# Patient Record
Sex: MALE | Race: WHITE | URBAN - METROPOLITAN AREA
[De-identification: names, ages, dates, MRNs, and addresses within clinical notes are randomized per-mention and may not be internally consistent; named-entity substitution may affect disease eponyms.]

---

## 2017-08-09 ENCOUNTER — OUTPATIENT (OUTPATIENT)
Dept: OUTPATIENT SERVICES | Facility: HOSPITAL | Age: 67
LOS: 1 days | End: 2017-08-09
Payer: COMMERCIAL

## 2017-08-09 VITALS
RESPIRATION RATE: 18 BRPM | TEMPERATURE: 97 F | OXYGEN SATURATION: 99 % | HEIGHT: 66 IN | DIASTOLIC BLOOD PRESSURE: 80 MMHG | WEIGHT: 154.1 LBS | SYSTOLIC BLOOD PRESSURE: 150 MMHG | HEART RATE: 64 BPM

## 2017-08-09 DIAGNOSIS — S83.242A OTHER TEAR OF MEDIAL MENISCUS, CURRENT INJURY, LEFT KNEE, INITIAL ENCOUNTER: ICD-10-CM

## 2017-08-09 DIAGNOSIS — I74.3 EMBOLISM AND THROMBOSIS OF ARTERIES OF THE LOWER EXTREMITIES: Chronic | ICD-10-CM

## 2017-08-09 DIAGNOSIS — I10 ESSENTIAL (PRIMARY) HYPERTENSION: ICD-10-CM

## 2017-08-09 DIAGNOSIS — I73.9 PERIPHERAL VASCULAR DISEASE, UNSPECIFIED: ICD-10-CM

## 2017-08-09 DIAGNOSIS — I73.9 PERIPHERAL VASCULAR DISEASE, UNSPECIFIED: Chronic | ICD-10-CM

## 2017-08-09 DIAGNOSIS — Z01.818 ENCOUNTER FOR OTHER PREPROCEDURAL EXAMINATION: ICD-10-CM

## 2017-08-09 PROCEDURE — G0463: CPT

## 2017-08-09 NOTE — H&P PST ADULT - HISTORY OF PRESENT ILLNESS
67 year old male with h/o HTN, PVD, GERD, HLD, hypothyroidism, COPD- c/o left knee pain 2/2 s/p fall in 05/2017. Pt had orthopedic consult- s/p MRI  revealed medial meniscus tear- scheduled for left knee arthroscopy on08/17/2017

## 2017-08-09 NOTE — H&P PST ADULT - PSH
Femoral popliteal artery thrombus  h/o Fem pop bypass 1997  PVD (peripheral vascular disease)  s/p peripheral stent insertion bilateral lower extemities x 10

## 2017-08-09 NOTE — H&P PST ADULT - PMH
Chronic obstructive pulmonary disease, unspecified COPD type    Eczema    Essential hypertension    Gastroesophageal reflux disease, esophagitis presence not specified    Hyperlipidemia, unspecified hyperlipidemia type    Hypothyroidism    PVD (peripheral vascular disease)

## 2017-08-09 NOTE — H&P PST ADULT - NSANTHOSAYNRD_GEN_A_CORE
No. ETHEL screening performed.  STOP BANG Legend: 0-2 = LOW Risk; 3-4 = INTERMEDIATE Risk; 5-8 = HIGH Risk

## 2017-08-16 RX ORDER — LIDOCAINE HCL 20 MG/ML
0.2 VIAL (ML) INJECTION ONCE
Qty: 0 | Refills: 0 | Status: DISCONTINUED | OUTPATIENT
Start: 2017-08-17 | End: 2017-09-01

## 2017-08-16 RX ORDER — SODIUM CHLORIDE 9 MG/ML
3 INJECTION INTRAMUSCULAR; INTRAVENOUS; SUBCUTANEOUS EVERY 8 HOURS
Qty: 0 | Refills: 0 | Status: DISCONTINUED | OUTPATIENT
Start: 2017-08-17 | End: 2017-09-01

## 2017-08-17 ENCOUNTER — OUTPATIENT (OUTPATIENT)
Dept: OUTPATIENT SERVICES | Facility: HOSPITAL | Age: 67
LOS: 1 days | End: 2017-08-17
Payer: COMMERCIAL

## 2017-08-17 VITALS
DIASTOLIC BLOOD PRESSURE: 76 MMHG | RESPIRATION RATE: 16 BRPM | OXYGEN SATURATION: 99 % | TEMPERATURE: 97 F | SYSTOLIC BLOOD PRESSURE: 127 MMHG | HEART RATE: 66 BPM

## 2017-08-17 VITALS — HEIGHT: 65.98 IN | WEIGHT: 154.1 LBS

## 2017-08-17 DIAGNOSIS — I74.3 EMBOLISM AND THROMBOSIS OF ARTERIES OF THE LOWER EXTREMITIES: Chronic | ICD-10-CM

## 2017-08-17 DIAGNOSIS — I73.9 PERIPHERAL VASCULAR DISEASE, UNSPECIFIED: Chronic | ICD-10-CM

## 2017-08-17 DIAGNOSIS — S83.242A OTHER TEAR OF MEDIAL MENISCUS, CURRENT INJURY, LEFT KNEE, INITIAL ENCOUNTER: ICD-10-CM

## 2017-08-17 PROCEDURE — 29881 ARTHRS KNE SRG MNISECTMY M/L: CPT | Mod: LT

## 2017-08-17 RX ORDER — ONDANSETRON 8 MG/1
4 TABLET, FILM COATED ORAL ONCE
Qty: 0 | Refills: 0 | Status: DISCONTINUED | OUTPATIENT
Start: 2017-08-17 | End: 2017-09-01

## 2017-08-17 RX ORDER — ASPIRIN/CALCIUM CARB/MAGNESIUM 324 MG
1 TABLET ORAL
Qty: 0 | Refills: 0 | DISCHARGE
Start: 2017-08-17

## 2017-08-17 RX ORDER — ACETAMINOPHEN 500 MG
1000 TABLET ORAL ONCE
Qty: 0 | Refills: 0 | Status: DISCONTINUED | OUTPATIENT
Start: 2017-08-17 | End: 2017-09-01

## 2017-08-17 RX ORDER — SODIUM CHLORIDE 9 MG/ML
1000 INJECTION, SOLUTION INTRAVENOUS
Qty: 0 | Refills: 0 | Status: DISCONTINUED | OUTPATIENT
Start: 2017-08-17 | End: 2017-09-01

## 2017-08-17 NOTE — ASU PATIENT PROFILE, ADULT - VISION (WITH CORRECTIVE LENSES IF THE PATIENT USUALLY WEARS THEM):
To locker/Partially impaired: cannot see medication labels or newsprint, but can see obstacles in path, and the surrounding layout; can count fingers at arm's length

## 2017-08-17 NOTE — ASU DISCHARGE PLAN (ADULT/PEDIATRIC). - MEDICATION SUMMARY - MEDICATIONS TO TAKE
I will START or STAY ON the medications listed below when I get home from the hospital:    aspirin 325 mg oral delayed release tablet  -- 1 tab(s) by mouth once a day for 14 days then resume homne aspirin 81mg regimen  -- Indication: For dvt ppx    valsartan 160 mg oral tablet  -- 1 tab(s) by mouth once a day  -- Indication: For hypertension    rosuvastatin 20 mg oral tablet  -- 1 tab(s) by mouth once a day (at bedtime)  -- Indication: For hld    Plavix 75 mg oral tablet  -- 1 tab(s) by mouth once a day  -- Indication: For antiplATELET    furosemide 20 mg oral tablet  -- 1 tab(s) by mouth once a day  -- Indication: For hypertension    omeprazole 20 mg oral delayed release capsule  -- 1 cap(s) by mouth once a day  -- Indication: For ulcer ppx    Synthroid 100 mcg (0.1 mg) oral tablet  -- 1 tab(s) by mouth once a day  -- Indication: For hypothyroid    Calcium 600+D 600 mg-200 intl units oral tablet  --  by mouth once a day  -- Indication: For supplement    Centrum Silver oral tablet  -- 1 tab(s) by mouth once a day  -- Indication: For supplement    Vitamin D3 1000 intl units oral capsule  -- 1 cap(s) by mouth once a day  -- Indication: For supplement

## 2017-08-17 NOTE — ASU DISCHARGE PLAN (ADULT/PEDIATRIC). - NOTIFY
Fever greater than 101/Numbness, color, or temperature change to extremity/Pain not relieved by Medications/Swelling that continues/Bleeding that does not stop/Persistent Nausea and Vomiting

## 2017-08-17 NOTE — ASU DISCHARGE PLAN (ADULT/PEDIATRIC). - MEDICATION SUMMARY - MEDICATIONS TO CHANGE
I will SWITCH the dose or number of times a day I take the medications listed below when I get home from the hospital:    Aspirin Low Dose 81 mg oral delayed release tablet  -- 1 tab(s) by mouth once a day

## 2017-10-24 PROBLEM — J44.9 CHRONIC OBSTRUCTIVE PULMONARY DISEASE, UNSPECIFIED: Chronic | Status: ACTIVE | Noted: 2017-08-09

## 2017-10-24 PROBLEM — K21.9 GASTRO-ESOPHAGEAL REFLUX DISEASE WITHOUT ESOPHAGITIS: Chronic | Status: ACTIVE | Noted: 2017-08-09

## 2017-10-24 PROBLEM — S83.249A OTHER TEAR OF MEDIAL MENISCUS, CURRENT INJURY, UNSPECIFIED KNEE, INITIAL ENCOUNTER: Chronic | Status: ACTIVE | Noted: 2017-08-09

## 2017-10-24 PROBLEM — E78.5 HYPERLIPIDEMIA, UNSPECIFIED: Chronic | Status: ACTIVE | Noted: 2017-08-09

## 2017-10-24 PROBLEM — L30.9 DERMATITIS, UNSPECIFIED: Chronic | Status: ACTIVE | Noted: 2017-08-09

## 2017-10-24 PROBLEM — I73.9 PERIPHERAL VASCULAR DISEASE, UNSPECIFIED: Chronic | Status: ACTIVE | Noted: 2017-08-09

## 2017-10-24 PROBLEM — I10 ESSENTIAL (PRIMARY) HYPERTENSION: Chronic | Status: ACTIVE | Noted: 2017-08-09

## 2017-10-24 PROBLEM — E03.9 HYPOTHYROIDISM, UNSPECIFIED: Chronic | Status: ACTIVE | Noted: 2017-08-09

## 2017-10-30 PROBLEM — M25.562 CHRONIC PAIN OF LEFT KNEE: Status: ACTIVE | Noted: 2017-10-30

## 2017-10-31 ENCOUNTER — APPOINTMENT (OUTPATIENT)
Dept: ORTHOPEDIC SURGERY | Facility: CLINIC | Age: 67
End: 2017-10-31
Payer: MEDICARE

## 2017-10-31 VITALS
BODY MASS INDEX: 24.33 KG/M2 | SYSTOLIC BLOOD PRESSURE: 159 MMHG | WEIGHT: 155 LBS | HEART RATE: 64 BPM | HEIGHT: 67 IN | DIASTOLIC BLOOD PRESSURE: 79 MMHG

## 2017-10-31 DIAGNOSIS — M25.562 PAIN IN LEFT KNEE: ICD-10-CM

## 2017-10-31 DIAGNOSIS — G89.29 PAIN IN LEFT KNEE: ICD-10-CM

## 2017-10-31 PROCEDURE — 99204 OFFICE O/P NEW MOD 45 MIN: CPT

## 2017-10-31 PROCEDURE — 73564 X-RAY EXAM KNEE 4 OR MORE: CPT | Mod: LT

## 2017-10-31 RX ORDER — LEVOTHYROXINE SODIUM 0.11 MG/1
112 TABLET ORAL
Qty: 90 | Refills: 0 | Status: ACTIVE | COMMUNITY
Start: 2016-06-07

## 2017-10-31 RX ORDER — FUROSEMIDE 20 MG/1
20 TABLET ORAL
Qty: 180 | Refills: 0 | Status: ACTIVE | COMMUNITY
Start: 2016-12-01

## 2017-10-31 RX ORDER — CLOPIDOGREL BISULFATE 75 MG/1
75 TABLET, FILM COATED ORAL
Qty: 90 | Refills: 0 | Status: ACTIVE | COMMUNITY
Start: 2017-05-15

## 2017-12-26 ENCOUNTER — APPOINTMENT (OUTPATIENT)
Dept: ORTHOPEDIC SURGERY | Facility: CLINIC | Age: 67
End: 2017-12-26
Payer: MEDICARE

## 2017-12-26 VITALS
BODY MASS INDEX: 27.32 KG/M2 | SYSTOLIC BLOOD PRESSURE: 143 MMHG | HEIGHT: 66 IN | DIASTOLIC BLOOD PRESSURE: 59 MMHG | HEART RATE: 70 BPM | WEIGHT: 170 LBS

## 2017-12-26 PROCEDURE — 99213 OFFICE O/P EST LOW 20 MIN: CPT

## 2018-07-09 PROBLEM — M25.552 LEFT HIP PAIN: Status: ACTIVE | Noted: 2018-07-09

## 2018-07-10 ENCOUNTER — APPOINTMENT (OUTPATIENT)
Dept: ORTHOPEDIC SURGERY | Facility: CLINIC | Age: 68
End: 2018-07-10
Payer: MEDICARE

## 2018-07-10 VITALS
WEIGHT: 161 LBS | DIASTOLIC BLOOD PRESSURE: 74 MMHG | HEIGHT: 66 IN | SYSTOLIC BLOOD PRESSURE: 138 MMHG | BODY MASS INDEX: 25.88 KG/M2 | HEART RATE: 74 BPM

## 2018-07-10 DIAGNOSIS — M25.552 PAIN IN LEFT HIP: ICD-10-CM

## 2018-07-10 PROCEDURE — 73564 X-RAY EXAM KNEE 4 OR MORE: CPT | Mod: LT

## 2018-07-10 PROCEDURE — 99214 OFFICE O/P EST MOD 30 MIN: CPT

## 2018-07-10 PROCEDURE — 73502 X-RAY EXAM HIP UNI 2-3 VIEWS: CPT | Mod: LT

## 2018-10-11 ENCOUNTER — APPOINTMENT (OUTPATIENT)
Dept: OTOLARYNGOLOGY | Facility: CLINIC | Age: 68
End: 2018-10-11

## 2018-11-06 ENCOUNTER — APPOINTMENT (OUTPATIENT)
Dept: ORTHOPEDIC SURGERY | Facility: CLINIC | Age: 68
End: 2018-11-06

## 2019-06-12 ENCOUNTER — APPOINTMENT (OUTPATIENT)
Dept: ORTHOPEDIC SURGERY | Facility: CLINIC | Age: 69
End: 2019-06-12
Payer: MEDICARE

## 2019-06-12 VITALS
HEIGHT: 66 IN | WEIGHT: 160 LBS | SYSTOLIC BLOOD PRESSURE: 126 MMHG | DIASTOLIC BLOOD PRESSURE: 75 MMHG | BODY MASS INDEX: 25.71 KG/M2 | HEART RATE: 71 BPM

## 2019-06-12 PROCEDURE — 99214 OFFICE O/P EST MOD 30 MIN: CPT

## 2019-06-12 PROCEDURE — 73564 X-RAY EXAM KNEE 4 OR MORE: CPT | Mod: LT

## 2019-06-12 NOTE — HISTORY OF PRESENT ILLNESS
[de-identified] : This is very nice 68-year-old gentleman experiencing left knee pain, which is severe in intensity. I have previously diagnosed him with L knee osteoarthritis.  He is here for a reevaluation of his pain.  He previously underwent a left knee arthroscopically by Dr. Hartmann 8/17/17 and had a severe increase in knee pain after the scope which improved with rest and time. We have been managing the pain well nonoperatively with HEP and NSAIDs. The pain substantially limits activities of daily living. Walking tolerance is reduced. Medication and activity modification have been minimally effective for a period lasting greater than three months in duration.  He uses a neoprene sleeve knee brace which does help.  Physical therapy has been helpful in the past.  He is planning on starting physical therapy again tomorrow.

## 2019-06-12 NOTE — PHYSICAL EXAM
[de-identified] : Patient is well nourished, well-developed, in no acute distress, with appropriate mood and affect. The patient is oriented to time, place, and person. Respirations are even and unlabored. Gait evaluation does reveal a limp. There is no inguinal adenopathy. Examination of the contralateral knee shows normal range of motion, strength, no tenderness, and intact skin. The affected limb is well-perfused, without skin lesions, shows a grossly normal motor and sensory examination. Knee motion is significantly reduced and does cause significant pain. The left knee moves from 0-125 degrees. The knee is stable within that range-of-motion to AP and ML stress. The alignment of the knee is 5 degrees varus. Muscle strength is normal. Pedal pulses are palpable. Hip examination was negative. TTP about distal femur medial scar from vascular surgery with sensitivity to light touch. [de-identified] : Long standing knee, AP knee, lateral knee, and patellar views of the left knee were ordered and taken in the office and demonstrate degenerative joint disease of the knee with joint space narrowing, osteophyte formation, and subchondral sclerosis.\par

## 2019-06-12 NOTE — REASON FOR VISIT
[Follow-Up Visit] : a follow-up visit for [Knee Pain] : knee pain [Osteoarthritis, Knee] : osteoarthritis, knee

## 2019-06-12 NOTE — DISCUSSION/SUMMARY
[de-identified] : The patient has left knee osteoarthritis.   Continue HEP and starting physical therapy tomorrow. Continue with brace.  Ice. Follow up in 3-6 months.  Prescribed voltaren gel to be applied to painful area.

## 2019-06-12 NOTE — REVIEW OF SYSTEMS
[Joint Pain] : joint pain [Arthralgia] : arthralgia [Joint Stiffness] : joint stiffness [Negative] : Heme/Lymph

## 2019-08-13 PROBLEM — M25.561 CHRONIC PAIN OF RIGHT KNEE: Status: ACTIVE | Noted: 2019-08-13

## 2019-08-14 ENCOUNTER — APPOINTMENT (OUTPATIENT)
Dept: ORTHOPEDIC SURGERY | Facility: CLINIC | Age: 69
End: 2019-08-14
Payer: MEDICARE

## 2019-08-14 DIAGNOSIS — G89.29 PAIN IN RIGHT KNEE: ICD-10-CM

## 2019-08-14 DIAGNOSIS — M25.561 PAIN IN RIGHT KNEE: ICD-10-CM

## 2019-08-14 DIAGNOSIS — M79.671 PAIN IN RIGHT FOOT: ICD-10-CM

## 2019-08-14 DIAGNOSIS — M25.571 PAIN IN RIGHT ANKLE AND JOINTS OF RIGHT FOOT: ICD-10-CM

## 2019-08-14 PROCEDURE — 73564 X-RAY EXAM KNEE 4 OR MORE: CPT | Mod: RT

## 2019-08-14 PROCEDURE — 73630 X-RAY EXAM OF FOOT: CPT | Mod: RT

## 2019-08-14 PROCEDURE — 73610 X-RAY EXAM OF ANKLE: CPT | Mod: RT

## 2019-08-14 PROCEDURE — 99215 OFFICE O/P EST HI 40 MIN: CPT
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
## 2019-08-14 NOTE — DISCUSSION/SUMMARY
[de-identified] : This patient has acute right lower extremity pain secondary to trauma.  He does have prepatellar bursitis.  He is noted that regarding this he should monitor the wound make sure that it does not become erythematous and he does not develop fever chills which would require return to the office for possible aspiration to rule out infection.  He should use a warm compress for this.  As well as an Ace wrap.  For the right lower extremity should ice and elevate the leg.  You should take anti-inflammatories for pain.  Weightbearing as tolerated.  Follow-up was recommended in 2 to 3 weeks.

## 2019-08-14 NOTE — PHYSICAL EXAM
[de-identified] : Long standing knee, AP knee, lateral knee, and patellar views of the right knee were ordered and taken in the office and demonstrate no evidence of degenerative joint disease of the knee, fractures, intra-articular pathology.\par AP and lateral radiographs of the right tibia and fibula were ordered and taken in the office and imaging no evidence of acute fracture or dislocation.\par AP and lateral and mortise radiographs of the right ankle as well as AP and oblique and lateral radiographs of the right foot were ordered and obtained in the office and demonstrate no evidence of acute displaced fracture or dislocation. [de-identified] : Patient is well nourished, well-developed, in no acute distress, with appropriate mood and affect. The patient is oriented to time, place, and person. Respirations are even and unlabored. Gait evaluation does reveal a limp. There is no inguinal adenopathy. Examination of the contralateral knee shows normal range of motion, strength, no tenderness, and intact skin. The affected limb is well-perfused, without skin lesions, shows a grossly normal motor and sensory examination.  2+ prepatellar bursitis is noted in the right knee without erythema.  Knee motion is significantly reduced and does cause significant pain. The right knee moves from 0-125 degrees. The knee is stable within that range-of-motion to AP and ML stress. The alignment of the knee is 5 degrees varus. Muscle strength is normal. Pedal pulses are palpable. Hip examination was negative.  He has 2+ peripheral edema below the knee to the foot.  There is some bruising about the tibia and foot.  Compartments are soft.  Tender to palpation all about the tibia and fibula all the way down through the ankle and foot.  10 degrees dorsiflexion and 20 degrees plantarflexion noted.\par

## 2019-09-04 ENCOUNTER — APPOINTMENT (OUTPATIENT)
Dept: ORTHOPEDIC SURGERY | Facility: CLINIC | Age: 69
End: 2019-09-04
Payer: MEDICARE

## 2019-09-04 VITALS — WEIGHT: 168 LBS | HEIGHT: 66 IN | BODY MASS INDEX: 27 KG/M2

## 2019-09-04 DIAGNOSIS — M70.41 PREPATELLAR BURSITIS, RIGHT KNEE: ICD-10-CM

## 2019-09-04 DIAGNOSIS — M25.561 PAIN IN RIGHT KNEE: ICD-10-CM

## 2019-09-04 PROCEDURE — 99213 OFFICE O/P EST LOW 20 MIN: CPT
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
## 2019-09-04 NOTE — PHYSICAL EXAM
[de-identified] : Patient is well nourished, well-developed, in no acute distress, with appropriate mood and affect. The patient is oriented to time, place, and person. Respirations are even and unlabored. Gait evaluation does reveal a limp. There is no inguinal adenopathy. Examination of the contralateral knee shows normal range of motion, strength, no tenderness, and intact skin. The affected limb is well-perfused, without skin lesions, shows a grossly normal motor and sensory examination.  1+ prepatellar bursitis is noted in the right knee without erythema.  Knee motion is significantly reduced and does cause significant pain. The right knee moves from 0-135 degrees. The knee is stable within that range-of-motion to AP and ML stress. The alignment of the knee is 5 degrees varus. Muscle strength is normal. Pedal pulses are palpable. Hip examination was negative.  He has 1+ peripheral edema below the knee to the foot.  Resolved ecchymosis.  Compartments are soft.  No longer tender to palpation about the tibia or fibula.\par

## 2019-11-26 ENCOUNTER — APPOINTMENT (OUTPATIENT)
Dept: ORTHOPEDIC SURGERY | Facility: CLINIC | Age: 69
End: 2019-11-26
Payer: MEDICARE

## 2019-11-26 DIAGNOSIS — S46.111A STRAIN OF MUSCLE, FASCIA AND TENDON OF LONG HEAD OF BICEPS, RIGHT ARM, INITIAL ENCOUNTER: ICD-10-CM

## 2019-11-26 PROCEDURE — 73030 X-RAY EXAM OF SHOULDER: CPT | Mod: RT

## 2019-11-26 PROCEDURE — 99203 OFFICE O/P NEW LOW 30 MIN: CPT

## 2019-11-27 PROBLEM — S46.111A RUPTURE OF LONG HEAD BICEPS TENDON, RIGHT, INITIAL ENCOUNTER: Status: ACTIVE | Noted: 2019-11-27
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
## 2019-11-27 NOTE — ADDENDUM
[FreeTextEntry1] : This note was written by Lindsay Holden on 11/26/2019 acting solely as a scribe for Dr. Alexy Sapnn.\par \par All medical record entries made by the Scribe were at my, Dr. Alexy Spann, direction and personally dictated by me on 11/26/2019. I have personally reviewed the chart and agree that the record accurately reflects my personal performance of the history, physical exam, assessment and plan.\par

## 2019-11-27 NOTE — PHYSICAL EXAM
[de-identified] : General: well-appearing, not in acute distress and not obese. \par Gait: normal. \par Oriented to time, place, person\par Mood: Normal\par Affect: Normal\par Assistive Devices: Sling\par \par Right upper extremity exam\par \par Inspection: No malalignment, No defects, No atrophy. Significant midarm swelling with ecchymosis to forearm. Possible early trey deformity\par Skin: No masses, No lesions\par Neck: Negative Spurling's, full ROM, no pain with ROM\par Elbow: limited ROM given swelling\par AROM: Full shoulder ROM\par Painful arc ROM: Pain with terminal elbow ROM\par Tenderness: Severe bicipital tenderness to the midarm, no tenderness to the greater tuberosity/RTC insertion, no anterior shoulder/lesser tuberosity tenderness\par Strength: 5/5 ER, 5/5 IR in adduction\par AC Joint: No ttp/pain with cross arm testing\par Biceps: Speed + Yergusons +\par Impingement test: Negative Spencer, Negative Neer \par Stability: Stable\par Vasc: 2+ radial pulse\par Neuro: AIN, PIN, Ulnar nerve intact to motor\par Sensation: Intact to light touch throughout [de-identified] : The following radiographs were ordered and read by me during this patients visit. I reviewed each radiograph in detail with the patient and discussed the findings as highlighted below. \par \par 3 views of the right shoulder were obtained today 11/26/2019  that show no acute fracture or dislocation. There is no glenohumeral and no AC joint degenerative change seen. Type II acromion. There is no significant malalignment. No significant other obvious osseous abnormality, otherwise unremarkable\par

## 2019-11-27 NOTE — DISCUSSION/SUMMARY
[de-identified] : 69 year old male presents with right LHB tendon rupture\par \par The patient presents with symptoms that may be consistent with a long head biceps tendon rupture. Patient has ecchymosis from mid arm distally consistent with injury in this location. Patient has significant tenderness through the biceps tendon and biceps musculature within the arm. Discussion was had with the patient regarding possible long-term cosmetic deformity to the biceps tendon, with minimal interruption of function. Patient is currently limited with elbow range of motion given current pain and swelling/ecchymosis given anticoagulation.\par \par Recommendations: Conservative modalities as above (overhead activity rest/activity avoidance until less symptomatic, ice, NSAIDs, home exercise strengthening and stretching program). Sling as needed ~1wk.\par \par Begin a trial of PT. Rx given. \par  \par Followup: 4-6 weeks\par

## 2019-12-17 ENCOUNTER — APPOINTMENT (OUTPATIENT)
Dept: ORTHOPEDIC SURGERY | Facility: CLINIC | Age: 69
End: 2019-12-17
Payer: MEDICARE

## 2019-12-17 DIAGNOSIS — S46.111D STRAIN OF MUSCLE, FASCIA AND TENDON OF LONG HEAD OF BICEPS, RIGHT ARM, SUBSEQUENT ENCOUNTER: ICD-10-CM

## 2019-12-17 PROCEDURE — 99213 OFFICE O/P EST LOW 20 MIN: CPT

## 2019-12-17 NOTE — ADDENDUM
[FreeTextEntry1] : This note was written by Lindsay Holden on 12/17/2019 acting solely as a scribe for Dr. Alexy Spann.\par \par All medical record entries made by the Scribe were at my, Dr. Alexy Spann, direction and personally dictated by me on 12/17/2019. I have personally reviewed the chart and agree that the record accurately reflects my personal performance of the history, physical exam, assessment and plan.\par

## 2019-12-17 NOTE — PHYSICAL EXAM
[de-identified] : General: well-appearing, not in acute distress and not obese. \par Gait: normal. \par Oriented to time, place, person\par Mood: Normal\par Affect: Normal\par Assistive Devices: Sling\par \par Right upper extremity exam\par \par Inspection: No malalignment, No defects, No atrophy. No ecchymosis, mild Dante deformity\par Skin: No masses, No lesions\par Neck: Negative Spurling's, full ROM, no pain with ROM\par Elbow: full ROM\par AROM: , ABD 90, 80 ER, IR to upper lumbar\par Painful arc ROM: Pain with terminal elbow ROM\par Tenderness: mild bicipital tenderness to the midarm, no tenderness to the greater tuberosity/RTC insertion, no anterior shoulder/lesser tuberosity tenderness\par Strength: 5/5 ER, 5/5 IR in adduction, 4.5/5 SS\par AC Joint: No ttp/pain with cross arm testing\par Biceps: Speed + Yergusons +\par Impingement test: Negative Spencer, Negative Neer \par Stability: Stable\par Vasc: 2+ radial pulse\par Neuro: AIN, PIN, Ulnar nerve intact to motor\par Sensation: Intact to light touch throughout [de-identified] : 3 views of the right shoulder were obtained 11/26/2019  that show no acute fracture or dislocation. There is no glenohumeral and no AC joint degenerative change seen. Type II acromion. There is no significant malalignment. No significant other obvious osseous abnormality, otherwise unremarkable\par

## 2019-12-17 NOTE — REASON FOR VISIT
[FreeTextEntry2] : Right arm injury  [Initial Visit] : an initial visit for [Follow-Up Visit] : a follow-up visit for

## 2019-12-17 NOTE — HISTORY OF PRESENT ILLNESS
[de-identified] : 69 year old male presents today for follow up of right LHB tendon rupture sustained 11/23/19. He is doing PT 3 x per week. Reports improvement of pain and function since last visit. Rate pain 4/10  and takes NSAIDs as needed. He is  able to do his ADLs with minimal pain. No current issues. Reports small deformity to the proximal arm.

## 2019-12-17 NOTE — DISCUSSION/SUMMARY
[de-identified] : 69 year old male presents with right LHB tendon rupture\par \par The patient presents with symptoms that may are consistent with a long head biceps tendon rupture. Discussion was had with the patient regarding long-term cosmetic deformity to the biceps tendon, with minimal interruption of function. Patient has significant improvement of range of motion and function of the upper extremity with resolution of the swelling seen at the last visit. Patient progressing well with physical therapy. No evidence of rotator cuff injury or dysfunction at today's visit.\par \par Recommendations: Conservative modalities as above (overhead activity rest/activity avoidance until less symptomatic, ice, NSAIDs, home exercise strengthening and stretching program). Continue PT.\par  \par Followup: PRN

## 2020-07-06 ENCOUNTER — APPOINTMENT (OUTPATIENT)
Dept: ORTHOPEDIC SURGERY | Facility: CLINIC | Age: 70
End: 2020-07-06

## 2020-07-07 ENCOUNTER — APPOINTMENT (OUTPATIENT)
Dept: ORTHOPEDIC SURGERY | Facility: CLINIC | Age: 70
End: 2020-07-07

## 2020-08-14 ENCOUNTER — APPOINTMENT (OUTPATIENT)
Dept: ORTHOPEDIC SURGERY | Facility: CLINIC | Age: 70
End: 2020-08-14
Payer: MEDICARE

## 2020-08-14 VITALS
SYSTOLIC BLOOD PRESSURE: 145 MMHG | WEIGHT: 155 LBS | DIASTOLIC BLOOD PRESSURE: 61 MMHG | HEIGHT: 66 IN | HEART RATE: 62 BPM | BODY MASS INDEX: 24.91 KG/M2

## 2020-08-14 DIAGNOSIS — M19.041 PRIMARY OSTEOARTHRITIS, RIGHT HAND: ICD-10-CM

## 2020-08-14 PROCEDURE — 99214 OFFICE O/P EST MOD 30 MIN: CPT

## 2020-08-14 PROCEDURE — 73130 X-RAY EXAM OF HAND: CPT | Mod: RT
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
## 2020-08-14 NOTE — PHYSICAL EXAM
[Poor Appearance] : well-appearing [Rad] : radial 2+ and symmetric bilaterally [Normal] : Alert and in no acute distress [Acute Distress] : not in acute distress [Obese] : not obese [de-identified] : The patient has no respiratory distress. Mood and affect are normal. The patient is alert and oriented to person, place and time.\par Examination of the cervical spine demonstrates no tenderness, no deformity and no muscle spasm. Cervical spine rotation is 60° to the right, 60° to the left, 75° of extension and 45° of flexion. Neurologic exam of the upper extremities reveals intact sensation to light touch. Motor function is 5 over 5 in all groups. Deep tendon reflexes are 2+ and equal at the biceps, triceps and brachioradialis.\par Examination of the right shoulder demonstrates no swelling, no deformity and no tenderness. The shoulder is stable. Drop arm test is negative. Hampshire test is negative. Liftoff test is negative. Motor strength is 5 over 5 in all groups. Range of motion is full and identical to that of the left shoulder. Flexion is 160°, abduction 160°, external rotation 45° and internal rotation to the lower thoracic level.\par There is no pain with motion of the elbows.  There is no pain with motion of the wrist.  Examination of the right hand demonstrates tenderness at the MCP joint to the right thumb.  There is lesser tenderness at the CMC joint.  The other fingers are nontender.  Tendon function is intact.  There is no triggering.  Tinel signs are negative.  The skin is intact.  There is no lymphedema. [de-identified] : AP, lateral and oblique x-rays of the right hand demonstrate no fracture or dislocation.  There are degenerative changes at the thumb MCP joint and CMC joint.

## 2020-08-14 NOTE — DISCUSSION/SUMMARY
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  I have discussed the pathology, natural history and treatment options with him.  Currently he feels his pain is controlled with over-the-counter medication.  If that stops being the case we can try other medication.  He will return as needed.

## 2020-10-23 ENCOUNTER — APPOINTMENT (OUTPATIENT)
Dept: NEUROSURGERY | Facility: CLINIC | Age: 70
End: 2020-10-23
Payer: MEDICARE

## 2020-10-23 VITALS
BODY MASS INDEX: 25.71 KG/M2 | SYSTOLIC BLOOD PRESSURE: 191 MMHG | HEIGHT: 66 IN | TEMPERATURE: 98.2 F | OXYGEN SATURATION: 98 % | WEIGHT: 160 LBS | DIASTOLIC BLOOD PRESSURE: 72 MMHG | HEART RATE: 62 BPM

## 2020-10-23 DIAGNOSIS — Z78.9 OTHER SPECIFIED HEALTH STATUS: ICD-10-CM

## 2020-10-23 DIAGNOSIS — I67.1 CEREBRAL ANEURYSM, NONRUPTURED: ICD-10-CM

## 2020-10-23 DIAGNOSIS — Z87.09 PERSONAL HISTORY OF OTHER DISEASES OF THE RESPIRATORY SYSTEM: ICD-10-CM

## 2020-10-23 DIAGNOSIS — Z86.39 PERSONAL HISTORY OF OTHER ENDOCRINE, NUTRITIONAL AND METABOLIC DISEASE: ICD-10-CM

## 2020-10-23 DIAGNOSIS — Z97.4 PRESENCE OF EXTERNAL HEARING-AID: ICD-10-CM

## 2020-10-23 DIAGNOSIS — Z86.79 PERSONAL HISTORY OF OTHER DISEASES OF THE CIRCULATORY SYSTEM: ICD-10-CM

## 2020-10-23 DIAGNOSIS — Z87.891 PERSONAL HISTORY OF NICOTINE DEPENDENCE: ICD-10-CM

## 2020-10-23 PROCEDURE — 99214 OFFICE O/P EST MOD 30 MIN: CPT

## 2020-10-23 PROCEDURE — 99204 OFFICE O/P NEW MOD 45 MIN: CPT

## 2020-10-23 RX ORDER — LISINOPRIL 30 MG/1
TABLET ORAL
Refills: 0 | Status: ACTIVE | COMMUNITY

## 2020-10-23 RX ORDER — ROSUVASTATIN CALCIUM 20 MG/1
20 TABLET, FILM COATED ORAL
Refills: 0 | Status: ACTIVE | COMMUNITY

## 2020-10-23 RX ORDER — VITAMIN B COMPLEX
TABLET ORAL
Refills: 0 | Status: ACTIVE | COMMUNITY

## 2020-10-23 RX ORDER — GLUCOSAMINE/CHONDR SU A SOD 750-600 MG
TABLET ORAL
Refills: 0 | Status: ACTIVE | COMMUNITY

## 2020-10-23 RX ORDER — MULTIVIT-MIN/FA/LYCOPEN/LUTEIN .4-300-25
TABLET ORAL
Refills: 0 | Status: ACTIVE | COMMUNITY

## 2020-10-23 NOTE — REASON FOR VISIT
[Consultation] : a consultation visit [Referred By: _________] : Patient was referred by CRISTIAN [Spouse] : spouse

## 2020-10-27 PROBLEM — Z86.79 HISTORY OF CORONARY ARTERY DISEASE: Status: RESOLVED | Noted: 2020-10-23 | Resolved: 2020-10-27

## 2020-10-27 PROBLEM — Z86.39 HISTORY OF HIGH CHOLESTEROL: Status: RESOLVED | Noted: 2017-10-31 | Resolved: 2020-10-27

## 2020-10-27 PROBLEM — Z78.9 EXERCISES OCCASIONALLY: Status: ACTIVE | Noted: 2017-10-31

## 2020-10-27 PROBLEM — I67.1 ANEURYSM, CEREBRAL, NONRUPTURED: Status: ACTIVE | Noted: 2020-10-23

## 2020-10-27 PROBLEM — Z87.891 FORMER SMOKER: Status: ACTIVE | Noted: 2017-10-31

## 2020-10-27 PROBLEM — Z78.9 DENIES ALCOHOL CONSUMPTION: Status: ACTIVE | Noted: 2017-10-31

## 2020-10-27 PROBLEM — Z87.09 HISTORY OF CHRONIC OBSTRUCTIVE LUNG DISEASE: Status: RESOLVED | Noted: 2017-10-31 | Resolved: 2020-10-27

## 2020-10-27 PROBLEM — Z78.9 DOES NOT USE ILLICIT DRUGS: Status: ACTIVE | Noted: 2017-10-31

## 2020-10-27 NOTE — PHYSICAL EXAM
[Person] : oriented to person [Place] : oriented to place [Time] : oriented to time [Concentration Intact] : normal concentrating ability [Fluency] : fluency intact [Comprehension] : comprehension intact [Cranial Nerves Optic (II)] : visual acuity intact bilaterally,  visual fields full to confrontation, pupils equal round and reactive to light [Cranial Nerves Oculomotor (III)] : extraocular motion intact [Cranial Nerves Trigeminal (V)] : facial sensation intact symmetrically [Cranial Nerves Facial (VII)] : face symmetrical [Cranial Nerves Vestibulocochlear (VIII)] : hearing was intact bilaterally [Cranial Nerves Glossopharyngeal (IX)] : tongue and palate midline [Cranial Nerves Accessory (XI - Cranial And Spinal)] : head turning and shoulder shrug symmetric [Cranial Nerves Hypoglossal (XII)] : there was no tongue deviation with protrusion [Motor Tone] : muscle tone was normal in all four extremities [Motor Handedness Right-Handed] : the patient is right hand dominant [Sensation Tactile Decrease] : light touch was intact

## 2020-10-27 NOTE — CONSULT LETTER
[Dear  ___] : Dear  [unfilled], [Consult Letter:] : I had the pleasure of evaluating your patient, [unfilled]. [Please see my note below.] : Please see my note below. [Consult Closing:] : Thank you very much for allowing me to participate in the care of this patient.  If you have any questions, please do not hesitate to contact me. [Sincerely,] : Sincerely, [FreeTextEntry1] : This is a very pleasant 69y/o right handed dominant gentleman with multiple vascular risks factors. I have discuss recent MR angiography findings with you and concur that patient will benefit from formal evaluation with cerebral angiography given incidental left posterior communicating artery aneurysm vs infundibular dilation. Will make pertinent arrangements to schedule angiogram and will discuss results with you.  [FreeTextEntry3] : Megan Carey MD\par Endovascular Neurologist \par Neurointerventional Surgery \par [address] [place] [address]\par Tel. [phone]

## 2020-10-27 NOTE — REASON FOR VISIT
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

## 2020-10-27 NOTE — HISTORY OF PRESENT ILLNESS
[FreeTextEntry1] : Mr. Oconnor is a 69 yo right handed male referred by Dr. Sierra with PMH of COPD, HTN, HLD, [place] with hearing aids,  left fem pop bypass (), presents to the office for a neurointerventional consultation for an incidental cerebral aneurysm seen on recent MRI head was negative for tumor. 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 \par Positive family history of aneurysms- mother had a SAH from a ruptured brain aneurysm in  in her early 50's. She then had elective coil embolization of remaining aneurysms in  and subsequently  from another medical condition. \par \par Pt takes Plavix 75 mg daily. \par Former smoker- 4 packs, quit 10 years ago. \par \par \par  \par

## 2020-10-27 NOTE — ASSESSMENT
[FreeTextEntry1] : Impression: Unruptured cerebral aneurysm \par \par Patient is amenable and wishes to proceed with a  diagnostic cerebral angiography \par PST/COVID\par Patient will obtain medical and cardiac optimization.\par Patient knows to call the office if there are any new or worsening symptoms.\par

## 2020-11-11 NOTE — ASSESSMENT
[FreeTextEntry1] : 2020\par \par Edy Acharya MD\par [facility]\par [address] [place] [address]\[place] [address]\par \par Re:	Alvin Oconnor\par :	1950\par \par Dear Dr. Acharya:\par \par Thank you for sending me Alvin Oconnor in neurosurgical consultation for evaluation of his left posterior communicating artery aneurysm.  He is a 70-year-old right-handed male who on 2020, awoke with dizziness.  He has had this for one month.  He has obtained physical therapy, and he now has balance issues and when walking outdoors needs a cane.  His MRI is grossly negative.  He is being treated with antibiotics for a middle ear infection.  MRA shows an irregular, multilobulated, slightly larger than 3 mm posterior communicating artery aneurysm on the left.  \par \par The patient’s past medical history is positive for COPD, emphysema, hypothyroidism, hypertension, vascular disease, and hypercholesterolemia.  Past surgical history is positive for a left leg vascular bypass 20 years ago, and he has 9 stents in his legs.  He has no allergies.  His social history is that he has smoked for 40 years and at one point was smoking 4 packs per day.  He has a positive family history.  His mother had 2 aneurysms and had a subarachnoid hemorrhage, and he is on a variety of medications including Plavix and Synthroid.  He has a negative neurologic exam.\par \par IMPRESSION: Given the morphology, size, and location of the aneurysm coupled with his smoking and family history, he is a candidate for definitive formal angiography to delineate this lesion and to make a determination whether it should be treated, either open or endovascular.  I am arranging for outpatient angiography with Dr. Carey here at [facility].  I will keep you posted, and I thank you for the confidence and courtesy of this referral as always.\par \par Thank you again,\par \par \par \par Sacha Sierra M.D., F.A.C.S.\par [facility]\par

## 2020-11-11 NOTE — PHYSICAL EXAM
[General Appearance - Alert] : alert [General Appearance - In No Acute Distress] : in no acute distress [Oriented To Time, Place, And Person] : oriented to person, place, and time [Impaired Insight] : insight and judgment were intact [Affect] : the affect was normal [Person] : oriented to person [Place] : oriented to place [Time] : oriented to time [Short Term Intact] : short term memory intact [Remote Intact] : remote memory intact [Span Intact] : the attention span was normal [Concentration Intact] : normal concentrating ability [Fluency] : fluency intact [Comprehension] : comprehension intact [Current Events] : adequate knowledge of current events [Past History] : adequate knowledge of personal past history [Vocabulary] : adequate range of vocabulary [Cranial Nerves Oculomotor (III)] : extraocular motion intact [Cranial Nerves Trigeminal (V)] : facial sensation intact symmetrically [Cranial Nerves Facial (VII)] : face symmetrical [Cranial Nerves Vestibulocochlear (VIII)] : hearing was intact bilaterally [Cranial Nerves Glossopharyngeal (IX)] : tongue and palate midline [Cranial Nerves Accessory (XI - Cranial And Spinal)] : head turning and shoulder shrug symmetric [Cranial Nerves Hypoglossal (XII)] : there was no tongue deviation with protrusion [Motor Tone] : muscle tone was normal in all four extremities [Motor Strength] : muscle strength was normal in all four extremities [No Muscle Atrophy] : normal bulk in all four extremities [Motor Handedness Right-Handed] : the patient is right hand dominant [Sensation Tactile Decrease] : light touch was intact [Balance] : balance was intact [Extraocular Movements] : extraocular movements were intact [Outer Ear] : the ears and nose were normal in appearance [Neck Appearance] : the appearance of the neck was normal [] : no respiratory distress [Respiration, Rhythm And Depth] : normal respiratory rhythm and effort [Exaggerated Use Of Accessory Muscles For Inspiration] : no accessory muscle use [Heart Rate And Rhythm] : heart rate was normal and rhythm regular [Skin Color & Pigmentation] : normal skin color and pigmentation [Skin Turgor] : normal skin turgor [Involuntary Movements] : no involuntary movements were seen [Abnormal Walk] : normal gait [Past-pointing] : there was no past-pointing [Tremor] : no tremor present [Coordination - Dysmetria Impaired Finger-to-Nose Bilateral] : not present [FreeTextEntry6] : no drift

## 2020-11-11 NOTE — REVIEW OF SYSTEMS
[Dizziness] : dizziness [Vertigo] : vertigo [Tension Headache] : tension-type headaches [Earache] : earache [Feeling Tired] : feeling tired [Negative] : Integumentary [de-identified] : imbalance

## 2020-11-11 NOTE — HISTORY OF PRESENT ILLNESS
[FreeTextEntry1] : Incidental cerebral aneurysm 3 mm left Pcomm artery aneurysm [de-identified] : 69 yo right handed male with PMH of COPD, HTN, HLD, left fem pop bypass (), presents to the office for a neurosurgical consultation for an incidental cerebral aneurysm seen on recent MRA imaging. In , pt initially presented with Vertigo and imbalance. He consulted with Dr. Acharya who ordered MRI/A imaging. He was placed Meclizine and attended VT with mild improvement. \par MRI head was negative for tumor. MRA brain showed 3 mm left pcomm artery aneurysm.\par Today, pt presents for consultation accompanied by his wife. He is extremely [place] and wears hearing aids. He states he has imbalance and unsteady gait and requires a cane as an assistive device. \par Positive family history of aneurysms- mother had a SAH from a ruptured brain aneurysm in  in her early 50's. She then had elective coil embolization of remaining aneurysms in  and subsequently  from another medical condition. \par Pt takes Plavix daily. Pt smoked 4 packs a day, quit 10 years ago. \par We feel the dizziness and imbalance are unrelated to the aneurysm. \par Plan: consulted with Dr. Carey  to consider cerebral angiogram

## 2020-11-18 ENCOUNTER — APPOINTMENT (OUTPATIENT)
Dept: NEUROSURGERY | Facility: HOSPITAL | Age: 70
End: 2020-11-18

## 2020-11-23 ENCOUNTER — APPOINTMENT (OUTPATIENT)
Dept: NEUROLOGY | Facility: CLINIC | Age: 70
End: 2020-11-23

## 2021-06-17 ENCOUNTER — APPOINTMENT (OUTPATIENT)
Dept: ENDOCRINOLOGY | Facility: CLINIC | Age: 71
End: 2021-06-17
Payer: MEDICARE

## 2021-06-17 VITALS
HEART RATE: 50 BPM | DIASTOLIC BLOOD PRESSURE: 57 MMHG | SYSTOLIC BLOOD PRESSURE: 181 MMHG | BODY MASS INDEX: 24.53 KG/M2 | WEIGHT: 152 LBS

## 2021-06-17 DIAGNOSIS — E03.9 HYPOTHYROIDISM, UNSPECIFIED: ICD-10-CM

## 2021-06-17 DIAGNOSIS — Z00.00 ENCOUNTER FOR GENERAL ADULT MEDICAL EXAMINATION W/OUT ABNORMAL FINDINGS: ICD-10-CM

## 2021-06-17 PROCEDURE — 99203 OFFICE O/P NEW LOW 30 MIN: CPT

## 2021-06-17 RX ORDER — CLOPIDOGREL 75 MG/1
75 TABLET, FILM COATED ORAL
Refills: 0 | Status: DISCONTINUED | COMMUNITY
End: 2021-06-17

## 2021-06-17 RX ORDER — LEVOTHYROXINE SODIUM 125 UG/1
125 TABLET ORAL
Qty: 90 | Refills: 0 | Status: DISCONTINUED | COMMUNITY
Start: 2017-08-18 | End: 2021-06-17

## 2021-06-17 RX ORDER — OMEPRAZOLE 20 MG/1
20 CAPSULE, DELAYED RELEASE ORAL
Refills: 0 | Status: DISCONTINUED | COMMUNITY
End: 2021-06-17

## 2021-06-17 RX ORDER — KETOROLAC TROMETHAMINE 10 MG/1
10 TABLET, FILM COATED ORAL
Qty: 20 | Refills: 0 | Status: DISCONTINUED | COMMUNITY
Start: 2017-08-10 | End: 2021-06-17

## 2021-06-17 RX ORDER — LEVOTHYROXINE SODIUM 100 UG/1
100 TABLET ORAL
Refills: 0 | Status: DISCONTINUED | COMMUNITY
End: 2021-06-17

## 2021-06-17 RX ORDER — VALACYCLOVIR 1 G/1
1 TABLET, FILM COATED ORAL
Qty: 30 | Refills: 0 | Status: DISCONTINUED | COMMUNITY
Start: 2017-09-18 | End: 2021-06-17

## 2021-06-17 RX ORDER — ACETAMINOPHEN AND CODEINE 300; 30 MG/1; MG/1
300-30 TABLET ORAL
Qty: 60 | Refills: 0 | Status: DISCONTINUED | COMMUNITY
Start: 2017-08-24 | End: 2021-06-17

## 2021-06-17 RX ORDER — OLMESARTAN MEDOXOMIL 40 MG/1
40 TABLET, FILM COATED ORAL
Refills: 0 | Status: DISCONTINUED | COMMUNITY
End: 2021-06-17

## 2021-06-17 RX ORDER — VALSARTAN 160 MG/1
160 TABLET, COATED ORAL
Qty: 90 | Refills: 0 | Status: DISCONTINUED | COMMUNITY
Start: 2016-12-22 | End: 2021-06-17

## 2021-06-17 RX ORDER — AZITHROMYCIN 250 MG/1
250 TABLET, FILM COATED ORAL
Qty: 14 | Refills: 0 | Status: DISCONTINUED | COMMUNITY
Start: 2017-06-12 | End: 2021-06-17

## 2021-06-17 RX ORDER — DICLOFENAC SODIUM 10 MG/G
1 GEL TOPICAL DAILY
Qty: 1 | Refills: 2 | Status: DISCONTINUED | COMMUNITY
Start: 2018-07-10 | End: 2021-06-17

## 2021-06-17 RX ORDER — FLUTICASONE PROPIONATE 50 UG/1
50 SPRAY, METERED NASAL
Qty: 16 | Refills: 0 | Status: DISCONTINUED | COMMUNITY
Start: 2017-07-13 | End: 2021-06-17

## 2021-06-17 RX ORDER — METHYLPREDNISOLONE 4 MG/1
4 TABLET ORAL
Qty: 21 | Refills: 0 | Status: DISCONTINUED | COMMUNITY
Start: 2017-09-18 | End: 2021-06-17

## 2021-06-17 RX ORDER — TRIMETHOPRIM 100 MG/1
100 TABLET ORAL
Refills: 0 | Status: DISCONTINUED | COMMUNITY
End: 2021-06-17

## 2021-06-17 RX ORDER — NEOMYCIN AND POLYMYXIN B SULFATES AND HYDROCORTISONE OTIC 10; 3.5; 1 MG/ML; MG/ML; [USP'U]/ML
3.5-10000-1 SUSPENSION AURICULAR (OTIC)
Qty: 10 | Refills: 0 | Status: DISCONTINUED | COMMUNITY
Start: 2017-08-10 | End: 2021-06-17

## 2021-06-17 RX ORDER — ATORVASTATIN CALCIUM 40 MG/1
40 TABLET, FILM COATED ORAL
Qty: 90 | Refills: 0 | Status: DISCONTINUED | COMMUNITY
Start: 2017-02-16 | End: 2021-06-17

## 2021-06-17 RX ORDER — DICLOFENAC SODIUM 10 MG/G
1 GEL TOPICAL DAILY
Qty: 1 | Refills: 2 | Status: DISCONTINUED | COMMUNITY
Start: 2019-06-12 | End: 2021-06-17

## 2021-06-17 RX ORDER — AMOXICILLIN 500 MG/1
500 CAPSULE ORAL
Qty: 24 | Refills: 0 | Status: DISCONTINUED | COMMUNITY
Start: 2017-05-06 | End: 2021-06-17

## 2021-06-17 NOTE — REVIEW OF SYSTEMS
[Recent Weight Loss (___ Lbs)] : recent weight loss: [unfilled] lbs [Lower Ext Edema] : lower extremity edema [Cold Intolerance] : cold intolerance [All other systems negative] : All other systems negative

## 2021-06-18 LAB
BASOPHILS # BLD AUTO: 0.01 K/UL
BASOPHILS NFR BLD AUTO: 0.2 %
EOSINOPHIL # BLD AUTO: 0.03 K/UL
EOSINOPHIL NFR BLD AUTO: 0.5 %
FERRITIN SERPL-MCNC: 56 NG/ML
HCT VFR BLD CALC: 41.2 %
HGB BLD-MCNC: 13.2 G/DL
IMM GRANULOCYTES NFR BLD AUTO: 0.5 %
LYMPHOCYTES # BLD AUTO: 0.99 K/UL
LYMPHOCYTES NFR BLD AUTO: 14.9 %
MAN DIFF?: NORMAL
MCHC RBC-ENTMCNC: 29.6 PG
MCHC RBC-ENTMCNC: 32 GM/DL
MCV RBC AUTO: 92.4 FL
MONOCYTES # BLD AUTO: 0.7 K/UL
MONOCYTES NFR BLD AUTO: 10.5 %
NEUTROPHILS # BLD AUTO: 4.89 K/UL
NEUTROPHILS NFR BLD AUTO: 73.4 %
PLATELET # BLD AUTO: 267 K/UL
RBC # BLD: 4.46 M/UL
RBC # FLD: 13.3 %
T4 FREE SERPL-MCNC: 1.4 NG/DL
THYROGLOB AB SERPL-ACNC: <20 IU/ML
THYROPEROXIDASE AB SERPL IA-ACNC: 91.2 IU/ML
TSH SERPL-ACNC: 0.67 UIU/ML
VIT B12 SERPL-MCNC: 504 PG/ML
WBC # FLD AUTO: 6.65 K/UL

## 2021-06-18 NOTE — PHYSICAL EXAM
[Alert] : alert [No Acute Distress] : no acute distress [No Proptosis] : no proptosis [No Lid Lag] : no lid lag [No LAD] : no lymphadenopathy [Thyroid Not Enlarged] : the thyroid was not enlarged [Clear to Auscultation] : lungs were clear to auscultation bilaterally [Normal S1, S2] : normal S1 and S2 [Regular Rhythm] : with a regular rhythm [Normal Affect] : the affect was normal [Normal Mood] : the mood was normal [Acanthosis Nigricans] : no acanthosis nigricans [de-identified] : hard of hearing [de-identified] : regular bradycardia [de-identified] : 2-3+ LE edema, R >L

## 2021-06-18 NOTE — ASSESSMENT
[FreeTextEntry1] : Cold intolerance.  Known hypothyroidism\par will check TSH.  if in the hypothyroid range, could be contributing to cold intolerance, though he has no other symptoms of hypothyroidism, so I suspect his TSH will be normal.\par Also will check for anemia.  Recommended keeping on schedule with his colonoscopies.\par Suggested increasing exercise to 5x/week; increased exercise will improve circulation and maybe help him feel less cold.\par

## 2021-06-18 NOTE — HISTORY OF PRESENT ILLNESS
[FreeTextEntry1] : Self referred for cold intolerance\par says is "always cold" for the past 5 years\par Hypothyroidism diagnosed about 10 years ago.  no  FH of thyroid disease \par no constipation.  \par lost 10-15 lb intentionally due to diet\par no palpitations or dizziness\par has swelling in legs due to PAD and CHF, taking diuretic.\par had low iron in the past.   is due for next colonoscopy. has had polyps in the past\par former smoker, quit in 2009, 30 year history\par syncopal episode about 2 months ago after driving to [place] (nearly continuously)\par exercises 3x/week\par \par PMH: PAD s/p 9 stents, fem pop bypass\par CHF, no recent hospitalizations.\par brain aneurysm, s/p clipping\par hypothyroidism\par \par Meds:\par levothyroxine 112mcg/day\par lisinopril 40mg, amlodipine 10mg\par furosemide 20mg bid\par Plavix, rosuvastatin 20mg\par Centrium Silver, Calcium 600mg + D3, vitamin D 1000 IU/day\par ALL: cilostazol (headache), betadine\par

## 2021-06-18 NOTE — CONSULT LETTER
[Dear  ___] : Dear  [unfilled], [Consult Letter:] : I had the pleasure of evaluating your patient, [unfilled]. [Please see my note below.] : Please see my note below. [Consult Closing:] : Thank you very much for allowing me to participate in the care of this patient.  If you have any questions, please do not hesitate to contact me. [Sincerely,] : Sincerely, [FreeTextEntry1] : Mr. Oconnor  reports cold intolerance and it was suggested to him to see an endocrinologist.  His TSH is normal, so he does not need an adjustment of his levothyroxine.   He is also not anemic and ferritin was in normal range.  I advised him that his thyroid was ok and not the reason he is cold.   I suggested exercising more, and he said he will try walking more.\par  [FreeTextEntry3] : Fabby Fishman MD\par Division of Endocrinology\par [facility]

## 2021-08-03 ENCOUNTER — APPOINTMENT (OUTPATIENT)
Dept: ORTHOPEDIC SURGERY | Facility: CLINIC | Age: 71
End: 2021-08-03
Payer: MEDICARE

## 2021-08-03 DIAGNOSIS — S70.01XA CONTUSION OF RIGHT HIP, INITIAL ENCOUNTER: ICD-10-CM

## 2021-08-03 DIAGNOSIS — M25.551 PAIN IN RIGHT HIP: ICD-10-CM

## 2021-08-03 PROCEDURE — 99213 OFFICE O/P EST LOW 20 MIN: CPT

## 2021-08-03 PROCEDURE — 73502 X-RAY EXAM HIP UNI 2-3 VIEWS: CPT | Mod: RT
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
## 2021-08-03 NOTE — DISCUSSION/SUMMARY
[de-identified] : This patient has a right hip contusion laterally.  The patient is not an appropriate candidate for surgical intervention at this time. An extensive discussion was conducted on the natural history of the disease and the variety of surgical and non-surgical options available to the patient including, but not limited to non-steroidal anti-inflammatory medications, steroid injections, physical therapy, maintenance of ideal body weight, and reduction of activity.  Recommend continue weight-bear as tolerated.  Restart home exercise program and ice.  He cannot take NSAIDs he should take Tylenol.\par The patient will schedule an appointment as needed.\par

## 2021-08-03 NOTE — PHYSICAL EXAM
[de-identified] : Patient is well nourished, well-developed, in no acute distress, with appropriate mood and affect. The patient is oriented to time, place, and person. Respirations are even and unlabored. Gait evaluation does not reveal a limp. There is no inguinal adenopathy. Examination of the contralateral hip shows normal range of motion, strength, no tenderness, and intact skin. The affected limb is well-perfused and showed 2+ dp/pt pulses, without skin lesions, shows a grossly normal motor and sensory examination. Examination of the hip shows no skin lesions. Hip motion is full and painless from 0-90 degrees extension to flexion, 20 degrees adduction and 20 degrees abduction, and 15 degrees internal and 30 degrees external rotation. Leg lengths are approximately equal. FADIR is negative and KAMINI is negative. Stinchfield test is negative. Both hips are stable and muscle strength is normal with good strength with resisted abduction and adduction. Pedal pulses are palpable.  Tender to palpation over the lateral aspect of the hip.Contusion noted with ecchymosis over the lateral aspect of the hip. [de-identified] : AP and lateral x-rays of the right hip, pelvis, and femur were ordered and taken in the office and demonstrate no evidence of degenerative joint disease of the hip with maintained joint space and no evidence of fractures or other intraarticular pathology.

## 2021-11-03 ENCOUNTER — APPOINTMENT (OUTPATIENT)
Dept: ORTHOPEDIC SURGERY | Facility: CLINIC | Age: 71
End: 2021-11-03

## 2021-11-03 ENCOUNTER — APPOINTMENT (OUTPATIENT)
Dept: ORTHOPEDIC SURGERY | Facility: CLINIC | Age: 71
End: 2021-11-03
Payer: MEDICARE

## 2021-11-03 VITALS — HEIGHT: 66 IN | WEIGHT: 150 LBS | BODY MASS INDEX: 24.11 KG/M2

## 2021-11-03 PROCEDURE — 73564 X-RAY EXAM KNEE 4 OR MORE: CPT | Mod: LT

## 2021-11-03 PROCEDURE — 99214 OFFICE O/P EST MOD 30 MIN: CPT | Mod: 25

## 2021-11-03 PROCEDURE — 20610 DRAIN/INJ JOINT/BURSA W/O US: CPT | Mod: LT

## 2021-11-04 NOTE — DISCUSSION/SUMMARY
[de-identified] : The patient has moderate left knee osteoarthritis. They are not an appropriate candidate for surgical intervention at this time. An extensive discussion was conducted on the natural history of the disease and the variety of surgical and non-surgical options available to the patient including, but not limited to non-steroidal anti-inflammatory medications, steroid injections, physical therapy, maintenance of ideal body weight, and reduction of activity. NSAIDs recommended The patient will schedule an appointment as needed. \par \par Informed consent for the left knee injection was obtained. All questions were answered. A time out was performed. The  knee was prepped and draped in sterile fashion. Using sterile technique, 2cc of Kenalog, 4cc of 1% lidocaine, 4cc of 0.25% marcaine using a 21-gauge needle. A sterile dressing was applied. Post injection instructions were reviewed. The patient tolerated the procedure well.

## 2021-11-04 NOTE — PHYSICAL EXAM
[de-identified] : Well developed, well nourished in no apparent distress, awake, alert and orientated to person, place and time. with appropriate mood and affect. \par Respirations are even and unlabored. Gait evaluation does reveal a limp. There is no inguinal adenopathy. \par The affected limb is well-perfused, without skin lesions, shows a grossly normal motor and sensory examination. \par Knee motion is significantly reduced and does cause significant pain. ROM of the knee is 5-115 degrees.  5 degrees varus\par The knee is stable within that range-of-motion to AP and ML stress. \par Muscle strength is normal. Pedal pulses are palpable.  [de-identified] : Long standing  knee, AP knee, lateral knee, and patellar views of the left knee were ordered and taken in the office and demonstrate moderate degenerative joint disease of the knee with joint space narrowing, osteophyte formation, and subchondral sclerosis.

## 2021-11-04 NOTE — HISTORY OF PRESENT ILLNESS
[de-identified] : This is a very nice  71 year old  male experiencing worsening pain in the left knee which is moderate in intensity and has been going on for at least 3 months now. The pain substantially limits activities of daily living. Walking tolerance is reduced. Medication and activity modification have been minimally effective for a period lasting greater than three months in duration. Assistive devices and external support were not deemed by the patient to be helpful in improving their function. The patient denies any radiation of the pain to the feet and it is not associated with numbness, tingling, or weakness.

## 2022-05-04 ENCOUNTER — APPOINTMENT (OUTPATIENT)
Dept: ORTHOPEDIC SURGERY | Facility: CLINIC | Age: 72
End: 2022-05-04
Payer: MEDICARE

## 2022-05-04 DIAGNOSIS — S80.12XA CONTUSION OF LEFT LOWER LEG, INITIAL ENCOUNTER: ICD-10-CM

## 2022-05-04 DIAGNOSIS — M17.12 UNILATERAL PRIMARY OSTEOARTHRITIS, LEFT KNEE: ICD-10-CM

## 2022-05-04 PROCEDURE — 73564 X-RAY EXAM KNEE 4 OR MORE: CPT | Mod: LT

## 2022-05-04 PROCEDURE — 99214 OFFICE O/P EST MOD 30 MIN: CPT

## 2022-05-04 PROCEDURE — 73590 X-RAY EXAM OF LOWER LEG: CPT | Mod: LT

## 2022-05-04 NOTE — DISCUSSION/SUMMARY
[de-identified] : The patient has moderate left knee osteoarthritis and a contusion of the left tibia. They are not an appropriate candidate for surgical intervention at this time. An extensive discussion was conducted on the natural history of the disease and the variety of surgical and non-surgical options available to the patient including, but not limited to non-steroidal anti-inflammatory medications, steroid injections, physical therapy, maintenance of ideal body weight, and reduction of activity.  He cannot take NSAIDs because he is on Plavix but he can take Tylenol for pain.  Ice and elevation is recommended.  Continue to use the cane the patient will schedule an appointment as needed. \par

## 2022-05-04 NOTE — PHYSICAL EXAM
[de-identified] : Well developed, well nourished in no apparent distress, awake, alert and orientated to person, place and time. with appropriate mood and affect. \par Respirations are even and unlabored. Gait evaluation does reveal a limp. There is no inguinal adenopathy. \par The affected limb is well-perfused, without skin lesions, shows a grossly normal motor and sensory examination. \par Knee motion is significantly reduced and does cause significant pain. ROM of the knee is 5-115 degrees.  5 degrees varus\par The knee is stable within that range-of-motion to AP and ML stress.  Swelling noted about the midshaft tibia with some mild erythema.  He states that the erythema does improve with elevation.  There is 2+ swelling in this area.\par Muscle strength is normal. Pedal pulses are palpable.  [de-identified] : Long standing  knee, AP knee, lateral knee, and patellar views of the left knee were ordered and taken in the office and demonstrate moderate degenerative joint disease of the knee with joint space narrowing, osteophyte formation, and subchondral sclerosis. \par \par AP lateral radiographs of the left tibia and fibula were ordered and obtained the office demonstrate no evidence of acute displaced fracture or dislocation.
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
## 2022-12-21 NOTE — ASU PATIENT PROFILE, ADULT - AS SC BRADEN MOISTURE
01/05/23      Faye Doyle  [address] [facility] [facility] [address]      To Whom It May Concern,    Faye Doyle is a patient in our clinic. Faye may sit out out of gym class this week and next week between the dates of January 02-13, 2023. She can follow up at that time if she is still having pain/issues.    If there are any other questions or concerns you may contact us directly.       Sincerely,      Regina Rogers MD  [facility] [address] MEDICAL [address]  [address]  [facility] [address] [phone]  
(3) occasionally moist

## 2023-10-22 ENCOUNTER — INPATIENT (INPATIENT)
Facility: HOSPITAL | Age: 73
LOS: 16 days | Discharge: NURSING, REHAB OR LONG-TERM CARE | DRG: 252 | End: 2023-11-08
Attending: STUDENT IN AN ORGANIZED HEALTH CARE EDUCATION/TRAINING PROGRAM | Admitting: INTERNAL MEDICINE
Payer: MEDICARE

## 2023-10-22 VITALS
RESPIRATION RATE: 18 BRPM | WEIGHT: 154.98 LBS | SYSTOLIC BLOOD PRESSURE: 127 MMHG | HEART RATE: 80 BPM | DIASTOLIC BLOOD PRESSURE: 66 MMHG | OXYGEN SATURATION: 99 %

## 2023-10-22 DIAGNOSIS — I73.9 PERIPHERAL VASCULAR DISEASE, UNSPECIFIED: Chronic | ICD-10-CM

## 2023-10-22 DIAGNOSIS — N17.9 ACUTE KIDNEY FAILURE, UNSPECIFIED: ICD-10-CM

## 2023-10-22 DIAGNOSIS — I74.3 EMBOLISM AND THROMBOSIS OF ARTERIES OF THE LOWER EXTREMITIES: Chronic | ICD-10-CM

## 2023-10-22 LAB
ABO RH CONFIRMATION: SIGNIFICANT CHANGE UP
ABO RH CONFIRMATION: SIGNIFICANT CHANGE UP
ADD ON TEST-SPECIMEN IN LAB: SIGNIFICANT CHANGE UP
ADD ON TEST-SPECIMEN IN LAB: SIGNIFICANT CHANGE UP
ALBUMIN SERPL ELPH-MCNC: 3 G/DL — LOW (ref 3.3–5)
ALBUMIN SERPL ELPH-MCNC: 3 G/DL — LOW (ref 3.3–5)
ALP SERPL-CCNC: 113 U/L — SIGNIFICANT CHANGE UP (ref 40–120)
ALP SERPL-CCNC: 113 U/L — SIGNIFICANT CHANGE UP (ref 40–120)
ALT FLD-CCNC: 30 U/L — SIGNIFICANT CHANGE UP (ref 12–78)
ALT FLD-CCNC: 30 U/L — SIGNIFICANT CHANGE UP (ref 12–78)
ANION GAP SERPL CALC-SCNC: 10 MMOL/L — SIGNIFICANT CHANGE UP (ref 5–17)
ANION GAP SERPL CALC-SCNC: 10 MMOL/L — SIGNIFICANT CHANGE UP (ref 5–17)
APTT BLD: 26.7 SEC — SIGNIFICANT CHANGE UP (ref 24.5–35.6)
APTT BLD: 26.7 SEC — SIGNIFICANT CHANGE UP (ref 24.5–35.6)
AST SERPL-CCNC: 43 U/L — HIGH (ref 15–37)
AST SERPL-CCNC: 43 U/L — HIGH (ref 15–37)
BASOPHILS # BLD AUTO: 0.01 K/UL — SIGNIFICANT CHANGE UP (ref 0–0.2)
BASOPHILS # BLD AUTO: 0.01 K/UL — SIGNIFICANT CHANGE UP (ref 0–0.2)
BASOPHILS NFR BLD AUTO: 0.1 % — SIGNIFICANT CHANGE UP (ref 0–2)
BASOPHILS NFR BLD AUTO: 0.1 % — SIGNIFICANT CHANGE UP (ref 0–2)
BILIRUB SERPL-MCNC: 0.4 MG/DL — SIGNIFICANT CHANGE UP (ref 0.2–1.2)
BILIRUB SERPL-MCNC: 0.4 MG/DL — SIGNIFICANT CHANGE UP (ref 0.2–1.2)
BLD GP AB SCN SERPL QL: SIGNIFICANT CHANGE UP
BLD GP AB SCN SERPL QL: SIGNIFICANT CHANGE UP
BUN SERPL-MCNC: 61 MG/DL — HIGH (ref 7–23)
BUN SERPL-MCNC: 61 MG/DL — HIGH (ref 7–23)
CALCIUM SERPL-MCNC: 8.8 MG/DL — SIGNIFICANT CHANGE UP (ref 8.5–10.1)
CALCIUM SERPL-MCNC: 8.8 MG/DL — SIGNIFICANT CHANGE UP (ref 8.5–10.1)
CHLORIDE SERPL-SCNC: 107 MMOL/L — SIGNIFICANT CHANGE UP (ref 96–108)
CHLORIDE SERPL-SCNC: 107 MMOL/L — SIGNIFICANT CHANGE UP (ref 96–108)
CO2 SERPL-SCNC: 26 MMOL/L — SIGNIFICANT CHANGE UP (ref 22–31)
CO2 SERPL-SCNC: 26 MMOL/L — SIGNIFICANT CHANGE UP (ref 22–31)
CREAT SERPL-MCNC: 2.2 MG/DL — HIGH (ref 0.5–1.3)
CREAT SERPL-MCNC: 2.2 MG/DL — HIGH (ref 0.5–1.3)
EGFR: 31 ML/MIN/1.73M2 — LOW
EGFR: 31 ML/MIN/1.73M2 — LOW
EOSINOPHIL # BLD AUTO: 0.07 K/UL — SIGNIFICANT CHANGE UP (ref 0–0.5)
EOSINOPHIL # BLD AUTO: 0.07 K/UL — SIGNIFICANT CHANGE UP (ref 0–0.5)
EOSINOPHIL NFR BLD AUTO: 0.5 % — SIGNIFICANT CHANGE UP (ref 0–6)
EOSINOPHIL NFR BLD AUTO: 0.5 % — SIGNIFICANT CHANGE UP (ref 0–6)
GLUCOSE SERPL-MCNC: 161 MG/DL — HIGH (ref 70–99)
GLUCOSE SERPL-MCNC: 161 MG/DL — HIGH (ref 70–99)
HCT VFR BLD CALC: 28.7 % — LOW (ref 39–50)
HCT VFR BLD CALC: 28.7 % — LOW (ref 39–50)
HGB BLD-MCNC: 9.2 G/DL — LOW (ref 13–17)
HGB BLD-MCNC: 9.2 G/DL — LOW (ref 13–17)
IMM GRANULOCYTES NFR BLD AUTO: 0.7 % — SIGNIFICANT CHANGE UP (ref 0–0.9)
IMM GRANULOCYTES NFR BLD AUTO: 0.7 % — SIGNIFICANT CHANGE UP (ref 0–0.9)
INR BLD: 1.02 RATIO — SIGNIFICANT CHANGE UP (ref 0.85–1.18)
INR BLD: 1.02 RATIO — SIGNIFICANT CHANGE UP (ref 0.85–1.18)
LYMPHOCYTES # BLD AUTO: 1.32 K/UL — SIGNIFICANT CHANGE UP (ref 1–3.3)
LYMPHOCYTES # BLD AUTO: 1.32 K/UL — SIGNIFICANT CHANGE UP (ref 1–3.3)
LYMPHOCYTES # BLD AUTO: 10.1 % — LOW (ref 13–44)
LYMPHOCYTES # BLD AUTO: 10.1 % — LOW (ref 13–44)
MCHC RBC-ENTMCNC: 28.4 PG — SIGNIFICANT CHANGE UP (ref 27–34)
MCHC RBC-ENTMCNC: 28.4 PG — SIGNIFICANT CHANGE UP (ref 27–34)
MCHC RBC-ENTMCNC: 32.1 GM/DL — SIGNIFICANT CHANGE UP (ref 32–36)
MCHC RBC-ENTMCNC: 32.1 GM/DL — SIGNIFICANT CHANGE UP (ref 32–36)
MCV RBC AUTO: 88.6 FL — SIGNIFICANT CHANGE UP (ref 80–100)
MCV RBC AUTO: 88.6 FL — SIGNIFICANT CHANGE UP (ref 80–100)
MONOCYTES # BLD AUTO: 0.96 K/UL — HIGH (ref 0–0.9)
MONOCYTES # BLD AUTO: 0.96 K/UL — HIGH (ref 0–0.9)
MONOCYTES NFR BLD AUTO: 7.3 % — SIGNIFICANT CHANGE UP (ref 2–14)
MONOCYTES NFR BLD AUTO: 7.3 % — SIGNIFICANT CHANGE UP (ref 2–14)
NEUTROPHILS # BLD AUTO: 10.66 K/UL — HIGH (ref 1.8–7.4)
NEUTROPHILS # BLD AUTO: 10.66 K/UL — HIGH (ref 1.8–7.4)
NEUTROPHILS NFR BLD AUTO: 81.3 % — HIGH (ref 43–77)
NEUTROPHILS NFR BLD AUTO: 81.3 % — HIGH (ref 43–77)
PLATELET # BLD AUTO: 282 K/UL — SIGNIFICANT CHANGE UP (ref 150–400)
PLATELET # BLD AUTO: 282 K/UL — SIGNIFICANT CHANGE UP (ref 150–400)
POTASSIUM SERPL-MCNC: 4.1 MMOL/L — SIGNIFICANT CHANGE UP (ref 3.5–5.3)
POTASSIUM SERPL-MCNC: 4.1 MMOL/L — SIGNIFICANT CHANGE UP (ref 3.5–5.3)
POTASSIUM SERPL-SCNC: 4.1 MMOL/L — SIGNIFICANT CHANGE UP (ref 3.5–5.3)
POTASSIUM SERPL-SCNC: 4.1 MMOL/L — SIGNIFICANT CHANGE UP (ref 3.5–5.3)
PROT SERPL-MCNC: 6.5 GM/DL — SIGNIFICANT CHANGE UP (ref 6–8.3)
PROT SERPL-MCNC: 6.5 GM/DL — SIGNIFICANT CHANGE UP (ref 6–8.3)
PROTHROM AB SERPL-ACNC: 11.5 SEC — SIGNIFICANT CHANGE UP (ref 9.5–13)
PROTHROM AB SERPL-ACNC: 11.5 SEC — SIGNIFICANT CHANGE UP (ref 9.5–13)
RBC # BLD: 3.24 M/UL — LOW (ref 4.2–5.8)
RBC # BLD: 3.24 M/UL — LOW (ref 4.2–5.8)
RBC # FLD: 14.5 % — SIGNIFICANT CHANGE UP (ref 10.3–14.5)
RBC # FLD: 14.5 % — SIGNIFICANT CHANGE UP (ref 10.3–14.5)
SODIUM SERPL-SCNC: 143 MMOL/L — SIGNIFICANT CHANGE UP (ref 135–145)
SODIUM SERPL-SCNC: 143 MMOL/L — SIGNIFICANT CHANGE UP (ref 135–145)
TROPONIN I, HIGH SENSITIVITY RESULT: 48.99 NG/L — SIGNIFICANT CHANGE UP
TROPONIN I, HIGH SENSITIVITY RESULT: 48.99 NG/L — SIGNIFICANT CHANGE UP
WBC # BLD: 13.11 K/UL — HIGH (ref 3.8–10.5)
WBC # BLD: 13.11 K/UL — HIGH (ref 3.8–10.5)
WBC # FLD AUTO: 13.11 K/UL — HIGH (ref 3.8–10.5)
WBC # FLD AUTO: 13.11 K/UL — HIGH (ref 3.8–10.5)

## 2023-10-22 PROCEDURE — 73721 MRI JNT OF LWR EXTRE W/O DYE: CPT | Mod: LT

## 2023-10-22 PROCEDURE — C9399: CPT

## 2023-10-22 PROCEDURE — 75635 CT ANGIO ABDOMINAL ARTERIES: CPT

## 2023-10-22 PROCEDURE — 93458 L HRT ARTERY/VENTRICLE ANGIO: CPT

## 2023-10-22 PROCEDURE — 83550 IRON BINDING TEST: CPT

## 2023-10-22 PROCEDURE — 82746 ASSAY OF FOLIC ACID SERUM: CPT

## 2023-10-22 PROCEDURE — 86923 COMPATIBILITY TEST ELECTRIC: CPT

## 2023-10-22 PROCEDURE — 73630 X-RAY EXAM OF FOOT: CPT | Mod: 26,LT

## 2023-10-22 PROCEDURE — 83036 HEMOGLOBIN GLYCOSYLATED A1C: CPT

## 2023-10-22 PROCEDURE — 97116 GAIT TRAINING THERAPY: CPT | Mod: GP

## 2023-10-22 PROCEDURE — 81001 URINALYSIS AUTO W/SCOPE: CPT

## 2023-10-22 PROCEDURE — 93926 LOWER EXTREMITY STUDY: CPT | Mod: 26,LT

## 2023-10-22 PROCEDURE — 73610 X-RAY EXAM OF ANKLE: CPT | Mod: 26,LT

## 2023-10-22 PROCEDURE — 86900 BLOOD TYPING SEROLOGIC ABO: CPT

## 2023-10-22 PROCEDURE — 82728 ASSAY OF FERRITIN: CPT

## 2023-10-22 PROCEDURE — 76770 US EXAM ABDO BACK WALL COMP: CPT | Mod: 26

## 2023-10-22 PROCEDURE — 82962 GLUCOSE BLOOD TEST: CPT

## 2023-10-22 PROCEDURE — 94640 AIRWAY INHALATION TREATMENT: CPT

## 2023-10-22 PROCEDURE — 36415 COLL VENOUS BLD VENIPUNCTURE: CPT

## 2023-10-22 PROCEDURE — 99222 1ST HOSP IP/OBS MODERATE 55: CPT

## 2023-10-22 PROCEDURE — 36430 TRANSFUSION BLD/BLD COMPNT: CPT

## 2023-10-22 PROCEDURE — 99223 1ST HOSP IP/OBS HIGH 75: CPT

## 2023-10-22 PROCEDURE — 84300 ASSAY OF URINE SODIUM: CPT

## 2023-10-22 PROCEDURE — C1887: CPT

## 2023-10-22 PROCEDURE — 84100 ASSAY OF PHOSPHORUS: CPT

## 2023-10-22 PROCEDURE — 97162 PT EVAL MOD COMPLEX 30 MIN: CPT | Mod: GP

## 2023-10-22 PROCEDURE — 85730 THROMBOPLASTIN TIME PARTIAL: CPT

## 2023-10-22 PROCEDURE — 96374 THER/PROPH/DIAG INJ IV PUSH: CPT

## 2023-10-22 PROCEDURE — 85025 COMPLETE CBC W/AUTO DIFF WBC: CPT

## 2023-10-22 PROCEDURE — 86922 COMPATIBILITY TEST ANTIGLOB: CPT

## 2023-10-22 PROCEDURE — 80048 BASIC METABOLIC PNL TOTAL CA: CPT

## 2023-10-22 PROCEDURE — C1874: CPT

## 2023-10-22 PROCEDURE — 82570 ASSAY OF URINE CREATININE: CPT

## 2023-10-22 PROCEDURE — 93306 TTE W/DOPPLER COMPLETE: CPT

## 2023-10-22 PROCEDURE — 93005 ELECTROCARDIOGRAM TRACING: CPT

## 2023-10-22 PROCEDURE — 99285 EMERGENCY DEPT VISIT HI MDM: CPT

## 2023-10-22 PROCEDURE — 86078 PHYS BLOOD BANK SERV REACTJ: CPT

## 2023-10-22 PROCEDURE — P9016: CPT

## 2023-10-22 PROCEDURE — 71045 X-RAY EXAM CHEST 1 VIEW: CPT

## 2023-10-22 PROCEDURE — C1894: CPT

## 2023-10-22 PROCEDURE — 93971 EXTREMITY STUDY: CPT | Mod: 26,LT

## 2023-10-22 PROCEDURE — 84540 ASSAY OF URINE/UREA-N: CPT

## 2023-10-22 PROCEDURE — 93010 ELECTROCARDIOGRAM REPORT: CPT

## 2023-10-22 PROCEDURE — 86920 COMPATIBILITY TEST SPIN: CPT

## 2023-10-22 PROCEDURE — C1768: CPT

## 2023-10-22 PROCEDURE — 97530 THERAPEUTIC ACTIVITIES: CPT | Mod: GP

## 2023-10-22 PROCEDURE — C1769: CPT

## 2023-10-22 PROCEDURE — 86921 COMPATIBILITY TEST INCUBATE: CPT

## 2023-10-22 PROCEDURE — 85027 COMPLETE CBC AUTOMATED: CPT

## 2023-10-22 PROCEDURE — 83735 ASSAY OF MAGNESIUM: CPT

## 2023-10-22 PROCEDURE — 99285 EMERGENCY DEPT VISIT HI MDM: CPT | Mod: 25

## 2023-10-22 PROCEDURE — 86803 HEPATITIS C AB TEST: CPT

## 2023-10-22 PROCEDURE — 86901 BLOOD TYPING SEROLOGIC RH(D): CPT

## 2023-10-22 PROCEDURE — 70450 CT HEAD/BRAIN W/O DYE: CPT

## 2023-10-22 PROCEDURE — 88305 TISSUE EXAM BY PATHOLOGIST: CPT

## 2023-10-22 PROCEDURE — 80053 COMPREHEN METABOLIC PANEL: CPT

## 2023-10-22 PROCEDURE — 82140 ASSAY OF AMMONIA: CPT

## 2023-10-22 PROCEDURE — 94660 CPAP INITIATION&MGMT: CPT

## 2023-10-22 PROCEDURE — 84484 ASSAY OF TROPONIN QUANT: CPT

## 2023-10-22 PROCEDURE — 86850 RBC ANTIBODY SCREEN: CPT

## 2023-10-22 PROCEDURE — C1889: CPT

## 2023-10-22 PROCEDURE — 83540 ASSAY OF IRON: CPT

## 2023-10-22 PROCEDURE — 85610 PROTHROMBIN TIME: CPT

## 2023-10-22 PROCEDURE — 97164 PT RE-EVAL EST PLAN CARE: CPT | Mod: GP

## 2023-10-22 PROCEDURE — 76770 US EXAM ABDO BACK WALL COMP: CPT

## 2023-10-22 PROCEDURE — 71045 X-RAY EXAM CHEST 1 VIEW: CPT | Mod: 26

## 2023-10-22 PROCEDURE — 83880 ASSAY OF NATRIURETIC PEPTIDE: CPT

## 2023-10-22 PROCEDURE — 82607 VITAMIN B-12: CPT

## 2023-10-22 PROCEDURE — 93926 LOWER EXTREMITY STUDY: CPT | Mod: LT

## 2023-10-22 PROCEDURE — 76000 FLUOROSCOPY <1 HR PHYS/QHP: CPT

## 2023-10-22 RX ORDER — CLOPIDOGREL BISULFATE 75 MG/1
75 TABLET, FILM COATED ORAL DAILY
Refills: 0 | Status: DISCONTINUED | OUTPATIENT
Start: 2023-10-22 | End: 2023-10-27

## 2023-10-22 RX ORDER — ASPIRIN/CALCIUM CARB/MAGNESIUM 324 MG
81 TABLET ORAL DAILY
Refills: 0 | Status: DISCONTINUED | OUTPATIENT
Start: 2023-10-22 | End: 2023-11-08

## 2023-10-22 RX ORDER — OXYCODONE AND ACETAMINOPHEN 5; 325 MG/1; MG/1
0.5 TABLET ORAL EVERY 4 HOURS
Refills: 0 | Status: DISCONTINUED | OUTPATIENT
Start: 2023-10-22 | End: 2023-10-29

## 2023-10-22 RX ORDER — NEBIVOLOL HYDROCHLORIDE 5 MG/1
2.5 TABLET ORAL DAILY
Refills: 0 | Status: DISCONTINUED | OUTPATIENT
Start: 2023-10-22 | End: 2023-11-08

## 2023-10-22 RX ORDER — LANOLIN ALCOHOL/MO/W.PET/CERES
3 CREAM (GRAM) TOPICAL AT BEDTIME
Refills: 0 | Status: DISCONTINUED | OUTPATIENT
Start: 2023-10-22 | End: 2023-11-08

## 2023-10-22 RX ORDER — INFLUENZA VIRUS VACCINE 15; 15; 15; 15 UG/.5ML; UG/.5ML; UG/.5ML; UG/.5ML
0.7 SUSPENSION INTRAMUSCULAR ONCE
Refills: 0 | Status: DISCONTINUED | OUTPATIENT
Start: 2023-10-22 | End: 2023-11-08

## 2023-10-22 RX ORDER — MORPHINE SULFATE 50 MG/1
2 CAPSULE, EXTENDED RELEASE ORAL EVERY 4 HOURS
Refills: 0 | Status: DISCONTINUED | OUTPATIENT
Start: 2023-10-22 | End: 2023-10-29

## 2023-10-22 RX ORDER — MORPHINE SULFATE 50 MG/1
4 CAPSULE, EXTENDED RELEASE ORAL ONCE
Refills: 0 | Status: DISCONTINUED | OUTPATIENT
Start: 2023-10-22 | End: 2023-10-22

## 2023-10-22 RX ORDER — LEVOTHYROXINE SODIUM 125 MCG
88 TABLET ORAL DAILY
Refills: 0 | Status: DISCONTINUED | OUTPATIENT
Start: 2023-10-22 | End: 2023-11-08

## 2023-10-22 RX ORDER — ONDANSETRON 8 MG/1
4 TABLET, FILM COATED ORAL EVERY 6 HOURS
Refills: 0 | Status: DISCONTINUED | OUTPATIENT
Start: 2023-10-22 | End: 2023-11-08

## 2023-10-22 RX ORDER — SODIUM CHLORIDE 9 MG/ML
1000 INJECTION INTRAMUSCULAR; INTRAVENOUS; SUBCUTANEOUS
Refills: 0 | Status: DISCONTINUED | OUTPATIENT
Start: 2023-10-22 | End: 2023-10-24

## 2023-10-22 RX ORDER — ACETAMINOPHEN 500 MG
650 TABLET ORAL EVERY 6 HOURS
Refills: 0 | Status: DISCONTINUED | OUTPATIENT
Start: 2023-10-22 | End: 2023-11-08

## 2023-10-22 RX ORDER — SODIUM CHLORIDE 9 MG/ML
500 INJECTION INTRAMUSCULAR; INTRAVENOUS; SUBCUTANEOUS ONCE
Refills: 0 | Status: COMPLETED | OUTPATIENT
Start: 2023-10-22 | End: 2023-10-22

## 2023-10-22 RX ORDER — ATORVASTATIN CALCIUM 80 MG/1
80 TABLET, FILM COATED ORAL AT BEDTIME
Refills: 0 | Status: DISCONTINUED | OUTPATIENT
Start: 2023-10-22 | End: 2023-11-08

## 2023-10-22 RX ADMIN — SODIUM CHLORIDE 500 MILLILITER(S): 9 INJECTION INTRAMUSCULAR; INTRAVENOUS; SUBCUTANEOUS at 12:01

## 2023-10-22 RX ADMIN — NEBIVOLOL HYDROCHLORIDE 2.5 MILLIGRAM(S): 5 TABLET ORAL at 18:38

## 2023-10-22 RX ADMIN — SODIUM CHLORIDE 1000 MILLILITER(S): 9 INJECTION INTRAMUSCULAR; INTRAVENOUS; SUBCUTANEOUS at 11:31

## 2023-10-22 RX ADMIN — MORPHINE SULFATE 2 MILLIGRAM(S): 50 CAPSULE, EXTENDED RELEASE ORAL at 23:35

## 2023-10-22 RX ADMIN — CLOPIDOGREL BISULFATE 75 MILLIGRAM(S): 75 TABLET, FILM COATED ORAL at 18:38

## 2023-10-22 RX ADMIN — ATORVASTATIN CALCIUM 80 MILLIGRAM(S): 80 TABLET, FILM COATED ORAL at 21:02

## 2023-10-22 RX ADMIN — Medication 3 MILLIGRAM(S): at 22:50

## 2023-10-22 RX ADMIN — MORPHINE SULFATE 2 MILLIGRAM(S): 50 CAPSULE, EXTENDED RELEASE ORAL at 23:20

## 2023-10-22 RX ADMIN — SODIUM CHLORIDE 75 MILLILITER(S): 9 INJECTION INTRAMUSCULAR; INTRAVENOUS; SUBCUTANEOUS at 18:38

## 2023-10-22 RX ADMIN — MORPHINE SULFATE 4 MILLIGRAM(S): 50 CAPSULE, EXTENDED RELEASE ORAL at 11:31

## 2023-10-22 NOTE — CONSULT NOTE ADULT - ATTENDING COMMENTS
US findings noted. Patient with hx of severe PVD with multiple BLE revascularization procedures. Admitted for profuse bleeding from L ankle wound causing near syncope event. Patient has been having LLE pain with increase intake in NSAIDs. Now with CKD.  Last LE procedure done about 2 m ago seen by Dr Gunter last week.  Patient has severe ischemia post profuse bleeding event. On Plavix. He will need CTA Ao with runoff once CKD allows. If patient wishes to continue vascular treatment at [facility] [place], [facility] medical records will be requested. He is not a candidate for AC. Discussed with hospitalist US findings noted. Patient with hx of severe PVD with multiple BLE revascularization procedures. Admitted for profuse bleeding from L ankle wound causing near syncope event. Patient has been having LLE pain with increase intake in NSAIDs. Now with CKD.  Last LE procedure done about 2 m ago seen by Dr Gunter last week.  Patient has severe ischemia post profuse bleeding event. On Plavix. He will need CTA Ao with runoff once CKD allows. If patient wishes to continue vascular treatment at [facility], [facility] medical records will be requested. He is not a candidate for AC. Discussed with hospitalist

## 2023-10-22 NOTE — PATIENT PROFILE ADULT - FALL HARM RISK - HARM RISK INTERVENTIONS

## 2023-10-22 NOTE — H&P ADULT - NSHPPHYSICALEXAM_GEN_ALL_CORE
PEx  T(C): --  HR: 80 (10-22-23 @ 10:31) (80 - 80)  BP: 127/66 (10-22-23 @ 10:31) (127/66 - 127/66)  RR: 18 (10-22-23 @ 10:31) (18 - 18)  SpO2: 99% (10-22-23 @ 10:31) (99% - 99%)  Wt(kg): --  General:     Well appearing, well nourished in no distress, no identifying marks , scars, or tattoos.  Skin: no rash or prominent lesions  Head: normocephalic, atraumatic     Sinuses: non-tender  Nose: no external lesions, mucosa non-inflamed, septum and turbinates normal  Throat: no erythema, exudates or lesions.  Neck: Supple without lymphadenopathy. Thyroid no thyromegaly, no palpable thyroid nodules, no palpable nodules or masses, carotid arteries no bruits.   Breasts: No palpable masses or lesions.  Heart: RRR, no murmur or gallop.  Normal S1, S2.  No S3, S4.   Lungs: CTA bilaterally, no wheezes, rhonchi, rales.  Breathing unlabored.   Chest wall: Normal insp   Abdomen:  Soft, NT/ND, normal bowel sounds, no HSM, no masses.  No peritoneal signs.   Back: spine normal without deformity or tenderness.  Normal ROM   : Exam normal.  no inguinal hernias.  Extremities: No deformities, clubbing, cyanosis, or edema.  Musculoskeletal: Normal gait and station. No decreased range of motion, instability, atrophy or abnormal strength or tone in the head, neck, spine, ribs, pelvis or extremities.   Neurologic: CN 2-12 normal. Sensation to pain, touch and proprioception normal. DTRs normal in upper and lower extremities. No pathologic reflexes.  Motor normal.  Psychiatric: Oriented X3, intact recent and remote memory, judgement and insight, normal mood and affect. PEx  T(C): --  HR: 80 (10-22-23 @ 10:31) (80 - 80)  BP: 127/66 (10-22-23 @ 10:31) (127/66 - 127/66)  RR: 18 (10-22-23 @ 10:31) (18 - 18)  SpO2: 99% (10-22-23 @ 10:31) (99% - 99%)    General:   NAD.  looks pale.    Skin: no rash or prominent lesions  Head: normocephalic, atraumatic     Sinuses: non-tender  Nose: no external lesions, mucosa non-inflamed, septum and turbinates normal  Throat: no erythema, exudates or lesions.  Neck: Supple without lymphadenopathy. Thyroid no thyromegaly, no palpable thyroid nodules, no palpable nodules or masses, carotid arteries no bruits.   Breasts: No palpable masses or lesions.  Heart: RRR, no murmur or gallop.  Normal S1, S2.  No S3, S4.   Lungs: CTA bilaterally, no wheezes, rhonchi, rales.  Breathing unlabored.   Chest wall: Normal insp   Abdomen:  Soft, NT/ND, normal bowel sounds, no HSM, no masses.  No peritoneal signs.   Back: spine normal without deformity or tenderness.  Normal ROM   : Exam normal.  no inguinal hernias.  Extremities: left foot cool to touch especially around toes/ sole of foot.  ulcer left medial malleolus covered in dressing c/d/i.  decreased pulses.  right foot WNL>    Musculoskeletal: Normal gait and station. No decreased range of motion, instability, atrophy or abnormal strength or tone in the head, neck, spine, ribs, pelvis or extremities.   Neurologic: CN 2-12 normal. Sensation to pain, touch and proprioception normal. DTRs normal in upper and lower extremities. No pathologic reflexes.  Motor normal.  Psychiatric: Oriented X3, intact recent and remote memory, judgement and insight, normal mood and affect. PEx  T(C): --  HR: 80 (10-22-23 @ 10:31) (80 - 80)  BP: 127/66 (10-22-23 @ 10:31) (127/66 - 127/66)  RR: 18 (10-22-23 @ 10:31) (18 - 18)  SpO2: 99% (10-22-23 @ 10:31) (99% - 99%)    General:   NAD.  looks pale.    Skin: no rash or prominent lesions  Head: normocephalic, atraumatic     Sinuses: non-tender  Nose: no external lesions, mucosa non-inflamed, septum and turbinates normal  Throat: no erythema, exudates or lesions.  Neck: Supple without lymphadenopathy. Thyroid no thyromegaly, no palpable thyroid nodules, no palpable nodules or masses, carotid arteries no bruits.   Breasts: No palpable masses or lesions.  Heart: bradycardia-- HR 50.  +II/VI MAXIMINO.    Lungs: CTA bilaterally, no wheezes, rhonchi, rales.  Breathing unlabored.   Chest wall: Normal insp   Abdomen:  Soft, NT/ND, normal bowel sounds, no HSM, no masses.  No peritoneal signs.   Back: spine normal without deformity or tenderness.  Normal ROM   : Exam normal.  no inguinal hernias.  Extremities: left foot cool to touch especially around toes/ sole of foot.  ulcer left medial malleolus covered in dressing c/d/i.  decreased pulses.  right foot WNL>    Musculoskeletal: Normal gait and station. No decreased range of motion, instability, atrophy or abnormal strength or tone in the head, neck, spine, ribs, pelvis or extremities.   Neurologic: CN 2-12 normal. Sensation to pain, touch and proprioception normal. DTRs normal in upper and lower extremities. No pathologic reflexes.  Motor normal.  Psychiatric: Oriented X3, intact recent and remote memory, judgement and insight, normal mood and affect.

## 2023-10-22 NOTE — ED PROVIDER NOTE - NSICDXPASTMEDICALHX_GEN_ALL_CORE_FT
PAST MEDICAL HISTORY:  Chronic obstructive pulmonary disease, unspecified COPD type     Eczema     Essential hypertension     Gastroesophageal reflux disease, esophagitis presence not specified     Hyperlipidemia, unspecified hyperlipidemia type     Hypothyroidism     Medial meniscus tear left knee    PVD (peripheral vascular disease)

## 2023-10-22 NOTE — ED ADULT TRIAGE NOTE - CHIEF COMPLAINT QUOTE
pt presents to ED from home for wound check. chronic wound to L foot. states spontaneously bleeding today. takes blood thinner. dressing in place. bleeding controlled at this time. unable to obtain oral temp due.

## 2023-10-22 NOTE — PHARMACOTHERAPY INTERVENTION NOTE - COMMENTS
Medication history complete, reviewed medications with patient spouse at bedside and confirmed with doctor first med hx.

## 2023-10-22 NOTE — ED PROVIDER NOTE - OBJECTIVE STATEMENT
74 yo male w/PMHx HLD, COPD, hypothyroidism, GERD, PVD, and HTN presents to the ED for wound check. Pt with chronic wound to L foot. Spontaneous bleeding began today. Wife at bedside reports pt had a mole removed by a dermatologist from the side of his left foot which is how the wound began. Pt is on Plavix and Lisinopril . States the pt has 20% circulation from the knee down to his ankle on his LLE. Pt endorsing an increase in pain to the entire LLE. Pt takes ibuprofen and Advil for pain. Pt was told to change the dressing on his wound 2-3 times a day using a guaze pad.

## 2023-10-22 NOTE — H&P ADULT - HISTORY OF PRESENT ILLNESS
72 y/o male with PMHX of HTN, severe PVD s/p multiple stents b/l legs, s/p fem/popliteal bypass, chronic left lower ankle ulceration, GERD, HLD who presented to  with CC of left LE pain and bleeding.  Patient notes he was at his family's house earlier today.  He was sitting on the floor with his grandson when the family noticed significant bleeding in his left leg.  His wife tried to get him up and into the bathroom to stop the bleeding.  She got him to sit on the side of the bathtub and was trying to hold pressure over the bleeding when he seemed to lose consciousness.  He didn't fall over but as per wife, turned white and wasn't responding to her.  They called 911.  He wasn't responding for a few minutes and then came to.  She denies hx of syncope/ near syncope in the past.  In the ER, bleeding had mostly stopped.  Patient was c/o severe pain left foot.        PAST MEDICAL & SURGICAL HISTORY:  Essential hypertension  PVD (peripheral vascular disease)  Gastroesophageal reflux disease, esophagitis presence not specified  Hypothyroidism  Chronic obstructive pulmonary disease, unspecified COPD type  Eczema  Hyperlipidemia, unspecified hyperlipidemia type  Medial meniscus tear  left knee  Femoral popliteal artery thrombus  h/o Fem pop bypass 1997      PVD (peripheral vascular disease)  s/p peripheral stent insertion bilateral lower extemities x 10        FAMILY HISTORY:    Social History:      Allergies:  Betadine (Hives; Rash)   74 y/o male with PMHX of HTN, severe PVD s/p multiple stents b/l legs, s/p fem/popliteal bypass, chronic left lower ankle ulceration, GERD, HLD who presented to  with CC of left LE pain and bleeding.  Patient notes he was at his family's house earlier today.  He was sitting on the floor with his grandson when the family noticed significant bleeding in his left leg.  His wife tried to get him up and into the bathroom to stop the bleeding.  She got him to sit on the side of the bathtub and was trying to hold pressure over the bleeding when he seemed to lose consciousness.  He didn't fall over but as per wife, turned white and wasn't responding to her.  They called 911.  He wasn't responding for a few minutes and then came to.  She denies hx of syncope/ near syncope in the past.  In the ER, bleeding had mostly stopped.  Patient was c/o severe pain left foot.  Left foot discoloration and vascular surgery called for consult.  Labs with ARF with Cr of 2.2-- unclear baseline.  Patient has been taking advil at home for pain in foot.        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    Ex-smoker-- quit 20 years ago      Allergies:  Betadine (Hives; Rash)

## 2023-10-22 NOTE — ED PROVIDER NOTE - CONSTITUTIONAL, MLM
Well appearing, awake, alert, oriented to person, place, time/situation and in no apparent distress. normal... Well appearing, awake, alert, oriented to person, place, time/situation and in moderate distress due to pain.

## 2023-10-22 NOTE — CONSULT NOTE ADULT - SUBJECTIVE AND OBJECTIVE BOX
HPI:  72yo M w/ HTN, severe PAD s/p LLE fem-pop bypass and fem-fem bypass, angiogram with stent and last intervention 2 months ago with atherectomy, GERD, HLD, CHF presents w/ left venous stasis ulcer that started bleeding this morning. Patient endorses that he was takinga shower and felt a pop and started bleeding. Once he got to ED, bleeding stopped. He has been having this ulcer for a year and getting wound care treatment. Patient also sees Dr. Jackson at [facility] who intervened for his left lower extremity 2 months ago with angiogram and atherectomy. Patient was to follow up with new vascular srgeon this Tuesday, Dr. Gunter who was planning on CT angiogram. He states that he has been having pain for so many years. Patient denies fever/chills, shortness of breath, chest pain. VS reviewed      PAST MEDICAL & SURGICAL HISTORY:  Essential hypertension      PVD (peripheral vascular disease)      Gastroesophageal reflux disease, esophagitis presence not specified      Hypothyroidism      Chronic obstructive pulmonary disease, unspecified COPD type      Eczema      Hyperlipidemia, unspecified hyperlipidemia type      Medial meniscus tear  left knee      Femoral popliteal artery thrombus  h/o Fem pop bypass 1997      PVD (peripheral vascular disease)  s/p peripheral stent insertion bilateral lower extemities x 10          MEDICATIONS  (STANDING):  atorvastatin 80 milliGRAM(s) Oral at bedtime  clopidogrel Tablet 75 milliGRAM(s) Oral daily  levothyroxine 88 MICROGram(s) Oral daily  nebivolol 2.5 milliGRAM(s) Oral daily  sodium chloride 0.9%. 1000 milliLiter(s) (75 mL/Hr) IV Continuous <Continuous>    MEDICATIONS  (PRN):  acetaminophen     Tablet .. 650 milliGRAM(s) Oral every 6 hours PRN Mild Pain (1 - 3)  aluminum hydroxide/magnesium hydroxide/simethicone Suspension 30 milliLiter(s) Oral every 4 hours PRN Dyspepsia  melatonin 3 milliGRAM(s) Oral at bedtime PRN Insomnia  morphine  - Injectable 2 milliGRAM(s) IV Push every 4 hours PRN Severe Pain (7 - 10)  ondansetron Injectable 4 milliGRAM(s) IV Push every 6 hours PRN Nausea and/or Vomiting  oxycodone    5 mG/acetaminophen 325 mG 0.5 Tablet(s) Oral every 4 hours PRN Moderate Pain (4 - 6)      Allergies    Betadine (Hives; Rash)    Intolerances        SOCIAL HISTORY:    FAMILY HISTORY:          Physical Exam:  GENERAL: NAD, AOx3, well developed, well nourished  HEAD: Atraumatic, normocephalic  EYES: EOMI, PERRLA, conjunctiva and sclera clear  ENT: moist mucous membrane  NECK: supple, No JVD, midline trachea  CHEST/LUNG: clear to auscultation b/l, no rales, rhonchi, wheezing or rubs. unlabored respirations  Heart: S1, S2 normal, RRR w/o murmur  ABDOMEN: soft, nondistended, nontender. no organomegaly  EXTREMITIES: +2 peripheral pulses, brisk cap refill. no clubbing, cyanosis or edema  NERVOUS SYSTEM: AOx3, speech clear, no neuro-deficits  MSK: full ROM, no deformities  SKIN: warm to touch, no rash or lesions        Vital Signs Last 24 Hrs  T(C): 36.5 (22 Oct 2023 15:02), Max: 36.5 (22 Oct 2023 15:02)  T(F): 97.7 (22 Oct 2023 15:02), Max: 97.7 (22 Oct 2023 15:02)  HR: 70 (22 Oct 2023 15:02) (70 - 80)  BP: 151/46 (22 Oct 2023 15:53) (109/42 - 151/46)  BP(mean): 77 (22 Oct 2023 15:53) (60 - 77)  RR: 18 (22 Oct 2023 15:02) (18 - 18)  SpO2: 100% (22 Oct 2023 15:02) (99% - 100%)    Parameters below as of 22 Oct 2023 15:02  Patient On (Oxygen Delivery Method): room air        I&O's Summary          LABS:                        9.2    13.11 )-----------( 282      ( 22 Oct 2023 11:10 )             28.7     10-22    143  |  107  |  61<H>  ----------------------------<  161<H>  4.1   |  26  |  2.20<H>    Ca    8.8      22 Oct 2023 11:10    TPro  6.5  /  Alb  3.0<L>  /  TBili  0.4  /  DBili  x   /  AST  43<H>  /  ALT  30  /  AlkPhos  113  10-22    PT/INR - ( 22 Oct 2023 11:10 )   PT: 11.5 sec;   INR: 1.02 ratio         PTT - ( 22 Oct 2023 11:10 )  PTT:26.7 sec  Urinalysis Basic - ( 22 Oct 2023 11:10 )    Color: x / Appearance: x / SG: x / pH: x  Gluc: 161 mg/dL / Ketone: x  / Bili: x / Urobili: x   Blood: x / Protein: x / Nitrite: x   Leuk Esterase: x / RBC: x / WBC x   Sq Epi: x / Non Sq Epi: x / Bacteria: x      CAPILLARY BLOOD GLUCOSE        LIVER FUNCTIONS - ( 22 Oct 2023 11:10 )  Alb: 3.0 g/dL / Pro: 6.5 gm/dL / ALK PHOS: 113 U/L / ALT: 30 U/L / AST: 43 U/L / GGT: x             Cultures:      RADIOLOGY & ADDITIONAL STUDIES:         HPI:  74yo M w/ HTN, severe PAD s/p LLE fem-pop bypass and fem-fem bypass, angiogram with stent and last intervention 2 months ago with atherectomy, GERD, HLD, CHF presents w/ left venous stasis ulcer that started bleeding this morning. Patient endorses that he was takinga shower and felt a pop and started bleeding. Once he got to ED, bleeding stopped. He has been having this ulcer for a year and getting wound care treatment. Patient also sees Dr. Jackson at [facility] who intervened for his left lower extremity 2 months ago with angiogram and atherectomy. Patient was to follow up with new vascular srgeon this Tuesday, Dr. Gunter who was planning on CT angiogram. He states that he has been having pain for so many years. Patient denies fever/chills, shortness of breath, chest pain. VS reviewed      PAST MEDICAL & SURGICAL HISTORY:  Essential hypertension      PVD (peripheral vascular disease)      Gastroesophageal reflux disease, esophagitis presence not specified      Hypothyroidism      Chronic obstructive pulmonary disease, unspecified COPD type      Eczema      Hyperlipidemia, unspecified hyperlipidemia type      Medial meniscus tear  left knee      Femoral popliteal artery thrombus  h/o Fem pop bypass 1997      PVD (peripheral vascular disease)  s/p peripheral stent insertion bilateral lower extemities x 10          MEDICATIONS  (STANDING):  atorvastatin 80 milliGRAM(s) Oral at bedtime  clopidogrel Tablet 75 milliGRAM(s) Oral daily  levothyroxine 88 MICROGram(s) Oral daily  nebivolol 2.5 milliGRAM(s) Oral daily  sodium chloride 0.9%. 1000 milliLiter(s) (75 mL/Hr) IV Continuous <Continuous>    MEDICATIONS  (PRN):  acetaminophen     Tablet .. 650 milliGRAM(s) Oral every 6 hours PRN Mild Pain (1 - 3)  aluminum hydroxide/magnesium hydroxide/simethicone Suspension 30 milliLiter(s) Oral every 4 hours PRN Dyspepsia  melatonin 3 milliGRAM(s) Oral at bedtime PRN Insomnia  morphine  - Injectable 2 milliGRAM(s) IV Push every 4 hours PRN Severe Pain (7 - 10)  ondansetron Injectable 4 milliGRAM(s) IV Push every 6 hours PRN Nausea and/or Vomiting  oxycodone    5 mG/acetaminophen 325 mG 0.5 Tablet(s) Oral every 4 hours PRN Moderate Pain (4 - 6)      Allergies    Betadine (Hives; Rash)    Intolerances        SOCIAL HISTORY:    FAMILY HISTORY:          Physical Exam:  GENERAL: NAD, AOx3  HEAD: Atraumatic, normocephalic  EYES: EOMI, PERRLA, conjunctiva and sclera clear  ENT: moist mucous membrane  NECK: supple, No JVD, midline trachea  CHEST/LUNG: unlabored respirations  Heart: S1, S2 normal  ABDOMEN: soft, nondistended, nontender. no organomegaly  EXTREMITIES: left lower foot with mild discoloration at sole to, venous stasis ulcer nonbleeding at medial malleolus, nondopplerable DP, faintly dopplerable PT. motor/sensory intact.  NERVOUS SYSTEM: AOx3, speech clear, no neuro-deficits  SKIN: warm to touch, no rash or lesions        Vital Signs Last 24 Hrs  T(C): 36.5 (22 Oct 2023 15:02), Max: 36.5 (22 Oct 2023 15:02)  T(F): 97.7 (22 Oct 2023 15:02), Max: 97.7 (22 Oct 2023 15:02)  HR: 70 (22 Oct 2023 15:02) (70 - 80)  BP: 151/46 (22 Oct 2023 15:53) (109/42 - 151/46)  BP(mean): 77 (22 Oct 2023 15:53) (60 - 77)  RR: 18 (22 Oct 2023 15:02) (18 - 18)  SpO2: 100% (22 Oct 2023 15:02) (99% - 100%)    Parameters below as of 22 Oct 2023 15:02  Patient On (Oxygen Delivery Method): room air        I&O's Summary          LABS:                        9.2    13.11 )-----------( 282      ( 22 Oct 2023 11:10 )             28.7     10-22    143  |  107  |  61<H>  ----------------------------<  161<H>  4.1   |  26  |  2.20<H>    Ca    8.8      22 Oct 2023 11:10    TPro  6.5  /  Alb  3.0<L>  /  TBili  0.4  /  DBili  x   /  AST  43<H>  /  ALT  30  /  AlkPhos  113  10-22    PT/INR - ( 22 Oct 2023 11:10 )   PT: 11.5 sec;   INR: 1.02 ratio         PTT - ( 22 Oct 2023 11:10 )  PTT:26.7 sec  Urinalysis Basic - ( 22 Oct 2023 11:10 )    Color: x / Appearance: x / SG: x / pH: x  Gluc: 161 mg/dL / Ketone: x  / Bili: x / Urobili: x   Blood: x / Protein: x / Nitrite: x   Leuk Esterase: x / RBC: x / WBC x   Sq Epi: x / Non Sq Epi: x / Bacteria: x      CAPILLARY BLOOD GLUCOSE        LIVER FUNCTIONS - ( 22 Oct 2023 11:10 )  Alb: 3.0 g/dL / Pro: 6.5 gm/dL / ALK PHOS: 113 U/L / ALT: 30 U/L / AST: 43 U/L / GGT: x             Cultures:      RADIOLOGY & ADDITIONAL STUDIES:

## 2023-10-22 NOTE — ED ADULT NURSE NOTE - OBJECTIVE STATEMENT
Pt states in February he had a wart removed on his left foot and it has been unable to heal. wound present to inner left foot. Bacitracin, non-stick dressing and kerlix wrapped and applied to wound. Pt states he has been to multiple wound specialists out of [facility] and they have been putting silvadene, a little bit of water and gauze dressing on wound but it has been unable to heal and has been bleeding. 20 G IV inserted into right A/C.

## 2023-10-22 NOTE — H&P ADULT - NSHPLABSRESULTS_GEN_ALL_CORE
9.2    13.11 )-----------( 282      ( 22 Oct 2023 11:10 )             28.7     10-22    143  |  107  |  61<H>  ----------------------------<  161<H>  4.1   |  26  |  2.20<H>    Ca    8.8      22 Oct 2023 11:10    TPro  6.5  /  Alb  3.0<L>  /  TBili  0.4  /  DBili  x   /  AST  43<H>  /  ALT  30  /  AlkPhos  113  10-22    CAPILLARY BLOOD GLUCOSE        PT/INR - ( 22 Oct 2023 11:10 )   PT: 11.5 sec;   INR: 1.02 ratio         PTT - ( 22 Oct 2023 11:10 )  PTT:26.7 sec  Urinalysis Basic - ( 22 Oct 2023 11:10 )    Color: x / Appearance: x / SG: x / pH: x  Gluc: 161 mg/dL / Ketone: x  / Bili: x / Urobili: x   Blood: x / Protein: x / Nitrite: x   Leuk Esterase: x / RBC: x / WBC x   Sq Epi: x / Non Sq Epi: x / Bacteria: x 9.2    13.11 )-----------( 282      ( 22 Oct 2023 11:10 )             28.7     10-22    143  |  107  |  61<H>  ----------------------------<  161<H>  4.1   |  26  |  2.20<H>    Ca    8.8      22 Oct 2023 11:10    TPro  6.5  /  Alb  3.0<L>  /  TBili  0.4  /  DBili  x   /  AST  43<H>  /  ALT  30  /  AlkPhos  113  10-22    PT/INR - ( 22 Oct 2023 11:10 )   PT: 11.5 sec;   INR: 1.02 ratio       PTT - ( 22 Oct 2023 11:10 )  PTT:26.7 sec    Urinalysis Basic - ( 22 Oct 2023 11:10 )  Color: x / Appearance: x / SG: x / pH: x  Gluc: 161 mg/dL / Ketone: x  / Bili: x / Urobili: x   Blood: x / Protein: x / Nitrite: x   Leuk Esterase: x / RBC: x / WBC x   Sq Epi: x / Non Sq Epi: x / Bacteria: x

## 2023-10-22 NOTE — ED PROVIDER NOTE - CADM POA PRESS ULCER
Loreta Armendariz Patient Age: 66 year old  MESSAGE:   See refill request from yesterday. Patient needs refill of:    clonazePAM (KLONOPIN) 2 MG tablet [Pharmacy Med Name: CLONAZEPAM 2 MG TABLET] 60 tablet 0 12/5/2018    Sig:  TAKE 2 TABLETS BY MOUTH EVERY EVENING AS NEEDED     Patient out of medication for a full week. Please refill today or call to advise if a problem.  Thanks.       Next and Last Visit with Provider and Department  Last visit with this provider: 12/5/2018  Last visit with this department: 12/5/2018    Next visit with this provider: Visit date not found  Next visit with this department: Visit date not found    WEIGHT AND HEIGHT:   Wt Readings from Last 1 Encounters:   12/29/17 76.2 kg (168 lb)     Ht Readings from Last 1 Encounters:   12/29/17 5' 5\" (1.651 m)     BMI Readings from Last 1 Encounters:   12/29/17 27.96 kg/m²       ALLERGIES: not on file.  No current outpatient medications on file.     No current facility-administered medications for this visit.      PHARMACY to use:  See below          Pharmacy preference(s) on file:   [facility]/pharmacy #[id number] - [place], [place] - [address] [address]  Phone: [phone] Fax: [phone]      CALL BACK INFO: Ok to leave response (including medical information) on answering machine  ROUTING: Patient's physician/staff        PCP: No primary care provider on file.         INS: No billing information found for this encounter.   PATIENT ADDRESS:  [address] [place] [address]   [facility] [id number]    No

## 2023-10-22 NOTE — H&P ADULT - NSHPREVIEWOFSYSTEMS_GEN_ALL_CORE
ROS:  General:  No fevers, chills, or unexplained weight loss  Skin: No rash or bothersome skin lesions  Musculoskeletal: No arthalgias, myalgias or joint swelling  Eyes: No visual changes or eye pain  Ears: No hearing loss , otorrhea or ear pain  Nose, Mouth, Throat: No nasal congestion, rhinorrhea, oral lesions, postnasal drip or sore throat  Cardio: No chest pain or palpitations. no lower extremity edema. no syncope. no claudication.   Respiratory: No cough, shortness of breath or wheezing   GI: No diarrhea, constipation, blood in stools, abdominal pain, vomiting or heartburn  : No urinary frequency, hematuria, incontinence, or dysuria  Neurologic: No headaches, parasthesias, confusion, dysarthria or gait instability  Psychiatric:  No anxiety or depression  Lymphatic:  No easy bruising, easy bleeding or swollen glands  Allergic: No itching, sneezing , watery eyes, clear rhinorrhea or recurrent infections ROS:  General:  No fevers, chills, or unexplained weight loss  Skin: No rash or bothersome skin lesions  Musculoskeletal: No arthalgias, myalgias or joint swelling  Eyes: No visual changes or eye pain  Ears: No hearing loss , otorrhea or ear pain  Nose, Mouth, Throat: No nasal congestion, rhinorrhea, oral lesions, postnasal drip or sore throat  Cardio: pain in left leg-- severe at rest.    Respiratory: No cough, shortness of breath or wheezing   GI: No diarrhea, constipation, blood in stools, abdominal pain, vomiting or heartburn  : No urinary frequency, hematuria, incontinence, or dysuria  Neurologic: No headaches, parasthesias, confusion, dysarthria or gait instability  Psychiatric:  No anxiety or depression  Lymphatic:  No easy bruising, easy bleeding or swollen glands  Allergic: No itching, sneezing , watery eyes, clear rhinorrhea or recurrent infections

## 2023-10-22 NOTE — ED PROVIDER NOTE - PROGRESS NOTE DETAILS
Yunior Scott for attending Dr. Arnold   Pt and wife updated with results. Pt pain improved after pain medicine. Pt with no history of kidney disease. Wife reports pt has not been able to eat or drink normally with some associated weight loss. Plan admission for JENIFER and failure to thrive. Yunior Scott for attending Dr. Arnold   Discussed with Dr. Crisostomo for admission. Yunior Scott for attending Dr. Arnold   Discussed with surgical resident for vascular consult.

## 2023-10-22 NOTE — ED PROVIDER NOTE - MUSCULOSKELETAL, MLM
Spine appears normal, range of motion is not limited, no muscle or joint tenderness. LLE 2+ pedal edema, wound on medial malleolus, not actively bleeding, cap refill les than 2 seconds Spine appears normal,  LLE 2+ pedal edema, wound/ulcer on medial malleolus, not actively bleeding, purpilish/blue color to left leg, No pain to palp, cool to touch extremities

## 2023-10-22 NOTE — ED PROVIDER NOTE - NSICDXPASTSURGICALHX_GEN_ALL_CORE_FT
PAST SURGICAL HISTORY:  Femoral popliteal artery thrombus h/o Fem pop bypass 1997    PVD (peripheral vascular disease) s/p peripheral stent insertion bilateral lower extemities x 10

## 2023-10-22 NOTE — ED PROVIDER NOTE - CLINICAL SUMMARY MEDICAL DECISION MAKING FREE TEXT BOX
74 yo pt presents to the ED with bleeding from chronic LLE wound. Pt also with increasing pain and swelling today. Plan check labs, pain control, and ultrasound.

## 2023-10-22 NOTE — PATIENT PROFILE ADULT - FUNCTIONAL ASSESSMENT - BASIC MOBILITY 6.
2-calculated by average/Not able to assess (calculate score using [facility] averaging method)

## 2023-10-22 NOTE — ED ADULT TRIAGE NOTE - PAIN: PRESENCE, MLM
There are no preventive care reminders to display for this patient.    Patient is up to date, no discussion needed.     complains of pain/discomfort

## 2023-10-22 NOTE — H&P ADULT - ASSESSMENT
74 y/o male with PMHX of HTN, severe PVD s/p multiple stents b/l legs, s/p fem/popliteal bypass, chronic left lower ankle ulceration, GERD, HLD who presented to  with CC of left LE pain and bleeding and near syncope.  Found to have bleeding from chronic vascular ulcer as well as anemia and JENIFER.      #Severe PVD now with increased pain/ bleeding from ulceration/ discoloration left foot and toes:    Admit to [facility] with remote tele.    Check US arterial Left LE-- r/o acute thrombus.    Hx of severe PVD s/p bypass and multiple stents.    Cont plavix.    Patient was on ASA but stopped due to bruising as per wife.    Pain control -- morphine/ percocet.    Vascular eval.    Bleeding from ulceration stopped in ER.    Cont dressing changes/ wound care.    Cannot have CT angio LE due to JENIFER.      #JENIFER:    Presumed JENIFER.  Wife unclear baseline but denies hx of CKD.    May be due to excess advil.    Also was on ACE and diuretics at home.    Hold ACE/ diuretics.    Avoid nephro toxic agents.    No IV contrast.    NSS @ 75.    Check urine lytes.    Check renal/ bladder US.    Repeat in am.      #Anemia:    Suspect related to acute blood loss.    No baseline.    Check iron/ b12/ folate.    Wife notes PMD just started patient on iron.    Repeat in am.    Transfuse <8.      #HTN:    Cont bystolic.    Hold ACE with JENIFER.      #Chronic ? diastolic CHF:    No acute exacerbation.    Hold diuretic and ACE with JENIFER.    Cont bystolic.      #Hyperglycemia:    Check A1C.      #Hypothyroid:    Cont synthroid.      #DVT proph:  hold for now with bleeding from ulcer.      D/w patient and wife in detail.   72 y/o male with PMHX of HTN, severe PVD s/p multiple stents b/l legs, s/p fem/popliteal bypass, chronic left lower ankle ulceration, GERD, HLD who presented to  with CC of left LE pain and bleeding and near syncope.  Found to have bleeding from chronic vascular ulcer as well as anemia and JENIFER.      #Severe PVD now with increased pain/ bleeding from ulceration/ discoloration left foot and toes:    Admit to [facility] with remote tele.    Check US arterial Left LE-- r/o acute thrombus.    Hx of severe PVD s/p bypass and multiple stents.    Cont plavix.    Patient was on ASA but stopped due to bruising as per wife.    Pain control -- morphine/ percocet.    Vascular eval.    Bleeding from ulceration stopped in ER.    Cont dressing changes/ wound care.    Cannot have CT angio LE due to JENIFER.      #Near syncope:    May be related to anemia-- bleeding.    Remote tele.    +Murmur on exam-- r/o AS.  Check ECHO.    Cardio eval.    Check trop.      #JENIFER:    Presumed JENIFER.  Wife unclear baseline but denies hx of CKD.    May be due to excess advil.    Also was on ACE and diuretics at home.    Hold ACE/ diuretics.    Avoid nephro toxic agents.    No IV contrast.    NSS @ 75.    Check urine lytes.    Check renal/ bladder US.    Repeat in am.      #Anemia:    Suspect related to acute blood loss.    No baseline.    Check iron/ b12/ folate.    Wife notes PMD just started patient on iron.    Repeat in am.    Transfuse <8.      #HTN:    Cont bystolic.    Hold ACE with JENIFER.      #Chronic ? diastolic CHF:    No acute exacerbation.    Hold diuretic and ACE with JENIFER.    Cont bystolic.      #Hyperglycemia:    Check A1C.      #Hypothyroid:    Cont synthroid.      #DVT proph:  hold for now with bleeding from ulcer.      D/w patient and wife in detail.

## 2023-10-22 NOTE — CONSULT NOTE ADULT - ASSESSMENT
74yo M presents w/ LLE venous stasis ulcer  severe small vessel disease on left lower extremity with multiple revascularization interventions, now occluded fem-pop and fem-fem bypass. chronic limb ischemia  JENIFER    Recommendation:  - likely need CT angiogram, however will wait until resolution of JENIFER  - family would like to continue care at [facility]. no acute intervention needed at this time  - daily dressing change    Plan discussed with vascular surgery attending, Dr. Lopez

## 2023-10-22 NOTE — ED PROVIDER NOTE - CARE PLAN
1 Principal Discharge DX:	JENIFER (acute kidney injury)  Secondary Diagnosis:	Anemia  Secondary Diagnosis:	Leg wound, left

## 2023-10-23 LAB
A1C WITH ESTIMATED AVERAGE GLUCOSE RESULT: 6.2 % — HIGH (ref 4–5.6)
A1C WITH ESTIMATED AVERAGE GLUCOSE RESULT: 6.2 % — HIGH (ref 4–5.6)
ANION GAP SERPL CALC-SCNC: 7 MMOL/L — SIGNIFICANT CHANGE UP (ref 5–17)
ANION GAP SERPL CALC-SCNC: 7 MMOL/L — SIGNIFICANT CHANGE UP (ref 5–17)
BLD GP AB SCN SERPL QL: SIGNIFICANT CHANGE UP
BLD GP AB SCN SERPL QL: SIGNIFICANT CHANGE UP
BUN SERPL-MCNC: 47 MG/DL — HIGH (ref 7–23)
BUN SERPL-MCNC: 47 MG/DL — HIGH (ref 7–23)
CALCIUM SERPL-MCNC: 8.4 MG/DL — LOW (ref 8.5–10.1)
CALCIUM SERPL-MCNC: 8.4 MG/DL — LOW (ref 8.5–10.1)
CHLORIDE SERPL-SCNC: 111 MMOL/L — HIGH (ref 96–108)
CHLORIDE SERPL-SCNC: 111 MMOL/L — HIGH (ref 96–108)
CO2 SERPL-SCNC: 26 MMOL/L — SIGNIFICANT CHANGE UP (ref 22–31)
CO2 SERPL-SCNC: 26 MMOL/L — SIGNIFICANT CHANGE UP (ref 22–31)
CREAT ?TM UR-MCNC: 71 MG/DL — SIGNIFICANT CHANGE UP
CREAT ?TM UR-MCNC: 71 MG/DL — SIGNIFICANT CHANGE UP
CREAT SERPL-MCNC: 1.33 MG/DL — HIGH (ref 0.5–1.3)
CREAT SERPL-MCNC: 1.33 MG/DL — HIGH (ref 0.5–1.3)
EGFR: 57 ML/MIN/1.73M2 — LOW
EGFR: 57 ML/MIN/1.73M2 — LOW
ESTIMATED AVERAGE GLUCOSE: 131 MG/DL — HIGH (ref 68–114)
ESTIMATED AVERAGE GLUCOSE: 131 MG/DL — HIGH (ref 68–114)
FERRITIN SERPL-MCNC: 77 NG/ML — SIGNIFICANT CHANGE UP (ref 30–400)
FERRITIN SERPL-MCNC: 77 NG/ML — SIGNIFICANT CHANGE UP (ref 30–400)
FOLATE SERPL-MCNC: >20 NG/ML — SIGNIFICANT CHANGE UP
FOLATE SERPL-MCNC: >20 NG/ML — SIGNIFICANT CHANGE UP
GLUCOSE SERPL-MCNC: 98 MG/DL — SIGNIFICANT CHANGE UP (ref 70–99)
GLUCOSE SERPL-MCNC: 98 MG/DL — SIGNIFICANT CHANGE UP (ref 70–99)
HCT VFR BLD CALC: 22.7 % — LOW (ref 39–50)
HCT VFR BLD CALC: 22.7 % — LOW (ref 39–50)
HCV AB S/CO SERPL IA: 0.06 S/CO — SIGNIFICANT CHANGE UP (ref 0–0.99)
HCV AB S/CO SERPL IA: 0.06 S/CO — SIGNIFICANT CHANGE UP (ref 0–0.99)
HCV AB SERPL-IMP: SIGNIFICANT CHANGE UP
HCV AB SERPL-IMP: SIGNIFICANT CHANGE UP
HGB BLD-MCNC: 7.2 G/DL — LOW (ref 13–17)
HGB BLD-MCNC: 7.2 G/DL — LOW (ref 13–17)
IRON SATN MFR SERPL: 10 % — LOW (ref 16–55)
IRON SATN MFR SERPL: 10 % — LOW (ref 16–55)
IRON SATN MFR SERPL: 26 UG/DL — LOW (ref 45–165)
IRON SATN MFR SERPL: 26 UG/DL — LOW (ref 45–165)
MCHC RBC-ENTMCNC: 28.1 PG — SIGNIFICANT CHANGE UP (ref 27–34)
MCHC RBC-ENTMCNC: 28.1 PG — SIGNIFICANT CHANGE UP (ref 27–34)
MCHC RBC-ENTMCNC: 31.7 GM/DL — LOW (ref 32–36)
MCHC RBC-ENTMCNC: 31.7 GM/DL — LOW (ref 32–36)
MCV RBC AUTO: 88.7 FL — SIGNIFICANT CHANGE UP (ref 80–100)
MCV RBC AUTO: 88.7 FL — SIGNIFICANT CHANGE UP (ref 80–100)
PLATELET # BLD AUTO: 200 K/UL — SIGNIFICANT CHANGE UP (ref 150–400)
PLATELET # BLD AUTO: 200 K/UL — SIGNIFICANT CHANGE UP (ref 150–400)
POTASSIUM SERPL-MCNC: 4.2 MMOL/L — SIGNIFICANT CHANGE UP (ref 3.5–5.3)
POTASSIUM SERPL-MCNC: 4.2 MMOL/L — SIGNIFICANT CHANGE UP (ref 3.5–5.3)
POTASSIUM SERPL-SCNC: 4.2 MMOL/L — SIGNIFICANT CHANGE UP (ref 3.5–5.3)
POTASSIUM SERPL-SCNC: 4.2 MMOL/L — SIGNIFICANT CHANGE UP (ref 3.5–5.3)
RBC # BLD: 2.56 M/UL — LOW (ref 4.2–5.8)
RBC # BLD: 2.56 M/UL — LOW (ref 4.2–5.8)
RBC # FLD: 15 % — HIGH (ref 10.3–14.5)
RBC # FLD: 15 % — HIGH (ref 10.3–14.5)
SODIUM SERPL-SCNC: 144 MMOL/L — SIGNIFICANT CHANGE UP (ref 135–145)
SODIUM SERPL-SCNC: 144 MMOL/L — SIGNIFICANT CHANGE UP (ref 135–145)
SODIUM UR-SCNC: 21 MMOL/L — SIGNIFICANT CHANGE UP
SODIUM UR-SCNC: 21 MMOL/L — SIGNIFICANT CHANGE UP
TIBC SERPL-MCNC: 254 UG/DL — SIGNIFICANT CHANGE UP (ref 220–430)
TIBC SERPL-MCNC: 254 UG/DL — SIGNIFICANT CHANGE UP (ref 220–430)
UIBC SERPL-MCNC: 228 UG/DL — SIGNIFICANT CHANGE UP (ref 110–370)
UIBC SERPL-MCNC: 228 UG/DL — SIGNIFICANT CHANGE UP (ref 110–370)
UUN UR-MCNC: 779 MG/DL — SIGNIFICANT CHANGE UP
UUN UR-MCNC: 779 MG/DL — SIGNIFICANT CHANGE UP
VIT B12 SERPL-MCNC: 785 PG/ML — SIGNIFICANT CHANGE UP (ref 232–1245)
VIT B12 SERPL-MCNC: 785 PG/ML — SIGNIFICANT CHANGE UP (ref 232–1245)
WBC # BLD: 7.24 K/UL — SIGNIFICANT CHANGE UP (ref 3.8–10.5)
WBC # BLD: 7.24 K/UL — SIGNIFICANT CHANGE UP (ref 3.8–10.5)
WBC # FLD AUTO: 7.24 K/UL — SIGNIFICANT CHANGE UP (ref 3.8–10.5)
WBC # FLD AUTO: 7.24 K/UL — SIGNIFICANT CHANGE UP (ref 3.8–10.5)

## 2023-10-23 PROCEDURE — 99232 SBSQ HOSP IP/OBS MODERATE 35: CPT

## 2023-10-23 PROCEDURE — 75635 CT ANGIO ABDOMINAL ARTERIES: CPT | Mod: 26

## 2023-10-23 PROCEDURE — 93306 TTE W/DOPPLER COMPLETE: CPT | Mod: 26

## 2023-10-23 RX ORDER — DIPHENHYDRAMINE HCL 50 MG
50 CAPSULE ORAL ONCE
Refills: 0 | Status: COMPLETED | OUTPATIENT
Start: 2023-10-23 | End: 2023-10-23

## 2023-10-23 RX ADMIN — SODIUM CHLORIDE 75 MILLILITER(S): 9 INJECTION INTRAMUSCULAR; INTRAVENOUS; SUBCUTANEOUS at 12:48

## 2023-10-23 RX ADMIN — NEBIVOLOL HYDROCHLORIDE 2.5 MILLIGRAM(S): 5 TABLET ORAL at 12:47

## 2023-10-23 RX ADMIN — Medication 40 MILLIGRAM(S): at 16:09

## 2023-10-23 RX ADMIN — Medication 50 MILLIGRAM(S): at 19:10

## 2023-10-23 RX ADMIN — ATORVASTATIN CALCIUM 80 MILLIGRAM(S): 80 TABLET, FILM COATED ORAL at 21:03

## 2023-10-23 RX ADMIN — Medication 81 MILLIGRAM(S): at 12:48

## 2023-10-23 RX ADMIN — CLOPIDOGREL BISULFATE 75 MILLIGRAM(S): 75 TABLET, FILM COATED ORAL at 12:47

## 2023-10-23 RX ADMIN — Medication 88 MICROGRAM(S): at 16:09

## 2023-10-23 NOTE — PROGRESS NOTE ADULT - ASSESSMENT
72 y/o male with PMHX of HTN, severe PVD s/p multiple stents b/l legs, s/p fem/popliteal bypass, chronic left lower ankle ulceration, GERD, HLD who presented to  with CC of left LE pain and bleeding and near syncope.  Found to have bleeding from chronic vascular ulcer as well as anemia and JENIFER.      #Severe PVD now with increased pain/ bleeding from ulceration/ discoloration left foot and toes:      US arterial Left LE: Occluded left femoropopliteal grafts and femorofemoral grafts.  Occluded left common femoral, superficial femoral, anterior tibial and   deep femoral arteries.Minimal monophasic flow short segments of the left popliteal, left   posterior tibial, and peroneal arteries  -Hx of severe PVD s/p bypass and multiple stents.    -Cont plavix.    -Patient was on ASA but stopped due to bruising as per wife.    -Pain control -- morphine/ percocet.    -Vascular eval.    -Bleeding from ulceration stopped in ER.    -Cont dressing changes/ wound care.    -- likely need CT angiogram, however will wait until resolution of JENIFER  - If patient wishes to continue vascular treatment at [facility] medical records will be requested   - daily dressing change  - Cardio consult appreciated- Could be more of a varicose rupture than PVD bleed     #Near syncope:    May be related to anemia-- bleeding.    Remote tele.    +Murmur on exam-- r/o AS.  Check ECHO.    Cardio eval.    Check trop.      #JENIFER:    Presumed JENIFER.  Wife unclear baseline but denies hx of CKD.    May be due to excess advil.    Also was on ACE and diuretics at home.    Hold ACE/ diuretics.    Avoid nephro toxic agents.    No IV contrast.    NSS @ 75.    Check urine lytes.    Check renal/ bladder US.    Repeat in am.      #Anemia:    Suspect related to acute blood loss.    No baseline.    Check iron/ b12/ folate.    Wife notes PMD just started patient on iron.    Repeat in am.    Transfuse <8 / Plan for 1u prbc today     #HTN:    Cont bystolic.    Hold ACE with JENIFER.      #Chronic ? diastolic CHF:    No acute exacerbation.    Hold diuretic and ACE with JENIFER.    Cont bystolic.      #Hyperglycemia:    Check A1C.      #Hypothyroid:    Cont synthroid.      #DVT proph:  hold for now with bleeding from ulcer.      D/w patient and wife in detail.

## 2023-10-23 NOTE — CONSULT NOTE ADULT - ASSESSMENT
Near syncope probably secondary to hypovolemia from significant blood loss from a bleeding ulcer.  Anemia of acute blood loss  JENIFER, improving  Severe PAD with critical limb ischemia and ulcer of the left foot.  Occluded femorofemoral and left femoropopliteal bypass graft.  Poor distal perfusion including stenosis of the left popliteal artery and single-vessel runoff.  Possibly venous insufficiency and a large caliber great saphenous vein in the left calf and left ankle area as a cause of patient's current bleeding.  Possible erosion of left ankle varicosity by arterial ulcer.  Mild AS    Suggest:    Hydration, Transfusion  Follow up renal function. improvement noted.  CTA of the abd, pelvis and LE run off, evaluate fem fem and left fem pop bypass.   Also assess for venous anatomy and reflux in the left leg. R/o  and significant reflux in the remnants of the left GSV.   D/W Dr Stevens.  Cardiac status is stable.   Pt would like to be transferred to [facility] or  if further invasive treatment needed for PAD or ulcer.   D/W wife at bedside. Near syncope probably secondary to hypovolemia from significant blood loss from a bleeding ulcer.  Anemia of acute blood loss  JENIFER, improving  Severe PAD with critical limb ischemia and ulcer of the left foot.  Occluded femorofemoral and left femoropopliteal bypass graft.  Poor distal perfusion including stenosis of the left popliteal artery and single-vessel runoff.  Possibly venous insufficiency and a large caliber great saphenous vein in the left calf and left ankle area as a cause of patient's current bleeding.  Possible erosion of left ankle varicosity by arterial ulcer.  Mild AS    Suggest:    Hydration, Transfusion  Follow up renal function. improvement noted.  CTA of the abd, pelvis and LE run off, evaluate fem fem and left fem pop bypass.   Also assess for venous anatomy and reflux in the left leg. R/o  and significant reflux in the remnants of the left GSV.   D/W Dr Stevens.  Cardiac status is stable.   Pt would like to be transferred to [facility] or  ([place]) if further invasive treatment needed for PAD or ulcer.   D/W wife at bedside.

## 2023-10-23 NOTE — PROGRESS NOTE ADULT - SUBJECTIVE AND OBJECTIVE BOX
Pt seen and examined at bedside, no acute events. Pt had no complaints. Denied fever, chills, nausea, vomiting or SOB overnight.     Vital Signs Last 24 Hrs  T(C): 37.1 (23 Oct 2023 08:36), Max: 37.1 (23 Oct 2023 08:36)  T(F): 98.7 (23 Oct 2023 08:36), Max: 98.7 (23 Oct 2023 08:36)  HR: 72 (23 Oct 2023 08:36) (65 - 78)  BP: 122/58 (23 Oct 2023 08:36) (109/42 - 160/87)  BP(mean): 77 (22 Oct 2023 15:53) (60 - 77)  RR: 18 (23 Oct 2023 08:36) (17 - 18)  SpO2: 95% (23 Oct 2023 08:36) (94% - 100%)    Parameters below as of 23 Oct 2023 08:36  Patient On (Oxygen Delivery Method): room air                            7.2    7.24  )-----------( 200      ( 23 Oct 2023 06:00 )             22.7     10-23    144  |  111<H>  |  47<H>  ----------------------------<  98  4.2   |  26  |  1.33<H>    Ca    8.4<L>      23 Oct 2023 06:00    TPro  6.5  /  Alb  3.0<L>  /  TBili  0.4  /  DBili  x   /  AST  43<H>  /  ALT  30  /  AlkPhos  113  10-22    I&O's Summary    22 Oct 2023 07:01  -  23 Oct 2023 07:00  --------------------------------------------------------  IN: 851 mL / OUT: 500 mL / NET: 351 mL    23 Oct 2023 07:01  -  23 Oct 2023 11:05  --------------------------------------------------------  IN: 450 mL / OUT: 350 mL / NET: 100 mL      Physical Exam:  GENERAL: NAD, AOx3  HEAD: Atraumatic, normocephalic  EYES: EOMI, PERRLA, conjunctiva and sclera clear  ENT: moist mucous membrane  NECK: supple, No JVD, midline trachea  CHEST/LUNG: unlabored respirations  Heart: S1, S2 normal  ABDOMEN: soft, nondistended, nontender. no organomegaly  EXTREMITIES: left lower foot with mild discoloration at sole to, venous stasis ulcer nonbleeding at medial malleolus, nondopplerable DP, faintly dopplerable PT. motor/sensory intact.  NERVOUS SYSTEM: AOx3, speech clear, no neuro-deficits  SKIN: warm to touch, no rash or lesions

## 2023-10-23 NOTE — PROGRESS NOTE ADULT - ASSESSMENT
72yo M presents w/ bleeding LLE venous stasis ulcer  severe small vessel disease on left lower extremity with multiple revascularization interventions, now occluded fem-pop and fem-fem bypass. chronic limb ischemia  JENIFER on CKD   chronic NSAID intake    Recommendation:  - likely need CT angiogram, however will wait until resolution of JENIFER  - If patient wishes to continue vascular treatment at [facility] medical records will be requested   - daily dressing change    Plan discussed with vascular surgery attending, Dr. Sosa

## 2023-10-23 NOTE — CONSULT NOTE ADULT - SUBJECTIVE AND OBJECTIVE BOX
Patient is a 73y old  Male who presents with a chief complaint of pain and bleeding left foot (23 Oct 2023 11:04)      HPI:  72 y/o male with PMHX of HTN, severe PVD s/p multiple stents b/l legs, s/p fem/popliteal bypass, chronic left lower ankle ulceration, GERD, HLD who presented to  with CC of left LE pain and bleeding.  Patient notes he was at his family's house earlier today.  He was sitting on the floor with his grandson when the family noticed significant bleeding in his left leg.  His wife tried to get him up and into the bathroom to stop the bleeding.  She got him to sit on the side of the bathtub and was trying to hold pressure over the bleeding when he seemed to lose consciousness.  He didn't fall over but as per wife, turned white and wasn't responding to her.  They called 911.  He wasn't responding for a few minutes and then came to.  She denies hx of syncope/ near syncope in the past.  In the ER, bleeding had mostly stopped.  Patient was c/o severe pain left foot.  Left foot discoloration and vascular surgery called for consult.  Labs with ARF with Cr of 2.2-- unclear baseline.  Patient has been taking advil at home for pain in foot.        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    Ex-smoker-- quit 20 years ago      Allergies:  Betadine (Hives; Rash)   (22 Oct 2023 14:35)      PAST MEDICAL & SURGICAL HISTORY:  Essential hypertension      PVD (peripheral vascular disease)      Gastroesophageal reflux disease, esophagitis presence not specified      Hypothyroidism      Chronic obstructive pulmonary disease, unspecified COPD type      Eczema      Hyperlipidemia, unspecified hyperlipidemia type      Medial meniscus tear  left knee      Femoral popliteal artery thrombus  h/o Fem pop bypass 1997      PVD (peripheral vascular disease)  s/p peripheral stent insertion bilateral lower extemities x 10          PREVIOUS CARDIAC WORKUP:      Echo:  Stress Test:  Cardiac Cath:    ALLERGIES:    Betadine (Hives; Rash)       MEDICATIONS  (STANDING):  aspirin enteric coated 81 milliGRAM(s) Oral daily  atorvastatin 80 milliGRAM(s) Oral at bedtime  clopidogrel Tablet 75 milliGRAM(s) Oral daily  influenza  Vaccine (HIGH DOSE) 0.7 milliLiter(s) IntraMuscular once  levothyroxine 88 MICROGram(s) Oral daily  nebivolol 2.5 milliGRAM(s) Oral daily  sodium chloride 0.9%. 1000 milliLiter(s) (75 mL/Hr) IV Continuous <Continuous>    MEDICATIONS  (PRN):  acetaminophen     Tablet .. 650 milliGRAM(s) Oral every 6 hours PRN Mild Pain (1 - 3)  aluminum hydroxide/magnesium hydroxide/simethicone Suspension 30 milliLiter(s) Oral every 4 hours PRN Dyspepsia  melatonin 3 milliGRAM(s) Oral at bedtime PRN Insomnia  morphine  - Injectable 2 milliGRAM(s) IV Push every 4 hours PRN Severe Pain (7 - 10)  ondansetron Injectable 4 milliGRAM(s) IV Push every 6 hours PRN Nausea and/or Vomiting  oxycodone    5 mG/acetaminophen 325 mG 0.5 Tablet(s) Oral every 4 hours PRN Moderate Pain (4 - 6)      FAMILY HISTORY:        SOCIAL HISTORY:  .scl    ROS:     .ros    Vital Signs Last 24 Hrs  T(C): 37.1 (23 Oct 2023 08:36), Max: 37.1 (23 Oct 2023 08:36)  T(F): 98.7 (23 Oct 2023 08:36), Max: 98.7 (23 Oct 2023 08:36)  HR: 72 (23 Oct 2023 08:36) (65 - 78)  BP: 122/58 (23 Oct 2023 08:36) (109/42 - 160/87)  BP(mean): 77 (22 Oct 2023 15:53) (60 - 77)  RR: 18 (23 Oct 2023 08:36) (17 - 18)  SpO2: 95% (23 Oct 2023 08:36) (94% - 100%)    Parameters below as of 23 Oct 2023 08:36  Patient On (Oxygen Delivery Method): room air          PHYSICAL EXAM:    .phy      TELEMETRY:    ECG:    LABS:                          7.2    7.24  )-----------( 200      ( 23 Oct 2023 06:00 )             22.7     10-23    144  |  111<H>  |  47<H>  ----------------------------<  98  4.2   |  26  |  1.33<H>    Creatinine: 1.33 mg/dL (10.23.23 @ 06:00)   Creatinine: 2.20 mg/dL (10.22.23 @ 11:10)     Ca    8.4<L>      23 Oct 2023 06:00    TPro  6.5  /  Alb  3.0<L>  /  TBili  0.4  /  DBili  x   /  AST  43<H>  /  ALT  30  /  AlkPhos  113  10-22    10-22 @ 11:10  Trop-I  48.99    PT/INR - ( 22 Oct 2023 11:10 )   PT: 11.5 sec;   INR: 1.02 ratio    PTT - ( 22 Oct 2023 11:10 )  PTT:26.7 sec    RADIOLOGY & ADDITIONAL STUDIES:     73-year-old male admitted with complaints of a near syncopal episode after having severe bleeding from his left ankle ulcer.  Patient has had history of PAD and venous insufficiency.  He has had a chronic left medial malleolus area ulceration.  He has had a history of a femorofemoral bypass and left femoropopliteal bypass.  While trying to get up after noticing a significant amount of bleed from the left ankle patient felt weak, pale and almost passed out.  No previous history of syncope or near syncope.  No history of arrhythmias.  Recently has been having severe pain in the left foot and worsening of ulceration of the left medial malleolus.  Follows up with Dr. Castillo at [facility] for PAD.  Recently saw Dr Nickolas Gunter of vascular surgery at [facility] on 10/17/2023 for worsening left ankle ulcer and change in SANTHOSH PVR. Clinical suspicion of occluded left Fem-pop bypass but with Doppler signal positive over the Fem-Fem bypass.       PAST MEDICAL & SURGICAL HISTORY:  Essential hypertension  PVD (peripheral vascular disease)  Gastroesophageal reflux disease, esophagitis presence not specified  Hypothyroidism  Chronic obstructive pulmonary disease, unspecified COPD type  Eczema  Hyperlipidemia, unspecified hyperlipidemia type  Medial meniscus tear left knee  Femoral popliteal artery thrombus  AAA, EVAR  Fem Fem bypass  h/o Fem pop bypass 1997.  Occlusion and redo bypass  PVD (peripheral vascular disease)  s/p peripheral stent insertion bilateral lower extremities x 10  Varicose veins      FAMILY HISTORY:  Family hx of vascular disease      Social History:    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    Ex-smoker-- quit 20 years ago      Allergies:  Betadine (Hives; Rash)   (22 Oct 2023 14:35)      PREVIOUS CARDIOVASCULAR WORKUP:      Peripheral angio 4/2023  SUMMARY:     - Severe LEFT popliteal stenosis s/p successful DCB x1  - Left PT occluded  - Patent fem-fem and fem-pop bypass grafts     Routine post-peripheral intervention care with monitoring and anticipated same day discharge if course uncomplicated  Continue ASA 81 mg daily  Continue Plavix 75 mg daily  Restart Xarelto 2.5mg BID tomorrow if no bleeding  Triple therapy for 1 week then stop aspirin  Continue medical management of PAD and PAD risk factors       SANTHOSH PVR 10/17/23  Interpretation:  Mild peripheral arterial occlusive disease appreciated in the right lower extremity based on   pulse volume recording waveforms. Moderate to severe peripheral arterial occlusive disease   appreciated in the left lower extremity based on pulse volume recording waveforms.    CTA Abd, pelvis and b/l LE run-off  5/15/23  IMPRESSION:     Status post endovascular repair of an infrarenal aortic aneurysm. The native aneurysm is stable in size. No evidence of endoleak.     Moderate stenosis of the origins of the celiac artery and superior mesenteric artery.     Severe stenoses of the origins of the bilateral renal arteries.     Right lower extremity: Inflow: Patent. Outflow: Patent superficial femoral and popliteal arteries with stable pseudoaneurysms, as described. Three-vessel runoff to the right foot.     Left lower extremity: Inflow: Occluded common iliac and external iliac arteries with patent femoral-femoral bypass graft. Occluded superficial femoral artery with patent femoral popliteal bypass graft. Multifocal moderate stenoses of the popliteal artery with two-vessel runoff to the left foot.       ALLERGIES:    Betadine (Hives; Rash)       MEDICATIONS  (STANDING):  aspirin enteric coated 81 milliGRAM(s) Oral daily  atorvastatin 80 milliGRAM(s) Oral at bedtime  clopidogrel Tablet 75 milliGRAM(s) Oral daily  influenza  Vaccine (HIGH DOSE) 0.7 milliLiter(s) IntraMuscular once  levothyroxine 88 MICROGram(s) Oral daily  nebivolol 2.5 milliGRAM(s) Oral daily  sodium chloride 0.9%. 1000 milliLiter(s) (75 mL/Hr) IV Continuous <Continuous>    MEDICATIONS  (PRN):  acetaminophen     Tablet .. 650 milliGRAM(s) Oral every 6 hours PRN Mild Pain (1 - 3)  aluminum hydroxide/magnesium hydroxide/simethicone Suspension 30 milliLiter(s) Oral every 4 hours PRN Dyspepsia  melatonin 3 milliGRAM(s) Oral at bedtime PRN Insomnia  morphine  - Injectable 2 milliGRAM(s) IV Push every 4 hours PRN Severe Pain (7 - 10)  ondansetron Injectable 4 milliGRAM(s) IV Push every 6 hours PRN Nausea and/or Vomiting  oxycodone    5 mG/acetaminophen 325 mG 0.5 Tablet(s) Oral every 4 hours PRN Moderate Pain (4 - 6)      ROS:     Detailed ten system ROS was performed and was negative except for history as eluded to above.    no fever  no chills  no nausea  no vomiting  no diarrhea  no constipation  no melena  no hematochezia  no chest pain  no palpitations  no sob at rest  no dyspnea on exertion  no cough  no wheezing  no anorexia  no headache  no dizziness  no syncope  no weakness  no myalgia  no dysuria  no polyuria  no hematuria     Vital Signs Last 24 Hrs  T(C): 37.1 (23 Oct 2023 08:36), Max: 37.1 (23 Oct 2023 08:36)  T(F): 98.7 (23 Oct 2023 08:36), Max: 98.7 (23 Oct 2023 08:36)  HR: 72 (23 Oct 2023 08:36) (65 - 78)  BP: 122/58 (23 Oct 2023 08:36) (109/42 - 160/87)  BP(mean): 77 (22 Oct 2023 15:53) (60 - 77)  RR: 18 (23 Oct 2023 08:36) (17 - 18)  SpO2: 95% (23 Oct 2023 08:36) (94% - 100%)    Parameters below as of 23 Oct 2023 08:36  Patient On (Oxygen Delivery Method): room air      PHYSICAL EXAM:    General:                Comfortable, AAO X 3, in no distress.  HEENT:                  Unremarkable.   Neck:                     Supple, no adenopathy, no thyromegaly, no JVD, no bruit.  Chest:                    Clear, B/L symmetric air entry, no tachypnea  Heart:                     S1, S2 normal, + murmur.  Abdomen:              Soft, non-tender, bowel sounds active. No palpable masses.  Extremities:           Poor perfusion Left foot. About 3 cm diameter Ulcer with dried blood at the left medial malleolus. No pedal pulses felt.  Varicose veins noted.   Neurologic:            Grossly nonfocal.       TELEMETRY:  Normal sinus rhythm with no tachy or alfonso events     ECG:  NSR, no ST T changes of ischemia or infarction.     LABS:                          7.2    7.24  )-----------( 200      ( 23 Oct 2023 06:00 )             22.7     10-23    144  |  111<H>  |  47<H>  ----------------------------<  98  4.2   |  26  |  1.33<H>    Creatinine: 1.33 mg/dL (10.23.23 @ 06:00)   Creatinine: 2.20 mg/dL (10.22.23 @ 11:10)     Ca    8.4<L>      23 Oct 2023 06:00    TPro  6.5  /  Alb  3.0<L>  /  TBili  0.4  /  DBili  x   /  AST  43<H>  /  ALT  30  /  AlkPhos  113  10-22    10-22 @ 11:10  Trop-I  48.99    PT/INR - ( 22 Oct 2023 11:10 )   PT: 11.5 sec;   INR: 1.02 ratio    PTT - ( 22 Oct 2023 11:10 )  PTT:26.7 sec    RADIOLOGY & ADDITIONAL STUDIES:    < from: US Duplex Venous Lower Ext Ltd, Left (10.22.23 @ 11:31) >  IMPRESSION:  No evidence of left lower extremity deep venous thrombosis.    An occluded arterial bypass graft is partially visualized.      < from: Xray Chest 1 View- PORTABLE-Urgent (10.22.23 @ 12:45) >  IMPRESSION: No acute chest finding. Mild edema around the left ankle   posteriorly. Benign-appearing calcifications in the medial left lower leg   are likely venous. No acute bony finding.    < from: US Duplex Arterial Lower Ext Ltd, Left (10.22.23 @ 14:40) >  IMPRESSION:  Occluded left femoropopliteal grafts and femorofemoral grafts.  Occluded left common femoral, superficial femoral, anterior tibial and deep femoral arteries.  Minimal monophasic flow short segments of the left popliteal, left posterior tibial, and peroneal arteries    < from: US Kidney and Bladder (10.22.23 @ 14:42) >  IMPRESSION:  Renal parenchymal disease without hydronephrosis. Left kidney is not well   visualized and may be atrophic.

## 2023-10-23 NOTE — PROGRESS NOTE ADULT - SUBJECTIVE AND OBJECTIVE BOX
HPI:  72 y/o male with PMHX of HTN, severe PVD s/p multiple stents b/l legs, s/p fem/popliteal bypass, chronic left lower ankle ulceration, GERD, HLD who presented to  with CC of left LE pain and bleeding.  Patient notes he was at his family's house earlier today.  He was sitting on the floor with his grandson when the family noticed significant bleeding in his left leg.  His wife tried to get him up and into the bathroom to stop the bleeding.  She got him to sit on the side of the bathtub and was trying to hold pressure over the bleeding when he seemed to lose consciousness.  He didn't fall over but as per wife, turned white and wasn't responding to her.  They called 911.  He wasn't responding for a few minutes and then came to.  She denies hx of syncope/ near syncope in the past.  In the ER, bleeding had mostly stopped.  Patient was c/o severe pain left foot.  Left foot discoloration and vascular surgery called for consult.  Labs with ARF with Cr of 2.2-- unclear baseline.  Patient has been taking advil at home for pain in foot.      Subjective:  CONSTITUTIONAL: No weakness, fevers or chills, no weight loss   EYES/ENT: No visual changes;  No vertigo or throat pain   NECK: No pain or stiffness  RESPIRATORY: No cough, wheezing, hemoptysis; No shortness of breath  CARDIOVASCULAR: No chest pain or palpitations  GASTROINTESTINAL: No abdominal or epigastric pain. No nausea, vomiting, or hematemesis; No diarrhea or constipation. No melena or hematochezia.  GENITOURINARY: No discharge, hematuria  NEUROLOGICAL: No numbness or weakness  All other review of systems is negative unless indicated above.    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    Ex-smoker-- quit 20 years ago      Allergies:  Betadine (Hives; Rash)   (22 Oct 2023 14:35)      MEDICATIONS  (STANDING):  aspirin enteric coated 81 milliGRAM(s) Oral daily  atorvastatin 80 milliGRAM(s) Oral at bedtime  clopidogrel Tablet 75 milliGRAM(s) Oral daily  influenza  Vaccine (HIGH DOSE) 0.7 milliLiter(s) IntraMuscular once  levothyroxine 88 MICROGram(s) Oral daily  nebivolol 2.5 milliGRAM(s) Oral daily  sodium chloride 0.9%. 1000 milliLiter(s) (75 mL/Hr) IV Continuous <Continuous>    MEDICATIONS  (PRN):  acetaminophen     Tablet .. 650 milliGRAM(s) Oral every 6 hours PRN Mild Pain (1 - 3)  aluminum hydroxide/magnesium hydroxide/simethicone Suspension 30 milliLiter(s) Oral every 4 hours PRN Dyspepsia  melatonin 3 milliGRAM(s) Oral at bedtime PRN Insomnia  morphine  - Injectable 2 milliGRAM(s) IV Push every 4 hours PRN Severe Pain (7 - 10)  ondansetron Injectable 4 milliGRAM(s) IV Push every 6 hours PRN Nausea and/or Vomiting  oxycodone    5 mG/acetaminophen 325 mG 0.5 Tablet(s) Oral every 4 hours PRN Moderate Pain (4 - 6)      Vital Signs Last 24 Hrs  T(C): 37.1 (23 Oct 2023 08:36), Max: 37.1 (23 Oct 2023 08:36)  T(F): 98.7 (23 Oct 2023 08:36), Max: 98.7 (23 Oct 2023 08:36)  HR: 72 (23 Oct 2023 08:36) (65 - 78)  BP: 122/58 (23 Oct 2023 08:36) (109/42 - 160/87)  BP(mean): 77 (22 Oct 2023 15:53) (60 - 77)  RR: 18 (23 Oct 2023 08:36) (17 - 18)  SpO2: 95% (23 Oct 2023 08:36) (94% - 100%)    Parameters below as of 23 Oct 2023 08:36  Patient On (Oxygen Delivery Method): room air          LABS:                          7.2    7.24  )-----------( 200      ( 23 Oct 2023 06:00 )             22.7     23 Oct 2023 06:00    144    |  111    |  47     ----------------------------<  98     4.2     |  26     |  1.33     Ca    8.4        23 Oct 2023 06:00    TPro  6.5    /  Alb  3.0    /  TBili  0.4    /  DBili  x      /  AST  43     /  ALT  30     /  AlkPhos  113    22 Oct 2023 11:10    LIVER FUNCTIONS - ( 22 Oct 2023 11:10 )  Alb: 3.0 g/dL / Pro: 6.5 gm/dL / ALK PHOS: 113 U/L / ALT: 30 U/L / AST: 43 U/L / GGT: x           PT/INR - ( 22 Oct 2023 11:10 )   PT: 11.5 sec;   INR: 1.02 ratio         PTT - ( 22 Oct 2023 11:10 )  PTT:26.7 sec  CAPILLARY BLOOD GLUCOSE            Urinalysis Basic - ( 23 Oct 2023 06:00 )    Color: x / Appearance: x / SG: x / pH: x  Gluc: 98 mg/dL / Ketone: x  / Bili: x / Urobili: x   Blood: x / Protein: x / Nitrite: x   Leuk Esterase: x / RBC: x / WBC x   Sq Epi: x / Non Sq Epi: x / Bacteria: x        RADIOLOGY:    < from: US Kidney and Bladder (10.22.23 @ 14:42) >    ACC: [id number] EXAM:  US KIDNEYS AND BLADDER   ORDERED BY: EVELIN HURD     PROCEDURE DATE:  10/22/2023          INTERPRETATION:  CLINICAL INFORMATION: Acute renal insufficiency.    COMPARISON: CT 8/28/2009.    TECHNIQUE: Sonography of the kidneys and bladder.    FINDINGS:  Right kidney: 11.1 cm. No renal mass, hydronephrosis or calculi.   Increased cortical echogenicity.    Left kidney: 9 cm. Not well visualized and may be atrophic.    Urinary bladder: Within normal limits. Prevoid volume of 322 mL. She was   unable to void.    IMPRESSION:  Renal parenchymal disease without hydronephrosis. Left kidney is not well   visualized and may be atrophic.        --- End of Report ---            WILLIAM GUERRA MD; Attending Radiologist  This document has been electronically signed. Oct 22 2023  2:50PM    < end of copied text >    < from: US Duplex Arterial Lower Ext Ltd, Left (10.22.23 @ 14:40) >    ACC: [id number] EXAM:  US DPLX LWR EXT ARTS LTD LT   ORDERED BY: CHAYO MIRAMONTES     PROCEDURE DATE:  10/22/2023          INTERPRETATION:  Clinical data: 73-year-old male with chronic left lower   ankle ulceration presenting with left lower extremity pain and bleeding   from site of wound. Recent episode of syncope. History of multiple   peripheral arterial stents and recent endarterectomy    TECHNIQUE: Sonography of the left lower extremity arteries with color   flow and spectral Doppler.    FINDINGS: No flow is detected in the left common femoral, deep femoral,   or superficial femoral arteries. A short segment of monophasic flow with   peak systolic velocity of 10 cm/s is identified in the popliteal artery   which is otherwise occluded. Minimal monophasic flow is also visualized   in the left posterior tibial and peroneal arteries. The anterior tibial   artery demonstrates no flow.    There is occlusion of  apparent femoropopliteal and femorofemoral bypass   grafts.    Biphasic flow is demonstrated in the right common femoral artery with   peak systolic velocity of 95 cm/s.    IMPRESSION:    Occluded left femoropopliteal grafts and femorofemoral grafts.    Occluded left common femoral, superficial femoral, anterior tibial and   deep femoral arteries.    Minimal monophasic flow short segments of the left popliteal, left   posterior tibial, and peroneal arteries    --- End of Report ---            ENDY العلي MD; Attending Radiologist  This document has been electronically signed. Oct 22 2023  3:38PM    < end of copied text >    < from: Xray Chest 1 View- PORTABLE-Urgent (10.22.23 @ 12:45) >    ACC: [id number] EXAM:  XR FOOT COMP MIN 3 VIEWS LT   ORDERED BY: CHAYO MIRAMONTES     ACC: [id number] EXAM:  XR CHEST PORTABLE URGENT 1V   ORDERED BY: CHAYO MIRAMONTES     ACC: [id number] EXAM:  XR ANKLE COMP MIN 3 VIEWS LT   ORDERED BY: CHAYO MIRAMONTES     PROCEDURE DATE:  10/22/2023          INTERPRETATION:  Left ankle, left foot, and chest. Concern is chronic   wound around the malleoli.    Left ankle. 3 views.    There is long area of soft tissue calcification likely in a venous   structure in the medial lower leg.    There is mild degeneration of the malleolar tips.    There is some swelling seen approaching cavers triangle.    No bone destruction or fracture.    Left foot. 3 views.    No bone destruction or fracture is evident.    AP chest on October 22, 2023 at 12:34 PM.    Heart magnified by technique.    Lungs are clear.    Chest is similar to August 28, 2019.    IMPRESSION: No acute chest finding. Mild edema around the left ankle   posteriorly. Benign-appearing calcifications in the medial left lower leg   are likely venous. No acute bony finding.    --- End of Report ---            NICK GAITAN MD; Attending Radiologist  This document has been electronically signed. Oct 22 2023  1:02PM    < end of copied text >    < from: Xray Ankle Complete 3 Views, Left (10.22.23 @ 11:55) >    ACC: [id number] EXAM:  XR FOOT COMP MIN 3 VIEWS LT   ORDERED BY: CHAYO MIRAMONTES     ACC: [id number] EXAM:  XR CHEST PORTABLE URGENT 1V   ORDERED BY: CHAYO MIRAMONTES     ACC: [id number] EXAM:  XR ANKLE COMP MIN 3 VIEWS LT   ORDERED BY: CHAYO MIRAMONTES     PROCEDURE DATE:  10/22/2023          INTERPRETATION:  Left ankle, left foot, and chest. Concern is chronic   wound around the malleoli.    Left ankle. 3 views.    There is long area of soft tissue calcification likely in a venous   structure in the medial lower leg.    There is mild degeneration of the malleolar tips.    There is some swelling seen approaching cavers triangle.    No bone destruction or fracture.    Left foot. 3 views.    No bone destruction or fracture is evident.    AP chest on October 22, 2023 at 12:34 PM.    Heart magnified by technique.    Lungs are clear.    Chest is similar to August 28, 2019.    IMPRESSION: No acute chest finding. Mild edema around the left ankle   posteriorly. Benign-appearing calcifications in the medial left lower leg   are likely venous. No acute bony finding.    --- End of Report ---            NICK GAITAN MD; Attending Radiologist  This document has been electronically signed. Oct 22 2023  1:02PM    < end of copied text >  < from: Xray Foot AP + Lateral + Oblique, Left (10.22.23 @ 11:51) >    ACC: [id number] EXAM:  XR FOOT COMP MIN 3 VIEWS LT   ORDERED BY: CHAYO MIRAMONTES     ACC: [id number] EXAM:  XR CHEST PORTABLE URGENT 1V   ORDERED BY: CHAYO MIRAMONTES     ACC: [id number] EXAM:  XR ANKLE COMP MIN 3 VIEWS LT   ORDERED BY: CHAYO MIRAMONTES     PROCEDURE DATE:  10/22/2023          INTERPRETATION:  Left ankle, left foot, and chest. Concern is chronic   wound around the malleoli.    Left ankle. 3 views.    There is long area of soft tissue calcification likely in a venous   structure in the medial lower leg.    There is mild degeneration of the malleolar tips.    There is some swelling seen approaching cavers triangle.    No bone destruction or fracture.    Left foot. 3 views.    No bone destruction or fracture is evident.    AP chest on October 22, 2023 at 12:34 PM.    Heart magnified by technique.    Lungs are clear.    Chest is similar to August 28, 2019.    IMPRESSION: No acute chest finding. Mild edema around the left ankle   posteriorly. Benign-appearing calcifications in the medial left lower leg   are likely venous. No acute bony finding.    --- End of Report ---            NICK GAITAN MD; Attending Radiologist  This document has been electronically signed. Oct 22 2023  1:02PM    < end of copied text >

## 2023-10-24 LAB
ALBUMIN SERPL ELPH-MCNC: 2.7 G/DL — LOW (ref 3.3–5)
ALBUMIN SERPL ELPH-MCNC: 2.7 G/DL — LOW (ref 3.3–5)
ALP SERPL-CCNC: 103 U/L — SIGNIFICANT CHANGE UP (ref 40–120)
ALP SERPL-CCNC: 103 U/L — SIGNIFICANT CHANGE UP (ref 40–120)
ALT FLD-CCNC: 29 U/L — SIGNIFICANT CHANGE UP (ref 12–78)
ALT FLD-CCNC: 29 U/L — SIGNIFICANT CHANGE UP (ref 12–78)
AMMONIA BLD-MCNC: 31 UMOL/L — SIGNIFICANT CHANGE UP (ref 11–32)
AMMONIA BLD-MCNC: 31 UMOL/L — SIGNIFICANT CHANGE UP (ref 11–32)
ANION GAP SERPL CALC-SCNC: 5 MMOL/L — SIGNIFICANT CHANGE UP (ref 5–17)
ANION GAP SERPL CALC-SCNC: 5 MMOL/L — SIGNIFICANT CHANGE UP (ref 5–17)
AST SERPL-CCNC: 70 U/L — HIGH (ref 15–37)
AST SERPL-CCNC: 70 U/L — HIGH (ref 15–37)
BASOPHILS # BLD AUTO: 0.01 K/UL — SIGNIFICANT CHANGE UP (ref 0–0.2)
BASOPHILS # BLD AUTO: 0.01 K/UL — SIGNIFICANT CHANGE UP (ref 0–0.2)
BASOPHILS NFR BLD AUTO: 0.1 % — SIGNIFICANT CHANGE UP (ref 0–2)
BASOPHILS NFR BLD AUTO: 0.1 % — SIGNIFICANT CHANGE UP (ref 0–2)
BILIRUB SERPL-MCNC: 0.3 MG/DL — SIGNIFICANT CHANGE UP (ref 0.2–1.2)
BILIRUB SERPL-MCNC: 0.3 MG/DL — SIGNIFICANT CHANGE UP (ref 0.2–1.2)
BUN SERPL-MCNC: 28 MG/DL — HIGH (ref 7–23)
BUN SERPL-MCNC: 28 MG/DL — HIGH (ref 7–23)
CALCIUM SERPL-MCNC: 8.3 MG/DL — LOW (ref 8.5–10.1)
CALCIUM SERPL-MCNC: 8.3 MG/DL — LOW (ref 8.5–10.1)
CHLORIDE SERPL-SCNC: 115 MMOL/L — HIGH (ref 96–108)
CHLORIDE SERPL-SCNC: 115 MMOL/L — HIGH (ref 96–108)
CO2 SERPL-SCNC: 26 MMOL/L — SIGNIFICANT CHANGE UP (ref 22–31)
CO2 SERPL-SCNC: 26 MMOL/L — SIGNIFICANT CHANGE UP (ref 22–31)
CREAT SERPL-MCNC: 1.19 MG/DL — SIGNIFICANT CHANGE UP (ref 0.5–1.3)
CREAT SERPL-MCNC: 1.19 MG/DL — SIGNIFICANT CHANGE UP (ref 0.5–1.3)
EGFR: 65 ML/MIN/1.73M2 — SIGNIFICANT CHANGE UP
EGFR: 65 ML/MIN/1.73M2 — SIGNIFICANT CHANGE UP
EOSINOPHIL # BLD AUTO: 0 K/UL — SIGNIFICANT CHANGE UP (ref 0–0.5)
EOSINOPHIL # BLD AUTO: 0 K/UL — SIGNIFICANT CHANGE UP (ref 0–0.5)
EOSINOPHIL NFR BLD AUTO: 0 % — SIGNIFICANT CHANGE UP (ref 0–6)
EOSINOPHIL NFR BLD AUTO: 0 % — SIGNIFICANT CHANGE UP (ref 0–6)
GLUCOSE BLDC GLUCOMTR-MCNC: 172 MG/DL — HIGH (ref 70–99)
GLUCOSE BLDC GLUCOMTR-MCNC: 172 MG/DL — HIGH (ref 70–99)
GLUCOSE SERPL-MCNC: 127 MG/DL — HIGH (ref 70–99)
GLUCOSE SERPL-MCNC: 127 MG/DL — HIGH (ref 70–99)
HCT VFR BLD CALC: 23.7 % — LOW (ref 39–50)
HCT VFR BLD CALC: 23.7 % — LOW (ref 39–50)
HGB BLD-MCNC: 7.7 G/DL — LOW (ref 13–17)
HGB BLD-MCNC: 7.7 G/DL — LOW (ref 13–17)
IMM GRANULOCYTES NFR BLD AUTO: 0.6 % — SIGNIFICANT CHANGE UP (ref 0–0.9)
IMM GRANULOCYTES NFR BLD AUTO: 0.6 % — SIGNIFICANT CHANGE UP (ref 0–0.9)
LYMPHOCYTES # BLD AUTO: 0.64 K/UL — LOW (ref 1–3.3)
LYMPHOCYTES # BLD AUTO: 0.64 K/UL — LOW (ref 1–3.3)
LYMPHOCYTES # BLD AUTO: 7.1 % — LOW (ref 13–44)
LYMPHOCYTES # BLD AUTO: 7.1 % — LOW (ref 13–44)
MAGNESIUM SERPL-MCNC: 2.4 MG/DL — SIGNIFICANT CHANGE UP (ref 1.6–2.6)
MAGNESIUM SERPL-MCNC: 2.4 MG/DL — SIGNIFICANT CHANGE UP (ref 1.6–2.6)
MCHC RBC-ENTMCNC: 28.8 PG — SIGNIFICANT CHANGE UP (ref 27–34)
MCHC RBC-ENTMCNC: 28.8 PG — SIGNIFICANT CHANGE UP (ref 27–34)
MCHC RBC-ENTMCNC: 32.5 GM/DL — SIGNIFICANT CHANGE UP (ref 32–36)
MCHC RBC-ENTMCNC: 32.5 GM/DL — SIGNIFICANT CHANGE UP (ref 32–36)
MCV RBC AUTO: 88.8 FL — SIGNIFICANT CHANGE UP (ref 80–100)
MCV RBC AUTO: 88.8 FL — SIGNIFICANT CHANGE UP (ref 80–100)
MONOCYTES # BLD AUTO: 0.82 K/UL — SIGNIFICANT CHANGE UP (ref 0–0.9)
MONOCYTES # BLD AUTO: 0.82 K/UL — SIGNIFICANT CHANGE UP (ref 0–0.9)
MONOCYTES NFR BLD AUTO: 9.1 % — SIGNIFICANT CHANGE UP (ref 2–14)
MONOCYTES NFR BLD AUTO: 9.1 % — SIGNIFICANT CHANGE UP (ref 2–14)
NEUTROPHILS # BLD AUTO: 7.48 K/UL — HIGH (ref 1.8–7.4)
NEUTROPHILS # BLD AUTO: 7.48 K/UL — HIGH (ref 1.8–7.4)
NEUTROPHILS NFR BLD AUTO: 83.1 % — HIGH (ref 43–77)
NEUTROPHILS NFR BLD AUTO: 83.1 % — HIGH (ref 43–77)
PHOSPHATE SERPL-MCNC: 3 MG/DL — SIGNIFICANT CHANGE UP (ref 2.5–4.5)
PHOSPHATE SERPL-MCNC: 3 MG/DL — SIGNIFICANT CHANGE UP (ref 2.5–4.5)
PLATELET # BLD AUTO: 196 K/UL — SIGNIFICANT CHANGE UP (ref 150–400)
PLATELET # BLD AUTO: 196 K/UL — SIGNIFICANT CHANGE UP (ref 150–400)
POTASSIUM SERPL-MCNC: 4.4 MMOL/L — SIGNIFICANT CHANGE UP (ref 3.5–5.3)
POTASSIUM SERPL-MCNC: 4.4 MMOL/L — SIGNIFICANT CHANGE UP (ref 3.5–5.3)
POTASSIUM SERPL-SCNC: 4.4 MMOL/L — SIGNIFICANT CHANGE UP (ref 3.5–5.3)
POTASSIUM SERPL-SCNC: 4.4 MMOL/L — SIGNIFICANT CHANGE UP (ref 3.5–5.3)
PROT SERPL-MCNC: 5.8 GM/DL — LOW (ref 6–8.3)
PROT SERPL-MCNC: 5.8 GM/DL — LOW (ref 6–8.3)
RBC # BLD: 2.67 M/UL — LOW (ref 4.2–5.8)
RBC # BLD: 2.67 M/UL — LOW (ref 4.2–5.8)
RBC # FLD: 14.8 % — HIGH (ref 10.3–14.5)
RBC # FLD: 14.8 % — HIGH (ref 10.3–14.5)
SODIUM SERPL-SCNC: 146 MMOL/L — HIGH (ref 135–145)
SODIUM SERPL-SCNC: 146 MMOL/L — HIGH (ref 135–145)
WBC # BLD: 9 K/UL — SIGNIFICANT CHANGE UP (ref 3.8–10.5)
WBC # BLD: 9 K/UL — SIGNIFICANT CHANGE UP (ref 3.8–10.5)
WBC # FLD AUTO: 9 K/UL — SIGNIFICANT CHANGE UP (ref 3.8–10.5)
WBC # FLD AUTO: 9 K/UL — SIGNIFICANT CHANGE UP (ref 3.8–10.5)

## 2023-10-24 PROCEDURE — 70450 CT HEAD/BRAIN W/O DYE: CPT | Mod: 26

## 2023-10-24 PROCEDURE — 99232 SBSQ HOSP IP/OBS MODERATE 35: CPT

## 2023-10-24 RX ORDER — MORPHINE SULFATE 50 MG/1
2 CAPSULE, EXTENDED RELEASE ORAL ONCE
Refills: 0 | Status: DISCONTINUED | OUTPATIENT
Start: 2023-10-24 | End: 2023-10-24

## 2023-10-24 RX ORDER — ALBUTEROL 90 UG/1
2.5 AEROSOL, METERED ORAL ONCE
Refills: 0 | Status: COMPLETED | OUTPATIENT
Start: 2023-10-24 | End: 2023-10-24

## 2023-10-24 RX ORDER — ALPRAZOLAM 0.25 MG
0.25 TABLET ORAL ONCE
Refills: 0 | Status: DISCONTINUED | OUTPATIENT
Start: 2023-10-24 | End: 2023-10-24

## 2023-10-24 RX ORDER — ALBUTEROL 90 UG/1
2 AEROSOL, METERED ORAL EVERY 6 HOURS
Refills: 0 | Status: DISCONTINUED | OUTPATIENT
Start: 2023-10-24 | End: 2023-10-24

## 2023-10-24 RX ORDER — ALBUTEROL 90 UG/1
2.5 AEROSOL, METERED ORAL
Refills: 0 | Status: DISCONTINUED | OUTPATIENT
Start: 2023-10-24 | End: 2023-10-30

## 2023-10-24 RX ORDER — FUROSEMIDE 40 MG
60 TABLET ORAL ONCE
Refills: 0 | Status: COMPLETED | OUTPATIENT
Start: 2023-10-24 | End: 2023-10-24

## 2023-10-24 RX ORDER — HYDRALAZINE HCL 50 MG
10 TABLET ORAL ONCE
Refills: 0 | Status: COMPLETED | OUTPATIENT
Start: 2023-10-24 | End: 2023-10-24

## 2023-10-24 RX ADMIN — ALBUTEROL 2.5 MILLIGRAM(S): 90 AEROSOL, METERED ORAL at 23:50

## 2023-10-24 RX ADMIN — SODIUM CHLORIDE 75 MILLILITER(S): 9 INJECTION INTRAMUSCULAR; INTRAVENOUS; SUBCUTANEOUS at 09:37

## 2023-10-24 RX ADMIN — Medication 81 MILLIGRAM(S): at 09:33

## 2023-10-24 RX ADMIN — NEBIVOLOL HYDROCHLORIDE 2.5 MILLIGRAM(S): 5 TABLET ORAL at 09:33

## 2023-10-24 RX ADMIN — CLOPIDOGREL BISULFATE 75 MILLIGRAM(S): 75 TABLET, FILM COATED ORAL at 09:33

## 2023-10-24 RX ADMIN — Medication 0.5 MILLIGRAM(S): at 22:40

## 2023-10-24 RX ADMIN — ATORVASTATIN CALCIUM 80 MILLIGRAM(S): 80 TABLET, FILM COATED ORAL at 21:16

## 2023-10-24 RX ADMIN — MORPHINE SULFATE 2 MILLIGRAM(S): 50 CAPSULE, EXTENDED RELEASE ORAL at 23:58

## 2023-10-24 RX ADMIN — Medication 88 MICROGRAM(S): at 06:55

## 2023-10-24 RX ADMIN — Medication 0.25 MILLIGRAM(S): at 11:54

## 2023-10-24 NOTE — PROGRESS NOTE ADULT - SUBJECTIVE AND OBJECTIVE BOX
HPI:  72 y/o male with PMHX of HTN, severe PVD s/p multiple stents b/l legs, s/p fem/popliteal bypass, chronic left lower ankle ulceration, GERD, HLD who presented to  with CC of left LE pain and bleeding.  Patient notes he was at his family's house earlier today.  He was sitting on the floor with his grandson when the family noticed significant bleeding in his left leg.  His wife tried to get him up and into the bathroom to stop the bleeding.  She got him to sit on the side of the bathtub and was trying to hold pressure over the bleeding when he seemed to lose consciousness.  He didn't fall over but as per wife, turned white and wasn't responding to her.  They called 911.  He wasn't responding for a few minutes and then came to.  She denies hx of syncope/ near syncope in the past.  In the ER, bleeding had mostly stopped.  Patient was c/o severe pain left foot.  Left foot discoloration and vascular surgery called for consult.  Labs with ARF with Cr of 2.2-- unclear baseline.  Patient has been taking advil at home for pain in foot.      Subjective:  CONSTITUTIONAL: No weakness, fevers or chills, no weight loss   EYES/ENT: No visual changes;  No vertigo or throat pain   NECK: No pain or stiffness  RESPIRATORY: No cough, wheezing, hemoptysis; No shortness of breath  CARDIOVASCULAR: No chest pain or palpitations  GASTROINTESTINAL: No abdominal or epigastric pain. No nausea, vomiting, or hematemesis; No diarrhea or constipation. No melena or hematochezia.  GENITOURINARY: No discharge, hematuria  NEUROLOGICAL: No numbness or weakness  All other review of systems is negative unless indicated above.      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    Ex-smoker-- quit 20 years ago      Allergies:  Betadine (Hives; Rash)   (22 Oct 2023 14:35)      MEDICATIONS  (STANDING):  aspirin enteric coated 81 milliGRAM(s) Oral daily  atorvastatin 80 milliGRAM(s) Oral at bedtime  clopidogrel Tablet 75 milliGRAM(s) Oral daily  influenza  Vaccine (HIGH DOSE) 0.7 milliLiter(s) IntraMuscular once  levothyroxine 88 MICROGram(s) Oral daily  nebivolol 2.5 milliGRAM(s) Oral daily    MEDICATIONS  (PRN):  acetaminophen     Tablet .. 650 milliGRAM(s) Oral every 6 hours PRN Mild Pain (1 - 3)  aluminum hydroxide/magnesium hydroxide/simethicone Suspension 30 milliLiter(s) Oral every 4 hours PRN Dyspepsia  melatonin 3 milliGRAM(s) Oral at bedtime PRN Insomnia  morphine  - Injectable 2 milliGRAM(s) IV Push every 6 hours PRN Severe Pain (7 - 10)  ondansetron Injectable 4 milliGRAM(s) IV Push every 6 hours PRN Nausea and/or Vomiting  oxycodone    5 mG/acetaminophen 325 mG 0.5 Tablet(s) Oral every 6 hours PRN Moderate Pain (4 - 6)      Vital Signs Last 24 Hrs  T(C): 36.8 (24 Oct 2023 08:49), Max: 37.1 (23 Oct 2023 21:40)  T(F): 98.2 (24 Oct 2023 08:49), Max: 98.8 (23 Oct 2023 21:40)  HR: 79 (24 Oct 2023 08:49) (69 - 86)  BP: 140/52 (24 Oct 2023 08:49) (131/42 - 149/85)  BP(mean): --  RR: 18 (24 Oct 2023 08:49) (17 - 18)  SpO2: 96% (24 Oct 2023 08:49) (95% - 98%)    Parameters below as of 24 Oct 2023 08:49  Patient On (Oxygen Delivery Method): room air      Physical Exam:  GENERAL: NAD, AOx3  HEAD: Atraumatic, normocephalic  EYES: EOMI, PERRLA, conjunctiva and sclera clear  ENT: moist mucous membrane  NECK: supple, No JVD, midline trachea  CHEST/LUNG: unlabored respirations  Heart: S1, S2 normal  ABDOMEN: soft, nondistended, nontender. no organomegaly  EXTREMITIES: left lower foot with mild discoloration at sole to, venous stasis ulcer nonbleeding at medial malleolus, nondopplerable DP, faintly dopplerable PT. motor/sensory intact.  NERVOUS SYSTEM: AOx3, speech clear, no neuro-deficits  SKIN: warm to touch, no rash or lesions          LABS:                          7.7    9.00  )-----------( 196      ( 24 Oct 2023 06:49 )             23.7     24 Oct 2023 06:49    146    |  115    |  28     ----------------------------<  127    4.4     |  26     |  1.19     Ca    8.3        24 Oct 2023 06:49  Phos  3.0       24 Oct 2023 06:49  Mg     2.4       24 Oct 2023 06:49    TPro  5.8    /  Alb  2.7    /  TBili  0.3    /  DBili  x      /  AST  70     /  ALT  29     /  AlkPhos  103    24 Oct 2023 06:49    LIVER FUNCTIONS - ( 24 Oct 2023 06:49 )  Alb: 2.7 g/dL / Pro: 5.8 gm/dL / ALK PHOS: 103 U/L / ALT: 29 U/L / AST: 70 U/L / GGT: x             CAPILLARY BLOOD GLUCOSE            Urinalysis Basic - ( 24 Oct 2023 06:49 )    Color: x / Appearance: x / SG: x / pH: x  Gluc: 127 mg/dL / Ketone: x  / Bili: x / Urobili: x   Blood: x / Protein: x / Nitrite: x   Leuk Esterase: x / RBC: x / WBC x   Sq Epi: x / Non Sq Epi: x / Bacteria: x        RADIOLOGY:  RADIOLOGY:    < from: US Kidney and Bladder (10.22.23 @ 14:42) >    ACC: [id number] EXAM:  US KIDNEYS AND BLADDER   ORDERED BY: EVELIN HURD     PROCEDURE DATE:  10/22/2023          INTERPRETATION:  CLINICAL INFORMATION: Acute renal insufficiency.    COMPARISON: CT 8/28/2009.    TECHNIQUE: Sonography of the kidneys and bladder.    FINDINGS:  Right kidney: 11.1 cm. No renal mass, hydronephrosis or calculi.   Increased cortical echogenicity.    Left kidney: 9 cm. Not well visualized and may be atrophic.    Urinary bladder: Within normal limits. Prevoid volume of 322 mL. She was   unable to void.    IMPRESSION:  Renal parenchymal disease without hydronephrosis. Left kidney is not well   visualized and may be atrophic.        --- End of Report ---            WILLIAM GUERRA MD; Attending Radiologist  This document has been electronically signed. Oct 22 2023  2:50PM    < end of copied text >    < from: US Duplex Arterial Lower Ext Ltd, Left (10.22.23 @ 14:40) >    ACC: [id number] EXAM:  US DPLX LWR EXT ARTS LTD LT   ORDERED BY: CHAYO MIRAMONTES     PROCEDURE DATE:  10/22/2023          INTERPRETATION:  Clinical data: 73-year-old male with chronic left lower   ankle ulceration presenting with left lower extremity pain and bleeding   from site of wound. Recent episode of syncope. History of multiple   peripheral arterial stents and recent endarterectomy    TECHNIQUE: Sonography of the left lower extremity arteries with color   flow and spectral Doppler.    FINDINGS: No flow is detected in the left common femoral, deep femoral,   or superficial femoral arteries. A short segment of monophasic flow with   peak systolic velocity of 10 cm/s is identified in the popliteal artery   which is otherwise occluded. Minimal monophasic flow is also visualized   in the left posterior tibial and peroneal arteries. The anterior tibial   artery demonstrates no flow.    There is occlusion of  apparent femoropopliteal and femorofemoral bypass   grafts.    Biphasic flow is demonstrated in the right common femoral artery with   peak systolic velocity of 95 cm/s.    IMPRESSION:    Occluded left femoropopliteal grafts and femorofemoral grafts.    Occluded left common femoral, superficial femoral, anterior tibial and   deep femoral arteries.    Minimal monophasic flow short segments of the left popliteal, left   posterior tibial, and peroneal arteries    --- End of Report ---            ENDY العلي MD; Attending Radiologist  This document has been electronically signed. Oct 22 2023  3:38PM    < end of copied text >    < from: Xray Chest 1 View- PORTABLE-Urgent (10.22.23 @ 12:45) >    ACC: [id number] EXAM:  XR FOOT COMP MIN 3 VIEWS LT   ORDERED BY: CHAYO MIRAMONTES     ACC: [id number] EXAM:  XR CHEST PORTABLE URGENT 1V   ORDERED BY: CHAYO MIRAMONTES     ACC: [id number] EXAM:  XR ANKLE COMP MIN 3 VIEWS LT   ORDERED BY: CHAYO MIRAMONTES     PROCEDURE DATE:  10/22/2023          INTERPRETATION:  Left ankle, left foot, and chest. Concern is chronic   wound around the malleoli.    Left ankle. 3 views.    There is long area of soft tissue calcification likely in a venous   structure in the medial lower leg.    There is mild degeneration of the malleolar tips.    There is some swelling seen approaching cavers triangle.    No bone destruction or fracture.    Left foot. 3 views.    No bone destruction or fracture is evident.    AP chest on October 22, 2023 at 12:34 PM.    Heart magnified by technique.    Lungs are clear.    Chest is similar to August 28, 2019.    IMPRESSION: No acute chest finding. Mild edema around the left ankle   posteriorly. Benign-appearing calcifications in the medial left lower leg   are likely venous. No acute bony finding.    --- End of Report ---            NICK GAITAN MD; Attending Radiologist  This document has been electronically signed. Oct 22 2023  1:02PM    < end of copied text >    < from: Xray Ankle Complete 3 Views, Left (10.22.23 @ 11:55) >    ACC: [id number] EXAM:  XR FOOT COMP MIN 3 VIEWS LT   ORDERED BY: CHAYO MIRAMONTES     ACC: [id number] EXAM:  XR CHEST PORTABLE URGENT 1V   ORDERED BY: CHAYO MIRAMONTES     ACC: [id number] EXAM:  XR ANKLE COMP MIN 3 VIEWS LT   ORDERED BY: CHAYO MIRAMONTES     PROCEDURE DATE:  10/22/2023          INTERPRETATION:  Left ankle, left foot, and chest. Concern is chronic   wound around the malleoli.    Left ankle. 3 views.    There is long area of soft tissue calcification likely in a venous   structure in the medial lower leg.    There is mild degeneration of the malleolar tips.    There is some swelling seen approaching cavers triangle.    No bone destruction or fracture.    Left foot. 3 views.    No bone destruction or fracture is evident.    AP chest on October 22, 2023 at 12:34 PM.    Heart magnified by technique.    Lungs are clear.    Chest is similar to August 28, 2019.    IMPRESSION: No acute chest finding. Mild edema around the left ankle   posteriorly. Benign-appearing calcifications in the medial left lower leg   are likely venous. No acute bony finding.    --- End of Report ---            NICK GAITAN MD; Attending Radiologist  This document has been electronically signed. Oct 22 2023  1:02PM    < end of copied text >  < from: Xray Foot AP + Lateral + Oblique, Left (10.22.23 @ 11:51) >    ACC: [id number] EXAM:  XR FOOT COMP MIN 3 VIEWS LT   ORDERED BY: CHAYO MIRAMONTES     ACC: [id number] EXAM:  XR CHEST PORTABLE URGENT 1V   ORDERED BY: CHAYO MIRAMONTES     ACC: [id number] EXAM:  XR ANKLE COMP MIN 3 VIEWS LT   ORDERED BY: CHAYO MIRAMONTES     PROCEDURE DATE:  10/22/2023          INTERPRETATION:  Left ankle, left foot, and chest. Concern is chronic   wound around the malleoli.    Left ankle. 3 views.    There is long area of soft tissue calcification likely in a venous   structure in the medial lower leg.    There is mild degeneration of the malleolar tips.    There is some swelling seen approaching cavers triangle.    No bone destruction or fracture.    Left foot. 3 views.    No bone destruction or fracture is evident.    AP chest on October 22, 2023 at 12:34 PM.    Heart magnified by technique.    Lungs are clear.    Chest is similar to August 28, 2019.    IMPRESSION: No acute chest finding. Mild edema around the left ankle   posteriorly. Benign-appearing calcifications in the medial left lower leg   are likely venous. No acute bony finding.    --- End of Report ---            NICK GAITAN MD; Attending Radiologist  This document has been electronically signed. Oct 22 2023  1:02PM    < end of copied text >

## 2023-10-24 NOTE — PHYSICAL THERAPY INITIAL EVALUATION ADULT - NSACTIVITYREC_GEN_A_PT
Stair Training: Patient will be able to negotiate 10 steps with bilateral handrails independently by discharge.

## 2023-10-24 NOTE — CONSULT NOTE ADULT - SUBJECTIVE AND OBJECTIVE BOX
Patient is a 73y old  Male who presents with a chief complaint of pain and bleeding left foot (24 Oct 2023 13:15)      BRIEF HOSPITAL COURSE: 72 y/o male with PMHX of HTN, severe PVD s/p multiple stents b/l legs, s/p fem/popliteal bypass, chronic left lower ankle ulceration, GERD, HLD who presented to  with CC of left LE pain and bleeding and near syncope.     Events last 24 hours: RR called for acute hypoxia and agitation. On arrival to RR patient agitated SpO2 75%. /90. Placed on NRB, given morphine 2mg IVP x2 POCUS showed diffuse anterior b lines. Placed on BIPAP transferred to ICU.     PAST MEDICAL & SURGICAL HISTORY:  Essential hypertension      PVD (peripheral vascular disease)      Gastroesophageal reflux disease, esophagitis presence not specified      Hypothyroidism      Chronic obstructive pulmonary disease, unspecified COPD type      Eczema      Hyperlipidemia, unspecified hyperlipidemia type      Medial meniscus tear  left knee      Femoral popliteal artery thrombus  h/o Fem pop bypass 1997      PVD (peripheral vascular disease)  s/p peripheral stent insertion bilateral lower extemities x 10          Review of Systems:  unable to obtain due to patient's clinical condition       Medications:    nebivolol 2.5 milliGRAM(s) Oral daily    albuterol    0.083% 2.5 milliGRAM(s) Nebulizer every 10 minutes    acetaminophen     Tablet .. 650 milliGRAM(s) Oral every 6 hours PRN  dexMEDEtomidine Infusion 1 MICROgram(s)/kG/Hr IV Continuous <Continuous>  melatonin 3 milliGRAM(s) Oral at bedtime PRN  morphine  - Injectable 2 milliGRAM(s) IV Push every 6 hours PRN  ondansetron Injectable 4 milliGRAM(s) IV Push every 6 hours PRN  oxycodone    5 mG/acetaminophen 325 mG 0.5 Tablet(s) Oral every 6 hours PRN      aspirin enteric coated 81 milliGRAM(s) Oral daily  clopidogrel Tablet 75 milliGRAM(s) Oral daily    aluminum hydroxide/magnesium hydroxide/simethicone Suspension 30 milliLiter(s) Oral every 4 hours PRN      atorvastatin 80 milliGRAM(s) Oral at bedtime  levothyroxine 88 MICROGram(s) Oral daily      influenza  Vaccine (HIGH DOSE) 0.7 milliLiter(s) IntraMuscular once    chlorhexidine 2% Cloths 1 Application(s) Topical <User Schedule>            ICU Vital Signs Last 24 Hrs  T(C): 36.7 (25 Oct 2023 00:15), Max: 37.2 (24 Oct 2023 21:15)  T(F): 98 (25 Oct 2023 00:15), Max: 99 (24 Oct 2023 21:15)  HR: 93 (25 Oct 2023 00:30) (64 - 116)  BP: 119/87 (25 Oct 2023 00:30) (119/87 - 183/60)  BP(mean): 97 (25 Oct 2023 00:30) (97 - 104)  ABP: --  ABP(mean): --  RR: 17 (25 Oct 2023 00:30) (17 - 27)  SpO2: 99% (25 Oct 2023 00:30) (84% - 100%)    O2 Parameters below as of 25 Oct 2023 00:30  Patient On (Oxygen Delivery Method): BiPAP/CPAP    O2 Concentration (%): 100            I&O's Detail    23 Oct 2023 07:01  -  24 Oct 2023 07:00  --------------------------------------------------------  IN:    Oral Fluid: 450 mL  Total IN: 450 mL    OUT:    Voided (mL): 750 mL  Total OUT: 750 mL    Total NET: -300 mL      24 Oct 2023 07:01  -  25 Oct 2023 01:13  --------------------------------------------------------  IN:    Oral Fluid: 350 mL    sodium chloride 0.9%: 1000 mL  Total IN: 1350 mL    OUT:  Total OUT: 0 mL    Total NET: 1350 mL            LABS:                        7.7    9.00  )-----------( 196      ( 24 Oct 2023 06:49 )             23.7     10-24    146<H>  |  115<H>  |  28<H>  ----------------------------<  127<H>  4.4   |  26  |  1.19    Ca    8.3<L>      24 Oct 2023 06:49  Phos  3.0     10-24  Mg     2.4     10-24    TPro  5.8<L>  /  Alb  2.7<L>  /  TBili  0.3  /  DBili  x   /  AST  70<H>  /  ALT  29  /  AlkPhos  103  10-24          CAPILLARY BLOOD GLUCOSE      POCT Blood Glucose.: 172 mg/dL (24 Oct 2023 23:39)      Urinalysis Basic - ( 24 Oct 2023 06:49 )    Color: x / Appearance: x / SG: x / pH: x  Gluc: 127 mg/dL / Ketone: x  / Bili: x / Urobili: x   Blood: x / Protein: x / Nitrite: x   Leuk Esterase: x / RBC: x / WBC x   Sq Epi: x / Non Sq Epi: x / Bacteria: x      CULTURES:      Physical Examination:    General: elderly, ill appearing, in acute resp distress    HEENT: Pupils equal, reactive to light.  Symmetric.    PULM: rales and wheeze b/l     CVS: Regular rate and rhythm    ABD: Soft, nondistended, nontender, normoactive bowel sounds    EXT: No edema, nontender    SKIN: Warm     NEURO: Agitated    POCUS: diffuse b lines anteriorly, LV systolic function appears preserved      RADIOLOGY: CXR with b/l infiltrates R>L      CRITICAL CARE TIME SPENT: 90 minutes assessing presenting problems of acute illness, which pose high probability of life threatening deterioration or end organ damage/dysfunction, as well as medical decision making including initiating plan of care, reviewing data, reviewing radiologic exams, discussing with multidisciplinary team,  discussing goals of care with patient/family, and writing this note.  Non-inclusive of procedures performed,

## 2023-10-24 NOTE — PROGRESS NOTE ADULT - ASSESSMENT
74yo M presents w/ bleeding LLE venous stasis ulcer  severe small vessel disease on left lower extremity with multiple revascularization interventions, now occluded fem-pop and fem-fem bypass. chronic limb ischemia  JENIFER on CKD   chronic NSAID intake  CTA does not demonstrate acute limb ischemia.    Recommendation:  - If patient wishes to continue vascular treatment at [facility], [facility] medical records will be requested   - daily dressing change    Plan discussed with vascular surgery attending, Dr. Sosa

## 2023-10-24 NOTE — PROGRESS NOTE ADULT - SUBJECTIVE AND OBJECTIVE BOX
Pt seen and examined at bedside, no acute events. Pt had no complaints. Denied fever, chills, nausea, vomiting or SOB overnight.     Physical Exam:  GENERAL: NAD, AOx3  HEAD: Atraumatic, normocephalic  EYES: EOMI, PERRLA, conjunctiva and sclera clear  ENT: moist mucous membrane  NECK: supple, No JVD, midline trachea  CHEST/LUNG: unlabored respirations  Heart: S1, S2 normal  ABDOMEN: soft, nondistended, nontender. no organomegaly  EXTREMITIES: left lower foot with mild discoloration at sole to, venous stasis ulcer nonbleeding at medial malleolus, nondopplerable DP, faintly dopplerable PT. motor/sensory intact.  NERVOUS SYSTEM: AOx3, speech clear, no neuro-deficits  SKIN: warm to touch, no rash or lesions    Vital Signs Last 24 Hrs  T(C): 36.8 (23 Oct 2023 23:55), Max: 37.1 (23 Oct 2023 08:36)  T(F): 98.2 (23 Oct 2023 23:55), Max: 98.8 (23 Oct 2023 21:40)  HR: 86 (23 Oct 2023 23:55) (69 - 86)  BP: 149/85 (23 Oct 2023 23:55) (122/58 - 149/85)  BP(mean): --  RR: 18 (23 Oct 2023 23:55) (17 - 18)  SpO2: 98% (23 Oct 2023 23:55) (95% - 98%)    Parameters below as of 23 Oct 2023 23:55  Patient On (Oxygen Delivery Method): room air                            7.2    7.24  )-----------( 200      ( 23 Oct 2023 06:00 )             22.7     10-23    144  |  111<H>  |  47<H>  ----------------------------<  98  4.2   |  26  |  1.33<H>    Ca    8.4<L>      23 Oct 2023 06:00    TPro  6.5  /  Alb  3.0<L>  /  TBili  0.4  /  DBili  x   /  AST  43<H>  /  ALT  30  /  AlkPhos  113  10-22    I&O's Summary    22 Oct 2023 07:01  -  23 Oct 2023 07:00  --------------------------------------------------------  IN: 851 mL / OUT: 500 mL / NET: 351 mL    23 Oct 2023 07:01  -  24 Oct 2023 05:28  --------------------------------------------------------  IN: 450 mL / OUT: 750 mL / NET: -300 mL      < from: CT Angio Abd Aorta w/run-off w/ IV Cont (10.23.23 @ 20:56) >  IMPRESSION:    Right lower extremity:  Moderate narrowing common femoral artery just prior to takeoff of   occluded femorofemoral bypass graft.  1 cm pseudoaneurysm posteriorly off the proximal stented right SFA.  Two-vessel runoff to the foot via the anterior and posterior tibial.    Left lower extremity:  Occluded left common and external iliac arteries, femorofemoral bypass   graft, and femoral popliteal bypass graft.  Reconstitution in the popliteal via muscular collaterals which in turn   are fed by more cephalad collaterals listed above.  Single vessel runoff to the left foot via the anterior tibial.    < end of copied text >

## 2023-10-24 NOTE — CONSULT NOTE ADULT - ASSESSMENT
Problem List:  1) Acute hypoxic respiratory failure  2) Acute pulmonary edema (?TACO)  3) Hypertensive emergency   4) delirium    Transfer to ICU level of care  Place on BIPAP 12/8 100%, wean Fio2 as tolerated  Given morphine 2mg IVP x3 to control agitation and settle breathing, placed on precedex, wean to lowest effective dose  lasix 60mg IVP x1 given  Bp improved with morphine and BP, no need for anti hypertensives at this time  patient received 2U PRBC over last 24 hours, also was getting fluids at 75ml/hr for 2 days    echo shows EF 55%, no mention on diastolic dysfunciton  suspect TACO, will send transfusion reaction work up discussed with blood blank  Discussed with E-Icu attending Dr. Ramos

## 2023-10-24 NOTE — PROGRESS NOTE ADULT - ASSESSMENT
72 y/o male with PMHX of HTN, severe PVD s/p multiple stents b/l legs, s/p fem/popliteal bypass, chronic left lower ankle ulceration, GERD, HLD who presented to  with CC of left LE pain and bleeding and near syncope.  Found to have bleeding from chronic vascular ulcer as well as anemia and JENIFER.      #Severe PVD now with increased pain/ bleeding from ulceration/ discoloration left foot and toes:      US arterial Left LE: Occluded left femoropopliteal grafts and femorofemoral grafts.  Occluded left common femoral, superficial femoral, anterior tibial and   deep femoral arteries.Minimal monophasic flow short segments of the left popliteal, left   posterior tibial, and peroneal arteries  Hx of severe PVD s/p bypass and multiple stents.    Cont plavix.    Patient was on ASA but stopped due to bruising as per wife.    Pain control -- morphine/ percocet.    Vascular eval appreciated / Dr. Sosa following, will need vascular reflux    Bleeding from ulceration stopped in ER.    Cont dressing changes/ wound care.    CT w/o contrast today, f/u with results   -If patient wishes to continue vascular treatment at [facility] medical records will be requested   daily dressing change  Cardio consult appreciated d/w Dr. Almendarez- Could be more of a varicose rupture than PVD bleed     #Near syncope:    May be related to anemia-- bleeding.    Remote tele.    +Murmur on exam-- r/o AS.  Check ECHO.    Cardio eval.    Check trop.      #JENIFER:    Presumed JENIFER.  Wife unclear baseline but denies hx of CKD.    May be due to excess advil.    Also was on ACE and diuretics at home.    Hold ACE/ diuretics.    Avoid nephro toxic agents.    No IV contrast.    NSS @ 77 discontinued   Check urine lytes/ UA ordered   Check renal/ bladder US.    Repeat in am.    uro consulted    #Anemia:    Suspect related to acute blood loss.    No baseline.    Check iron/ b12/ folate.    Wife notes PMD just started patient on iron.    Repeat in am.    Transfuse <8 / received 1uprbc yesterday Plan for 1u prbc today     #HTN:    Cont bystolic.    Hold ACE with JENIFER.      #Chronic ? diastolic CHF:    No acute exacerbation.    Hold diuretic and ACE with JENIFER.    Cont bystolic.      #Hyperglycemia:    Check A1C.      #Hypothyroid:    Cont synthroid.      #DVT proph:  hold for now with bleeding from ulcer.      D/w patient and wife in detail.

## 2023-10-25 LAB
ADD ON TEST-SPECIMEN IN LAB: SIGNIFICANT CHANGE UP
ADD ON TEST-SPECIMEN IN LAB: SIGNIFICANT CHANGE UP
ANION GAP SERPL CALC-SCNC: 11 MMOL/L — SIGNIFICANT CHANGE UP (ref 5–17)
ANION GAP SERPL CALC-SCNC: 11 MMOL/L — SIGNIFICANT CHANGE UP (ref 5–17)
ANION GAP SERPL CALC-SCNC: 5 MMOL/L — SIGNIFICANT CHANGE UP (ref 5–17)
ANION GAP SERPL CALC-SCNC: 5 MMOL/L — SIGNIFICANT CHANGE UP (ref 5–17)
APPEARANCE UR: CLEAR — SIGNIFICANT CHANGE UP
APPEARANCE UR: CLEAR — SIGNIFICANT CHANGE UP
APTT BLD: 27.2 SEC — SIGNIFICANT CHANGE UP (ref 24.5–35.6)
APTT BLD: 27.2 SEC — SIGNIFICANT CHANGE UP (ref 24.5–35.6)
BACTERIA # UR AUTO: NEGATIVE /HPF — SIGNIFICANT CHANGE UP
BACTERIA # UR AUTO: NEGATIVE /HPF — SIGNIFICANT CHANGE UP
BILIRUB UR-MCNC: NEGATIVE — SIGNIFICANT CHANGE UP
BILIRUB UR-MCNC: NEGATIVE — SIGNIFICANT CHANGE UP
BUN SERPL-MCNC: 27 MG/DL — HIGH (ref 7–23)
BUN SERPL-MCNC: 27 MG/DL — HIGH (ref 7–23)
BUN SERPL-MCNC: 29 MG/DL — HIGH (ref 7–23)
BUN SERPL-MCNC: 29 MG/DL — HIGH (ref 7–23)
CALCIUM SERPL-MCNC: 8 MG/DL — LOW (ref 8.5–10.1)
CALCIUM SERPL-MCNC: 8 MG/DL — LOW (ref 8.5–10.1)
CALCIUM SERPL-MCNC: 8.1 MG/DL — LOW (ref 8.5–10.1)
CALCIUM SERPL-MCNC: 8.1 MG/DL — LOW (ref 8.5–10.1)
CAST: 9 /LPF — HIGH (ref 0–4)
CAST: 9 /LPF — HIGH (ref 0–4)
CHLORIDE SERPL-SCNC: 108 MMOL/L — SIGNIFICANT CHANGE UP (ref 96–108)
CHLORIDE SERPL-SCNC: 108 MMOL/L — SIGNIFICANT CHANGE UP (ref 96–108)
CHLORIDE SERPL-SCNC: 110 MMOL/L — HIGH (ref 96–108)
CHLORIDE SERPL-SCNC: 110 MMOL/L — HIGH (ref 96–108)
CO2 SERPL-SCNC: 23 MMOL/L — SIGNIFICANT CHANGE UP (ref 22–31)
CO2 SERPL-SCNC: 23 MMOL/L — SIGNIFICANT CHANGE UP (ref 22–31)
CO2 SERPL-SCNC: 28 MMOL/L — SIGNIFICANT CHANGE UP (ref 22–31)
CO2 SERPL-SCNC: 28 MMOL/L — SIGNIFICANT CHANGE UP (ref 22–31)
COLOR SPEC: YELLOW — SIGNIFICANT CHANGE UP
COLOR SPEC: YELLOW — SIGNIFICANT CHANGE UP
CREAT SERPL-MCNC: 1.18 MG/DL — SIGNIFICANT CHANGE UP (ref 0.5–1.3)
CREAT SERPL-MCNC: 1.18 MG/DL — SIGNIFICANT CHANGE UP (ref 0.5–1.3)
CREAT SERPL-MCNC: 1.69 MG/DL — HIGH (ref 0.5–1.3)
CREAT SERPL-MCNC: 1.69 MG/DL — HIGH (ref 0.5–1.3)
DIFF PNL FLD: ABNORMAL
DIFF PNL FLD: ABNORMAL
EGFR: 43 ML/MIN/1.73M2 — LOW
EGFR: 43 ML/MIN/1.73M2 — LOW
EGFR: 66 ML/MIN/1.73M2 — SIGNIFICANT CHANGE UP
EGFR: 66 ML/MIN/1.73M2 — SIGNIFICANT CHANGE UP
GLUCOSE SERPL-MCNC: 110 MG/DL — HIGH (ref 70–99)
GLUCOSE SERPL-MCNC: 110 MG/DL — HIGH (ref 70–99)
GLUCOSE SERPL-MCNC: 142 MG/DL — HIGH (ref 70–99)
GLUCOSE SERPL-MCNC: 142 MG/DL — HIGH (ref 70–99)
GLUCOSE UR QL: NEGATIVE MG/DL — SIGNIFICANT CHANGE UP
GLUCOSE UR QL: NEGATIVE MG/DL — SIGNIFICANT CHANGE UP
HCT VFR BLD CALC: 36.6 % — LOW (ref 39–50)
HCT VFR BLD CALC: 36.6 % — LOW (ref 39–50)
HGB BLD-MCNC: 11.5 G/DL — LOW (ref 13–17)
HGB BLD-MCNC: 11.5 G/DL — LOW (ref 13–17)
HYALINE CASTS # UR AUTO: PRESENT
HYALINE CASTS # UR AUTO: PRESENT
KETONES UR-MCNC: NEGATIVE MG/DL — SIGNIFICANT CHANGE UP
KETONES UR-MCNC: NEGATIVE MG/DL — SIGNIFICANT CHANGE UP
LEUKOCYTE ESTERASE UR-ACNC: ABNORMAL
LEUKOCYTE ESTERASE UR-ACNC: ABNORMAL
MAGNESIUM SERPL-MCNC: 2.1 MG/DL — SIGNIFICANT CHANGE UP (ref 1.6–2.6)
MAGNESIUM SERPL-MCNC: 2.1 MG/DL — SIGNIFICANT CHANGE UP (ref 1.6–2.6)
MCHC RBC-ENTMCNC: 28 PG — SIGNIFICANT CHANGE UP (ref 27–34)
MCHC RBC-ENTMCNC: 28 PG — SIGNIFICANT CHANGE UP (ref 27–34)
MCHC RBC-ENTMCNC: 31.4 GM/DL — LOW (ref 32–36)
MCHC RBC-ENTMCNC: 31.4 GM/DL — LOW (ref 32–36)
MCV RBC AUTO: 89.3 FL — SIGNIFICANT CHANGE UP (ref 80–100)
MCV RBC AUTO: 89.3 FL — SIGNIFICANT CHANGE UP (ref 80–100)
NITRITE UR-MCNC: NEGATIVE — SIGNIFICANT CHANGE UP
NITRITE UR-MCNC: NEGATIVE — SIGNIFICANT CHANGE UP
NT-PROBNP SERPL-SCNC: HIGH PG/ML (ref 0–125)
NT-PROBNP SERPL-SCNC: HIGH PG/ML (ref 0–125)
PH UR: 5 — SIGNIFICANT CHANGE UP (ref 5–8)
PH UR: 5 — SIGNIFICANT CHANGE UP (ref 5–8)
PHOSPHATE SERPL-MCNC: 3.9 MG/DL — SIGNIFICANT CHANGE UP (ref 2.5–4.5)
PHOSPHATE SERPL-MCNC: 3.9 MG/DL — SIGNIFICANT CHANGE UP (ref 2.5–4.5)
PLATELET # BLD AUTO: 253 K/UL — SIGNIFICANT CHANGE UP (ref 150–400)
PLATELET # BLD AUTO: 253 K/UL — SIGNIFICANT CHANGE UP (ref 150–400)
POTASSIUM SERPL-MCNC: 3.8 MMOL/L — SIGNIFICANT CHANGE UP (ref 3.5–5.3)
POTASSIUM SERPL-MCNC: 3.8 MMOL/L — SIGNIFICANT CHANGE UP (ref 3.5–5.3)
POTASSIUM SERPL-MCNC: 4 MMOL/L — SIGNIFICANT CHANGE UP (ref 3.5–5.3)
POTASSIUM SERPL-MCNC: 4 MMOL/L — SIGNIFICANT CHANGE UP (ref 3.5–5.3)
POTASSIUM SERPL-SCNC: 3.8 MMOL/L — SIGNIFICANT CHANGE UP (ref 3.5–5.3)
POTASSIUM SERPL-SCNC: 3.8 MMOL/L — SIGNIFICANT CHANGE UP (ref 3.5–5.3)
POTASSIUM SERPL-SCNC: 4 MMOL/L — SIGNIFICANT CHANGE UP (ref 3.5–5.3)
POTASSIUM SERPL-SCNC: 4 MMOL/L — SIGNIFICANT CHANGE UP (ref 3.5–5.3)
PROT UR-MCNC: NEGATIVE MG/DL — SIGNIFICANT CHANGE UP
PROT UR-MCNC: NEGATIVE MG/DL — SIGNIFICANT CHANGE UP
RBC # BLD: 4.1 M/UL — LOW (ref 4.2–5.8)
RBC # BLD: 4.1 M/UL — LOW (ref 4.2–5.8)
RBC # FLD: 17 % — HIGH (ref 10.3–14.5)
RBC # FLD: 17 % — HIGH (ref 10.3–14.5)
RBC CASTS # UR COMP ASSIST: 3 /HPF — SIGNIFICANT CHANGE UP (ref 0–4)
RBC CASTS # UR COMP ASSIST: 3 /HPF — SIGNIFICANT CHANGE UP (ref 0–4)
SODIUM SERPL-SCNC: 141 MMOL/L — SIGNIFICANT CHANGE UP (ref 135–145)
SODIUM SERPL-SCNC: 141 MMOL/L — SIGNIFICANT CHANGE UP (ref 135–145)
SODIUM SERPL-SCNC: 144 MMOL/L — SIGNIFICANT CHANGE UP (ref 135–145)
SODIUM SERPL-SCNC: 144 MMOL/L — SIGNIFICANT CHANGE UP (ref 135–145)
SP GR SPEC: 1.01 — SIGNIFICANT CHANGE UP (ref 1–1.03)
SP GR SPEC: 1.01 — SIGNIFICANT CHANGE UP (ref 1–1.03)
SQUAMOUS # UR AUTO: 1 /HPF — SIGNIFICANT CHANGE UP (ref 0–5)
SQUAMOUS # UR AUTO: 1 /HPF — SIGNIFICANT CHANGE UP (ref 0–5)
TROPONIN I, HIGH SENSITIVITY RESULT: 1614.11 NG/L — HIGH
TROPONIN I, HIGH SENSITIVITY RESULT: 1614.11 NG/L — HIGH
TROPONIN I, HIGH SENSITIVITY RESULT: 1792.15 NG/L — HIGH
TROPONIN I, HIGH SENSITIVITY RESULT: 1792.15 NG/L — HIGH
UROBILINOGEN FLD QL: 0.2 MG/DL — SIGNIFICANT CHANGE UP (ref 0.2–1)
UROBILINOGEN FLD QL: 0.2 MG/DL — SIGNIFICANT CHANGE UP (ref 0.2–1)
WBC # BLD: 16.23 K/UL — HIGH (ref 3.8–10.5)
WBC # BLD: 16.23 K/UL — HIGH (ref 3.8–10.5)
WBC # FLD AUTO: 16.23 K/UL — HIGH (ref 3.8–10.5)
WBC # FLD AUTO: 16.23 K/UL — HIGH (ref 3.8–10.5)
WBC UR QL: 9 /HPF — HIGH (ref 0–5)
WBC UR QL: 9 /HPF — HIGH (ref 0–5)

## 2023-10-25 PROCEDURE — 71045 X-RAY EXAM CHEST 1 VIEW: CPT | Mod: 26

## 2023-10-25 PROCEDURE — 93010 ELECTROCARDIOGRAM REPORT: CPT

## 2023-10-25 PROCEDURE — 99232 SBSQ HOSP IP/OBS MODERATE 35: CPT

## 2023-10-25 PROCEDURE — 99291 CRITICAL CARE FIRST HOUR: CPT

## 2023-10-25 RX ORDER — PIPERACILLIN AND TAZOBACTAM 4; .5 G/20ML; G/20ML
3.38 INJECTION, POWDER, LYOPHILIZED, FOR SOLUTION INTRAVENOUS ONCE
Refills: 0 | Status: COMPLETED | OUTPATIENT
Start: 2023-10-25 | End: 2023-10-25

## 2023-10-25 RX ORDER — PIPERACILLIN AND TAZOBACTAM 4; .5 G/20ML; G/20ML
3.38 INJECTION, POWDER, LYOPHILIZED, FOR SOLUTION INTRAVENOUS EVERY 8 HOURS
Refills: 0 | Status: DISCONTINUED | OUTPATIENT
Start: 2023-10-26 | End: 2023-10-30

## 2023-10-25 RX ORDER — PIPERACILLIN AND TAZOBACTAM 4; .5 G/20ML; G/20ML
3.38 INJECTION, POWDER, LYOPHILIZED, FOR SOLUTION INTRAVENOUS ONCE
Refills: 0 | Status: COMPLETED | OUTPATIENT
Start: 2023-10-26 | End: 2023-10-26

## 2023-10-25 RX ORDER — MORPHINE SULFATE 50 MG/1
2 CAPSULE, EXTENDED RELEASE ORAL ONCE
Refills: 0 | Status: DISCONTINUED | OUTPATIENT
Start: 2023-10-25 | End: 2023-10-25

## 2023-10-25 RX ORDER — DEXMEDETOMIDINE HYDROCHLORIDE IN 0.9% SODIUM CHLORIDE 4 UG/ML
1 INJECTION INTRAVENOUS
Qty: 200 | Refills: 0 | Status: DISCONTINUED | OUTPATIENT
Start: 2023-10-25 | End: 2023-10-25

## 2023-10-25 RX ORDER — HEPARIN SODIUM 5000 [USP'U]/ML
INJECTION INTRAVENOUS; SUBCUTANEOUS
Qty: 25000 | Refills: 0 | Status: DISCONTINUED | OUTPATIENT
Start: 2023-10-25 | End: 2023-10-26

## 2023-10-25 RX ORDER — FUROSEMIDE 40 MG
40 TABLET ORAL ONCE
Refills: 0 | Status: COMPLETED | OUTPATIENT
Start: 2023-10-25 | End: 2023-10-25

## 2023-10-25 RX ORDER — HEPARIN SODIUM 5000 [USP'U]/ML
2500 INJECTION INTRAVENOUS; SUBCUTANEOUS EVERY 6 HOURS
Refills: 0 | Status: DISCONTINUED | OUTPATIENT
Start: 2023-10-25 | End: 2023-10-27

## 2023-10-25 RX ORDER — CHLORHEXIDINE GLUCONATE 213 G/1000ML
1 SOLUTION TOPICAL
Refills: 0 | Status: DISCONTINUED | OUTPATIENT
Start: 2023-10-25 | End: 2023-11-08

## 2023-10-25 RX ORDER — HEPARIN SODIUM 5000 [USP'U]/ML
5500 INJECTION INTRAVENOUS; SUBCUTANEOUS EVERY 6 HOURS
Refills: 0 | Status: DISCONTINUED | OUTPATIENT
Start: 2023-10-25 | End: 2023-10-27

## 2023-10-25 RX ADMIN — CLOPIDOGREL BISULFATE 75 MILLIGRAM(S): 75 TABLET, FILM COATED ORAL at 11:24

## 2023-10-25 RX ADMIN — DEXMEDETOMIDINE HYDROCHLORIDE IN 0.9% SODIUM CHLORIDE 17.6 MICROGRAM(S)/KG/HR: 4 INJECTION INTRAVENOUS at 01:06

## 2023-10-25 RX ADMIN — Medication 60 MILLIGRAM(S): at 00:02

## 2023-10-25 RX ADMIN — PIPERACILLIN AND TAZOBACTAM 200 GRAM(S): 4; .5 INJECTION, POWDER, LYOPHILIZED, FOR SOLUTION INTRAVENOUS at 14:26

## 2023-10-25 RX ADMIN — NEBIVOLOL HYDROCHLORIDE 2.5 MILLIGRAM(S): 5 TABLET ORAL at 11:23

## 2023-10-25 RX ADMIN — HEPARIN SODIUM 1200 UNIT(S)/HR: 5000 INJECTION INTRAVENOUS; SUBCUTANEOUS at 17:56

## 2023-10-25 RX ADMIN — MORPHINE SULFATE 2 MILLIGRAM(S): 50 CAPSULE, EXTENDED RELEASE ORAL at 00:05

## 2023-10-25 RX ADMIN — PIPERACILLIN AND TAZOBACTAM 25 GRAM(S): 4; .5 INJECTION, POWDER, LYOPHILIZED, FOR SOLUTION INTRAVENOUS at 21:34

## 2023-10-25 RX ADMIN — ATORVASTATIN CALCIUM 80 MILLIGRAM(S): 80 TABLET, FILM COATED ORAL at 21:34

## 2023-10-25 RX ADMIN — Medication 40 MILLIGRAM(S): at 11:24

## 2023-10-25 RX ADMIN — HEPARIN SODIUM 1200 UNIT(S)/HR: 5000 INJECTION INTRAVENOUS; SUBCUTANEOUS at 19:30

## 2023-10-25 RX ADMIN — MORPHINE SULFATE 2 MILLIGRAM(S): 50 CAPSULE, EXTENDED RELEASE ORAL at 00:20

## 2023-10-25 NOTE — PROGRESS NOTE ADULT - ASSESSMENT
74yo M presents w/ bleeding LLE venous stasis ulcer. Severe small vessel disease on left lower extremity with multiple revascularization interventions, now occluded fem-pop and fem-fem bypass. Chronic limb ischemia, JENIFER on CKD , chronic NSAID intake. Patient transferred to ICU with pulmonary edema.    Recommendation:  - Monitor VS  - ICU management for acute fluid overload  - follow up MRI  - follow up podiatry consult  - transfuse blood products as needed  - cardiac clearance needed for surgical intervention    Plan discussed with vascular surgery attending, Dr. Sosa

## 2023-10-25 NOTE — PROGRESS NOTE ADULT - SUBJECTIVE AND OBJECTIVE BOX
72yo M with chronic limb ischemia seen and examined at bedside. In the early morning, the patient developed pulmonary edema secondary to fluid overload s/p 1 unit PRBC. He was transferred to the ICU where he is on BIPAP.    Physical Exam:  GENERAL: NAD, AOx3  HEAD: Atraumatic, normocephalic  EYES: EOMI, PERRLA, conjunctiva and sclera clear  ENT: moist mucous membrane  NECK: supple, No JVD, midline trachea  CHEST/LUNG: tachypneic on BIPAP, saturating above 95%  Heart: S1, S2 normal  ABDOMEN: soft, nondistended, nontender. no organomegaly  EXTREMITIES: left lower foot with mild discoloration at sole to, venous stasis ulcer nonbleeding at medial malleolus, nondopplerable DP, faintly dopplerable PT. motor/sensory intact.  NERVOUS SYSTEM: AOx3, no neuro-deficits  SKIN: warm to touch, no rash or lesions    Vital Signs Last 24 Hrs  T(C): 36.7 (25 Oct 2023 00:15), Max: 37.2 (24 Oct 2023 21:15)  T(F): 98 (25 Oct 2023 00:15), Max: 99 (24 Oct 2023 21:15)  HR: 65 (25 Oct 2023 02:00) (62 - 116)  BP: 109/52 (25 Oct 2023 02:00) (64/46 - 183/60)  BP(mean): 69 (25 Oct 2023 02:00) (53 - 104)  RR: 21 (25 Oct 2023 02:00) (17 - 27)  SpO2: 98% (25 Oct 2023 02:00) (84% - 100%)    Parameters below as of 25 Oct 2023 02:00  Patient On (Oxygen Delivery Method): BiPAP/CPAP    O2 Concentration (%): 40                        7.7    9.00  )-----------( 196      ( 24 Oct 2023 06:49 )             23.7     10-24    146<H>  |  115<H>  |  28<H>  ----------------------------<  127<H>  4.4   |  26  |  1.19    Ca    8.3<L>      24 Oct 2023 06:49  Phos  3.0     10-24  Mg     2.4     10-24    TPro  5.8<L>  /  Alb  2.7<L>  /  TBili  0.3  /  DBili  x   /  AST  70<H>  /  ALT  29  /  AlkPhos  103  10-24    I&O's Summary    23 Oct 2023 07:01  -  24 Oct 2023 07:00  --------------------------------------------------------  IN: 450 mL / OUT: 750 mL / NET: -300 mL    24 Oct 2023 07:01  -  25 Oct 2023 02:38  --------------------------------------------------------  IN: 1350 mL / OUT: 505 mL / NET: 845 mL

## 2023-10-25 NOTE — PROVIDER CONTACT NOTE (EICU) - SITUATION
Patient with HX of COPD. During the course of the past 24 hours patient did receive a blood transfusion. Originally, additional ativan and an albuterol inhaler were put in the computer, however they were discontinued after evaluating the patient. On examination, patient appears visibly anxious and SOB. Audible wheezing, rhonchi and crackles. When vital signs taken, Spo2 of 71% on 2.5L. Rapid response was then called by nurse. Chest x-ray, Lasix 60mg IV, morphine 2mg IV, albuterol nebs, hydralazine 10mg IV and BiPAP was ordered. Patient was then transferred to the ICU for further management.  Case discussed with the ICU PA.  Camera in patient appeared comfortable.

## 2023-10-25 NOTE — PROGRESS NOTE ADULT - SUBJECTIVE AND OBJECTIVE BOX
Events Overnight: patient transferred to ICU for respiratory failure, likely chf, currently on Bipap. on Precedex    HPI:      72 y/o male with PMHX of HTN, severe PVD s/p multiple stents b/l legs, s/p fem/popliteal bypass, chronic left lower ankle ulceration, GERD, HLD who presented to  with CC of left LE pain and bleeding and near syncope.   Patient was transfused for bleeding from left lower let,    RR called for acute hypoxia and agitation. SpO2 75%. /90. Placed on NRB, given morphine 2mg IVP x2 POCUS showed diffuse anterior b lines. Placed on BIPAP transferred to ICU.   cxr CHF vs. right sided infiltrate  was diuresis  creatinine 1.6 this am was 1.3    echo during admission ef 60%  ROS no chest pain, sob improved, less agitated    PAST MEDICAL & SURGICAL HISTORY:  Essential hypertension  PVD (peripheral vascular disease)  Gastroesophageal reflux disease, esophagitis presence not specified  Hypothyroidism  Chronic obstructive pulmonary disease, unspecified COPD type  Eczema  Hyperlipidemia, unspecified hyperlipidemia type  Medial meniscus tear  left knee  Femoral popliteal artery thrombus  h/o Fem pop bypass   PVD (peripheral vascular disease)  s/p peripheral stent insertion bilateral lower extemities x 10       MEDICATIONS  (STANDING):  albuterol    0.083% 2.5 milliGRAM(s) Nebulizer every 10 minutes  aspirin enteric coated 81 milliGRAM(s) Oral daily  atorvastatin 80 milliGRAM(s) Oral at bedtime  chlorhexidine 2% Cloths 1 Application(s) Topical <User Schedule>  clopidogrel Tablet 75 milliGRAM(s) Oral daily  dexMEDEtomidine Infusion 1 MICROgram(s)/kG/Hr (17.6 mL/Hr) IV Continuous <Continuous>  influenza  Vaccine (HIGH DOSE) 0.7 milliLiter(s) IntraMuscular once  levothyroxine 88 MICROGram(s) Oral daily  nebivolol 2.5 milliGRAM(s) Oral daily    MEDICATIONS  (PRN):  acetaminophen     Tablet .. 650 milliGRAM(s) Oral every 6 hours PRN Mild Pain (1 - 3)  aluminum hydroxide/magnesium hydroxide/simethicone Suspension 30 milliLiter(s) Oral every 4 hours PRN Dyspepsia  melatonin 3 milliGRAM(s) Oral at bedtime PRN Insomnia  morphine  - Injectable 2 milliGRAM(s) IV Push every 6 hours PRN Severe Pain (7 - 10)  ondansetron Injectable 4 milliGRAM(s) IV Push every 6 hours PRN Nausea and/or Vomiting  oxycodone    5 mG/acetaminophen 325 mG 0.5 Tablet(s) Oral every 6 hours PRN Moderate Pain (4 - 6)    ICU Vital Signs Last 24 Hrs  T(C): 37.3 (25 Oct 2023 04:00), Max: 37.3 (25 Oct 2023 04:00)  T(F): 99.2 (25 Oct 2023 04:00), Max: 99.2 (25 Oct 2023 04:00)  HR: 60 (25 Oct 2023 07:00) (60 - 116)  BP: 129/50 (25 Oct 2023 07:00) (64/46 - 183/60)  BP(mean): 73 (25 Oct 2023 07:00) (53 - 104)  ABP: --  ABP(mean): --  RR: 20 (25 Oct 2023 07:00) (17 - 27)  SpO2: 100% (25 Oct 2023 07:00) (84% - 100%)    O2 Parameters below as of 25 Oct 2023 06:00  Patient On (Oxygen Delivery Method): BiPAP/CPAP    O2 Concentration (%): 40    Physical Exam    General - lethargic  HEENT - nc/at bipap on  neck supple  lungs bilateral crackles  cv rrr  abdomen soft,  extremtiies left leg cool, wrapped no further bleeding  neuro awake, alert lethargic, on precedex        I&O's Summary    24 Oct 2023 07:01  -  25 Oct 2023 07:00  --------------------------------------------------------  IN: 1394 mL / OUT: 1070 mL / NET: 324 mL                          11.5   16.23 )-----------( 253      ( 25 Oct 2023 03:57 )             36.6       10-25    144  |  110<H>  |  29<H>  ----------------------------<  142<H>  3.8   |  23  |  1.69<H>    Ca    8.1<L>      25 Oct 2023 03:57  Phos  3.9     10-25  Mg     2.1     10-25    TPro  5.8<L>  /  Alb  2.7<L>  /  TBili  0.3  /  DBili  x   /  AST  70<H>  /  ALT  29  /  AlkPhos  103  10-24    Urinalysis Basic - ( 25 Oct 2023 06:09 )    Color: Yellow / Appearance: Clear / S.014 / pH: x  Gluc: x / Ketone: Negative mg/dL  / Bili: Negative / Urobili: 0.2 mg/dL   Blood: x / Protein: Negative mg/dL / Nitrite: Negative   Leuk Esterase: Small / RBC: 3 /HPF / WBC 9 /HPF   Sq Epi: x / Non Sq Epi: 1 /HPF / Bacteria: Negative /HPF    DVT Prophylaxis:  held for bleeding                                                                Advanced Directives: Full Code

## 2023-10-25 NOTE — PROGRESS NOTE ADULT - SUBJECTIVE AND OBJECTIVE BOX
74 y/o male with PMHX of HTN, severe PVD s/p multiple stents b/l legs, s/p fem/popliteal bypass, chronic left lower ankle ulceration, GERD, HLD who presented to  with CC of left LE pain and bleeding and near syncope.     Events last 24 hours: RR called for acute hypoxia and agitation. On arrival to  patient agitated SpO2 75%. /90. Placed on NRB, given morphine 2mg IVP x2 POCUS showed diffuse anterior b lines. Placed on BIPAP transferred to ICU.     PAST MEDICAL & SURGICAL HISTORY:  Essential hypertension      PVD (peripheral vascular disease)      Gastroesophageal reflux disease, esophagitis presence not specified      Hypothyroidism      Chronic obstructive pulmonary disease, unspecified COPD type      Eczema      Hyperlipidemia, unspecified hyperlipidemia type      Medial meniscus tear  left knee      Femoral popliteal artery thrombus  h/o Fem pop bypass 1997      PVD (peripheral vascular disease)  s/p peripheral stent insertion bilateral lower extemities x 10          Review of Systems:  Unable to obtain.    Medications:    nebivolol 2.5 milliGRAM(s) Oral daily    albuterol    0.083% 2.5 milliGRAM(s) Nebulizer every 10 minutes    acetaminophen     Tablet .. 650 milliGRAM(s) Oral every 6 hours PRN  dexMEDEtomidine Infusion 1 MICROgram(s)/kG/Hr IV Continuous <Continuous>  melatonin 3 milliGRAM(s) Oral at bedtime PRN  morphine  - Injectable 2 milliGRAM(s) IV Push every 6 hours PRN  ondansetron Injectable 4 milliGRAM(s) IV Push every 6 hours PRN  oxycodone    5 mG/acetaminophen 325 mG 0.5 Tablet(s) Oral every 6 hours PRN      aspirin enteric coated 81 milliGRAM(s) Oral daily  clopidogrel Tablet 75 milliGRAM(s) Oral daily    aluminum hydroxide/magnesium hydroxide/simethicone Suspension 30 milliLiter(s) Oral every 4 hours PRN      atorvastatin 80 milliGRAM(s) Oral at bedtime  levothyroxine 88 MICROGram(s) Oral daily      influenza  Vaccine (HIGH DOSE) 0.7 milliLiter(s) IntraMuscular once    chlorhexidine 2% Cloths 1 Application(s) Topical <User Schedule>            ICU Vital Signs Last 24 Hrs  T(C): 36.7 (25 Oct 2023 00:15), Max: 37.2 (24 Oct 2023 21:15)  T(F): 98 (25 Oct 2023 00:15), Max: 99 (24 Oct 2023 21:15)  HR: 61 (25 Oct 2023 02:54) (61 - 116)  BP: 109/52 (25 Oct 2023 02:00) (64/46 - 183/60)  BP(mean): 69 (25 Oct 2023 02:00) (53 - 104)  ABP: --  ABP(mean): --  RR: 21 (25 Oct 2023 02:00) (17 - 27)  SpO2: 99% (25 Oct 2023 02:54) (84% - 100%)    O2 Parameters below as of 25 Oct 2023 02:00  Patient On (Oxygen Delivery Method): BiPAP/CPAP    O2 Concentration (%): 40            I&O's Detail    23 Oct 2023 07:01  -  24 Oct 2023 07:00  --------------------------------------------------------  IN:    Oral Fluid: 450 mL  Total IN: 450 mL    OUT:    Voided (mL): 750 mL  Total OUT: 750 mL    Total NET: -300 mL      24 Oct 2023 07:01  -  25 Oct 2023 03:32  --------------------------------------------------------  IN:    Oral Fluid: 350 mL    sodium chloride 0.9%: 1000 mL  Total IN: 1350 mL    OUT:    Indwelling Catheter - Urethral (mL): 505 mL  Total OUT: 505 mL    Total NET: 845 mL            LABS:                        7.7    9.00  )-----------( 196      ( 24 Oct 2023 06:49 )             23.7     10-24    146<H>  |  115<H>  |  28<H>  ----------------------------<  127<H>  4.4   |  26  |  1.19    Ca    8.3<L>      24 Oct 2023 06:49  Phos  3.0     10-24  Mg     2.4     10-24    TPro  5.8<L>  /  Alb  2.7<L>  /  TBili  0.3  /  DBili  x   /  AST  70<H>  /  ALT  29  /  AlkPhos  103  10-24          CAPILLARY BLOOD GLUCOSE      POCT Blood Glucose.: 172 mg/dL (24 Oct 2023 23:39)      Urinalysis Basic - ( 24 Oct 2023 06:49 )    Color: x / Appearance: x / SG: x / pH: x  Gluc: 127 mg/dL / Ketone: x  / Bili: x / Urobili: x   Blood: x / Protein: x / Nitrite: x   Leuk Esterase: x / RBC: x / WBC x   Sq Epi: x / Non Sq Epi: x / Bacteria: x      CULTURES:      Physical Examination:    General: No acute distress.  On BiPAP    HEENT: Pupils equal, reactive to light.  Symmetric.    PULM: B/L rales.    CVS: Regular rate and rhythm, no murmurs, rubs, or gallops    ABD: Soft, nondistended, nontende    EXT: No edema, nontender. Left heal wound.    SKIN: Warm and well perfused    NEURO: Alert, oriented. Agitated.    < from: CT Head No Cont (10.24.23 @ 13:05) >  IMPRESSION:  No evidence of an acute intracranial hemorrhage, midline shift, or   hydrocephalus. Mild to moderate atrophy with mild periventricular white   matter ischemic changes. Left parasellar aneurysm coil    --- End of Report ---            SUNIL CARNES MD; Attending Radiologist  This document has been electronically signed. Oct 24 2023  1:19PM    < end of copied text >

## 2023-10-25 NOTE — PROGRESS NOTE ADULT - ASSESSMENT
72 y/o male with PMHX of HTN, severe PVD s/p multiple stents b/l legs, s/p fem/popliteal bypass, chronic left lower ankle ulceration, GERD, HLD who presented to  with CC of left LE pain and bleeding and near syncope.    Assessment    1) Acute hypoxic respiratory failure   2) TACO  3) HTN emergency  4) hyperactive delirium   5) Anemia of chronic disease vs acute blood loss anemia from heal ulcer  6) Acute pulmonary edema     Plan     Neuro: Hyperacitve delirium. Promote wake/sleep cycle. On precedex gtt. Became acutely hypotensive and bradycardic. Urgently stopped to prevent further hemodynamic collapse.   Pulm: Acute pulmonary edema secondary to HTN emergency from agitation superimposed with excessive fluid administration with 2u PRBCs in 24hr period. CXR shows B/L Infiltrates R>L. On BiPAP 12/8 on FIO2 of 100%. Actievly titrating FIO2 to provide adequate Oxygenation. Down titrated to 40%. Patient improved clinically with BiPAP.   CV: HTN emergency resulting in SCAPE with pressures in the 180s. Given Lasix 60mg, positive response. No HTN given. BP control with BiPAP and precedex gtt.   Renal: Resolving JENIFER. Will assess sCr s/p diuresis. Trend lytes and UO.   GI: NPO while on BiPAP  ID: Heal ulcer with no sings of active cellulitis Afebrile. Vascular following, will likely need MRI. Appears patient will need amputation. Hx of LE bypass and stenting. US/CT showing chronic occlusion. Vasc following.  Heme: Hold AC in setting of anemia. ASA/PLVX. Trend H/H.      _32___ minutes of critical care time spent providing medical care for patient's acute illness/conditions that impairs at least one vital organ system and/or poses a high risk of imminent or life threatening deterioration in the patient's condition. It includes time spent evaluating and treating the patient's acute illness as well as time spent reviewing labs, radiology, discussing goals of care with patient and/or patient's family, and discussing the case with a multidisciplinary team in an effort to prevent further life threatening deterioration or end organ damage. This time is independent of any procedures performed.

## 2023-10-25 NOTE — PROGRESS NOTE ADULT - ASSESSMENT
NSTEMI  Near syncope probably secondary to hypovolemia from significant blood loss from a bleeding ulcer.  Anemia of acute blood loss  JENIFER, improving  Severe PAD with critical limb ischemia and ulcer of the left foot.  Occluded femorofemoral and left femoropopliteal bypass graft.  Poor distal perfusion including stenosis of the left popliteal artery and single-vessel runoff.  Possibly venous insufficiency and a large caliber great saphenous vein in the left calf and left ankle area as a cause of patient's current bleeding.  Possible erosion of left ankle varicosity by arterial ulcer.  Mild AS    Suggest:    Troponin elevation consistent with non-STEMI.  Will start full dose anticoagulation with no bolus.  Continue dual antiplatelet therapy.  Discussed with interventional cardiologist, Dr. Almendarez, given renal insufficiency and multiple noncardiac medical issues at this time, will defer coronary angiogram unless patient becomes unstable or develops anginal symptoms refractory to medical therapy.  Continue statin.  Continue beta-blocker.    Cardiac status is stable.   Pt would like to be transferred to [facility] or [facility]) if further invasive treatment needed for PAD or ulcer.   D/W wife at bedside.

## 2023-10-25 NOTE — CONSULT NOTE ADULT - REASON FOR ADMISSION
pain and bleeding left foot

## 2023-10-25 NOTE — PROGRESS NOTE ADULT - ASSESSMENT
74 y/o male with PMHX of HTN, severe PVD s/p multiple stents b/l legs, s/p fem/popliteal bypass, chronic left lower ankle ulceration, GERD, HLD who presented to  with CC of left LE pain and bleeding and near syncope.    Assessment    1) Acute hypoxic respiratory failure   2) possible, TRALI/TACO got only 1 unit of blood yesterday  3) HTN emergency  4) hyperactive delirium from hypoxia  5)  acute blood loss anemia from heal ulcer  6) Acute pulmonary edema  vs. pneumonia    Plan     Neuro was on precedex, overnight, for bipap, will d/c and reaccess  Pulm: Acute pulmonary edema secondary to likely heart failure, . CXR shows B/L Infiltrates R>L.  was On BiPAP 12/8 on FIO2 of 100%.      will attempt to D/c bipap, diuresis,   CV: HTN better, bnp was [id number], will check ekg, troponin, preserved ef on echo  Renal:   JENIFER. creatinie 1.6 inc from yesterday, down from 1.2  GI:  if breathing improved will start diet  ID: Heal ulcer with no sings of active cellulitis Afebrile.  , will likely need MRI. Appears patient will need amputation. Hx of LE bypass and stenting. US/CT showing chronic occlusion.    Heme: was on. ASA/PLVX.  held for bleeding

## 2023-10-25 NOTE — PROGRESS NOTE ADULT - SUBJECTIVE AND OBJECTIVE BOX
73-year-old male admitted with complaints of a near syncopal episode after having severe bleeding from his left ankle ulcer.  Patient has had history of PAD and venous insufficiency.  He has had a chronic left medial malleolus area ulceration.  He has had a history of a femorofemoral bypass and left femoropopliteal bypass.  While trying to get up after noticing a significant amount of bleed from the left ankle patient felt weak, pale and almost passed out.  No previous history of syncope or near syncope.  No history of arrhythmias.  Recently has been having severe pain in the left foot and worsening of ulceration of the left medial malleolus.  Follows up with Dr. Castillo at [facility] for PAD.  Recently saw Dr Nickolas Gunter of vascular surgery at [facility] on 10/17/2023 for worsening left ankle ulcer and change in SANTHOSH PVR. Clinical suspicion of occluded left Fem-pop bypass but with Doppler signal positive over the Fem-Fem bypass.     10/25/23  The patient was seen and examined.  Overnight events noted. No chest discomfort at this time.      PAST MEDICAL & SURGICAL HISTORY:  Essential hypertension  PVD (peripheral vascular disease)  Gastroesophageal reflux disease, esophagitis presence not specified  Hypothyroidism  Chronic obstructive pulmonary disease, unspecified COPD type  Eczema  Hyperlipidemia, unspecified hyperlipidemia type  Medial meniscus tear left knee  Femoral popliteal artery thrombus  AAA, EVAR  Fem Fem bypass  h/o Fem pop bypass 1997.  Occlusion and redo bypass  PVD (peripheral vascular disease)  s/p peripheral stent insertion bilateral lower extremities x 10  Varicose veins      Allergies:  Betadine (Hives; Rash)   (22 Oct 2023 14:35)      PREVIOUS CARDIOVASCULAR WORKUP:      Peripheral angio 4/2023  SUMMARY:     - Severe LEFT popliteal stenosis s/p successful DCB x1  - Left PT occluded  - Patent fem-fem and fem-pop bypass grafts     Routine post-peripheral intervention care with monitoring and anticipated same day discharge if course uncomplicated  Continue ASA 81 mg daily  Continue Plavix 75 mg daily  Restart Xarelto 2.5mg BID tomorrow if no bleeding  Triple therapy for 1 week then stop aspirin  Continue medical management of PAD and PAD risk factors       SANTHOSH PVR 10/17/23  Interpretation:  Mild peripheral arterial occlusive disease appreciated in the right lower extremity based on   pulse volume recording waveforms. Moderate to severe peripheral arterial occlusive disease   appreciated in the left lower extremity based on pulse volume recording waveforms.    CTA Abd, pelvis and b/l LE run-off  5/15/23  IMPRESSION:     Status post endovascular repair of an infrarenal aortic aneurysm. The native aneurysm is stable in size. No evidence of endoleak.     Moderate stenosis of the origins of the celiac artery and superior mesenteric artery.     Severe stenoses of the origins of the bilateral renal arteries.     Right lower extremity: Inflow: Patent. Outflow: Patent superficial femoral and popliteal arteries with stable pseudoaneurysms, as described. Three-vessel runoff to the right foot.     Left lower extremity: Inflow: Occluded common iliac and external iliac arteries with patent femoral-femoral bypass graft. Occluded superficial femoral artery with patent femoral popliteal bypass graft. Multifocal moderate stenoses of the popliteal artery with two-vessel runoff to the left foot.       ALLERGIES:    Betadine (Hives; Rash)     MEDICATIONS  (STANDING):  albuterol    0.083% 2.5 milliGRAM(s) Nebulizer every 10 minutes  aspirin enteric coated 81 milliGRAM(s) Oral daily  atorvastatin 80 milliGRAM(s) Oral at bedtime  chlorhexidine 2% Cloths 1 Application(s) Topical <User Schedule>  clopidogrel Tablet 75 milliGRAM(s) Oral daily  heparin  Infusion.  Unit(s)/Hr (12 mL/Hr) IV Continuous <Continuous>  influenza  Vaccine (HIGH DOSE) 0.7 milliLiter(s) IntraMuscular once  levothyroxine 88 MICROGram(s) Oral daily  nebivolol 2.5 milliGRAM(s) Oral daily  piperacillin/tazobactam IVPB.- 3.375 Gram(s) IV Intermittent once  silver sulfADIAZINE 1% Cream 1 Application(s) Topical once    MEDICATIONS  (PRN):  acetaminophen     Tablet .. 650 milliGRAM(s) Oral every 6 hours PRN Mild Pain (1 - 3)  aluminum hydroxide/magnesium hydroxide/simethicone Suspension 30 milliLiter(s) Oral every 4 hours PRN Dyspepsia  heparin   Injectable 5500 Unit(s) IV Push every 6 hours PRN For aPTT less than 40  heparin   Injectable 2500 Unit(s) IV Push every 6 hours PRN For aPTT between 40 - 57  melatonin 3 milliGRAM(s) Oral at bedtime PRN Insomnia  morphine  - Injectable 2 milliGRAM(s) IV Push every 6 hours PRN Severe Pain (7 - 10)  ondansetron Injectable 4 milliGRAM(s) IV Push every 6 hours PRN Nausea and/or Vomiting  oxycodone    5 mG/acetaminophen 325 mG 0.5 Tablet(s) Oral every 6 hours PRN Moderate Pain (4 - 6)      Vital Signs Last 24 Hrs  T(C): 37.1 (25 Oct 2023 08:00), Max: 37.3 (25 Oct 2023 04:00)  T(F): 98.8 (25 Oct 2023 08:00), Max: 99.2 (25 Oct 2023 04:00)  HR: 69 (25 Oct 2023 13:00) (59 - 116)  BP: 149/60 (25 Oct 2023 13:00) (64/46 - 183/60)  BP(mean): 86 (25 Oct 2023 13:00) (53 - 115)  RR: 24 (25 Oct 2023 13:00) (16 - 27)  SpO2: 98% (25 Oct 2023 13:00) (84% - 100%)    Parameters below as of 25 Oct 2023 13:00  Patient On (Oxygen Delivery Method): nasal cannula  O2 Flow (L/min): 5    I&O's Summary    24 Oct 2023 07:01  -  25 Oct 2023 07:00  --------------------------------------------------------  IN: 1394 mL / OUT: 1070 mL / NET: 324 mL        PHYSICAL EXAM:    General:                Comfortable, AAO X 3, in no distress.  HEENT:                  Unremarkable.   Neck:                     Supple, no adenopathy, no thyromegaly, no JVD, no bruit.  Chest:                    Clear, B/L symmetric air entry, no tachypnea  Heart:                     S1, S2 normal, + murmur.  Abdomen:              Soft, non-tender, bowel sounds active. No palpable masses.  Extremities:           Poor perfusion Left foot. About 3 cm diameter Ulcer with dried blood at the left medial malleolus. No pedal pulses felt.  Varicose veins noted.   Neurologic:            Grossly nonfocal.       TELEMETRY:  Normal sinus rhythm with no tachy or alfonso events     ECG:  NSR, no ST T changes of ischemia or infarction.     LABS:                          7.2    7.24  )-----------( 200      ( 23 Oct 2023 06:00 )             22.7     10-23    144  |  111<H>  |  47<H>  ----------------------------<  98  4.2   |  26  |  1.33<H>    Creatinine: 1.33 mg/dL (10.23.23 @ 06:00)   Creatinine: 2.20 mg/dL (10.22.23 @ 11:10)     Ca    8.4<L>      23 Oct 2023 06:00    TPro  6.5  /  Alb  3.0<L>  /  TBili  0.4  /  DBili  x   /  AST  43<H>  /  ALT  30  /  AlkPhos  113  10-22    10-22 @ 11:10  Trop-I  48.99    PT/INR - ( 22 Oct 2023 11:10 )   PT: 11.5 sec;   INR: 1.02 ratio    PTT - ( 22 Oct 2023 11:10 )  PTT:26.7 sec    RADIOLOGY & ADDITIONAL STUDIES:    < from: US Duplex Venous Lower Ext Ltd, Left (10.22.23 @ 11:31) >  IMPRESSION:  No evidence of left lower extremity deep venous thrombosis.    An occluded arterial bypass graft is partially visualized.      < from: Xray Chest 1 View- PORTABLE-Urgent (10.22.23 @ 12:45) >  IMPRESSION: No acute chest finding. Mild edema around the left ankle   posteriorly. Benign-appearing calcifications in the medial left lower leg   are likely venous. No acute bony finding.    < from: US Duplex Arterial Lower Ext Ltd, Left (10.22.23 @ 14:40) >  IMPRESSION:  Occluded left femoropopliteal grafts and femorofemoral grafts.  Occluded left common femoral, superficial femoral, anterior tibial and deep femoral arteries.  Minimal monophasic flow short segments of the left popliteal, left posterior tibial, and peroneal arteries    < from: US Kidney and Bladder (10.22.23 @ 14:42) >  IMPRESSION:  Renal parenchymal disease without hydronephrosis. Left kidney is not well   visualized and may be atrophic.

## 2023-10-25 NOTE — PROVIDER CONTACT NOTE (CHANGE IN STATUS NOTIFICATION) - ACTION/TREATMENT ORDERED:
Albuterol neb x 1 given. Pt placed on bipap. Morphine 2 mg IV x 2 ordered and given. Lasix 60 mg IV x 1 ordered and given. Pt transferred to ICU.

## 2023-10-25 NOTE — CONSULT NOTE ADULT - SUBJECTIVE AND OBJECTIVE BOX
Date of consult: 10/25/2023    HPI:  74 y/o male with PMHX of HTN, severe PVD s/p multiple stents b/l legs, s/p fem/popliteal bypass, chronic left lower ankle ulceration, GERD, HLD who presented to  with CC of left LE pain and bleeding.  Patient notes he was at his family's house earlier 10/22.  He was sitting on the floor with his grandson when the family noticed significant bleeding in his left leg.  His wife tried to get him up and into the bathroom to stop the bleeding.  She got him to sit on the side of the bathtub and was trying to hold pressure over the bleeding when he seemed to lose consciousness.  He didn't fall over but as per wife, turned white and wasn't responding to her.  They called 911.  He wasn't responding for a few minutes and then came to.  She denies hx of syncope/ near syncope in the past.  In the ER, bleeding had mostly stopped.  Patient was c/o severe pain left foot.  Left foot discoloration and vascular surgery called for consult.  Labs with ARF with Cr of 2.2-- unclear baseline.  Patient has been taking advil at home for pain in foot.      Podiatry was consulted for left foot ulcer. Pt resting comfortably bedside today. Pt tender to wound site.     PAST MEDICAL & SURGICAL HISTORY:  Essential hypertension  PVD (peripheral vascular disease)  Gastroesophageal reflux disease, esophagitis presence not specified  Hypothyroidism  Chronic obstructive pulmonary disease, unspecified COPD type  Eczema  Hyperlipidemia, unspecified hyperlipidemia type  Medial meniscus tear  left knee  Femoral popliteal artery thrombus  h/o Fem pop bypass 1997  PVD (peripheral vascular disease)  s/p peripheral stent insertion bilateral lower extemities x 10      MEDICATIONS  (STANDING):  albuterol    0.083% 2.5 milliGRAM(s) Nebulizer every 10 minutes  aspirin enteric coated 81 milliGRAM(s) Oral daily  atorvastatin 80 milliGRAM(s) Oral at bedtime  chlorhexidine 2% Cloths 1 Application(s) Topical <User Schedule>  clopidogrel Tablet 75 milliGRAM(s) Oral daily  influenza  Vaccine (HIGH DOSE) 0.7 milliLiter(s) IntraMuscular once  levothyroxine 88 MICROGram(s) Oral daily  nebivolol 2.5 milliGRAM(s) Oral daily  silver sulfADIAZINE 1% Cream 1 Application(s) Topical once    MEDICATIONS  (PRN):  acetaminophen     Tablet .. 650 milliGRAM(s) Oral every 6 hours PRN Mild Pain (1 - 3)  aluminum hydroxide/magnesium hydroxide/simethicone Suspension 30 milliLiter(s) Oral every 4 hours PRN Dyspepsia  melatonin 3 milliGRAM(s) Oral at bedtime PRN Insomnia  morphine  - Injectable 2 milliGRAM(s) IV Push every 6 hours PRN Severe Pain (7 - 10)  ondansetron Injectable 4 milliGRAM(s) IV Push every 6 hours PRN Nausea and/or Vomiting  oxycodone    5 mG/acetaminophen 325 mG 0.5 Tablet(s) Oral every 6 hours PRN Moderate Pain (4 - 6)          FAMILY HISTORY:  Family hx of vascular disease      Social History:    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    Ex-smoker-- quit 20 years ago      Allergies:  Betadine (Hives; Rash)   (22 Oct 2023 14:35)            PMH: Essential hypertension    PVD (peripheral vascular disease)    Gastroesophageal reflux disease, esophagitis presence not specified    Hypothyroidism    Chronic obstructive pulmonary disease, unspecified COPD type    Eczema    Hyperlipidemia, unspecified hyperlipidemia type    Medial meniscus tear      PSH:Femoral popliteal artery thrombus    PVD (peripheral vascular disease)        Allergies:Betadine (Hives; Rash)      Labs:                          11.5   16.23 )-----------( 253      ( 25 Oct 2023 03:57 )             36.6     WBC Trend  16.23<H> Date (10-25 @ 03:57)  9.00 Date (10-24 @ 06:49)  7.24 Date (10-23 @ 06:00)  13.11<H> Date (10-22 @ 11:10)      Chem  10-25    144  |  110<H>  |  29<H>  ----------------------------<  142<H>  3.8   |  23  |  1.69<H>    Ca    8.1<L>      25 Oct 2023 03:57  Phos  3.9     10-25  Mg     2.1     10-25    TPro  5.8<L>  /  Alb  2.7<L>  /  TBili  0.3  /  DBili  x   /  AST  70<H>  /  ALT  29  /  AlkPhos  103  10-24          T(F): 98.8 (10-25-23 @ 08:00), Max: 99.2 (10-25-23 @ 04:00)  HR: 87 (10-25-23 @ 12:00) (59 - 116)  BP: 169/92 (10-25-23 @ 12:00) (64/46 - 183/60)  RR: 16 (10-25-23 @ 12:00) (16 - 27)  SpO2: 98% (10-25-23 @ 12:00) (84% - 100%)  Wt(kg): --    REVIEW OF SYSTEMS:    CONSTITUTIONAL: No weakness, fevers or chills  EYES: No visual changes  RESPIRATORY: No cough, wheezing; No shortness of breath  CARDIOVASCULAR: No chest pain or palpitations  GASTROINTESTINAL: No abdominal or epigastric pain. No nausea, vomiting; No diarrhea or constipation.   GENITOURINARY: No dysuria, frequency or hematuria  NEUROLOGICAL: No numbness or weakness  SKIN: See physical examination.  All other review of systems is negative unless indicated above    Physical Exam:   Constitutional: NAD, alert;  Lower Extremity Focus  Derm:  Skin warm, dry and supple bilateral.    Ulceration to the left medial heel, wound base is mostly eschar and fibrogranular, wound size is 3.5cm x 3.5cm, no periwound edema or erythema noted. no purulence or pus noted, no tracking/tunneling noted, no PTB, no malodor  Vascular: Dorsalis Pedis and Posterior Tibial pulses non palpable.  Capillary re-fill time more then 3 seconds digits 1-5 bilateral.    Neuro: Protective sensation diminished to the level of the digits bilateral.  MSK: Muscle strength 5/5 all major muscle groups bilateral. TTP to the wound site on the left medial mal          < from: Xray Foot AP + Lateral + Oblique, Left (10.22.23 @ 11:51) >  PROCEDURE DATE:  10/22/2023          INTERPRETATION:  Left ankle, left foot, and chest. Concern is chronic   wound around the malleoli.    Left ankle. 3 views.    There is long area of soft tissue calcification likely in a venous   structure in the medial lower leg.    There is mild degeneration of the malleolar tips.    There is some swelling seen approaching cavers triangle.    No bone destruction or fracture.    Left foot. 3 views.    No bone destruction or fracture is evident.    AP chest on October 22, 2023 at 12:34 PM.    Heart magnified by technique.    Lungs are clear.    Chest is similar to August 28, 2019.    IMPRESSION: No acute chest finding. Mild edema around the left ankle   posteriorly. Benign-appearing calcifications in the medial left lower leg   are likely venous. No acute bony finding.    --- End of Report ---        < end of copied text >

## 2023-10-25 NOTE — CHART NOTE - NSCHARTNOTEFT_GEN_A_CORE
Called by nurse due to patients continued agitation and anxiety despite Ativan treatment. As per nurse, patient was developing a wheeze. Patient with HX of COPD. During the course of the past 24 hours patient did receive a blood transfusion. Originally, additional ativan and an albuterol inhaler were put in the computer, however they were discontinued after evaluating the patient. On examination, patient appears visibly anxious and SOB. Audible wheezing, rhonchi and crackles. When vital signs taken, Spo2 of 71% on 2.5L. Rapid response was then called by nurse. Chest x-ray, Lasix 60mg IV, morphine 2mg IV, albuterol nebs, hydralazine 10mg IV and BiPAP was ordered. Patient was then transferred to the ICU for further management.
Hospitalist Addendum:    Report now available for US Arterial Doppler.    Occluded left femoropopliteal grafts and femorofemoral grafts.  Occluded left common femoral, superficial femoral, anterior tibial and   deep femoral arteries.  Minimal monophasic flow short segments of the left popliteal, left   posterior tibial, and peroneal arteries    Reviewed with vascular attending Dr. Lopez.  She feels findings are likely chronic.  Patient not presenting with symptoms of acute limb ischemia.    Recommends hydration to improve renal function and then likely needs angiogram when renal function improved.    Will f/u.
Received a call from nurse that patient is confused, anxious and jumping out of bed. Patient seems to become confused at night and confusion clears up during the day as per nurse. Patient reports anxiety.     Plan;   Anxiety   - Ativan 0.5mg IV
Vascular Surgery Attending    Spoke at length with patient and his wife at bedside. Explained CT scan results and patient's very high likelihood of limb loss given severity of his disease. Given option to follow up at [facility] for second opinion, but both the patient and his wife would like to continue care at [facility].  Will obtain podiatry consult and MRI  Will need cardiac clearance for OR, tentatively early next week    PATRICK Sosa MD
Nurse called to advise that the patient has been restless, jumping out of bed and confused the entire night. As per nurse, patient was restless the night before as well. At time of evaluation, patient resting comfortably in bed. No visible acute distress.   Additionally, nurse advised that patient received 1 unit prbc and no morning labs ordered.     Plan  AMS/Anemia  - Order morning labs: CBC, CMP, Mag, Phos, Ammonia  - Ordered UA   - Consider imaging if confusion does not resolve

## 2023-10-25 NOTE — CONSULT NOTE ADULT - ASSESSMENT
A: 73y old male seen for the following:   - full thickness wound to the left foot, chronic   - Pain to the left foot  - Difficulty ambulation    P:   Chart reviewed and Patient evaluated;  Discussed diagnosis and treatment with patient. Discussed importance of daily foot examinations, proper shoe gear, importance of tight glycemic control.   X-rays reviewed : on wet read showing no soft tissue gas, no acute bony findings, mild edema noted to the left ankle posteriorly  There is a full thickness wound to the left medial mal, etiology of wound most likely due to arterial problem, wound looks chronic and stable without acute SOI  Wound flush with normal saline  Applied bacitracin with dry sterile dressing  WC: silvadene and DSD  WBAT using surgical shoe  Offloading to bilateral heels while bedbound.   Continue with antibiotics as per ID/med  All additional care per Med appreciated  Patient demonstrated verbal understanding of all interventions and tolerated interventions well without any complications.   Podiatry will follow while in house      Case D/W attending Dr. Joseph

## 2023-10-26 LAB
ALBUMIN SERPL ELPH-MCNC: 2.2 G/DL — LOW (ref 3.3–5)
ALBUMIN SERPL ELPH-MCNC: 2.2 G/DL — LOW (ref 3.3–5)
ALP SERPL-CCNC: 96 U/L — SIGNIFICANT CHANGE UP (ref 40–120)
ALP SERPL-CCNC: 96 U/L — SIGNIFICANT CHANGE UP (ref 40–120)
ALT FLD-CCNC: 27 U/L — SIGNIFICANT CHANGE UP (ref 12–78)
ALT FLD-CCNC: 27 U/L — SIGNIFICANT CHANGE UP (ref 12–78)
ANION GAP SERPL CALC-SCNC: 7 MMOL/L — SIGNIFICANT CHANGE UP (ref 5–17)
ANION GAP SERPL CALC-SCNC: 7 MMOL/L — SIGNIFICANT CHANGE UP (ref 5–17)
APTT BLD: 49.7 SEC — HIGH (ref 24.5–35.6)
APTT BLD: 49.7 SEC — HIGH (ref 24.5–35.6)
APTT BLD: 59.6 SEC — HIGH (ref 24.5–35.6)
APTT BLD: 59.6 SEC — HIGH (ref 24.5–35.6)
APTT BLD: 70.1 SEC — HIGH (ref 24.5–35.6)
APTT BLD: 70.1 SEC — HIGH (ref 24.5–35.6)
AST SERPL-CCNC: 57 U/L — HIGH (ref 15–37)
AST SERPL-CCNC: 57 U/L — HIGH (ref 15–37)
BILIRUB SERPL-MCNC: 0.7 MG/DL — SIGNIFICANT CHANGE UP (ref 0.2–1.2)
BILIRUB SERPL-MCNC: 0.7 MG/DL — SIGNIFICANT CHANGE UP (ref 0.2–1.2)
BUN SERPL-MCNC: 25 MG/DL — HIGH (ref 7–23)
BUN SERPL-MCNC: 25 MG/DL — HIGH (ref 7–23)
CALCIUM SERPL-MCNC: 7.3 MG/DL — LOW (ref 8.5–10.1)
CALCIUM SERPL-MCNC: 7.3 MG/DL — LOW (ref 8.5–10.1)
CHLORIDE SERPL-SCNC: 109 MMOL/L — HIGH (ref 96–108)
CHLORIDE SERPL-SCNC: 109 MMOL/L — HIGH (ref 96–108)
CO2 SERPL-SCNC: 27 MMOL/L — SIGNIFICANT CHANGE UP (ref 22–31)
CO2 SERPL-SCNC: 27 MMOL/L — SIGNIFICANT CHANGE UP (ref 22–31)
CREAT SERPL-MCNC: 1.13 MG/DL — SIGNIFICANT CHANGE UP (ref 0.5–1.3)
CREAT SERPL-MCNC: 1.13 MG/DL — SIGNIFICANT CHANGE UP (ref 0.5–1.3)
EGFR: 69 ML/MIN/1.73M2 — SIGNIFICANT CHANGE UP
EGFR: 69 ML/MIN/1.73M2 — SIGNIFICANT CHANGE UP
GLUCOSE SERPL-MCNC: 107 MG/DL — HIGH (ref 70–99)
GLUCOSE SERPL-MCNC: 107 MG/DL — HIGH (ref 70–99)
HCT VFR BLD CALC: 27.2 % — LOW (ref 39–50)
HCT VFR BLD CALC: 27.2 % — LOW (ref 39–50)
HCT VFR BLD CALC: 27.9 % — LOW (ref 39–50)
HCT VFR BLD CALC: 27.9 % — LOW (ref 39–50)
HGB BLD-MCNC: 9.1 G/DL — LOW (ref 13–17)
HGB BLD-MCNC: 9.1 G/DL — LOW (ref 13–17)
HGB BLD-MCNC: 9.2 G/DL — LOW (ref 13–17)
HGB BLD-MCNC: 9.2 G/DL — LOW (ref 13–17)
MCHC RBC-ENTMCNC: 28.3 PG — SIGNIFICANT CHANGE UP (ref 27–34)
MCHC RBC-ENTMCNC: 28.3 PG — SIGNIFICANT CHANGE UP (ref 27–34)
MCHC RBC-ENTMCNC: 28.6 PG — SIGNIFICANT CHANGE UP (ref 27–34)
MCHC RBC-ENTMCNC: 28.6 PG — SIGNIFICANT CHANGE UP (ref 27–34)
MCHC RBC-ENTMCNC: 33 GM/DL — SIGNIFICANT CHANGE UP (ref 32–36)
MCHC RBC-ENTMCNC: 33 GM/DL — SIGNIFICANT CHANGE UP (ref 32–36)
MCHC RBC-ENTMCNC: 33.5 GM/DL — SIGNIFICANT CHANGE UP (ref 32–36)
MCHC RBC-ENTMCNC: 33.5 GM/DL — SIGNIFICANT CHANGE UP (ref 32–36)
MCV RBC AUTO: 85.5 FL — SIGNIFICANT CHANGE UP (ref 80–100)
MCV RBC AUTO: 85.5 FL — SIGNIFICANT CHANGE UP (ref 80–100)
MCV RBC AUTO: 85.8 FL — SIGNIFICANT CHANGE UP (ref 80–100)
MCV RBC AUTO: 85.8 FL — SIGNIFICANT CHANGE UP (ref 80–100)
PLATELET # BLD AUTO: 223 K/UL — SIGNIFICANT CHANGE UP (ref 150–400)
PLATELET # BLD AUTO: 223 K/UL — SIGNIFICANT CHANGE UP (ref 150–400)
PLATELET # BLD AUTO: 227 K/UL — SIGNIFICANT CHANGE UP (ref 150–400)
PLATELET # BLD AUTO: 227 K/UL — SIGNIFICANT CHANGE UP (ref 150–400)
POTASSIUM SERPL-MCNC: 3.6 MMOL/L — SIGNIFICANT CHANGE UP (ref 3.5–5.3)
POTASSIUM SERPL-MCNC: 3.6 MMOL/L — SIGNIFICANT CHANGE UP (ref 3.5–5.3)
POTASSIUM SERPL-SCNC: 3.6 MMOL/L — SIGNIFICANT CHANGE UP (ref 3.5–5.3)
POTASSIUM SERPL-SCNC: 3.6 MMOL/L — SIGNIFICANT CHANGE UP (ref 3.5–5.3)
PROT SERPL-MCNC: 5.4 GM/DL — LOW (ref 6–8.3)
PROT SERPL-MCNC: 5.4 GM/DL — LOW (ref 6–8.3)
RBC # BLD: 3.18 M/UL — LOW (ref 4.2–5.8)
RBC # BLD: 3.18 M/UL — LOW (ref 4.2–5.8)
RBC # BLD: 3.25 M/UL — LOW (ref 4.2–5.8)
RBC # BLD: 3.25 M/UL — LOW (ref 4.2–5.8)
RBC # FLD: 16.3 % — HIGH (ref 10.3–14.5)
RBC # FLD: 16.3 % — HIGH (ref 10.3–14.5)
RBC # FLD: 16.5 % — HIGH (ref 10.3–14.5)
RBC # FLD: 16.5 % — HIGH (ref 10.3–14.5)
SODIUM SERPL-SCNC: 143 MMOL/L — SIGNIFICANT CHANGE UP (ref 135–145)
SODIUM SERPL-SCNC: 143 MMOL/L — SIGNIFICANT CHANGE UP (ref 135–145)
TROPONIN I, HIGH SENSITIVITY RESULT: 1384.24 NG/L — HIGH
TROPONIN I, HIGH SENSITIVITY RESULT: 1384.24 NG/L — HIGH
WBC # BLD: 8.96 K/UL — SIGNIFICANT CHANGE UP (ref 3.8–10.5)
WBC # BLD: 8.96 K/UL — SIGNIFICANT CHANGE UP (ref 3.8–10.5)
WBC # BLD: 9.73 K/UL — SIGNIFICANT CHANGE UP (ref 3.8–10.5)
WBC # BLD: 9.73 K/UL — SIGNIFICANT CHANGE UP (ref 3.8–10.5)
WBC # FLD AUTO: 8.96 K/UL — SIGNIFICANT CHANGE UP (ref 3.8–10.5)
WBC # FLD AUTO: 8.96 K/UL — SIGNIFICANT CHANGE UP (ref 3.8–10.5)
WBC # FLD AUTO: 9.73 K/UL — SIGNIFICANT CHANGE UP (ref 3.8–10.5)
WBC # FLD AUTO: 9.73 K/UL — SIGNIFICANT CHANGE UP (ref 3.8–10.5)

## 2023-10-26 PROCEDURE — 99232 SBSQ HOSP IP/OBS MODERATE 35: CPT

## 2023-10-26 PROCEDURE — 99233 SBSQ HOSP IP/OBS HIGH 50: CPT

## 2023-10-26 RX ORDER — FUROSEMIDE 40 MG
20 TABLET ORAL ONCE
Refills: 0 | Status: COMPLETED | OUTPATIENT
Start: 2023-10-26 | End: 2023-10-26

## 2023-10-26 RX ORDER — POTASSIUM CHLORIDE 20 MEQ
40 PACKET (EA) ORAL ONCE
Refills: 0 | Status: COMPLETED | OUTPATIENT
Start: 2023-10-26 | End: 2023-10-26

## 2023-10-26 RX ORDER — HEPARIN SODIUM 5000 [USP'U]/ML
INJECTION INTRAVENOUS; SUBCUTANEOUS
Qty: 25000 | Refills: 0 | Status: DISCONTINUED | OUTPATIENT
Start: 2023-10-26 | End: 2023-10-27

## 2023-10-26 RX ADMIN — PIPERACILLIN AND TAZOBACTAM 25 GRAM(S): 4; .5 INJECTION, POWDER, LYOPHILIZED, FOR SOLUTION INTRAVENOUS at 14:00

## 2023-10-26 RX ADMIN — HEPARIN SODIUM 1200 UNIT(S)/HR: 5000 INJECTION INTRAVENOUS; SUBCUTANEOUS at 13:59

## 2023-10-26 RX ADMIN — HEPARIN SODIUM 1200 UNIT(S)/HR: 5000 INJECTION INTRAVENOUS; SUBCUTANEOUS at 07:07

## 2023-10-26 RX ADMIN — Medication 40 MILLIEQUIVALENT(S): at 11:50

## 2023-10-26 RX ADMIN — PIPERACILLIN AND TAZOBACTAM 25 GRAM(S): 4; .5 INJECTION, POWDER, LYOPHILIZED, FOR SOLUTION INTRAVENOUS at 06:00

## 2023-10-26 RX ADMIN — HEPARIN SODIUM 1200 UNIT(S)/HR: 5000 INJECTION INTRAVENOUS; SUBCUTANEOUS at 09:51

## 2023-10-26 RX ADMIN — HEPARIN SODIUM 2500 UNIT(S): 5000 INJECTION INTRAVENOUS; SUBCUTANEOUS at 00:28

## 2023-10-26 RX ADMIN — CHLORHEXIDINE GLUCONATE 1 APPLICATION(S): 213 SOLUTION TOPICAL at 07:11

## 2023-10-26 RX ADMIN — Medication 20 MILLIGRAM(S): at 11:51

## 2023-10-26 RX ADMIN — ATORVASTATIN CALCIUM 80 MILLIGRAM(S): 80 TABLET, FILM COATED ORAL at 21:35

## 2023-10-26 RX ADMIN — CLOPIDOGREL BISULFATE 75 MILLIGRAM(S): 75 TABLET, FILM COATED ORAL at 11:51

## 2023-10-26 RX ADMIN — NEBIVOLOL HYDROCHLORIDE 2.5 MILLIGRAM(S): 5 TABLET ORAL at 11:49

## 2023-10-26 RX ADMIN — PIPERACILLIN AND TAZOBACTAM 25 GRAM(S): 4; .5 INJECTION, POWDER, LYOPHILIZED, FOR SOLUTION INTRAVENOUS at 21:35

## 2023-10-26 RX ADMIN — HEPARIN SODIUM 1200 UNIT(S)/HR: 5000 INJECTION INTRAVENOUS; SUBCUTANEOUS at 02:05

## 2023-10-26 RX ADMIN — Medication 88 MICROGRAM(S): at 05:58

## 2023-10-26 RX ADMIN — HEPARIN SODIUM 1200 UNIT(S)/HR: 5000 INJECTION INTRAVENOUS; SUBCUTANEOUS at 18:14

## 2023-10-26 NOTE — PROGRESS NOTE ADULT - SUBJECTIVE AND OBJECTIVE BOX
Events Overnight:   Patient still slightly sob, temp to 99, on nasal cannula, cxr from yesterday, chf vs. pneumojnir    HPI:     72 y/o male with PMHX of HTN, severe PVD s/p multiple stents b/l legs, s/p fem/popliteal bypass, chronic left lower ankle ulceration, GERD, HLD who presented to  with CC of left LE pain and bleeding and near syncope.   Patient was transfused for bleeding from left lower let,    RR called for acute hypoxia and agitation. SpO2 75%. /90. Placed on NRB, given morphine 2mg IVP x2 POCUS showed diffuse anterior b lines. Placed on BIPAP transferred to ICU.   cxr CHF vs. right sided infiltrate  creatinine improved now 1.1    echo during admission ef 60%  ROS no chest pain, sob improved, less agitated, some numbness in foot, no rest pain, no fevers, no cough no chills      MEDICATIONS  (STANDING):  albuterol    0.083% 2.5 milliGRAM(s) Nebulizer every 10 minutes  aspirin enteric coated 81 milliGRAM(s) Oral daily  atorvastatin 80 milliGRAM(s) Oral at bedtime  chlorhexidine 2% Cloths 1 Application(s) Topical <User Schedule>  clopidogrel Tablet 75 milliGRAM(s) Oral daily  heparin  Infusion.  Unit(s)/Hr (12 mL/Hr) IV Continuous <Continuous>  influenza  Vaccine (HIGH DOSE) 0.7 milliLiter(s) IntraMuscular once  levothyroxine 88 MICROGram(s) Oral daily  nebivolol 2.5 milliGRAM(s) Oral daily  piperacillin/tazobactam IVPB.. 3.375 Gram(s) IV Intermittent every 8 hours  potassium chloride    Tablet ER 40 milliEquivalent(s) Oral once  silver sulfADIAZINE 1% Cream 1 Application(s) Topical once    MEDICATIONS  (PRN):  acetaminophen     Tablet .. 650 milliGRAM(s) Oral every 6 hours PRN Mild Pain (1 - 3)  aluminum hydroxide/magnesium hydroxide/simethicone Suspension 30 milliLiter(s) Oral every 4 hours PRN Dyspepsia  heparin   Injectable 5500 Unit(s) IV Push every 6 hours PRN For aPTT less than 40  heparin   Injectable 2500 Unit(s) IV Push every 6 hours PRN For aPTT between 40 - 57  melatonin 3 milliGRAM(s) Oral at bedtime PRN Insomnia  morphine  - Injectable 2 milliGRAM(s) IV Push every 6 hours PRN Severe Pain (7 - 10)  ondansetron Injectable 4 milliGRAM(s) IV Push every 6 hours PRN Nausea and/or Vomiting  oxycodone    5 mG/acetaminophen 325 mG 0.5 Tablet(s) Oral every 6 hours PRN Moderate Pain (4 - 6)    Weight (kg): 68.7 (10-26 @ 00:00)    ICU Vital Signs Last 24 Hrs  T(C): 37.1 (26 Oct 2023 04:00), Max: 37.4 (25 Oct 2023 16:00)  T(F): 98.8 (26 Oct 2023 04:00), Max: 99.4 (25 Oct 2023 16:00)  HR: 67 (26 Oct 2023 06:00) (67 - 90)  BP: 157/57 (26 Oct 2023 05:00) (108/74 - 169/92)  BP(mean): 85 (26 Oct 2023 05:00) (75 - 115)  ABP: --  ABP(mean): --  RR: 20 (25 Oct 2023 20:00) (16 - 29)  SpO2: 99% (26 Oct 2023 06:00) (91% - 100%)    O2 Parameters below as of 25 Oct 2023 20:00  Patient On (Oxygen Delivery Method): nasal cannula  O2 Flow (L/min): 5    Physical Exam    General - awake, alert, no longer confused  HEENT - nc/at   neck supple  lungs bibasilar  crackles  cv rrr  abdomen soft,  extremtiies left leg cool, wrapped no further bleeding  neuro awake, alert lethargic, on precedex  Skin no rash    I&O's Summary    25 Oct 2023 07:01  -  26 Oct 2023 07:00  --------------------------------------------------------  IN: 1201 mL / OUT: 2625 mL / NET: -1424 mL                            9.2    9.73  )-----------( 227      ( 26 Oct 2023 05:36 )             27.9       10-26    143  |  109<H>  |  25<H>  ----------------------------<  107<H>  3.6   |  27  |  1.13    Ca    7.3<L>      26 Oct 2023 05:36  Phos  3.9     10-25  Mg     2.1     10-25    TPro  5.4<L>  /  Alb  2.2<L>  /  TBili  0.7  /  DBili  x   /  AST  57<H>  /  ALT  27  /  AlkPhos  96  10-26    Urinalysis Basic - ( 26 Oct 2023 05:36 )    Color: x / Appearance: x / SG: x / pH: x  Gluc: 107 mg/dL / Ketone: x  / Bili: x / Urobili: x   Blood: x / Protein: x / Nitrite: x   Leuk Esterase: x / RBC: x / WBC x   Sq Epi: x / Non Sq Epi: x / Bacteria: x    DVT Prophylaxis: IV Heparin                                                                Advanced Directives: Full Code

## 2023-10-26 NOTE — DIETITIAN INITIAL EVALUATION ADULT - PERTINENT LABORATORY DATA
10-26    143  |  109<H>  |  25<H>  ----------------------------<  107<H>  3.6   |  27  |  1.13    Ca    7.3<L>      26 Oct 2023 05:36  Phos  3.9     10-25  Mg     2.1     10-25    TPro  5.4<L>  /  Alb  2.2<L>  /  TBili  0.7  /  DBili  x   /  AST  57<H>  /  ALT  27  /  AlkPhos  96  10-26  A1C with Estimated Average Glucose Result: 6.2 % (10-23-23 @ 06:00)   10-26    143  |  109<H>  |  25<H>  ----------------------------<  107<H>  3.6   |  27  |  1.13    Ca    7.3<L>      26 Oct 2023 05:36  Phos  3.9     10-25  Mg     2.1     10-25    TPro  5.4<L>  /  Alb  2.2<L>  /  TBili  0.7  /  DBili  x   /  AST  57<H>  /  ALT  27  /  AlkPhos  96  10-26  A1C with Estimated Average Glucose Result: 6.2 % (10-23-23 @ 06:00)

## 2023-10-26 NOTE — DIETITIAN INITIAL EVALUATION ADULT - ADD RECOMMEND
1) Consider liberalizing to regular diet to optimize PO intake  2) Add Ensure + HP BID (350kcal, 20g protein) to optimize nutritional status  3) MVI w/ minerals daily to ensure 100% RDA met  4) Consider adding thiamine 100 mg daily 2/2 poor PO intake/ malnutrition  5) Monitor bowel movements, if no BM for >3 days, consider implementing bowel regimen.  6) Encourage protein-rich foods, maximize food preferences  7) Meal encouragement; tray set-up to optimize nutrition 2/2  poor po intake  8) Monitor electrolytes; specifically K+, Phos, Mg - pt at risk for refeeding syndrome  9) Consider adding appetite stimulant such as Remeron or Marinol 2/2 chronically poor appetite/ PO intake  10) Obtain daily wts to track trends/changes  11) Confirm goals of care regarding nutrition support    RD will continue to monitor PO intake, hydration status, wts, and labs prn.

## 2023-10-26 NOTE — PROGRESS NOTE ADULT - SUBJECTIVE AND OBJECTIVE BOX
Date of service: 10/26/2023    HPI:  72 y/o male with PMHX of HTN, severe PVD s/p multiple stents b/l legs, s/p fem/popliteal bypass, chronic left lower ankle ulceration, GERD, HLD who presented to  with CC of left LE pain and bleeding.  Patient notes he was at his family's house earlier 10/22.  He was sitting on the floor with his grandson when the family noticed significant bleeding in his left leg.  His wife tried to get him up and into the bathroom to stop the bleeding.  She got him to sit on the side of the bathtub and was trying to hold pressure over the bleeding when he seemed to lose consciousness.  He didn't fall over but as per wife, turned white and wasn't responding to her.  They called 911.  He wasn't responding for a few minutes and then came to.  She denies hx of syncope/ near syncope in the past.  In the ER, bleeding had mostly stopped.  Patient was c/o severe pain left foot.  Left foot discoloration and vascular surgery called for consult.  Labs with ARF with Cr of 2.2-- unclear baseline.  Patient has been taking advil at home for pain in foot.      Podiatry was consulted for left foot ulcer. Pt resting comfortably bedside today. Pt tender to wound site.     10/26: Pt seen by podiatry team today AM, pt resting bedside. Inquired about personal belongings. NAD, no other pedal complaints    PAST MEDICAL & SURGICAL HISTORY:  Essential hypertension  PVD (peripheral vascular disease)  Gastroesophageal reflux disease, esophagitis presence not specified  Hypothyroidism  Chronic obstructive pulmonary disease, unspecified COPD type  Eczema  Hyperlipidemia, unspecified hyperlipidemia type  Medial meniscus tear  left knee  Femoral popliteal artery thrombus  h/o Fem pop bypass 1997  PVD (peripheral vascular disease)  s/p peripheral stent insertion bilateral lower extemities x 10      MEDICATIONS  (STANDING):  albuterol    0.083% 2.5 milliGRAM(s) Nebulizer every 10 minutes  aspirin enteric coated 81 milliGRAM(s) Oral daily  atorvastatin 80 milliGRAM(s) Oral at bedtime  chlorhexidine 2% Cloths 1 Application(s) Topical <User Schedule>  clopidogrel Tablet 75 milliGRAM(s) Oral daily  influenza  Vaccine (HIGH DOSE) 0.7 milliLiter(s) IntraMuscular once  levothyroxine 88 MICROGram(s) Oral daily  nebivolol 2.5 milliGRAM(s) Oral daily  silver sulfADIAZINE 1% Cream 1 Application(s) Topical once    MEDICATIONS  (PRN):  acetaminophen     Tablet .. 650 milliGRAM(s) Oral every 6 hours PRN Mild Pain (1 - 3)  aluminum hydroxide/magnesium hydroxide/simethicone Suspension 30 milliLiter(s) Oral every 4 hours PRN Dyspepsia  melatonin 3 milliGRAM(s) Oral at bedtime PRN Insomnia  morphine  - Injectable 2 milliGRAM(s) IV Push every 6 hours PRN Severe Pain (7 - 10)  ondansetron Injectable 4 milliGRAM(s) IV Push every 6 hours PRN Nausea and/or Vomiting  oxycodone    5 mG/acetaminophen 325 mG 0.5 Tablet(s) Oral every 6 hours PRN Moderate Pain (4 - 6)          FAMILY HISTORY:  Family hx of vascular disease      Social History:    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    Ex-smoker-- quit 20 years ago      Allergies:  Betadine (Hives; Rash)   (22 Oct 2023 14:35)            PMH: Essential hypertension    PVD (peripheral vascular disease)    Gastroesophageal reflux disease, esophagitis presence not specified    Hypothyroidism    Chronic obstructive pulmonary disease, unspecified COPD type    Eczema    Hyperlipidemia, unspecified hyperlipidemia type    Medial meniscus tear      PSH:Femoral popliteal artery thrombus    PVD (peripheral vascular disease)        Allergies:Betadine (Hives; Rash)      Labs:                          9.2    9.73  )-----------( 227      ( 26 Oct 2023 05:36 )             27.9   10-26    143  |  109<H>  |  25<H>  ----------------------------<  107<H>  3.6   |  27  |  1.13    Ca    7.3<L>      26 Oct 2023 05:36  Phos  3.9     10-25  Mg     2.1     10-25    TPro  5.4<L>  /  Alb  2.2<L>  /  TBili  0.7  /  DBili  x   /  AST  57<H>  /  ALT  27  /  AlkPhos  96  10-26        Vital Signs Last 24 Hrs  T(C): 37.1 (26 Oct 2023 04:00), Max: 37.4 (25 Oct 2023 16:00)  T(F): 98.8 (26 Oct 2023 04:00), Max: 99.4 (25 Oct 2023 16:00)  HR: 78 (26 Oct 2023 10:00) (67 - 90)  BP: 158/60 (26 Oct 2023 10:00) (109/65 - 169/92)  BP(mean): 89 (26 Oct 2023 10:00) (75 - 115)  RR: 33 (26 Oct 2023 10:00) (16 - 33)  SpO2: 99% (26 Oct 2023 10:00) (91% - 100%)    Parameters below as of 25 Oct 2023 20:00  Patient On (Oxygen Delivery Method): nasal cannula  O2 Flow (L/min): 5      REVIEW OF SYSTEMS:    CONSTITUTIONAL: No weakness, fevers or chills  EYES: No visual changes  RESPIRATORY: No cough, wheezing; No shortness of breath  CARDIOVASCULAR: No chest pain or palpitations  GASTROINTESTINAL: No abdominal or epigastric pain. No nausea, vomiting; No diarrhea or constipation.   GENITOURINARY: No dysuria, frequency or hematuria  NEUROLOGICAL: No numbness or weakness  SKIN: See physical examination.  All other review of systems is negative unless indicated above    Physical Exam:   Constitutional: NAD, alert;  Lower Extremity Focus  Derm:  Skin warm, dry and supple bilateral.    Ulceration to the left medial heel, wound base is mostly eschar, wound size is 3.5cm x 3.5cm, no periwound edema or erythema noted. no purulence or pus noted, no tracking/tunneling noted, no PTB, no malodor  Vascular: Dorsalis Pedis and Posterior Tibial pulses non palpable.  Capillary re-fill time more then 3 seconds digits 1-5 bilateral.    Neuro: Protective sensation diminished to the level of the digits bilateral.  MSK: Muscle strength 5/5 all major muscle groups bilateral. TTP to the wound site on the left medial mal          < from: Xray Foot AP + Lateral + Oblique, Left (10.22.23 @ 11:51) >  PROCEDURE DATE:  10/22/2023          INTERPRETATION:  Left ankle, left foot, and chest. Concern is chronic   wound around the malleoli.    Left ankle. 3 views.    There is long area of soft tissue calcification likely in a venous   structure in the medial lower leg.    There is mild degeneration of the malleolar tips.    There is some swelling seen approaching cavers triangle.    No bone destruction or fracture.    Left foot. 3 views.    No bone destruction or fracture is evident.    AP chest on October 22, 2023 at 12:34 PM.    Heart magnified by technique.    Lungs are clear.    Chest is similar to August 28, 2019.    IMPRESSION: No acute chest finding. Mild edema around the left ankle   posteriorly. Benign-appearing calcifications in the medial left lower leg   are likely venous. No acute bony finding.    --- End of Report ---        < end of copied text >

## 2023-10-26 NOTE — DIETITIAN INITIAL EVALUATION ADULT - PHYSICAL ASSESSMENT SHOULDERS
no nausea/no back pain/no shortness of breath/no cough/no fever/no diaphoresis/no syncope/no dizziness/no vomiting/no chills
severe

## 2023-10-26 NOTE — PROGRESS NOTE ADULT - SUBJECTIVE AND OBJECTIVE BOX
72yo M with chronic limb ischemia seen and examined at bedside. Patient no longer requiring supplemental oxygen, states he is feeling better. Tolerating diet, patient denies nausea, vomiting, CP, SOB, fever, or chills.     Physical Exam:  GENERAL: NAD, AOx3  HEAD: Atraumatic, normocephalic  EYES: EOMI, PERRLA, conjunctiva and sclera clear  ENT: moist mucous membrane  NECK: supple, No JVD, midline trachea  CHEST/LUNG: No increased work of breathing, saturating above 95%  Heart: S1, S2 normal  ABDOMEN: soft, nondistended, nontender. no organomegaly  EXTREMITIES: left lower foot with mild discoloration at sole to, venous stasis ulcer nonbleeding at medial malleolus, nondopplerable DP, faintly dopplerable PT. motor/sensory intact.  NERVOUS SYSTEM: AOx3, no neuro-deficits  SKIN: warm to touch, no rash or lesions    Vital Signs Last 24 Hrs  T(C): 37.3 (25 Oct 2023 20:00), Max: 37.4 (25 Oct 2023 16:00)  T(F): 99.2 (25 Oct 2023 20:00), Max: 99.4 (25 Oct 2023 16:00)  HR: 85 (26 Oct 2023 00:00) (59 - 90)  BP: 148/95 (26 Oct 2023 00:00) (96/50 - 169/92)  BP(mean): 108 (26 Oct 2023 00:00) (65 - 115)  RR: 20 (25 Oct 2023 20:00) (16 - 29)  SpO2: 97% (26 Oct 2023 00:00) (91% - 100%)    Parameters below as of 25 Oct 2023 20:00  Patient On (Oxygen Delivery Method): nasal cannula  O2 Flow (L/min): 5                          9.1    8.96  )-----------( 223      ( 25 Oct 2023 23:38 )             27.2     10-25    141  |  108  |  27<H>  ----------------------------<  110<H>  4.0   |  28  |  1.18    Ca    8.0<L>      25 Oct 2023 14:44  Phos  3.9     10-25  Mg     2.1     10-25    TPro  5.8<L>  /  Alb  2.7<L>  /  TBili  0.3  /  DBili  x   /  AST  70<H>  /  ALT  29  /  AlkPhos  103  10-24    I&O's Summary    24 Oct 2023 07:01  -  25 Oct 2023 07:00  --------------------------------------------------------  IN: 1394 mL / OUT: 1070 mL / NET: 324 mL    25 Oct 2023 07:01  -  26 Oct 2023 02:48  --------------------------------------------------------  IN: 861 mL / OUT: 1525 mL / NET: -664 mL      < from: Xray Chest 1 View- PORTABLE-Routine (Xray Chest 1 View- PORTABLE-Routine .) (10.25.23 @ 09:22) >  Portable chest October 25 at 12:39 AM: Exam is compared to October 22 at   12:34 PM. Progressive infiltrates on the right since previous exam. Heart   is within normal limits inits transthoracic diameter. Left chest is   clear with some hyperaeration in the upper lung field. Regional osseous   structures appropriate for age.    Portable chest October 25 at 9:14 AM: Exam is compared to earlier exam at   12:39 AM. Progressiveinfiltrates in the right lung since previous exam.   Inflammatory infectious process should be considered. There is also some   increasing interstitial markings in the left chest. As such as underlying   fluid overload should also be considered versus progressive inflammatory   infectious process. Heart is at the upper limits of normal in its   transthoracic diameter. Continued follow-up suggested    < end of copied text >

## 2023-10-26 NOTE — DIETITIAN INITIAL EVALUATION ADULT - NAME AND PHONE
Rosalia Brand, Dietetic Intern  Rosalia Brand, Dietetic Intern   Rosy Hanson (Formerly Kirill), MS, RDN, [facility], [phone]  Certified Nutrition

## 2023-10-26 NOTE — PROGRESS NOTE ADULT - ASSESSMENT
Acute on chronic diastolic CHF, probably from volume and heme replacement  NSTEMi v. Demand related / type 2 MI  Expect CAD with h/o extensive PAD  Anemia of acute blood loss      Suggest:    Diuresis  Beta blockers  ARB if renal function stable  Will need cardiac cath when stable   D/W wife   Acute on chronic diastolic CHF, probably from volume and heme replacement  NSTEMI v. Demand related / type 2 MI  Expect CAD with h/o extensive PAD  Anemia of acute blood loss      Suggest:    Diuresis  Beta blockers  ARB if renal function stable  Will need cardiac cath when stable   D/W wife

## 2023-10-26 NOTE — DIETITIAN INITIAL EVALUATION ADULT - ETIOLOGY
r/t inability to meet nutrient needs 2/2 JENIFER, on COPD r/t inability to meet nutrient needs 2/2 acute resp failure on COPD

## 2023-10-26 NOTE — DIETITIAN INITIAL EVALUATION ADULT - PERTINENT MEDS FT
MEDICATIONS  (STANDING):  albuterol    0.083% 2.5 milliGRAM(s) Nebulizer every 10 minutes  aspirin enteric coated 81 milliGRAM(s) Oral daily  atorvastatin 80 milliGRAM(s) Oral at bedtime  chlorhexidine 2% Cloths 1 Application(s) Topical <User Schedule>  clopidogrel Tablet 75 milliGRAM(s) Oral daily  furosemide   Injectable 20 milliGRAM(s) IV Push once  heparin  Infusion.  Unit(s)/Hr (12 mL/Hr) IV Continuous <Continuous>  influenza  Vaccine (HIGH DOSE) 0.7 milliLiter(s) IntraMuscular once  levothyroxine 88 MICROGram(s) Oral daily  nebivolol 2.5 milliGRAM(s) Oral daily  piperacillin/tazobactam IVPB.. 3.375 Gram(s) IV Intermittent every 8 hours  potassium chloride    Tablet ER 40 milliEquivalent(s) Oral once  silver sulfADIAZINE 1% Cream 1 Application(s) Topical once    MEDICATIONS  (PRN):  acetaminophen     Tablet .. 650 milliGRAM(s) Oral every 6 hours PRN Mild Pain (1 - 3)  aluminum hydroxide/magnesium hydroxide/simethicone Suspension 30 milliLiter(s) Oral every 4 hours PRN Dyspepsia  heparin   Injectable 5500 Unit(s) IV Push every 6 hours PRN For aPTT less than 40  heparin   Injectable 2500 Unit(s) IV Push every 6 hours PRN For aPTT between 40 - 57  melatonin 3 milliGRAM(s) Oral at bedtime PRN Insomnia  morphine  - Injectable 2 milliGRAM(s) IV Push every 6 hours PRN Severe Pain (7 - 10)  ondansetron Injectable 4 milliGRAM(s) IV Push every 6 hours PRN Nausea and/or Vomiting  oxycodone    5 mG/acetaminophen 325 mG 0.5 Tablet(s) Oral every 6 hours PRN Moderate Pain (4 - 6)   MEDICATIONS  (STANDING):  albuterol    0.083% 2.5 milliGRAM(s) Nebulizer every 10 minutes  aspirin enteric coated 81 milliGRAM(s) Oral daily  atorvastatin 80 milliGRAM(s) Oral at bedtime  chlorhexidine 2% Cloths 1 Application(s) Topical <User Schedule>  clopidogrel Tablet 75 milliGRAM(s) Oral daily  furosemide   Injectable 20 milliGRAM(s) IV Push once  heparin  Infusion.  Unit(s)/Hr (12 mL/Hr) IV Continuous <Continuous>  influenza  Vaccine (HIGH DOSE) 0.7 milliLiter(s) IntraMuscular once  levothyroxine 88 MICROGram(s) Oral daily  nebivolol 2.5 milliGRAM(s) Oral daily  piperacillin/tazobactam IVPB.. 3.375 Gram(s) IV Intermittent every 8 hours  potassium chloride    Tablet ER 40 milliEquivalent(s) Oral once  silver sulfADIAZINE 1% Cream 1 Application(s) Topical once    MEDICATIONS  (PRN):  acetaminophen     Tablet .. 650 milliGRAM(s) Oral every 6 hours PRN Mild Pain (1 - 3)  aluminum hydroxide/magnesium hydroxide/simethicone Suspension 30 milliLiter(s) Oral every 4 hours PRN Dyspepsia  heparin   Injectable 5500 Unit(s) IV Push every 6 hours PRN For aPTT less than 40  heparin   Injectable 2500 Unit(s) IV Push every 6 hours PRN For aPTT between 40 - 57  melatonin 3 milliGRAM(s) Oral at bedtime PRN Insomnia  morphine  - Injectable 2 milliGRAM(s) IV Push every 6 hours PRN Severe Pain (7 - 10)  ondansetron Injectable 4 milliGRAM(s) IV Push every 6 hours PRN Nausea and/or Vomiting  oxycodone    5 mG/acetaminophen 325 mG 0.5 Tablet(s) Oral every 6 hours PRN Moderate Pain (4 - 6)

## 2023-10-26 NOTE — DIETITIAN INITIAL EVALUATION ADULT - NSFNSGIIOFT_GEN_A_CORE
I&O's Detail    25 Oct 2023 07:01  -  26 Oct 2023 07:00  --------------------------------------------------------  IN:    Dexmedetomidine: 24 mL    Heparin Infusion: 12 mL    Heparin Infusion: 140 mL    IV PiggyBack: 300 mL    Oral Fluid: 725 mL  Total IN: 1201 mL    OUT:    Indwelling Catheter - Urethral (mL): 2625 mL  Total OUT: 2625 mL    Total NET: -1424 mL

## 2023-10-26 NOTE — PROGRESS NOTE ADULT - ASSESSMENT
72yo M presents w/ bleeding LLE venous stasis ulcer. Severe small vessel disease on left lower extremity with multiple revascularization interventions, now occluded fem-pop and fem-fem bypass. Chronic limb ischemia, JENIFER on CKD , chronic NSAID intake. Patient in ICU s/p development of pulmonary edema and suspected NSTEMI.     Recommendation:  - Monitor VS  - continue ICU management  - will defer surgical intervention until cleared medically    Plan discussed with vascular surgery attending, Dr. Sosa Cartilage Graft Text: The defect edges were debeveled with a #15 scalpel blade.  Given the location of the defect, shape of the defect, the fact the defect involved a full thickness cartilage defect a cartilage graft was deemed most appropriate.  An appropriate donor site was identified, cleansed, and anesthetized. The cartilage graft was then harvested and transferred to the recipient site, oriented appropriately and then sutured into place.  The secondary defect was then repaired using a primary closure.

## 2023-10-26 NOTE — PROGRESS NOTE ADULT - ASSESSMENT
74 y/o male with PMHX of HTN, severe PVD s/p multiple stents b/l legs, s/p fem/popliteal bypass, chronic left lower ankle ulceration, GERD, HLD who presented to  with CC of left LE pain and bleeding and near syncope.    Assessment    1) Acute hypoxic respiratory failure   2) possible, TRALI/TACO got only 1 unit of blood yesterday  3) HTN emergency  4) hyperactive delirium from hypoxia  5)  acute blood loss anemia from heal ulcer  6) Acute pulmonary edema  vs. pneumonia    Plan     Neuro  delirium improved, off presedex  Pulm: Acute pulmonary edema secondary to likely heart failure, . CXR shows B/L Infiltrates R>L.  now on nasal cannula     continue diuresis, on zosyn possible pneumonia, low grade fever, will reassess cxr tomorrow am  CV: HTN better, bnp was [id number],   preserved ef on echo, on bblocker, troponin now trending down,   Renal:   JENIFER. creatinie 1.1  GI: Diet as tolerated  ID: Heal ulcer with no sings of active cellulitis Afebrile.  , . US/CT showing chronic occlusion.  further rx of foot differed at this time  Heme: was on. ASA/PLVX. IV heparin for MI

## 2023-10-26 NOTE — PROGRESS NOTE ADULT - SUBJECTIVE AND OBJECTIVE BOX
CURRENT CARDIAC WORKUP:       Echo:  Stress Test:  Cardiac Cath:    Allergies:   Betadine (Hives; Rash)      MEDICATIONS  (STANDING):  albuterol    0.083% 2.5 milliGRAM(s) Nebulizer every 10 minutes  aspirin enteric coated 81 milliGRAM(s) Oral daily  atorvastatin 80 milliGRAM(s) Oral at bedtime  chlorhexidine 2% Cloths 1 Application(s) Topical <User Schedule>  clopidogrel Tablet 75 milliGRAM(s) Oral daily  heparin  Infusion.  Unit(s)/Hr (12 mL/Hr) IV Continuous <Continuous>  influenza  Vaccine (HIGH DOSE) 0.7 milliLiter(s) IntraMuscular once  levothyroxine 88 MICROGram(s) Oral daily  nebivolol 2.5 milliGRAM(s) Oral daily  piperacillin/tazobactam IVPB.. 3.375 Gram(s) IV Intermittent every 8 hours  silver sulfADIAZINE 1% Cream 1 Application(s) Topical once    MEDICATIONS  (PRN):  acetaminophen     Tablet .. 650 milliGRAM(s) Oral every 6 hours PRN Mild Pain (1 - 3)  aluminum hydroxide/magnesium hydroxide/simethicone Suspension 30 milliLiter(s) Oral every 4 hours PRN Dyspepsia  heparin   Injectable 5500 Unit(s) IV Push every 6 hours PRN For aPTT less than 40  heparin   Injectable 2500 Unit(s) IV Push every 6 hours PRN For aPTT between 40 - 57  melatonin 3 milliGRAM(s) Oral at bedtime PRN Insomnia  morphine  - Injectable 2 milliGRAM(s) IV Push every 6 hours PRN Severe Pain (7 - 10)  ondansetron Injectable 4 milliGRAM(s) IV Push every 6 hours PRN Nausea and/or Vomiting  oxycodone    5 mG/acetaminophen 325 mG 0.5 Tablet(s) Oral every 6 hours PRN Moderate Pain (4 - 6)      ROS:     .ros      Vital Signs Last 24 Hrs  T(C): 36.8 (26 Oct 2023 08:00), Max: 37.4 (25 Oct 2023 16:00)  T(F): 98.3 (26 Oct 2023 08:00), Max: 99.4 (25 Oct 2023 16:00)  HR: 72 (26 Oct 2023 12:00) (66 - 90)  BP: 169/56 (26 Oct 2023 12:00) (109/65 - 169/56)  BP(mean): 89 (26 Oct 2023 12:00) (75 - 108)  RR: 26 (26 Oct 2023 12:00) (20 - 33)  SpO2: 100% (26 Oct 2023 12:00) (91% - 100%)    Parameters below as of 25 Oct 2023 20:00  Patient On (Oxygen Delivery Method): nasal cannula  O2 Flow (L/min): 5      I&O's Summary    25 Oct 2023 07:01  -  26 Oct 2023 07:00  --------------------------------------------------------  IN: 1201 mL / OUT: 2625 mL / NET: -1424 mL        PHYSICAL EXAM:    .phy      TELEMETRY:    ECG:    LABS:                        9.2    9.73  )-----------( 227      ( 26 Oct 2023 05:36 )             27.9     10-26    143  |  109<H>  |  25<H>  ----------------------------<  107<H>  3.6   |  27  |  1.13    Ca    7.3<L>      26 Oct 2023 05:36  Phos  3.9     10-25  Mg     2.1     10-25    TPro  5.4<L>  /  Alb  2.2<L>  /  TBili  0.7  /  DBili  x   /  AST  57<H>  /  ALT  27  /  AlkPhos  96  10-26        10-26 @ 05:36  Trop-I 1384.24  CK     --    10-25 @ 14:44  Trop-I 1792.15  CK     --    10-25 @ 03:51  Trop-I 1614.11  CK     --    10-22 @ 11:10  Trop-I 48.99  CK     --      Pro BNP   10-25 @ 03:51  [id number]        PTT - ( 26 Oct 2023 07:54 )  PTT:70.1 sec      RADIOLOGY & ADDITIONAL STUDIES:     73-year-old male admitted with complaints of a near syncopal episode after having severe bleeding from his left ankle ulcer.  Patient has had history of PAD and venous insufficiency.  He has had a chronic left medial malleolus area ulceration.  He has had a history of a femorofemoral bypass and left femoropopliteal bypass.  While trying to get up after noticing a significant amount of bleed from the left ankle patient felt weak, pale and almost passed out.  No previous history of syncope or near syncope.  No history of arrhythmias.  Recently has been having severe pain in the left foot and worsening of ulceration of the left medial malleolus.  Follows up with Dr. Castillo at [facility] for PAD.  Recently saw Dr Nickolas Gunter of vascular surgery at [facility] on 10/17/2023 for worsening left ankle ulcer and change in SANTHOSH PVR. Clinical suspicion of occluded left Fem-pop bypass but with Doppler signal positive over the Fem-Fem bypass. Acute on chronic diastolic CHF and elevated CE.  Moved to ICU.     Today, feels better. Breathing improved with diuresis. Also started on IV ABx.       PAST MEDICAL & SURGICAL HISTORY:  Essential hypertension  PVD (peripheral vascular disease)  Gastroesophageal reflux disease, esophagitis presence not specified  Hypothyroidism  Chronic obstructive pulmonary disease, unspecified COPD type  Eczema  Hyperlipidemia, unspecified hyperlipidemia type  Medial meniscus tear left knee  Femoral popliteal artery thrombus  AAA, EVAR  Fem Fem bypass  h/o Fem pop bypass 1997.  Occlusion and redo bypass  PVD (peripheral vascular disease)  s/p peripheral stent insertion bilateral lower extremities x 10  Varicose veins      CURRENT CARDIAC WORKUP  < from: TTE Echo Complete w/o Contrast w/ Doppler (10.23.23 @ 10:51) >   The left ventricle is normal in size, wall thickness, wall motion and contractility.   Estimated left ventricular ejection fraction is 55-60 %.   The left atrium is mildly dilated.   Mild to Moderate aortic regurgitation.   Mild (1+) tricuspid valve regurgitation.      PREVIOUS CARDIOVASCULAR WORKUP:      Peripheral angio 4/2023  SUMMARY:     - Severe LEFT popliteal stenosis s/p successful DCB x1  - Left PT occluded  - Patent fem-fem and fem-pop bypass grafts     Routine post-peripheral intervention care with monitoring and anticipated same day discharge if course uncomplicated  Continue ASA 81 mg daily  Continue Plavix 75 mg daily  Restart Xarelto 2.5mg BID tomorrow if no bleeding  Triple therapy for 1 week then stop aspirin  Continue medical management of PAD and PAD risk factors       SANTHOSH PVR 10/17/23  Interpretation:  Mild peripheral arterial occlusive disease appreciated in the right lower extremity based on   pulse volume recording waveforms. Moderate to severe peripheral arterial occlusive disease   appreciated in the left lower extremity based on pulse volume recording waveforms.    CTA Abd, pelvis and b/l LE run-off  5/15/23  IMPRESSION:     Status post endovascular repair of an infrarenal aortic aneurysm. The native aneurysm is stable in size. No evidence of endoleak.     Moderate stenosis of the origins of the celiac artery and superior mesenteric artery.     Severe stenoses of the origins of the bilateral renal arteries.     Right lower extremity: Inflow: Patent. Outflow: Patent superficial femoral and popliteal arteries with stable pseudoaneurysms, as described. Three-vessel runoff to the right foot.     Left lower extremity: Inflow: Occluded common iliac and external iliac arteries with patent femoral-femoral bypass graft. Occluded superficial femoral artery with patent femoral popliteal bypass graft. Multifocal moderate stenoses of the popliteal artery with two-vessel runoff to the left foot.       ALLERGIES:    Betadine (Hives; Rash)      MEDICATIONS  (STANDING):  albuterol    0.083% 2.5 milliGRAM(s) Nebulizer every 10 minutes  aspirin enteric coated 81 milliGRAM(s) Oral daily  atorvastatin 80 milliGRAM(s) Oral at bedtime  chlorhexidine 2% Cloths 1 Application(s) Topical <User Schedule>  clopidogrel Tablet 75 milliGRAM(s) Oral daily  heparin  Infusion.  Unit(s)/Hr (12 mL/Hr) IV Continuous <Continuous>  influenza  Vaccine (HIGH DOSE) 0.7 milliLiter(s) IntraMuscular once  levothyroxine 88 MICROGram(s) Oral daily  nebivolol 2.5 milliGRAM(s) Oral daily  piperacillin/tazobactam IVPB.. 3.375 Gram(s) IV Intermittent every 8 hours  silver sulfADIAZINE 1% Cream 1 Application(s) Topical once    MEDICATIONS  (PRN):  acetaminophen     Tablet .. 650 milliGRAM(s) Oral every 6 hours PRN Mild Pain (1 - 3)  aluminum hydroxide/magnesium hydroxide/simethicone Suspension 30 milliLiter(s) Oral every 4 hours PRN Dyspepsia  heparin   Injectable 5500 Unit(s) IV Push every 6 hours PRN For aPTT less than 40  heparin   Injectable 2500 Unit(s) IV Push every 6 hours PRN For aPTT between 40 - 57  melatonin 3 milliGRAM(s) Oral at bedtime PRN Insomnia  morphine  - Injectable 2 milliGRAM(s) IV Push every 6 hours PRN Severe Pain (7 - 10)  ondansetron Injectable 4 milliGRAM(s) IV Push every 6 hours PRN Nausea and/or Vomiting  oxycodone    5 mG/acetaminophen 325 mG 0.5 Tablet(s) Oral every 6 hours PRN Moderate Pain (4 - 6)        Vital Signs Last 24 Hrs  T(C): 36.8 (26 Oct 2023 08:00), Max: 37.4 (25 Oct 2023 16:00)  T(F): 98.3 (26 Oct 2023 08:00), Max: 99.4 (25 Oct 2023 16:00)  HR: 72 (26 Oct 2023 12:00) (66 - 90)  BP: 169/56 (26 Oct 2023 12:00) (109/65 - 169/56)  BP(mean): 89 (26 Oct 2023 12:00) (75 - 108)  RR: 26 (26 Oct 2023 12:00) (20 - 33)  SpO2: 100% (26 Oct 2023 12:00) (91% - 100%)    Parameters below as of 25 Oct 2023 20:00  Patient On (Oxygen Delivery Method): nasal cannula  O2 Flow (L/min): 5      I&O's Summary    25 Oct 2023 07:01  -  26 Oct 2023 07:00  --------------------------------------------------------  IN: 1201 mL / OUT: 2625 mL / NET: -1424 mL        PHYSICAL EXAM:    General:                Comfortable, AAO X 3, in no distress.  HEENT:                  Unremarkable.   Neck:                     Supple, no adenopathy, no thyromegaly, no JVD, no bruit.  Chest:                    Clear, B/L symmetric air entry, no tachypnea  Heart:                     S1, S2 normal, + murmur.  Abdomen:              Soft, non-tender, bowel sounds active. No palpable masses.  Extremities:           Poor perfusion Left foot. About 3 cm diameter Ulcer with dried blood at the left medial malleolus. No pedal pulses felt.  Varicose veins noted.   Neurologic:            Grossly nonfocal.       TELEMETRY:  Normal sinus rhythm with no tachy or alfonso events     ECG:  NSR, no ST T changes of ischemia or infarction.     LABS:                        9.2    9.73  )-----------( 227      ( 26 Oct 2023 05:36 )             27.9     10-26    143  |  109<H>  |  25<H>  ----------------------------<  107<H>  3.6   |  27  |  1.13    Ca    7.3<L>      26 Oct 2023 05:36  Phos  3.9     10-25  Mg     2.1     10-25    TPro  5.4<L>  /  Alb  2.2<L>  /  TBili  0.7  /  DBili  x   /  AST  57<H>  /  ALT  27  /  AlkPhos  96  10-26      10-26 @ 05:36  Trop-I 1384.24    10-25 @ 14:44  Trop-I 1792.15    10-25 @ 03:51  Trop-I 1614.11    10-22 @ 11:10  Trop-I 48.99      Pro BNP   10-25 @ 03:51  [id number]    PTT - ( 26 Oct 2023 07:54 )  PTT:70.1 sec      RADIOLOGY & ADDITIONAL STUDIES:    < from: Xray Chest 1 View- PORTABLE-Routine (Xray Chest 1 View- PORTABLE-Routine .) (10.25.23 @ 09:22) >  IMPRESSION:    Portable chest October 25 at 12:39 AM: Exam is compared to October 22 at   12:34 PM. Progressive infiltrates on the right since previous exam. Heart   is within normal limits in its transthoracic diameter. Left chest is   clear with some hyperaeration in the upper lung field. Regional osseous   structures appropriate for age.    Portable chest October 25 at 9:14 AM: Exam is compared to earlier exam at   12:39 AM. Progressive infiltrates in the right lung since previous exam.   Inflammatory infectious process should be considered. There is also some   increasing interstitial markings in the left chest. As such as underlying   fluid overload should also be considered versus progressive inflammatory   infectious process. Heart is at the upper limits of normal in its   transthoracic diameter. Continued follow-up suggested      < from: CT Angio Abd Aorta w/run-off w/ IV Cont (10.23.23 @ 20:56) >  IMPRESSION:    Right lower extremity:  Moderate narrowing common femoral artery just prior to takeoff of   occluded femorofemoral bypass graft.  1 cm pseudoaneurysm posteriorly off the proximal stented right SFA.  Two-vessel runoff to the foot via the anterior and posterior tibial.    Left lower extremity:  Occluded left common and external iliac arteries, femorofemoral bypass   graft, and femoral popliteal bypass graft.  Reconstitution in the popliteal via muscular collaterals which in turn   are fed by more cephalad collaterals listed above.  Single vessel runoff to the left foot via the anterior tibial.

## 2023-10-26 NOTE — DIETITIAN INITIAL EVALUATION ADULT - ORAL INTAKE PTA/DIET HISTORY
Lives at home with his wife who does the grocery shopping/cooking. Pt reports "fair" appetite, typically eats 1 meal/day. Reports N/V PTA. Follows a low salt diet while at home. Does not drink any ONS at home.  Lives at home with his wife who does the grocery shopping/cooking. Pt reports "fair" appetite, typically eats 1 meal/day; states been like this for "awhile". Reports N/V PTA. Follows a low salt diet while at home. Does not drink any ONS at home. Pt likely meeting <50% ENN >1 month

## 2023-10-26 NOTE — DIETITIAN INITIAL EVALUATION ADULT - OTHER INFO
74 y/o male with PMHX of HTN, severe PVD s/p multiple stents b/l legs, s/p fem/popliteal bypass, chronic left lower ankle ulceration, GERD, HLD who presented to  with CC of left LE pain and bleeding.  Patient notes he was at his family's house earlier today.  He was sitting on the floor with his grandson when the family noticed significant bleeding in his left leg.  His wife tried to get him up and into the bathroom to stop the bleeding.  She got him to sit on the side of the bathtub and was trying to hold pressure over the bleeding when he seemed to lose consciousness.  He didn't fall over but as per wife, turned white and wasn't responding to her.  They called 911.  He wasn't responding for a few minutes and then came to.  She denies hx of syncope/ near syncope in the past.  In the ER, bleeding had mostly stopped.  Patient was c/o severe pain left foot.  Left foot discoloration and vascular surgery called for consult.  Labs with ARF with Cr of 2.2-- unclear baseline.  Patient has been taking advil at home for pain in foot.   74 y/o M with PMHX of HTN, severe PVD s/p multiple stents b/l legs, s/p fem/popliteal bypass, chronic left lower ankle ulceration, GERD, HLD who presented to  with CC of left LE pain and bleeding and near syncope. Admitting diagnosis - acute renal failure    Pt reports "fair" appetite since admission; ate half his breakfast. No N/V since admit. Pt reports UBW ~155#, does not endorse any wt changes. RD obtained bed scale wt of 140# - 10/26. 15# unintentional wt loss (9.6%) unknown time frame. 2+ edema skewing wts, possibly masking additional wt loss. NFPE reveals mod-sev muscle/fat wasting, pt appears thin and bony. Consider liberalizing to regular diet to optimize PO intake. Agreeable to trial Ensure + HP BID (350kcal, 20g protein) to optimize nutritional status. See additional recommendations below.  74 y/o M with PMHX of HTN, severe PVD s/p multiple stents b/l legs, s/p fem/popliteal bypass, chronic left lower ankle ulceration, GERD, HLD who presented to  with CC of left LE pain and bleeding and near syncope. Admitting diagnosis - acute renal failure, acute resp failure, HTN emergency, acute blood loss anemia from healed ulcer, acute pulmonary edema vs PNA    Yesterday pt was on bipap, very lethargic. Now off, more awake today and on oral diet. Pt reports "fair" appetite since admission; ate 50% his breakfast reported by pt. No N/V since admit. Pt reports UBW ~155#, does not endorse any wt changes. RD obtained bed scale wt of 140# - 10/26. 15# unintentional wt loss (9.6%) unknown time frame. 2+ edema skewing wts, possibly masking additional wt loss. NFPE reveals mod-sev muscle/fat wasting, pt appears thin and bony. Consider liberalizing to regular diet to optimize PO intake. Agreeable to trial Ensure + HP BID (350kcal, 20g protein) to optimize nutritional status. See additional recommendations below.

## 2023-10-26 NOTE — DIETITIAN NUTRITION RISK NOTIFICATION - OTHER RISK FACTORS
Not applicable
See above.  Abelino counts from chemotherapy.   No present clinical evidence of infection.   S/P platelet and red blood cell transfusion.   Follow up counts.   Will DEFER Neupogen to the haematologist in the AM.  On Levaquin prophylaxis.   Would review patient's posaconazole Rx in the AM (restricted Rx with ID).

## 2023-10-26 NOTE — PROGRESS NOTE ADULT - ASSESSMENT
A: 73y old male seen for the following:   - full thickness wound to the left foot, chronic   - Pain to the left foot  - Difficulty ambulation    P:   Chart reviewed and Patient evaluated;  Discussed diagnosis and treatment with patient. Discussed importance of daily foot examinations, proper shoe gear, importance of tight glycemic control.   X-rays reviewed : on wet read showing no soft tissue gas, no acute bony findings, mild edema noted to the left ankle posteriorly  There is a full thickness wound to the left medial mal, etiology of wound most likely due to arterial problem, wound looks chronic and stable without acute SOI  Wound flush with normal saline   Applied silvadene with dry sterile dressing  WC: silvadene and DSD  WBAT using surgical shoe  Offloading to bilateral heels while bedbound.   Continue with antibiotics as per ID/med  All additional care per Med appreciated  Patient demonstrated verbal understanding of all interventions and tolerated interventions well without any complications.   Podiatry will follow while in house      Case D/W attending Dr. Joseph

## 2023-10-27 LAB
ANION GAP SERPL CALC-SCNC: 1 MMOL/L — LOW (ref 5–17)
ANION GAP SERPL CALC-SCNC: 1 MMOL/L — LOW (ref 5–17)
APTT BLD: 68.7 SEC — HIGH (ref 24.5–35.6)
APTT BLD: 68.7 SEC — HIGH (ref 24.5–35.6)
BUN SERPL-MCNC: 20 MG/DL — SIGNIFICANT CHANGE UP (ref 7–23)
BUN SERPL-MCNC: 20 MG/DL — SIGNIFICANT CHANGE UP (ref 7–23)
CALCIUM SERPL-MCNC: 7.8 MG/DL — LOW (ref 8.5–10.1)
CALCIUM SERPL-MCNC: 7.8 MG/DL — LOW (ref 8.5–10.1)
CHLORIDE SERPL-SCNC: 108 MMOL/L — SIGNIFICANT CHANGE UP (ref 96–108)
CHLORIDE SERPL-SCNC: 108 MMOL/L — SIGNIFICANT CHANGE UP (ref 96–108)
CO2 SERPL-SCNC: 30 MMOL/L — SIGNIFICANT CHANGE UP (ref 22–31)
CO2 SERPL-SCNC: 30 MMOL/L — SIGNIFICANT CHANGE UP (ref 22–31)
CREAT SERPL-MCNC: 1.01 MG/DL — SIGNIFICANT CHANGE UP (ref 0.5–1.3)
CREAT SERPL-MCNC: 1.01 MG/DL — SIGNIFICANT CHANGE UP (ref 0.5–1.3)
EGFR: 80 ML/MIN/1.73M2 — SIGNIFICANT CHANGE UP
EGFR: 80 ML/MIN/1.73M2 — SIGNIFICANT CHANGE UP
GLUCOSE SERPL-MCNC: 115 MG/DL — HIGH (ref 70–99)
GLUCOSE SERPL-MCNC: 115 MG/DL — HIGH (ref 70–99)
HCT VFR BLD CALC: 29.5 % — LOW (ref 39–50)
HCT VFR BLD CALC: 29.5 % — LOW (ref 39–50)
HGB BLD-MCNC: 9.7 G/DL — LOW (ref 13–17)
HGB BLD-MCNC: 9.7 G/DL — LOW (ref 13–17)
MCHC RBC-ENTMCNC: 28.4 PG — SIGNIFICANT CHANGE UP (ref 27–34)
MCHC RBC-ENTMCNC: 28.4 PG — SIGNIFICANT CHANGE UP (ref 27–34)
MCHC RBC-ENTMCNC: 32.9 GM/DL — SIGNIFICANT CHANGE UP (ref 32–36)
MCHC RBC-ENTMCNC: 32.9 GM/DL — SIGNIFICANT CHANGE UP (ref 32–36)
MCV RBC AUTO: 86.3 FL — SIGNIFICANT CHANGE UP (ref 80–100)
MCV RBC AUTO: 86.3 FL — SIGNIFICANT CHANGE UP (ref 80–100)
PLATELET # BLD AUTO: 269 K/UL — SIGNIFICANT CHANGE UP (ref 150–400)
PLATELET # BLD AUTO: 269 K/UL — SIGNIFICANT CHANGE UP (ref 150–400)
POST UNIT NUMBER: SIGNIFICANT CHANGE UP
POST UNIT NUMBER: SIGNIFICANT CHANGE UP
POTASSIUM SERPL-MCNC: 4.1 MMOL/L — SIGNIFICANT CHANGE UP (ref 3.5–5.3)
POTASSIUM SERPL-MCNC: 4.1 MMOL/L — SIGNIFICANT CHANGE UP (ref 3.5–5.3)
POTASSIUM SERPL-SCNC: 4.1 MMOL/L — SIGNIFICANT CHANGE UP (ref 3.5–5.3)
POTASSIUM SERPL-SCNC: 4.1 MMOL/L — SIGNIFICANT CHANGE UP (ref 3.5–5.3)
RBC # BLD: 3.42 M/UL — LOW (ref 4.2–5.8)
RBC # BLD: 3.42 M/UL — LOW (ref 4.2–5.8)
RBC # FLD: 16 % — HIGH (ref 10.3–14.5)
RBC # FLD: 16 % — HIGH (ref 10.3–14.5)
SODIUM SERPL-SCNC: 139 MMOL/L — SIGNIFICANT CHANGE UP (ref 135–145)
SODIUM SERPL-SCNC: 139 MMOL/L — SIGNIFICANT CHANGE UP (ref 135–145)
TRANSFUSION REACTION PATHOLOGIST COMMENTS: SIGNIFICANT CHANGE UP
TRANSFUSION REACTION PATHOLOGIST COMMENTS: SIGNIFICANT CHANGE UP
TRANSFUSION REACTION PATHOLOGIST INTERPRETATION: SIGNIFICANT CHANGE UP
TRANSFUSION REACTION PATHOLOGIST INTERPRETATION: SIGNIFICANT CHANGE UP
TROPONIN I, HIGH SENSITIVITY RESULT: 682.24 NG/L — HIGH
TROPONIN I, HIGH SENSITIVITY RESULT: 682.24 NG/L — HIGH
WBC # BLD: 9.57 K/UL — SIGNIFICANT CHANGE UP (ref 3.8–10.5)
WBC # BLD: 9.57 K/UL — SIGNIFICANT CHANGE UP (ref 3.8–10.5)
WBC # FLD AUTO: 9.57 K/UL — SIGNIFICANT CHANGE UP (ref 3.8–10.5)
WBC # FLD AUTO: 9.57 K/UL — SIGNIFICANT CHANGE UP (ref 3.8–10.5)

## 2023-10-27 PROCEDURE — 71045 X-RAY EXAM CHEST 1 VIEW: CPT | Mod: 26

## 2023-10-27 PROCEDURE — 99232 SBSQ HOSP IP/OBS MODERATE 35: CPT

## 2023-10-27 PROCEDURE — 99233 SBSQ HOSP IP/OBS HIGH 50: CPT

## 2023-10-27 RX ORDER — DIPHENHYDRAMINE HCL 50 MG
50 CAPSULE ORAL ONCE
Refills: 0 | Status: COMPLETED | OUTPATIENT
Start: 2023-10-27 | End: 2023-10-27

## 2023-10-27 RX ORDER — HEPARIN SODIUM 5000 [USP'U]/ML
5000 INJECTION INTRAVENOUS; SUBCUTANEOUS EVERY 8 HOURS
Refills: 0 | Status: DISCONTINUED | OUTPATIENT
Start: 2023-10-27 | End: 2023-11-02

## 2023-10-27 RX ORDER — FUROSEMIDE 40 MG
20 TABLET ORAL ONCE
Refills: 0 | Status: COMPLETED | OUTPATIENT
Start: 2023-10-27 | End: 2023-10-27

## 2023-10-27 RX ORDER — SODIUM CHLORIDE 9 MG/ML
1000 INJECTION INTRAMUSCULAR; INTRAVENOUS; SUBCUTANEOUS
Refills: 0 | Status: DISCONTINUED | OUTPATIENT
Start: 2023-10-27 | End: 2023-10-27

## 2023-10-27 RX ADMIN — PIPERACILLIN AND TAZOBACTAM 25 GRAM(S): 4; .5 INJECTION, POWDER, LYOPHILIZED, FOR SOLUTION INTRAVENOUS at 14:14

## 2023-10-27 RX ADMIN — PIPERACILLIN AND TAZOBACTAM 25 GRAM(S): 4; .5 INJECTION, POWDER, LYOPHILIZED, FOR SOLUTION INTRAVENOUS at 05:49

## 2023-10-27 RX ADMIN — Medication 81 MILLIGRAM(S): at 08:00

## 2023-10-27 RX ADMIN — SODIUM CHLORIDE 75 MILLILITER(S): 9 INJECTION INTRAMUSCULAR; INTRAVENOUS; SUBCUTANEOUS at 08:40

## 2023-10-27 RX ADMIN — Medication 50 MILLIGRAM(S): at 08:50

## 2023-10-27 RX ADMIN — HEPARIN SODIUM 5000 UNIT(S): 5000 INJECTION INTRAVENOUS; SUBCUTANEOUS at 14:14

## 2023-10-27 RX ADMIN — HEPARIN SODIUM 1200 UNIT(S)/HR: 5000 INJECTION INTRAVENOUS; SUBCUTANEOUS at 07:26

## 2023-10-27 RX ADMIN — CHLORHEXIDINE GLUCONATE 1 APPLICATION(S): 213 SOLUTION TOPICAL at 05:33

## 2023-10-27 RX ADMIN — HEPARIN SODIUM 5000 UNIT(S): 5000 INJECTION INTRAVENOUS; SUBCUTANEOUS at 23:00

## 2023-10-27 RX ADMIN — NEBIVOLOL HYDROCHLORIDE 2.5 MILLIGRAM(S): 5 TABLET ORAL at 08:00

## 2023-10-27 RX ADMIN — ATORVASTATIN CALCIUM 80 MILLIGRAM(S): 80 TABLET, FILM COATED ORAL at 23:00

## 2023-10-27 RX ADMIN — Medication 88 MICROGRAM(S): at 05:48

## 2023-10-27 RX ADMIN — CLOPIDOGREL BISULFATE 75 MILLIGRAM(S): 75 TABLET, FILM COATED ORAL at 08:00

## 2023-10-27 RX ADMIN — PIPERACILLIN AND TAZOBACTAM 25 GRAM(S): 4; .5 INJECTION, POWDER, LYOPHILIZED, FOR SOLUTION INTRAVENOUS at 23:00

## 2023-10-27 RX ADMIN — Medication 20 MILLIGRAM(S): at 18:06

## 2023-10-27 RX ADMIN — Medication 125 MILLIGRAM(S): at 08:39

## 2023-10-27 NOTE — PROGRESS NOTE ADULT - SUBJECTIVE AND OBJECTIVE BOX
Date of service: 10/27/2023  HPI: 72 y/o male with PMHX of HTN, severe PVD s/p multiple stents b/l legs, s/p fem/popliteal bypass, chronic left lower ankle ulceration, GERD, HLD who presented to  with CC of left LE pain and bleeding.  Patient notes he was at his family's house earlier 10/22.  He was sitting on the floor with his grandson when the family noticed significant bleeding in his left leg.  His wife tried to get him up and into the bathroom to stop the bleeding.  She got him to sit on the side of the bathtub and was trying to hold pressure over the bleeding when he seemed to lose consciousness.  He didn't fall over but as per wife, turned white and wasn't responding to her.  They called 911.  He wasn't responding for a few minutes and then came to.  She denies hx of syncope/ near syncope in the past.  In the ER, bleeding had mostly stopped.  Patient was c/o severe pain left foot.  Left foot discoloration and vascular surgery called for consult.  Labs with ARF with Cr of 2.2-- unclear baseline.  Patient has been taking advil at home for pain in foot.      Podiatry was consulted for left foot ulcer. Pt resting comfortably bedside today. Pt tender to wound site.     10/27/2023: Pt seen by podiatry team today AM, pt resting bedside. Inquired about personal belongings. NAD, no other pedal complaints    PAST MEDICAL & SURGICAL HISTORY:  Essential hypertension  PVD (peripheral vascular disease)  Gastroesophageal reflux disease, esophagitis presence not specified  Hypothyroidism  Chronic obstructive pulmonary disease, unspecified COPD type  Eczema  Hyperlipidemia, unspecified hyperlipidemia type  Medial meniscus tear  left knee  Femoral popliteal artery thrombus  h/o Fem pop bypass 1997  PVD (peripheral vascular disease)  s/p peripheral stent insertion bilateral lower extemities x 10    MEDICATIONS  (STANDING):  albuterol    0.083% 2.5 milliGRAM(s) Nebulizer every 10 minutes  aspirin enteric coated 81 milliGRAM(s) Oral daily  atorvastatin 80 milliGRAM(s) Oral at bedtime  chlorhexidine 2% Cloths 1 Application(s) Topical <User Schedule>  clopidogrel Tablet 75 milliGRAM(s) Oral daily  heparin  Infusion.  Unit(s)/Hr (12 mL/Hr) IV Continuous <Continuous>  influenza  Vaccine (HIGH DOSE) 0.7 milliLiter(s) IntraMuscular once  levothyroxine 88 MICROGram(s) Oral daily  nebivolol 2.5 milliGRAM(s) Oral daily  piperacillin/tazobactam IVPB.. 3.375 Gram(s) IV Intermittent every 8 hours  silver sulfADIAZINE 1% Cream 1 Application(s) Topical once  sodium chloride 0.9%. 1000 milliLiter(s) (75 mL/Hr) IV Continuous <Continuous>    MEDICATIONS  (PRN):  acetaminophen     Tablet .. 650 milliGRAM(s) Oral every 6 hours PRN Mild Pain (1 - 3)  aluminum hydroxide/magnesium hydroxide/simethicone Suspension 30 milliLiter(s) Oral every 4 hours PRN Dyspepsia  heparin   Injectable 5500 Unit(s) IV Push every 6 hours PRN For aPTT less than 40  heparin   Injectable 2500 Unit(s) IV Push every 6 hours PRN For aPTT between 40 - 57  melatonin 3 milliGRAM(s) Oral at bedtime PRN Insomnia  morphine  - Injectable 2 milliGRAM(s) IV Push every 6 hours PRN Severe Pain (7 - 10)  ondansetron Injectable 4 milliGRAM(s) IV Push every 6 hours PRN Nausea and/or Vomiting  oxycodone    5 mG/acetaminophen 325 mG 0.5 Tablet(s) Oral every 6 hours PRN Moderate Pain (4 - 6)        FAMILY HISTORY:  Family hx of vascular disease      Social History:    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    Ex-smoker-- quit 20 years ago      Allergies:  Betadine (Hives; Rash)   (22 Oct 2023 14:35)            PMH: Essential hypertension    PVD (peripheral vascular disease)    Gastroesophageal reflux disease, esophagitis presence not specified    Hypothyroidism    Chronic obstructive pulmonary disease, unspecified COPD type    Eczema    Hyperlipidemia, unspecified hyperlipidemia type    Medial meniscus tear      PSH:Femoral popliteal artery thrombus    PVD (peripheral vascular disease)        Allergies:Betadine (Hives; Rash)      Labs:                        9.7    9.57  )-----------( 269      ( 27 Oct 2023 05:52 )             29.5     10-27    139  |  108  |  20  ----------------------------<  115<H>  4.1   |  30  |  1.01    Ca    7.8<L>      27 Oct 2023 05:52    TPro  5.4<L>  /  Alb  2.2<L>  /  TBili  0.7  /  DBili  x   /  AST  57<H>  /  ALT  27  /  AlkPhos  96  10-26         Vital Signs Last 24 Hrs  T(C): 36.8 (27 Oct 2023 08:27), Max: 37.1 (27 Oct 2023 00:00)  T(F): 98.3 (27 Oct 2023 08:27), Max: 98.8 (27 Oct 2023 00:00)  HR: 69 (27 Oct 2023 08:27) (62 - 96)  BP: 147/61 (27 Oct 2023 08:27) (128/85 - 169/56)  BP(mean): 83 (27 Oct 2023 08:00) (75 - 103)  RR: 18 (27 Oct 2023 08:27) (18 - 33)  SpO2: 96% (27 Oct 2023 08:27) (92% - 100%)    Parameters below as of 27 Oct 2023 08:27  Patient On (Oxygen Delivery Method): room air          REVIEW OF SYSTEMS:    CONSTITUTIONAL: No weakness, fevers or chills  EYES: No visual changes  RESPIRATORY: No cough, wheezing; No shortness of breath  CARDIOVASCULAR: No chest pain or palpitations  GASTROINTESTINAL: No abdominal or epigastric pain. No nausea, vomiting; No diarrhea or constipation.   GENITOURINARY: No dysuria, frequency or hematuria  NEUROLOGICAL: No numbness or weakness  SKIN: See physical examination.  All other review of systems is negative unless indicated above    Physical Exam:   Constitutional: NAD, alert;  Lower Extremity Focus  Derm:  Skin warm, dry and supple bilateral.    Ulceration to the left medial heel, wound base is mostly eschar, wound size is 3.5cm x 3.5cm, no periwound edema or erythema noted. no purulence or pus noted, no tracking/tunneling noted, no PTB, no malodor  Vascular: Dorsalis Pedis and Posterior Tibial pulses non palpable.  Capillary re-fill time more then 3 seconds digits 1-5 bilateral.    Neuro: Protective sensation diminished to the level of the digits bilateral.  MSK: Muscle strength 5/5 all major muscle groups bilateral. TTP to the wound site on the left medial mal          < from: Xray Foot AP + Lateral + Oblique, Left (10.22.23 @ 11:51) >  PROCEDURE DATE:  10/22/2023          INTERPRETATION:  Left ankle, left foot, and chest. Concern is chronic   wound around the malleoli.    Left ankle. 3 views.    There is long area of soft tissue calcification likely in a venous   structure in the medial lower leg.    There is mild degeneration of the malleolar tips.    There is some swelling seen approaching cavers triangle.    No bone destruction or fracture.    Left foot. 3 views.    No bone destruction or fracture is evident.    AP chest on October 22, 2023 at 12:34 PM.    Heart magnified by technique.    Lungs are clear.    Chest is similar to August 28, 2019.    IMPRESSION: No acute chest finding. Mild edema around the left ankle   posteriorly. Benign-appearing calcifications in the medial left lower leg   are likely venous. No acute bony finding.    --- End of Report ---        < end of copied text >

## 2023-10-27 NOTE — PROGRESS NOTE ADULT - SUBJECTIVE AND OBJECTIVE BOX
72yo M with chronic limb ischemia seen and examined at bedside. Patient states he is feeling well. Currently requiring 6L NC. Tolerating diet, patient denies nausea, vomiting, CP, SOB, fever, or chills.     Physical Exam:  GENERAL: NAD, AOx3  HEAD: Atraumatic, normocephalic  EYES: EOMI, PERRLA, conjunctiva and sclera clear  ENT: moist mucous membrane  NECK: supple, No JVD, midline trachea  CHEST/LUNG: No increased work of breathing, on 6L NC saturating above 95%  Heart: S1, S2 normal  ABDOMEN: soft, nondistended, nontender. no organomegaly  EXTREMITIES: left lower foot with mild discoloration at sole to, venous stasis ulcer nonbleeding at medial malleolus, nondopplerable DP, faintly dopplerable PT. motor/sensory intact.  NERVOUS SYSTEM: AOx3, no neuro-deficits  SKIN: warm to touch, no rash or lesions    Vital Signs Last 24 Hrs  T(C): 37.1 (27 Oct 2023 00:00), Max: 37.1 (27 Oct 2023 00:00)  T(F): 98.8 (27 Oct 2023 00:00), Max: 98.8 (27 Oct 2023 00:00)  HR: 76 (27 Oct 2023 01:00) (65 - 96)  BP: 134/72 (27 Oct 2023 01:00) (128/85 - 169/56)  BP(mean): 80 (27 Oct 2023 01:00) (75 - 103)  RR: 22 (27 Oct 2023 01:00) (20 - 33)  SpO2: 97% (27 Oct 2023 01:00) (95% - 100%)    Parameters below as of 26 Oct 2023 20:00  Patient On (Oxygen Delivery Method): nasal cannula  O2 Flow (L/min): 3                          9.2    9.73  )-----------( 227      ( 26 Oct 2023 05:36 )             27.9     10-26    143  |  109<H>  |  25<H>  ----------------------------<  107<H>  3.6   |  27  |  1.13    Ca    7.3<L>      26 Oct 2023 05:36    TPro  5.4<L>  /  Alb  2.2<L>  /  TBili  0.7  /  DBili  x   /  AST  57<H>  /  ALT  27  /  AlkPhos  96  10-26    I&O's Summary    25 Oct 2023 07:01  -  26 Oct 2023 07:00  --------------------------------------------------------  IN: 1201 mL / OUT: 2625 mL / NET: -1424 mL    26 Oct 2023 07:01  -  27 Oct 2023 04:46  --------------------------------------------------------  IN: 727 mL / OUT: 1250 mL / NET: -523 mL

## 2023-10-27 NOTE — PROGRESS NOTE ADULT - SUBJECTIVE AND OBJECTIVE BOX
72 y/o male with PMHX of HTN, severe PVD s/p multiple stents b/l legs, s/p fem/popliteal bypass, chronic left lower ankle ulceration, GERD, HLD who presented to  with CC of left LE pain and bleeding and near syncope.   Pt with acute and chronic diastolic CHF and elevated trop  Was taken to the cath lab for ischemic evaluation. Consent was signed by wife d/t to pt's episodes of forgetfulness    pre procedure evaluation  ASA class:III  Creatinine:1.01  GFR:78  Bleeding  Risk score:3.5  Beto Score: 9 points    s/p LHC revealing non obstructive CAD.  Denies chest pain, shortness of breath, dizziness or palpitations   PHYSICAL EXAM:  Constitutional: NAD  Neuro: Alert and oriented x 3 Speech clear No focal deficits  Respiratory: CTAB  Cardiovascular: S1 and S2, RRR,   Gastrointestinal: BS+, soft, NT/ND  Extremities: No clubbing cyanosis or edema. Dsg of left lower leg intact  Vascular: + doppler DP pulses  Psychiatric: Normal mood, normal affect  Musculoskeletal: 5/5 strength b/l upper and lower extremities  Right radial hemoband in place.  Procedure site CDI, no bleeding, no hematoma, able to move all digits with capillary refill < 3 seconds, fingers warm    73-year-old male admitted with complaints of a near syncopal episode after having severe bleeding from his left ankle ulcer.  Patient has had history of PAD and venous insufficiency.  He has had a chronic left medial malleolus area ulceration.  He has had a history of a femorofemoral bypass and left femoropopliteal bypass.  While trying to get up after noticing a significant amount of bleed from the left ankle patient felt weak, pale and almost passed out.  No previous history of syncope or near syncope.  No history of arrhythmias.  Recently has been having severe pain in the left foot and worsening of ulceration of the left medial malleolus.  Follows up with Dr. Castillo at [facility] for PAD.  Recently saw Dr Nickolas Gunter of vascular surgery at [facility] on 10/17/2023 for worsening left ankle ulcer and change in SANTHOSH PVR. Clinical suspicion of occluded left Fem-pop bypass but with Doppler signal positive over the Fem-Fem bypass. Acute on chronic diastolic CHF and elevated CE.  Moved to ICU.     now s/p LHC revealing non obstructive CAD    -continue tele  -continue hospitalist service  -vital signs, labs, diet and activity per post procedure orders  -ambulte ad gaurav post bedrest  -iv hydration  -encourage PO fluids  -plan of care discussed with patient, family and MD   -post procedure and d/c instructions reviewed  -follow up with MD in 1-2 weeks  -Discussed therapeutic lifestyle changes to reduce risk factors such as following a cardiac diet, weight loss, maintaining a healthy weight, exercise, smoking cessation, medication compliance, and regular follow-up  with MD to know your numbers (BP, cholesterol, weight, and glucose) 74 y/o male with PMHX of HTN, severe PVD s/p multiple stents b/l legs, s/p fem/popliteal bypass, chronic left lower ankle ulceration, GERD, HLD who presented to  with CC of left LE pain and bleeding and near syncope.   Pt with acute and chronic diastolic CHF and elevated trop  Was taken to the cath lab for ischemic evaluation. Consent was signed by wife d/t to pt's episodes of forgetfulness    pre procedure evaluation  ASA class:III  Creatinine:1.01  GFR:78  Bleeding  Risk score:3.5  Beto Score: 9 points    s/p LHC revealing non obstructive CAD.  Denies chest pain, shortness of breath, dizziness or palpitations   PHYSICAL EXAM:  Constitutional: NAD  Neuro: Alert and oriented x 3 Speech clear No focal deficits  Respiratory: CTAB  Cardiovascular: S1 and S2, RRR,   Gastrointestinal: BS+, soft, NT/ND  Extremities: No clubbing cyanosis or edema. Dsg of left lower leg intact  Vascular: + doppler DP pulses  Psychiatric: Normal mood, normal affect  Musculoskeletal: 5/5 strength b/l upper and lower extremities  Right radial hemoband in place.  Procedure site CDI, no bleeding, no hematoma, able to move all digits with capillary refill < 3 seconds, fingers warm    73-year-old male admitted with complaints of a near syncopal episode after having severe bleeding from his left ankle ulcer.  Patient has had history of PAD and venous insufficiency.  He has had a chronic left medial malleolus area ulceration.  He has had a history of a femorofemoral bypass and left femoropopliteal bypass.  While trying to get up after noticing a significant amount of bleed from the left ankle patient felt weak, pale and almost passed out.  No previous history of syncope or near syncope.  No history of arrhythmias.  Recently has been having severe pain in the left foot and worsening of ulceration of the left medial malleolus.  Follows up with Dr. Castillo at [facility] for PAD.  Recently saw Dr Nickolas Gunter of vascular surgery at [facility] on 10/17/2023 for worsening left ankle ulcer and change in SANTHOSH PVR. Clinical suspicion of occluded left Fem-pop bypass but with Doppler signal positive over the Fem-Fem bypass. Acute on chronic diastolic CHF and elevated CE.  Moved to ICU.     now s/p LHC revealing non obstructive CAD    --No iv hydration d/t elevated EDP  -plan of care discussed with patient and MD   -post procedure  instructions reviewed  -follow up with MD in 1-2 weeks after discharge  -Discussed therapeutic lifestyle changes to reduce risk factors such as following a cardiac diet, weight loss, maintaining a healthy weight, exercise, smoking cessation, medication compliance, and regular follow-up  with MD  -Further management per primary team

## 2023-10-27 NOTE — PROGRESS NOTE ADULT - ASSESSMENT
74yo M presents w/ bleeding LLE venous stasis ulcer. Severe small vessel disease on left lower extremity with multiple revascularization interventions, now occluded fem-pop and fem-fem bypass. Chronic limb ischemia, JENIFER on CKD , chronic NSAID intake. Patient in ICU s/p development of pulmonary edema and suspected NSTEMI v type 2 MI. Plan for cardiac catheterization today.    Recommendation:  - Monitor VS  - continue ICU management  - will defer surgical intervention until cleared medically    Plan discussed with vascular surgery attending, Dr. Sosa

## 2023-10-27 NOTE — PROGRESS NOTE ADULT - SUBJECTIVE AND OBJECTIVE BOX
Events Overnight:  patient had cath medical disease, ef 45%    HPI:  72 y/o male with PMHX of HTN, severe PVD s/p multiple stents b/l legs, s/p fem/popliteal bypass, chronic left lower ankle ulceration, GERD, HLD who presented to  with CC of left LE pain and bleeding and near syncope.   Patient was transfused for bleeding from left lower let,    RR called for acute hypoxia and agitation. SpO2 75%. /90. Placed on NRB, given morphine 2mg IVP x2 POCUS showed diffuse anterior b lines. Placed on BIPAP transferred to ICU.   cxr CHF vs. right sided infiltrate  creatinine improved now 1.0  cath inc filling pressures     ROS no chest pain, sob improving , less agitated, some numbness in foot, no rest pain, no fevers, no cough no chills     MEDICATIONS  (STANDING):  albuterol    0.083% 2.5 milliGRAM(s) Nebulizer every 10 minutes  aspirin enteric coated 81 milliGRAM(s) Oral daily  atorvastatin 80 milliGRAM(s) Oral at bedtime  chlorhexidine 2% Cloths 1 Application(s) Topical <User Schedule>  furosemide   Injectable 20 milliGRAM(s) IV Push once  influenza  Vaccine (HIGH DOSE) 0.7 milliLiter(s) IntraMuscular once  levothyroxine 88 MICROGram(s) Oral daily  nebivolol 2.5 milliGRAM(s) Oral daily  piperacillin/tazobactam IVPB.. 3.375 Gram(s) IV Intermittent every 8 hours  silver sulfADIAZINE 1% Cream 1 Application(s) Topical once    MEDICATIONS  (PRN):  acetaminophen     Tablet .. 650 milliGRAM(s) Oral every 6 hours PRN Mild Pain (1 - 3)  aluminum hydroxide/magnesium hydroxide/simethicone Suspension 30 milliLiter(s) Oral every 4 hours PRN Dyspepsia  melatonin 3 milliGRAM(s) Oral at bedtime PRN Insomnia  morphine  - Injectable 2 milliGRAM(s) IV Push every 6 hours PRN Severe Pain (7 - 10)  ondansetron Injectable 4 milliGRAM(s) IV Push every 6 hours PRN Nausea and/or Vomiting  oxycodone    5 mG/acetaminophen 325 mG 0.5 Tablet(s) Oral every 6 hours PRN Moderate Pain (4 - 6)    ICU Vital Signs Last 24 Hrs  T(C): 36.7 (27 Oct 2023 12:00), Max: 37.1 (27 Oct 2023 00:00)  T(F): 98.1 (27 Oct 2023 12:00), Max: 98.8 (27 Oct 2023 00:00)  HR: 59 (27 Oct 2023 12:00) (56 - 96)  BP: 139/96 (27 Oct 2023 12:00) (125/86 - 168/65)  BP(mean): 106 (27 Oct 2023 12:00) (75 - 106)  ABP: --  ABP(mean): --  RR: 21 (27 Oct 2023 12:00) (16 - 30)  SpO2: 99% (27 Oct 2023 12:00) (92% - 100%)    O2 Parameters below as of 27 Oct 2023 12:00  Patient On (Oxygen Delivery Method): nasal cannula  O2 Flow (L/min): 3    Physical Exam    General - awake, alert, no longer confused  HEENT - nc/at   neck supple  lungs  clear  cv rrr  abdomen soft,  extremtiies left leg cool, wrapped no further bleeding  neuro awake, alert lethargic, on precedex  Skin no rash    I&O's Summary    26 Oct 2023 07:01  -  27 Oct 2023 07:00  --------------------------------------------------------  IN: 892 mL / OUT: 1850 mL / NET: -958 mL                       9.7    9.57  )-----------( 269      ( 27 Oct 2023 05:52 )             29.5       10-27    139  |  108  |  20  ----------------------------<  115<H>  4.1   |  30  |  1.01    Ca    7.8<L>      27 Oct 2023 05:52    TPro  5.4<L>  /  Alb  2.2<L>  /  TBili  0.7  /  DBili  x   /  AST  57<H>  /  ALT  27  /  AlkPhos  96  10-26      Urinalysis Basic - ( 27 Oct 2023 05:52 )    Color: x / Appearance: x / SG: x / pH: x  Gluc: 115 mg/dL / Ketone: x  / Bili: x / Urobili: x   Blood: x / Protein: x / Nitrite: x   Leuk Esterase: x / RBC: x / WBC x   Sq Epi: x / Non Sq Epi: x / Bacteria: x    DVT Prophylaxis:  heparin subq                                                                 Advanced Directives: FUll Code

## 2023-10-27 NOTE — PROGRESS NOTE ADULT - ASSESSMENT
Assesment: 73y old male seen for the following:   - full thickness wound to the left foot, chronic   - Pain to the left foot  - Difficulty ambulation    Plan:   Chart reviewed and Patient evaluated;  Discussed diagnosis and treatment with patient. Discussed importance of daily foot examinations, proper shoe gear, importance of tight glycemic control.   X-rays reviewed : on wet read showing no soft tissue gas, no acute bony findings, mild edema noted to the left ankle posteriorly  There is a full thickness wound to the left medial mal, etiology of wound most likely due to arterial problem, wound looks chronic and stable without acute SOI  Wound flush with normal saline   Applied silvadene with dry sterile dressing  WC: silvadene and DSD  WBAT using surgical shoe  Offloading to bilateral heels while bedbound.   Continue with antibiotics as per ID/med  All additional care per Med appreciated  Patient demonstrated verbal understanding of all interventions and tolerated interventions well without any complications.   Podiatry will follow while in house      Case D/W attending Dr. Joseph

## 2023-10-27 NOTE — PROGRESS NOTE ADULT - ASSESSMENT
72 y/o male with PMHX of HTN, severe PVD s/p multiple stents b/l legs, s/p fem/popliteal bypass, chronic left lower ankle ulceration, GERD, HLD who presented to  with CC of left LE pain and bleeding and near syncope.    Assessment    1) Acute hypoxic respiratory failure   2) possible, TRALI/TACO got only 1 unit of blood yesterday  3) HTN emergency  4) hyperactive delirium from hypoxia  5)  acute blood loss anemia from heal ulcer  6) Acute pulmonary edema  vs. pneumonia    Plan     Neuro  delirium improved,  was related wob and hypoxia  Pulm: Acute pulmonary edema secondary to likely heart failure, . CXR shows B/L Infiltrates R>L.  now on nasal cannula     continue diuresis, on zosyn possible pneumonia,check f/u cxr  CV: HTN better, , on bblocker, aspirin, ef 45%,   Renal:   JENIFER. creatinie 1.0  GI: Diet as tolerated  ID: Heal ulcer with no sings of active cellulitis Afebrile.  , . US/CT showing chronic occlusion.  further rx of foot differed at this time  Heme: was on. continue aspirin

## 2023-10-27 NOTE — PACU DISCHARGE NOTE - COMMENTS
Report given to [facility], RN- Right Wrist dry and intact- Cardiac monitor in place- Pt. transported by RN- Safety maintained

## 2023-10-28 LAB
ANION GAP SERPL CALC-SCNC: 7 MMOL/L — SIGNIFICANT CHANGE UP (ref 5–17)
ANION GAP SERPL CALC-SCNC: 7 MMOL/L — SIGNIFICANT CHANGE UP (ref 5–17)
BUN SERPL-MCNC: 27 MG/DL — HIGH (ref 7–23)
BUN SERPL-MCNC: 27 MG/DL — HIGH (ref 7–23)
CALCIUM SERPL-MCNC: 8.1 MG/DL — LOW (ref 8.5–10.1)
CALCIUM SERPL-MCNC: 8.1 MG/DL — LOW (ref 8.5–10.1)
CHLORIDE SERPL-SCNC: 107 MMOL/L — SIGNIFICANT CHANGE UP (ref 96–108)
CHLORIDE SERPL-SCNC: 107 MMOL/L — SIGNIFICANT CHANGE UP (ref 96–108)
CO2 SERPL-SCNC: 27 MMOL/L — SIGNIFICANT CHANGE UP (ref 22–31)
CO2 SERPL-SCNC: 27 MMOL/L — SIGNIFICANT CHANGE UP (ref 22–31)
CREAT SERPL-MCNC: 1.04 MG/DL — SIGNIFICANT CHANGE UP (ref 0.5–1.3)
CREAT SERPL-MCNC: 1.04 MG/DL — SIGNIFICANT CHANGE UP (ref 0.5–1.3)
EGFR: 77 ML/MIN/1.73M2 — SIGNIFICANT CHANGE UP
EGFR: 77 ML/MIN/1.73M2 — SIGNIFICANT CHANGE UP
GLUCOSE SERPL-MCNC: 127 MG/DL — HIGH (ref 70–99)
GLUCOSE SERPL-MCNC: 127 MG/DL — HIGH (ref 70–99)
HCT VFR BLD CALC: 27.1 % — LOW (ref 39–50)
HCT VFR BLD CALC: 27.1 % — LOW (ref 39–50)
HGB BLD-MCNC: 8.8 G/DL — LOW (ref 13–17)
HGB BLD-MCNC: 8.8 G/DL — LOW (ref 13–17)
MCHC RBC-ENTMCNC: 28.2 PG — SIGNIFICANT CHANGE UP (ref 27–34)
MCHC RBC-ENTMCNC: 28.2 PG — SIGNIFICANT CHANGE UP (ref 27–34)
MCHC RBC-ENTMCNC: 32.5 GM/DL — SIGNIFICANT CHANGE UP (ref 32–36)
MCHC RBC-ENTMCNC: 32.5 GM/DL — SIGNIFICANT CHANGE UP (ref 32–36)
MCV RBC AUTO: 86.9 FL — SIGNIFICANT CHANGE UP (ref 80–100)
MCV RBC AUTO: 86.9 FL — SIGNIFICANT CHANGE UP (ref 80–100)
PLATELET # BLD AUTO: 277 K/UL — SIGNIFICANT CHANGE UP (ref 150–400)
PLATELET # BLD AUTO: 277 K/UL — SIGNIFICANT CHANGE UP (ref 150–400)
POTASSIUM SERPL-MCNC: 3.9 MMOL/L — SIGNIFICANT CHANGE UP (ref 3.5–5.3)
POTASSIUM SERPL-MCNC: 3.9 MMOL/L — SIGNIFICANT CHANGE UP (ref 3.5–5.3)
POTASSIUM SERPL-SCNC: 3.9 MMOL/L — SIGNIFICANT CHANGE UP (ref 3.5–5.3)
POTASSIUM SERPL-SCNC: 3.9 MMOL/L — SIGNIFICANT CHANGE UP (ref 3.5–5.3)
RBC # BLD: 3.12 M/UL — LOW (ref 4.2–5.8)
RBC # BLD: 3.12 M/UL — LOW (ref 4.2–5.8)
RBC # FLD: 15.6 % — HIGH (ref 10.3–14.5)
RBC # FLD: 15.6 % — HIGH (ref 10.3–14.5)
SODIUM SERPL-SCNC: 141 MMOL/L — SIGNIFICANT CHANGE UP (ref 135–145)
SODIUM SERPL-SCNC: 141 MMOL/L — SIGNIFICANT CHANGE UP (ref 135–145)
WBC # BLD: 13.54 K/UL — HIGH (ref 3.8–10.5)
WBC # BLD: 13.54 K/UL — HIGH (ref 3.8–10.5)
WBC # FLD AUTO: 13.54 K/UL — HIGH (ref 3.8–10.5)
WBC # FLD AUTO: 13.54 K/UL — HIGH (ref 3.8–10.5)

## 2023-10-28 PROCEDURE — 99232 SBSQ HOSP IP/OBS MODERATE 35: CPT

## 2023-10-28 RX ORDER — FUROSEMIDE 40 MG
20 TABLET ORAL DAILY
Refills: 0 | Status: DISCONTINUED | OUTPATIENT
Start: 2023-10-28 | End: 2023-11-03

## 2023-10-28 RX ORDER — FUROSEMIDE 40 MG
20 TABLET ORAL ONCE
Refills: 0 | Status: COMPLETED | OUTPATIENT
Start: 2023-10-28 | End: 2023-10-28

## 2023-10-28 RX ORDER — LOSARTAN POTASSIUM 100 MG/1
25 TABLET, FILM COATED ORAL DAILY
Refills: 0 | Status: DISCONTINUED | OUTPATIENT
Start: 2023-10-28 | End: 2023-11-08

## 2023-10-28 RX ORDER — THIAMINE MONONITRATE (VIT B1) 100 MG
100 TABLET ORAL DAILY
Refills: 0 | Status: DISCONTINUED | OUTPATIENT
Start: 2023-10-28 | End: 2023-11-08

## 2023-10-28 RX ADMIN — Medication 20 MILLIGRAM(S): at 11:29

## 2023-10-28 RX ADMIN — NEBIVOLOL HYDROCHLORIDE 2.5 MILLIGRAM(S): 5 TABLET ORAL at 10:12

## 2023-10-28 RX ADMIN — Medication 88 MICROGRAM(S): at 05:41

## 2023-10-28 RX ADMIN — HEPARIN SODIUM 5000 UNIT(S): 5000 INJECTION INTRAVENOUS; SUBCUTANEOUS at 05:40

## 2023-10-28 RX ADMIN — HEPARIN SODIUM 5000 UNIT(S): 5000 INJECTION INTRAVENOUS; SUBCUTANEOUS at 22:19

## 2023-10-28 RX ADMIN — Medication 81 MILLIGRAM(S): at 10:13

## 2023-10-28 RX ADMIN — HEPARIN SODIUM 5000 UNIT(S): 5000 INJECTION INTRAVENOUS; SUBCUTANEOUS at 14:51

## 2023-10-28 RX ADMIN — PIPERACILLIN AND TAZOBACTAM 25 GRAM(S): 4; .5 INJECTION, POWDER, LYOPHILIZED, FOR SOLUTION INTRAVENOUS at 05:41

## 2023-10-28 RX ADMIN — PIPERACILLIN AND TAZOBACTAM 25 GRAM(S): 4; .5 INJECTION, POWDER, LYOPHILIZED, FOR SOLUTION INTRAVENOUS at 14:51

## 2023-10-28 RX ADMIN — PIPERACILLIN AND TAZOBACTAM 25 GRAM(S): 4; .5 INJECTION, POWDER, LYOPHILIZED, FOR SOLUTION INTRAVENOUS at 22:19

## 2023-10-28 RX ADMIN — Medication 100 MILLIGRAM(S): at 14:51

## 2023-10-28 RX ADMIN — Medication 3 MILLIGRAM(S): at 22:20

## 2023-10-28 RX ADMIN — ATORVASTATIN CALCIUM 80 MILLIGRAM(S): 80 TABLET, FILM COATED ORAL at 22:20

## 2023-10-28 RX ADMIN — Medication 1 APPLICATION(S): at 18:11

## 2023-10-28 NOTE — PROGRESS NOTE ADULT - SUBJECTIVE AND OBJECTIVE BOX
Events Overnight;; breathing better now on nasal cannula, improving, tolerating diet    HPI:      72 y/o male with PMHX of HTN, severe PVD s/p multiple stents b/l legs, s/p fem/popliteal bypass, chronic left lower ankle ulceration, GERD, HLD who presented to  with CC of left LE pain and bleeding and near syncope.   Patient was transfused for bleeding from left lower let,    RR called for acute hypoxia and agitation. SpO2 75%. /90. Placed on NRB, given morphine 2mg IVP x2 POCUS showed diffuse anterior b lines. Placed on BIPAP transferred to ICU.   cxr CHF vs. right sided infiltrate, diuresis and started on zosyn  creatinine improved now 1.0  cath inc filling pressures. medical disease, now on 2 liters     ROS no chest pain, sob improving , less agitated, some numbness in foot, no rest pain, no fevers, no cough no chills    MEDICATIONS  (STANDING):  albuterol    0.083% 2.5 milliGRAM(s) Nebulizer every 10 minutes  aspirin enteric coated 81 milliGRAM(s) Oral daily  atorvastatin 80 milliGRAM(s) Oral at bedtime  chlorhexidine 2% Cloths 1 Application(s) Topical <User Schedule>  heparin   Injectable 5000 Unit(s) SubCutaneous every 8 hours  influenza  Vaccine (HIGH DOSE) 0.7 milliLiter(s) IntraMuscular once  levothyroxine 88 MICROGram(s) Oral daily  nebivolol 2.5 milliGRAM(s) Oral daily  piperacillin/tazobactam IVPB.. 3.375 Gram(s) IV Intermittent every 8 hours  silver sulfADIAZINE 1% Cream 1 Application(s) Topical once    MEDICATIONS  (PRN):  acetaminophen     Tablet .. 650 milliGRAM(s) Oral every 6 hours PRN Mild Pain (1 - 3)  aluminum hydroxide/magnesium hydroxide/simethicone Suspension 30 milliLiter(s) Oral every 4 hours PRN Dyspepsia  melatonin 3 milliGRAM(s) Oral at bedtime PRN Insomnia  morphine  - Injectable 2 milliGRAM(s) IV Push every 6 hours PRN Severe Pain (7 - 10)  ondansetron Injectable 4 milliGRAM(s) IV Push every 6 hours PRN Nausea and/or Vomiting  oxycodone    5 mG/acetaminophen 325 mG 0.5 Tablet(s) Oral every 6 hours PRN Moderate Pain (4 - 6)    ICU Vital Signs Last 24 Hrs  T(C): 36.8 (28 Oct 2023 08:00), Max: 37.2 (28 Oct 2023 04:00)  T(F): 98.2 (28 Oct 2023 08:00), Max: 98.9 (28 Oct 2023 04:00)  HR: 64 (28 Oct 2023 09:00) (56 - 89)  BP: 156/55 (28 Oct 2023 09:00) (106/63 - 156/55)  BP(mean): 80 (28 Oct 2023 09:00) (71 - 116)  ABP: --  ABP(mean): --  RR: 23 (28 Oct 2023 09:00) (14 - 34)  SpO2: 96% (28 Oct 2023 09:00) (92% - 100%)    O2 Parameters below as of 28 Oct 2023 09:00  Patient On (Oxygen Delivery Method): nasal cannula  O2 Flow (L/min): 2      Physical exam    General - awake, alert, no longer confused  HEENT - nc/at   neck supple  lungs  clear  cv rrr  abdomen soft,  extremtiies left leg cool, warm to ankle wrapped no further bleeding from ankle  neuro awake, alert lethargic, on precedex  Skin no rash      I&O's Summary    27 Oct 2023 07:01  -  28 Oct 2023 07:00  --------------------------------------------------------  IN: 312.6 mL / OUT: 1185 mL / NET: -872.4 mL                        8.8    13.54 )-----------( 277      ( 28 Oct 2023 05:43 )             27.1       10-28    141  |  107  |  27<H>  ----------------------------<  127<H>  3.9   |  27  |  1.04    Ca    8.1<L>      28 Oct 2023 05:43    Urinalysis Basic - ( 28 Oct 2023 05:43 )    Color: x / Appearance: x / SG: x / pH: x  Gluc: 127 mg/dL / Ketone: x  / Bili: x / Urobili: x   Blood: x / Protein: x / Nitrite: x   Leuk Esterase: x / RBC: x / WBC x   Sq Epi: x / Non Sq Epi: x / Bacteria: x      DVT Prophylaxis:   Heparin subq                                                              Advanced Directives: Full Code

## 2023-10-28 NOTE — PROGRESS NOTE ADULT - SUBJECTIVE AND OBJECTIVE BOX
72yo M with chronic limb ischemia seen and examined at bedside. Patient states he is feeling well. Currently requiring 6L NC. Tolerating diet, patient denies nausea, vomiting, CP, SOB, fever, or chills.     Vital Signs Last 24 Hrs  T(C): 37.2 (28 Oct 2023 04:00), Max: 37.2 (28 Oct 2023 04:00)  T(F): 98.9 (28 Oct 2023 04:00), Max: 98.9 (28 Oct 2023 04:00)  HR: 64 (28 Oct 2023 07:00) (56 - 89)  BP: 131/47 (28 Oct 2023 07:00) (106/63 - 153/96)  BP(mean): 72 (28 Oct 2023 07:00) (71 - 116)  RR: 18 (28 Oct 2023 07:00) (14 - 34)  SpO2: 98% (28 Oct 2023 07:00) (92% - 100%)    Parameters below as of 28 Oct 2023 07:00  Patient On (Oxygen Delivery Method): nasal cannula  O2 Flow (L/min): 2                          8.8    13.54 )-----------( 277      ( 28 Oct 2023 05:43 )             27.1     10-28    141  |  107  |  27<H>  ----------------------------<  127<H>  3.9   |  27  |  1.04    Ca    8.1<L>      28 Oct 2023 05:43      I&O's Summary    27 Oct 2023 07:01  -  28 Oct 2023 07:00  --------------------------------------------------------  IN: 312.6 mL / OUT: 1185 mL / NET: -872.4 mL          Physical Exam:  GENERAL: NAD, AOx3  HEAD: Atraumatic, normocephalic  EYES: EOMI, PERRLA, conjunctiva and sclera clear  ENT: moist mucous membrane  NECK: supple, No JVD, midline trachea  CHEST/LUNG: No increased work of breathing, on 6L NC saturating above 95%  Heart: S1, S2 normal  ABDOMEN: soft, nondistended, nontender. no organomegaly  EXTREMITIES: left lower foot with mild discoloration at sole to, venous stasis ulcer nonbleeding at medial malleolus, nondopplerable DP, faintly dopplerable PT. motor/sensory intact.  NERVOUS SYSTEM: AOx3, no neuro-deficits  SKIN: warm to touch, no rash or lesions

## 2023-10-28 NOTE — PROGRESS NOTE ADULT - SUBJECTIVE AND OBJECTIVE BOX
Date of service: 10/28/2023  HPI: 72 y/o male with PMHX of HTN, severe PVD s/p multiple stents b/l legs, s/p fem/popliteal bypass, chronic left lower ankle ulceration, GERD, HLD who presented to  with CC of left LE pain and bleeding.  Patient notes he was at his family's house earlier 10/22.  He was sitting on the floor with his grandson when the family noticed significant bleeding in his left leg.  His wife tried to get him up and into the bathroom to stop the bleeding.  She got him to sit on the side of the bathtub and was trying to hold pressure over the bleeding when he seemed to lose consciousness.  He didn't fall over but as per wife, turned white and wasn't responding to her.  They called 911.  He wasn't responding for a few minutes and then came to.  She denies hx of syncope/ near syncope in the past.  In the ER, bleeding had mostly stopped.  Patient was c/o severe pain left foot.  Left foot discoloration and vascular surgery called for consult.  Labs with ARF with Cr of 2.2-- unclear baseline.  Patient has been taking advil at home for pain in foot.      Podiatry was consulted for left foot ulcer. Pt resting comfortably bedside today. Pt tender to wound site.     10/28/2023: Pt seen by podiatry team today, pt sitting on chair watching TV, pleasant. NAD, no other pedal complaints    PAST MEDICAL & SURGICAL HISTORY:  Essential hypertension  PVD (peripheral vascular disease)  Gastroesophageal reflux disease, esophagitis presence not specified  Hypothyroidism  Chronic obstructive pulmonary disease, unspecified COPD type  Eczema  Hyperlipidemia, unspecified hyperlipidemia type  Medial meniscus tear  left knee  Femoral popliteal artery thrombus  h/o Fem pop bypass 1997  PVD (peripheral vascular disease)  s/p peripheral stent insertion bilateral lower extemities x 10    MEDICATIONS  (STANDING):  albuterol    0.083% 2.5 milliGRAM(s) Nebulizer every 10 minutes  aspirin enteric coated 81 milliGRAM(s) Oral daily  atorvastatin 80 milliGRAM(s) Oral at bedtime  chlorhexidine 2% Cloths 1 Application(s) Topical <User Schedule>  clopidogrel Tablet 75 milliGRAM(s) Oral daily  heparin  Infusion.  Unit(s)/Hr (12 mL/Hr) IV Continuous <Continuous>  influenza  Vaccine (HIGH DOSE) 0.7 milliLiter(s) IntraMuscular once  levothyroxine 88 MICROGram(s) Oral daily  nebivolol 2.5 milliGRAM(s) Oral daily  piperacillin/tazobactam IVPB.. 3.375 Gram(s) IV Intermittent every 8 hours  silver sulfADIAZINE 1% Cream 1 Application(s) Topical once  sodium chloride 0.9%. 1000 milliLiter(s) (75 mL/Hr) IV Continuous <Continuous>    MEDICATIONS  (PRN):  acetaminophen     Tablet .. 650 milliGRAM(s) Oral every 6 hours PRN Mild Pain (1 - 3)  aluminum hydroxide/magnesium hydroxide/simethicone Suspension 30 milliLiter(s) Oral every 4 hours PRN Dyspepsia  heparin   Injectable 5500 Unit(s) IV Push every 6 hours PRN For aPTT less than 40  heparin   Injectable 2500 Unit(s) IV Push every 6 hours PRN For aPTT between 40 - 57  melatonin 3 milliGRAM(s) Oral at bedtime PRN Insomnia  morphine  - Injectable 2 milliGRAM(s) IV Push every 6 hours PRN Severe Pain (7 - 10)  ondansetron Injectable 4 milliGRAM(s) IV Push every 6 hours PRN Nausea and/or Vomiting  oxycodone    5 mG/acetaminophen 325 mG 0.5 Tablet(s) Oral every 6 hours PRN Moderate Pain (4 - 6)        FAMILY HISTORY:  Family hx of vascular disease      Social History:    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    Ex-smoker-- quit 20 years ago      Allergies:  Betadine (Hives; Rash)   (22 Oct 2023 14:35)            PMH: Essential hypertension    PVD (peripheral vascular disease)    Gastroesophageal reflux disease, esophagitis presence not specified    Hypothyroidism    Chronic obstructive pulmonary disease, unspecified COPD type    Eczema    Hyperlipidemia, unspecified hyperlipidemia type    Medial meniscus tear      PSH:Femoral popliteal artery thrombus    PVD (peripheral vascular disease)        Allergies:Betadine (Hives; Rash)      Labs:                                   8.8    13.54 )-----------( 277      ( 28 Oct 2023 05:43 )             27.1   10-28    141  |  107  |  27<H>  ----------------------------<  127<H>  3.9   |  27  |  1.04    Ca    8.1<L>      28 Oct 2023 05:43         Vital Signs Last 24 Hrs  T(C): 36.4 (28 Oct 2023 15:55), Max: 37.2 (28 Oct 2023 04:00)  T(F): 97.6 (28 Oct 2023 15:55), Max: 98.9 (28 Oct 2023 04:00)  HR: 62 (28 Oct 2023 15:55) (61 - 89)  BP: 145/65 (28 Oct 2023 15:55) (104/80 - 156/55)  BP(mean): 89 (28 Oct 2023 15:00) (66 - 113)  RR: 18 (28 Oct 2023 15:55) (15 - 34)  SpO2: 96% (28 Oct 2023 15:55) (92% - 100%)    Parameters below as of 28 Oct 2023 15:55  Patient On (Oxygen Delivery Method): room air          REVIEW OF SYSTEMS:    CONSTITUTIONAL: No weakness, fevers or chills  EYES: No visual changes  RESPIRATORY: No cough, wheezing; No shortness of breath  CARDIOVASCULAR: No chest pain or palpitations  GASTROINTESTINAL: No abdominal or epigastric pain. No nausea, vomiting; No diarrhea or constipation.   GENITOURINARY: No dysuria, frequency or hematuria  NEUROLOGICAL: No numbness or weakness  SKIN: See physical examination.  All other review of systems is negative unless indicated above    Physical Exam:   Constitutional: NAD, alert;  Lower Extremity Focus  Derm:  Skin warm, dry and supple bilateral.    Ulceration to the left medial heel, wound base is mostly eschar, wound size is 3.5cm x 3.5cm, no periwound edema or erythema noted. no purulence or pus noted, no tracking/tunneling noted, no PTB, no malodor  Vascular: Dorsalis Pedis and Posterior Tibial pulses non palpable.  Capillary re-fill time more then 3 seconds digits 1-5 bilateral.    Neuro: Protective sensation diminished to the level of the digits bilateral.  MSK: Muscle strength 5/5 all major muscle groups bilateral. TTP to the wound site on the left medial mal          < from: Xray Foot AP + Lateral + Oblique, Left (10.22.23 @ 11:51) >  PROCEDURE DATE:  10/22/2023          INTERPRETATION:  Left ankle, left foot, and chest. Concern is chronic   wound around the malleoli.    Left ankle. 3 views.    There is long area of soft tissue calcification likely in a venous   structure in the medial lower leg.    There is mild degeneration of the malleolar tips.    There is some swelling seen approaching cavers triangle.    No bone destruction or fracture.    Left foot. 3 views.    No bone destruction or fracture is evident.    AP chest on October 22, 2023 at 12:34 PM.    Heart magnified by technique.    Lungs are clear.    Chest is similar to August 28, 2019.    IMPRESSION: No acute chest finding. Mild edema around the left ankle   posteriorly. Benign-appearing calcifications in the medial left lower leg   are likely venous. No acute bony finding.    --- End of Report ---        < end of copied text >

## 2023-10-28 NOTE — PROGRESS NOTE ADULT - ASSESSMENT
Non obstructive CAD  Acute on chronic HFrEF  PAD    Suggest:    Diuresis  Cautious fluids  Continue current meds  Cardiac status is stable.  Vascular follow up for revascularization.  Cardiac status is stable for surgery  I shall f/u PRN now.  Non obstructive CAD  Acute on chronic HFrEF  PAD  Anemia of acute blood loss    Suggest:    Diuresis PO lasix  Cautious fluids  Continue current meds  Add ARB/ ACEi  F/u electrolytes and renal function  Cardiac status is stable.  Vascular follow up for revascularization.  Cardiac status is stable for surgery  D/w Intensive Care MD Dr Berger  I shall f/u PRN now.

## 2023-10-28 NOTE — PROGRESS NOTE ADULT - ASSESSMENT
· Assessment	  72 y/o male with PMHX of HTN, severe PVD s/p multiple stents b/l legs, s/p fem/popliteal bypass, chronic left lower ankle ulceration, GERD, HLD who presented to  with CC of left LE pain and bleeding and near syncope.    Assessment    1) Acute hypoxic respiratory failure   2) possible, TRALI/TACO got only 1 unit of blood yesterday  3) HTN emergency  4) hyperactive delirium from hypoxia  5)  acute blood loss anemia from heal ulcer  6) Acute pulmonary edema  vs. pneumonia    Plan     Neuro  delirium improved,  was related wob and hypoxia  Pulm: Acute pulmonary edema secondary to likely heart failure, . CXR shows B/L Infiltrates R>L.  now on nasal cannula     continue diuresis, on zosyn possible pneumonia (doubt) would d/c zosyn after 5 days  CV: HTN better, , on bblocker, aspirin, ef 45%,   Renal:   JENIFER. creatinie 1.0  GI: Diet as tolerated  ID: Heal ulcer with no sings of active cellulitis Afebrile.  , . US/CT showing chronic occlusion.  further rx of foot differed at this time     at this time ready for vascular intervention  Heme: was on. continue aspirin

## 2023-10-28 NOTE — PROGRESS NOTE ADULT - SUBJECTIVE AND OBJECTIVE BOX
CURRENT CARDIAC WORKUP:       Echo:  Stress Test:  Cardiac Cath:    Allergies:   IV Contrast (Hives)  Betadine (Hives; Rash)      MEDICATIONS  (STANDING):  albuterol    0.083% 2.5 milliGRAM(s) Nebulizer every 10 minutes  aspirin enteric coated 81 milliGRAM(s) Oral daily  atorvastatin 80 milliGRAM(s) Oral at bedtime  chlorhexidine 2% Cloths 1 Application(s) Topical <User Schedule>  heparin   Injectable 5000 Unit(s) SubCutaneous every 8 hours  influenza  Vaccine (HIGH DOSE) 0.7 milliLiter(s) IntraMuscular once  levothyroxine 88 MICROGram(s) Oral daily  nebivolol 2.5 milliGRAM(s) Oral daily  piperacillin/tazobactam IVPB.. 3.375 Gram(s) IV Intermittent every 8 hours  silver sulfADIAZINE 1% Cream 1 Application(s) Topical once    MEDICATIONS  (PRN):  acetaminophen     Tablet .. 650 milliGRAM(s) Oral every 6 hours PRN Mild Pain (1 - 3)  aluminum hydroxide/magnesium hydroxide/simethicone Suspension 30 milliLiter(s) Oral every 4 hours PRN Dyspepsia  melatonin 3 milliGRAM(s) Oral at bedtime PRN Insomnia  morphine  - Injectable 2 milliGRAM(s) IV Push every 6 hours PRN Severe Pain (7 - 10)  ondansetron Injectable 4 milliGRAM(s) IV Push every 6 hours PRN Nausea and/or Vomiting  oxycodone    5 mG/acetaminophen 325 mG 0.5 Tablet(s) Oral every 6 hours PRN Moderate Pain (4 - 6)      ROS:     .ros      Vital Signs Last 24 Hrs  T(C): 36.8 (28 Oct 2023 08:00), Max: 37.2 (28 Oct 2023 04:00)  T(F): 98.2 (28 Oct 2023 08:00), Max: 98.9 (28 Oct 2023 04:00)  HR: 64 (28 Oct 2023 09:00) (56 - 89)  BP: 156/55 (28 Oct 2023 09:00) (106/63 - 156/55)  BP(mean): 80 (28 Oct 2023 09:00) (71 - 116)  RR: 23 (28 Oct 2023 09:00) (14 - 34)  SpO2: 96% (28 Oct 2023 09:00) (92% - 100%)    Parameters below as of 28 Oct 2023 09:00  Patient On (Oxygen Delivery Method): nasal cannula  O2 Flow (L/min): 2      I&O's Summary    27 Oct 2023 07:01  -  28 Oct 2023 07:00  --------------------------------------------------------  IN: 312.6 mL / OUT: 1185 mL / NET: -872.4 mL        PHYSICAL EXAM:    .phy      TELEMETRY:    ECG:    LABS:                        8.8    13.54 )-----------( 277      ( 28 Oct 2023 05:43 )             27.1     10-28    141  |  107  |  27<H>  ----------------------------<  127<H>  3.9   |  27  |  1.04    Ca    8.1<L>      28 Oct 2023 05:43          10-27 @ 05:52  Trop-I 682.24  CK     --    10-26 @ 05:36  Trop-I 1384.24  CK     --    10-25 @ 14:44  Trop-I 1792.15  CK     --    10-25 @ 03:51  Trop-I 1614.11  CK     --    10-22 @ 11:10  Trop-I 48.99  CK     --      Pro BNP   10-25 @ 03:51  [id number]        PTT - ( 27 Oct 2023 05:52 )  PTT:68.7 sec    Urinalysis Basic - ( 28 Oct 2023 05:43 )    Color: x / Appearance: x / SG: x / pH: x  Gluc: 127 mg/dL / Ketone: x  / Bili: x / Urobili: x   Blood: x / Protein: x / Nitrite: x   Leuk Esterase: x / RBC: x / WBC x   Sq Epi: x / Non Sq Epi: x / Bacteria: x            RADIOLOGY & ADDITIONAL STUDIES:     73-year-old male admitted with complaints of a near syncopal episode after having severe bleeding from his left ankle ulcer.  Patient has had history of PAD and venous insufficiency.  He has had a chronic left medial malleolus area ulceration.  He has had a history of a femorofemoral bypass and left femoropopliteal bypass.  While trying to get up after noticing a significant amount of bleed from the left ankle patient felt weak, pale and almost passed out.  No previous history of syncope or near syncope.  No history of arrhythmias.  Recently has been having severe pain in the left foot and worsening of ulceration of the left medial malleolus.  Follows up with Dr. Castillo at [facility] for PAD.  Recently saw Dr Nickolas Gunter of vascular surgery at [facility] on 10/17/2023 for worsening left ankle ulcer and change in SANTHOSH PVR. Clinical suspicion of occluded left Fem-pop bypass but with Doppler signal positive over the Fem-Fem bypass. Acute on chronic diastolic CHF and elevated CE.  Moved to ICU. Cardiac cath showed non obstructive CAD, elevated LVEDP    Today, feels OK. Breathing improved with current treatment. Left foot pain is slightly better.        PAST MEDICAL & SURGICAL HISTORY:  Essential hypertension  PVD (peripheral vascular disease)  Gastroesophageal reflux disease, esophagitis presence not specified  Hypothyroidism  Chronic obstructive pulmonary disease, unspecified COPD type  Eczema  Hyperlipidemia, unspecified hyperlipidemia type  Medial meniscus tear left knee  Femoral popliteal artery thrombus  AAA, EVAR  Fem Fem bypass  h/o Fem pop bypass 1997.  Occlusion and redo bypass  PVD (peripheral vascular disease)  s/p peripheral stent insertion bilateral lower extremities x 10  Varicose veins      CURRENT CARDIAC WORKUP  < from: TTE Echo Complete w/o Contrast w/ Doppler (10.23.23 @ 10:51) >   The left ventricle is normal in size, wall thickness, wall motion and contractility.   Estimated left ventricular ejection fraction is 55-60 %.   The left atrium is mildly dilated.   Mild to Moderate aortic regurgitation.   Mild (1+) tricuspid valve regurgitation.    < from: Cardiac Catheterization (10.27.23 @ 09:25) >  Non obstructive coronary artery disease.   Mildly reduced LV systolic function. Estimated EF 45-50%.   Elevated LVEDP 30 mm Hg.       PREVIOUS CARDIOVASCULAR WORKUP:      Peripheral angio 4/2023  SUMMARY:     - Severe LEFT popliteal stenosis s/p successful DCB x1  - Left PT occluded  - Patent fem-fem and fem-pop bypass grafts     Routine post-peripheral intervention care with monitoring and anticipated same day discharge if course uncomplicated  Continue ASA 81 mg daily  Continue Plavix 75 mg daily  Restart Xarelto 2.5mg BID tomorrow if no bleeding  Triple therapy for 1 week then stop aspirin  Continue medical management of PAD and PAD risk factors       SANTHOSH PVR 10/17/23  Interpretation:  Mild peripheral arterial occlusive disease appreciated in the right lower extremity based on   pulse volume recording waveforms. Moderate to severe peripheral arterial occlusive disease   appreciated in the left lower extremity based on pulse volume recording waveforms.    CTA Abd, pelvis and b/l LE run-off  5/15/23  IMPRESSION:     Status post endovascular repair of an infrarenal aortic aneurysm. The native aneurysm is stable in size. No evidence of endoleak.     Moderate stenosis of the origins of the celiac artery and superior mesenteric artery.     Severe stenoses of the origins of the bilateral renal arteries.     Right lower extremity: Inflow: Patent. Outflow: Patent superficial femoral and popliteal arteries with stable pseudoaneurysms, as described. Three-vessel runoff to the right foot.     Left lower extremity: Inflow: Occluded common iliac and external iliac arteries with patent femoral-femoral bypass graft. Occluded superficial femoral artery with patent femoral popliteal bypass graft. Multifocal moderate stenoses of the popliteal artery with two-vessel runoff to the left foot.     Allergies:   IV Contrast (Hives)  Betadine (Hives; Rash)      MEDICATIONS  (STANDING):  albuterol    0.083% 2.5 milliGRAM(s) Nebulizer every 10 minutes  aspirin enteric coated 81 milliGRAM(s) Oral daily  atorvastatin 80 milliGRAM(s) Oral at bedtime  chlorhexidine 2% Cloths 1 Application(s) Topical <User Schedule>  heparin   Injectable 5000 Unit(s) SubCutaneous every 8 hours  influenza  Vaccine (HIGH DOSE) 0.7 milliLiter(s) IntraMuscular once  levothyroxine 88 MICROGram(s) Oral daily  nebivolol 2.5 milliGRAM(s) Oral daily  piperacillin/tazobactam IVPB.. 3.375 Gram(s) IV Intermittent every 8 hours  silver sulfADIAZINE 1% Cream 1 Application(s) Topical once    MEDICATIONS  (PRN):  acetaminophen     Tablet .. 650 milliGRAM(s) Oral every 6 hours PRN Mild Pain (1 - 3)  aluminum hydroxide/magnesium hydroxide/simethicone Suspension 30 milliLiter(s) Oral every 4 hours PRN Dyspepsia  melatonin 3 milliGRAM(s) Oral at bedtime PRN Insomnia  morphine  - Injectable 2 milliGRAM(s) IV Push every 6 hours PRN Severe Pain (7 - 10)  ondansetron Injectable 4 milliGRAM(s) IV Push every 6 hours PRN Nausea and/or Vomiting  oxycodone    5 mG/acetaminophen 325 mG 0.5 Tablet(s) Oral every 6 hours PRN Moderate Pain (4 - 6)      Vital Signs Last 24 Hrs  T(C): 36.8 (28 Oct 2023 08:00), Max: 37.2 (28 Oct 2023 04:00)  T(F): 98.2 (28 Oct 2023 08:00), Max: 98.9 (28 Oct 2023 04:00)  HR: 64 (28 Oct 2023 09:00) (56 - 89)  BP: 156/55 (28 Oct 2023 09:00) (106/63 - 156/55)  BP(mean): 80 (28 Oct 2023 09:00) (71 - 116)  RR: 23 (28 Oct 2023 09:00) (14 - 34)  SpO2: 96% (28 Oct 2023 09:00) (92% - 100%)    Parameters below as of 28 Oct 2023 09:00  Patient On (Oxygen Delivery Method): nasal cannula  O2 Flow (L/min): 2      PHYSICAL EXAM:    General:                Comfortable, AAO X 3, in no distress.  HEENT:                  Unremarkable.   Neck:                     Supple, no adenopathy, no thyromegaly, no JVD, no bruit.  Chest:                    Clear, B/L symmetric air entry, no tachypnea  Heart:                     S1, S2 normal, + murmur.  Abdomen:              Soft, non-tender, bowel sounds active. No palpable masses.  Extremities:           Poor perfusion Left foot. About 3 cm diameter Ulcer with dried blood at the left medial malleolus. No pedal pulses felt.  Varicose veins noted.   Neurologic:            Grossly nonfocal.       TELEMETRY:  Normal sinus rhythm with no tachy or alfonso events     ECG:  NSR, no ST T changes of ischemia or infarction.       LABS:                        8.8    13.54 )-----------( 277      ( 28 Oct 2023 05:43 )             27.1     10-28    141  |  107  |  27<H>  ----------------------------<  127<H>  3.9   |  27  |  1.04    Ca    8.1<L>      28 Oct 2023 05:43          10-27 @ 05:52  Trop-I 682.24    10-26 @ 05:36  Trop-I 1384.24    10-25 @ 14:44  Trop-I 1792.15    10-25 @ 03:51  Trop-I 1614.11    10-22 @ 11:10  Trop-I 48.99    Pro BNP   10-25 @ 03:51  [id number]

## 2023-10-28 NOTE — PROGRESS NOTE ADULT - ASSESSMENT
74yo M presents w/ bleeding LLE venous stasis ulcer. Severe small vessel disease on left lower extremity with multiple revascularization interventions, now occluded fem-pop and fem-fem bypass. Chronic limb ischemia, JENIFER on CKD , chronic NSAID intake. Patient in ICU s/p development of pulmonary edema and suspected NSTEMI v type 2 MI. s/p cardiac catheterization-- nonobstructive CAD.    Recommendation:  - Monitor VS  - continue ICU management  - will defer surgical intervention until cleared medically    Plan discussed with vascular surgery attending, Dr. Sosa

## 2023-10-29 LAB
ANION GAP SERPL CALC-SCNC: 7 MMOL/L — SIGNIFICANT CHANGE UP (ref 5–17)
ANION GAP SERPL CALC-SCNC: 7 MMOL/L — SIGNIFICANT CHANGE UP (ref 5–17)
BUN SERPL-MCNC: 23 MG/DL — SIGNIFICANT CHANGE UP (ref 7–23)
BUN SERPL-MCNC: 23 MG/DL — SIGNIFICANT CHANGE UP (ref 7–23)
CALCIUM SERPL-MCNC: 8.1 MG/DL — LOW (ref 8.5–10.1)
CALCIUM SERPL-MCNC: 8.1 MG/DL — LOW (ref 8.5–10.1)
CHLORIDE SERPL-SCNC: 110 MMOL/L — HIGH (ref 96–108)
CHLORIDE SERPL-SCNC: 110 MMOL/L — HIGH (ref 96–108)
CO2 SERPL-SCNC: 28 MMOL/L — SIGNIFICANT CHANGE UP (ref 22–31)
CO2 SERPL-SCNC: 28 MMOL/L — SIGNIFICANT CHANGE UP (ref 22–31)
CREAT SERPL-MCNC: 0.95 MG/DL — SIGNIFICANT CHANGE UP (ref 0.5–1.3)
CREAT SERPL-MCNC: 0.95 MG/DL — SIGNIFICANT CHANGE UP (ref 0.5–1.3)
EGFR: 86 ML/MIN/1.73M2 — SIGNIFICANT CHANGE UP
EGFR: 86 ML/MIN/1.73M2 — SIGNIFICANT CHANGE UP
GLUCOSE SERPL-MCNC: 99 MG/DL — SIGNIFICANT CHANGE UP (ref 70–99)
GLUCOSE SERPL-MCNC: 99 MG/DL — SIGNIFICANT CHANGE UP (ref 70–99)
HCT VFR BLD CALC: 28.4 % — LOW (ref 39–50)
HCT VFR BLD CALC: 28.4 % — LOW (ref 39–50)
HGB BLD-MCNC: 9.2 G/DL — LOW (ref 13–17)
HGB BLD-MCNC: 9.2 G/DL — LOW (ref 13–17)
MCHC RBC-ENTMCNC: 28.2 PG — SIGNIFICANT CHANGE UP (ref 27–34)
MCHC RBC-ENTMCNC: 28.2 PG — SIGNIFICANT CHANGE UP (ref 27–34)
MCHC RBC-ENTMCNC: 32.4 GM/DL — SIGNIFICANT CHANGE UP (ref 32–36)
MCHC RBC-ENTMCNC: 32.4 GM/DL — SIGNIFICANT CHANGE UP (ref 32–36)
MCV RBC AUTO: 87.1 FL — SIGNIFICANT CHANGE UP (ref 80–100)
MCV RBC AUTO: 87.1 FL — SIGNIFICANT CHANGE UP (ref 80–100)
PLATELET # BLD AUTO: 325 K/UL — SIGNIFICANT CHANGE UP (ref 150–400)
PLATELET # BLD AUTO: 325 K/UL — SIGNIFICANT CHANGE UP (ref 150–400)
POTASSIUM SERPL-MCNC: 3.7 MMOL/L — SIGNIFICANT CHANGE UP (ref 3.5–5.3)
POTASSIUM SERPL-MCNC: 3.7 MMOL/L — SIGNIFICANT CHANGE UP (ref 3.5–5.3)
POTASSIUM SERPL-SCNC: 3.7 MMOL/L — SIGNIFICANT CHANGE UP (ref 3.5–5.3)
POTASSIUM SERPL-SCNC: 3.7 MMOL/L — SIGNIFICANT CHANGE UP (ref 3.5–5.3)
RBC # BLD: 3.26 M/UL — LOW (ref 4.2–5.8)
RBC # BLD: 3.26 M/UL — LOW (ref 4.2–5.8)
RBC # FLD: 15.8 % — HIGH (ref 10.3–14.5)
RBC # FLD: 15.8 % — HIGH (ref 10.3–14.5)
SODIUM SERPL-SCNC: 145 MMOL/L — SIGNIFICANT CHANGE UP (ref 135–145)
SODIUM SERPL-SCNC: 145 MMOL/L — SIGNIFICANT CHANGE UP (ref 135–145)
WBC # BLD: 8.8 K/UL — SIGNIFICANT CHANGE UP (ref 3.8–10.5)
WBC # BLD: 8.8 K/UL — SIGNIFICANT CHANGE UP (ref 3.8–10.5)
WBC # FLD AUTO: 8.8 K/UL — SIGNIFICANT CHANGE UP (ref 3.8–10.5)
WBC # FLD AUTO: 8.8 K/UL — SIGNIFICANT CHANGE UP (ref 3.8–10.5)

## 2023-10-29 PROCEDURE — 99232 SBSQ HOSP IP/OBS MODERATE 35: CPT

## 2023-10-29 RX ORDER — TRAMADOL HYDROCHLORIDE 50 MG/1
25 TABLET ORAL EVERY 8 HOURS
Refills: 0 | Status: DISCONTINUED | OUTPATIENT
Start: 2023-10-29 | End: 2023-11-02

## 2023-10-29 RX ORDER — OXYCODONE HYDROCHLORIDE 5 MG/1
2.5 TABLET ORAL EVERY 4 HOURS
Refills: 0 | Status: DISCONTINUED | OUTPATIENT
Start: 2023-10-29 | End: 2023-10-29

## 2023-10-29 RX ADMIN — NEBIVOLOL HYDROCHLORIDE 2.5 MILLIGRAM(S): 5 TABLET ORAL at 09:01

## 2023-10-29 RX ADMIN — Medication 88 MICROGRAM(S): at 06:16

## 2023-10-29 RX ADMIN — Medication 81 MILLIGRAM(S): at 09:01

## 2023-10-29 RX ADMIN — PIPERACILLIN AND TAZOBACTAM 25 GRAM(S): 4; .5 INJECTION, POWDER, LYOPHILIZED, FOR SOLUTION INTRAVENOUS at 06:15

## 2023-10-29 RX ADMIN — PIPERACILLIN AND TAZOBACTAM 25 GRAM(S): 4; .5 INJECTION, POWDER, LYOPHILIZED, FOR SOLUTION INTRAVENOUS at 14:46

## 2023-10-29 RX ADMIN — HEPARIN SODIUM 5000 UNIT(S): 5000 INJECTION INTRAVENOUS; SUBCUTANEOUS at 15:33

## 2023-10-29 RX ADMIN — PIPERACILLIN AND TAZOBACTAM 25 GRAM(S): 4; .5 INJECTION, POWDER, LYOPHILIZED, FOR SOLUTION INTRAVENOUS at 22:34

## 2023-10-29 RX ADMIN — HEPARIN SODIUM 5000 UNIT(S): 5000 INJECTION INTRAVENOUS; SUBCUTANEOUS at 22:34

## 2023-10-29 RX ADMIN — LOSARTAN POTASSIUM 25 MILLIGRAM(S): 100 TABLET, FILM COATED ORAL at 09:01

## 2023-10-29 RX ADMIN — HEPARIN SODIUM 5000 UNIT(S): 5000 INJECTION INTRAVENOUS; SUBCUTANEOUS at 06:15

## 2023-10-29 RX ADMIN — Medication 100 MILLIGRAM(S): at 09:01

## 2023-10-29 RX ADMIN — ATORVASTATIN CALCIUM 80 MILLIGRAM(S): 80 TABLET, FILM COATED ORAL at 22:34

## 2023-10-29 RX ADMIN — Medication 20 MILLIGRAM(S): at 09:01

## 2023-10-29 NOTE — PROGRESS NOTE ADULT - ASSESSMENT
72yo M presents w/ bleeding LLE venous stasis ulcer. Severe small vessel disease on left lower extremity with multiple revascularization interventions, now occluded fem-pop and fem-fem bypass. Chronic limb ischemia, JENIFER on CKD , chronic NSAID intake. Patient in ICU s/p development of pulmonary edema and suspected NSTEMI v type 2 MI. s/p cardiac catheterization-- nonobstructive CAD.    Recommendation:  - Monitor VS  - continue ICU management  - family discussion had and pt and family would like to stay at  for intervention   will plan for a lower extremity axillobifem bypass some time next week  - please obtain cardiac and medical clearances    Plan discussed with vascular surgery attending, Dr. Sosa

## 2023-10-29 NOTE — PROGRESS NOTE ADULT - ASSESSMENT
72 y/o male with PMHX of HTN, severe PVD s/p multiple stents b/l legs, s/p fem/popliteal bypass, chronic left lower ankle ulceration, GERD, HLD who presented to  with CC of left LE pain and bleeding and near syncope.    Acute blood loss anemia from bleeding L heel ulcer  -No further bleeding   -H/H stable   -Podiatry consult noted     Acute hypoxic respiratory failure   Possible, TRALI/TACO s/p 1 unit of blood   Hyperactive delirium from hypoxia  Acute pulmonary edema  vs. pneumonia  -Now breathing well on RA  -Asymptomatic   -CXR shows B/L Infiltrates R>L  -continue diuresis, on zosyn possible pneumonia (doubt) would d/c zosyn after 5 days  -Continue zosyn  for probable PNA    Acute on chronic HFrEF   Non obstructive CAD  -Now stable  -TTE as above, EF: 45-50%  -S/p LHC   -Cardiology following     Severe small vessel disease on left lower extremity   Chronic limb ischemia  -S/p multiple revascularization interventions, now occluded fem-pop and fem-fem bypass  -planned for OR wish vascular surgery sometime this week   -Cardiology clearance noted     Pre-operative risk evaluation   S/p LHC with non obstructive disease, TTE as above   Cleared by cardiology   METs > 4  RCRI (revised cardiac risk index score): 3%. 15% 30-risk of death, MI, cardiac arrest  Benefits of surgery outweigh the risk  Patient is currently optimized from a medical standpoint therefore no absolute contraindication to proceeding with procedure    HTN emergency  -Resolved  -Continue losartan, nebivolol, furosemide     JENIFER  -Improved     VTE ppx: heparin subQ

## 2023-10-29 NOTE — PROGRESS NOTE ADULT - SUBJECTIVE AND OBJECTIVE BOX
Date of service: 10/29/2023  HPI: 72 y/o male with PMHX of HTN, severe PVD s/p multiple stents b/l legs, s/p fem/popliteal bypass, chronic left lower ankle ulceration, GERD, HLD who presented to  with CC of left LE pain and bleeding.  Patient notes he was at his family's house earlier 10/22.  He was sitting on the floor with his grandson when the family noticed significant bleeding in his left leg.  His wife tried to get him up and into the bathroom to stop the bleeding.  She got him to sit on the side of the bathtub and was trying to hold pressure over the bleeding when he seemed to lose consciousness.  He didn't fall over but as per wife, turned white and wasn't responding to her.  They called 911.  He wasn't responding for a few minutes and then came to.  She denies hx of syncope/ near syncope in the past.  In the ER, bleeding had mostly stopped.  Patient was c/o severe pain left foot.  Left foot discoloration and vascular surgery called for consult.  Labs with ARF with Cr of 2.2-- unclear baseline.  Patient has been taking advil at home for pain in foot.      Podiatry was consulted for left foot ulcer. Pt resting comfortably bedside today. Pt tender to wound site.     10/29/2023: Pt seen by podiatry team today, pt resting bedside, doing crossword puzzle, pleasant. NAD, no other pedal complaints    PAST MEDICAL & SURGICAL HISTORY:  Essential hypertension  PVD (peripheral vascular disease)  Gastroesophageal reflux disease, esophagitis presence not specified  Hypothyroidism  Chronic obstructive pulmonary disease, unspecified COPD type  Eczema  Hyperlipidemia, unspecified hyperlipidemia type  Medial meniscus tear  left knee  Femoral popliteal artery thrombus  h/o Fem pop bypass 1997  PVD (peripheral vascular disease)  s/p peripheral stent insertion bilateral lower extemities x 10    MEDICATIONS  (STANDING):  albuterol    0.083% 2.5 milliGRAM(s) Nebulizer every 10 minutes  aspirin enteric coated 81 milliGRAM(s) Oral daily  atorvastatin 80 milliGRAM(s) Oral at bedtime  chlorhexidine 2% Cloths 1 Application(s) Topical <User Schedule>  clopidogrel Tablet 75 milliGRAM(s) Oral daily  heparin  Infusion.  Unit(s)/Hr (12 mL/Hr) IV Continuous <Continuous>  influenza  Vaccine (HIGH DOSE) 0.7 milliLiter(s) IntraMuscular once  levothyroxine 88 MICROGram(s) Oral daily  nebivolol 2.5 milliGRAM(s) Oral daily  piperacillin/tazobactam IVPB.. 3.375 Gram(s) IV Intermittent every 8 hours  silver sulfADIAZINE 1% Cream 1 Application(s) Topical once  sodium chloride 0.9%. 1000 milliLiter(s) (75 mL/Hr) IV Continuous <Continuous>    MEDICATIONS  (PRN):  acetaminophen     Tablet .. 650 milliGRAM(s) Oral every 6 hours PRN Mild Pain (1 - 3)  aluminum hydroxide/magnesium hydroxide/simethicone Suspension 30 milliLiter(s) Oral every 4 hours PRN Dyspepsia  heparin   Injectable 5500 Unit(s) IV Push every 6 hours PRN For aPTT less than 40  heparin   Injectable 2500 Unit(s) IV Push every 6 hours PRN For aPTT between 40 - 57  melatonin 3 milliGRAM(s) Oral at bedtime PRN Insomnia  morphine  - Injectable 2 milliGRAM(s) IV Push every 6 hours PRN Severe Pain (7 - 10)  ondansetron Injectable 4 milliGRAM(s) IV Push every 6 hours PRN Nausea and/or Vomiting  oxycodone    5 mG/acetaminophen 325 mG 0.5 Tablet(s) Oral every 6 hours PRN Moderate Pain (4 - 6)        FAMILY HISTORY:  Family hx of vascular disease      Social History:    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    Ex-smoker-- quit 20 years ago      Allergies:  Betadine (Hives; Rash)   (22 Oct 2023 14:35)            PMH: Essential hypertension    PVD (peripheral vascular disease)    Gastroesophageal reflux disease, esophagitis presence not specified    Hypothyroidism    Chronic obstructive pulmonary disease, unspecified COPD type    Eczema    Hyperlipidemia, unspecified hyperlipidemia type    Medial meniscus tear      PSH:Femoral popliteal artery thrombus    PVD (peripheral vascular disease)        Allergies:Betadine (Hives; Rash)      Labs:                                   8.8    13.54 )-----------( 277      ( 28 Oct 2023 05:43 )             27.1   10-28    141  |  107  |  27<H>  ----------------------------<  127<H>  3.9   |  27  |  1.04    Ca    8.1<L>      28 Oct 2023 05:43         Vital Signs Last 24 Hrs  T(C): 36.9 (29 Oct 2023 07:38), Max: 37 (28 Oct 2023 22:18)  T(F): 98.4 (29 Oct 2023 07:38), Max: 98.6 (28 Oct 2023 22:18)  HR: 56 (29 Oct 2023 11:07) (56 - 69)  BP: 149/58 (29 Oct 2023 11:07) (124/75 - 163/89)  BP(mean): 92 (28 Oct 2023 22:18) (89 - 92)  RR: 18 (29 Oct 2023 07:38) (18 - 19)  SpO2: 95% (29 Oct 2023 07:38) (92% - 96%)    Parameters below as of 29 Oct 2023 07:38  Patient On (Oxygen Delivery Method): room air          REVIEW OF SYSTEMS:    CONSTITUTIONAL: No weakness, fevers or chills  EYES: No visual changes  RESPIRATORY: No cough, wheezing; No shortness of breath  CARDIOVASCULAR: No chest pain or palpitations  GASTROINTESTINAL: No abdominal or epigastric pain. No nausea, vomiting; No diarrhea or constipation.   GENITOURINARY: No dysuria, frequency or hematuria  NEUROLOGICAL: No numbness or weakness  SKIN: See physical examination.  All other review of systems is negative unless indicated above    Physical Exam:   Constitutional: NAD, alert;  Lower Extremity Focus  Derm:  Skin warm, dry and supple bilateral.    Ulceration to the left medial heel, wound base is mostly eschar, wound size is 3.5cm x 3.5cm, no periwound edema or erythema noted. no purulence or pus noted, no tracking/tunneling noted, no PTB, no malodor  Vascular: Dorsalis Pedis and Posterior Tibial pulses non palpable.  Capillary re-fill time more then 3 seconds digits 1-5 bilateral.    Neuro: Protective sensation diminished to the level of the digits bilateral.  MSK: Muscle strength 5/5 all major muscle groups bilateral. TTP to the wound site on the left medial mal          < from: Xray Foot AP + Lateral + Oblique, Left (10.22.23 @ 11:51) >  PROCEDURE DATE:  10/22/2023          INTERPRETATION:  Left ankle, left foot, and chest. Concern is chronic   wound around the malleoli.    Left ankle. 3 views.    There is long area of soft tissue calcification likely in a venous   structure in the medial lower leg.    There is mild degeneration of the malleolar tips.    There is some swelling seen approaching cavers triangle.    No bone destruction or fracture.    Left foot. 3 views.    No bone destruction or fracture is evident.    AP chest on October 22, 2023 at 12:34 PM.    Heart magnified by technique.    Lungs are clear.    Chest is similar to August 28, 2019.    IMPRESSION: No acute chest finding. Mild edema around the left ankle   posteriorly. Benign-appearing calcifications in the medial left lower leg   are likely venous. No acute bony finding.    --- End of Report ---        < end of copied text >

## 2023-10-29 NOTE — PROGRESS NOTE ADULT - SUBJECTIVE AND OBJECTIVE BOX
HPI: 72 y/o male with PMHX of HTN, severe PVD s/p multiple stents b/l legs, s/p fem/popliteal bypass, chronic left lower ankle ulceration, GERD, HLD who presented to  with CC of left LE pain and bleeding.  Patient notes he was at his family's house earlier today.  He was sitting on the floor with his grandson when the family noticed significant bleeding in his left leg.  His wife tried to get him up and into the bathroom to stop the bleeding.  She got him to sit on the side of the bathtub and was trying to hold pressure over the bleeding when he seemed to lose consciousness.  He didn't fall over but as per wife, turned white and wasn't responding to her.  They called 911.  He wasn't responding for a few minutes and then came to.  She denies hx of syncope/ near syncope in the past.  In the ER, bleeding had mostly stopped.  Patient was c/o severe pain left foot.  Left foot discoloration and vascular surgery called for consult.  Labs with ARF with Cr of 2.2-- unclear baseline.  Patient has been taking advil at home for pain in foot. RR called for acute hypoxia and agitation. SpO2 75%. /90. Placed on NRB, given morphine 2mg IVP x2 POCUS showed diffuse anterior b lines. Placed on BIPAP transferred to ICU. cxr CHF vs. right sided infiltrate, diuresis and started on zosyn. Pt with acute and chronic diastolic CHF and elevated trop. Was taken to the cath lab for ischemic evaluation. Experienced hyperactive delirium from hypoxia, downgraded to medicine 10/28.     Subjective: Patient seen and examined today, feels well, no complaints to offer at this time.     REVIEW OF SYSTEMS:    CONSTITUTIONAL: No weakness, fevers or chills  EYES/ENT: No visual changes;  No vertigo or throat pain   NECK: No pain or stiffness  RESPIRATORY: No cough, wheezing, hemoptysis; No shortness of breath  CARDIOVASCULAR: No chest pain or palpitations  GASTROINTESTINAL: No abdominal or epigastric pain. No nausea, vomiting, or hematemesis; No diarrhea or constipation. No melena or hematochezia.  GENITOURINARY: No dysuria, frequency or hematuria  NEUROLOGICAL: No numbness or weakness  SKIN: No itching, rashes        MEDICATIONS  (STANDING):  albuterol    0.083% 2.5 milliGRAM(s) Nebulizer every 10 minutes  aspirin enteric coated 81 milliGRAM(s) Oral daily  atorvastatin 80 milliGRAM(s) Oral at bedtime  chlorhexidine 2% Cloths 1 Application(s) Topical <User Schedule>  furosemide    Tablet 20 milliGRAM(s) Oral daily  heparin   Injectable 5000 Unit(s) SubCutaneous every 8 hours  influenza  Vaccine (HIGH DOSE) 0.7 milliLiter(s) IntraMuscular once  levothyroxine 88 MICROGram(s) Oral daily  losartan 25 milliGRAM(s) Oral daily  nebivolol 2.5 milliGRAM(s) Oral daily  piperacillin/tazobactam IVPB.. 3.375 Gram(s) IV Intermittent every 8 hours  silver sulfADIAZINE 1% Cream 1 Application(s) Topical daily  silver sulfADIAZINE 1% Cream 1 Application(s) Topical once  thiamine 100 milliGRAM(s) Oral daily    MEDICATIONS  (PRN):  acetaminophen     Tablet .. 650 milliGRAM(s) Oral every 6 hours PRN Mild Pain (1 - 3)  aluminum hydroxide/magnesium hydroxide/simethicone Suspension 30 milliLiter(s) Oral every 4 hours PRN Dyspepsia  melatonin 3 milliGRAM(s) Oral at bedtime PRN Insomnia  morphine  - Injectable 2 milliGRAM(s) IV Push every 6 hours PRN Severe Pain (7 - 10)  ondansetron Injectable 4 milliGRAM(s) IV Push every 6 hours PRN Nausea and/or Vomiting  oxycodone    5 mG/acetaminophen 325 mG 0.5 Tablet(s) Oral every 6 hours PRN Moderate Pain (4 - 6)      Vital Signs Last 24 Hrs  T(C): 36.9 (29 Oct 2023 07:38), Max: 37 (28 Oct 2023 22:18)  T(F): 98.4 (29 Oct 2023 07:38), Max: 98.6 (28 Oct 2023 22:18)  HR: 56 (29 Oct 2023 11:07) (56 - 69)  BP: 149/58 (29 Oct 2023 11:07) (149/58 - 163/89)  BP(mean): 92 (28 Oct 2023 22:18) (92 - 92)  RR: 18 (29 Oct 2023 07:38) (18 - 18)  SpO2: 95% (29 Oct 2023 07:38) (95% - 95%)    Parameters below as of 29 Oct 2023 07:38  Patient On (Oxygen Delivery Method): room air      PHYSICAL EXAM:  GENERAL: NAD, lying in bed comfortably  HEAD:  Atraumatic, Normocephalic  EYES: conjunctiva and sclera clear  ENT: Moist mucous membranes  NECK: Supple, No JVD  CHEST/LUNG: Clear to auscultation bilaterally; No rales, rhonchi, wheezing. Unlabored respirations  HEART: Regular rate and rhythm; No murmurs  ABDOMEN: Bowel sounds present; Soft, Nontender, Nondistended.   EXTREMITIES:  2+ Peripheral Pulses, brisk capillary refill. No clubbing, cyanosis, or edema  NERVOUS SYSTEM:  Alert & Oriented X3, speech clear. No deficits   MSK: FROM all 4 extremities, full and equal strength  SKIN: LLE wound to medial aspect of foot, eschar (chronic ulcer)        LABS:                          9.2    8.80  )-----------( 325      ( 29 Oct 2023 07:21 )             28.4     29 Oct 2023 07:21    145    |  110    |  23     ----------------------------<  99     3.7     |  28     |  0.95     Ca    8.1        29 Oct 2023 07:21          CAPILLARY BLOOD GLUCOSE            Urinalysis Basic - ( 29 Oct 2023 07:21 )    Color: x / Appearance: x / SG: x / pH: x  Gluc: 99 mg/dL / Ketone: x  / Bili: x / Urobili: x   Blood: x / Protein: x / Nitrite: x   Leuk Esterase: x / RBC: x / WBC x   Sq Epi: x / Non Sq Epi: x / Bacteria: x        RADIOLOGY:  < from: TTE Echo Complete w/o Contrast w/ Doppler (10.23.23 @ 10:51) >   Impression     Summary     The left ventricle is normal in size, wall thickness, wall motion and   contractility.   Estimated left ventricular ejection fraction is 55-60 %.   The left atrium is mildly dilated.   Mild to Moderate aortic regurgitation.   Mild (1+) tricuspid valve regurgitation.    < from: Cardiac Catheterization (10.27.23 @ 09:25) >  Coronary Angiography   The coronary circulation is left dominant.      LM   Distal left main: There is a 30 % stenosis.      LAD   Left anterior descending artery: The segment is large. Angiography  shows mild atherosclerosis.    CX   Circumflex: The segment is large, dominant and mildly calcified.  Angiography shows mild atherosclerosis. Mid circumflex:  There is a 30 % stenosis.      RCA   Right coronary artery: The segment is small, non-dominant. Angiography  shows no disease.    Left Heart Cath   Left ventricular function was assessed and demonstrates abnormal LV  wall motion. The anterobasal, anterolateral, apical,  diaphragmatic and posterobasal wall segments are hypokinetic. Global  left ventricular function is mildly depressed. Ejection  fraction was visually estimated by estimation with a value of 45%. LV  to AO pullback was performed. The left ventricular end  diastolic pressure was 30 mmHg. LHC performed: Aortic valve crossed  and left ventricular pressure obtained.

## 2023-10-29 NOTE — PROGRESS NOTE ADULT - SUBJECTIVE AND OBJECTIVE BOX
72yo M with chronic limb ischemia seen and examined at bedside. Patient states he is feeling well. No longer requiring oxygen supplementation. Tolerating diet, patient denies nausea, vomiting, CP, SOB, fever, or chills.     Vital Signs Last 24 Hrs  T(C): 37 (28 Oct 2023 22:18), Max: 37.2 (28 Oct 2023 04:00)  T(F): 98.6 (28 Oct 2023 22:18), Max: 98.9 (28 Oct 2023 04:00)  HR: 63 (28 Oct 2023 22:38) (61 - 84)  BP: 149/68 (28 Oct 2023 22:18) (104/80 - 156/55)  BP(mean): 92 (28 Oct 2023 22:18) (66 - 113)  RR: 18 (28 Oct 2023 22:18) (17 - 28)  SpO2: 95% (28 Oct 2023 22:18) (92% - 98%)    Parameters below as of 28 Oct 2023 22:18  Patient On (Oxygen Delivery Method): room air                            8.8    13.54 )-----------( 277      ( 28 Oct 2023 05:43 )             27.1     10-28    141  |  107  |  27<H>  ----------------------------<  127<H>  3.9   |  27  |  1.04    Ca    8.1<L>      28 Oct 2023 05:43      I&O's Summary    27 Oct 2023 07:01  -  28 Oct 2023 07:00  --------------------------------------------------------  IN: 312.6 mL / OUT: 1185 mL / NET: -872.4 mL    28 Oct 2023 07:01  -  29 Oct 2023 02:08  --------------------------------------------------------  IN: 100 mL / OUT: 625 mL / NET: -525 mL          Physical Exam:  GENERAL: NAD, AOx3  HEAD: Atraumatic, normocephalic  EYES: EOMI, PERRLA, conjunctiva and sclera clear  ENT: moist mucous membrane  NECK: supple, No JVD, midline trachea  CHEST/LUNG: No increased work of breathing, equal chest rise  Heart: S1, S2 normal  ABDOMEN: soft, nondistended, nontender. no organomegaly  EXTREMITIES: left lower foot with mild discoloration at sole to, venous stasis ulcer nonbleeding at medial malleolus, nondopplerable DP, faintly dopplerable PT. motor/sensory intact.  NERVOUS SYSTEM: AOx3, no neuro-deficits  SKIN: warm to touch, no rash or lesions

## 2023-10-30 LAB
ANION GAP SERPL CALC-SCNC: 6 MMOL/L — SIGNIFICANT CHANGE UP (ref 5–17)
ANION GAP SERPL CALC-SCNC: 6 MMOL/L — SIGNIFICANT CHANGE UP (ref 5–17)
BASOPHILS # BLD AUTO: 0.01 K/UL — SIGNIFICANT CHANGE UP (ref 0–0.2)
BASOPHILS # BLD AUTO: 0.01 K/UL — SIGNIFICANT CHANGE UP (ref 0–0.2)
BASOPHILS NFR BLD AUTO: 0.1 % — SIGNIFICANT CHANGE UP (ref 0–2)
BASOPHILS NFR BLD AUTO: 0.1 % — SIGNIFICANT CHANGE UP (ref 0–2)
BUN SERPL-MCNC: 19 MG/DL — SIGNIFICANT CHANGE UP (ref 7–23)
BUN SERPL-MCNC: 19 MG/DL — SIGNIFICANT CHANGE UP (ref 7–23)
CALCIUM SERPL-MCNC: 8.1 MG/DL — LOW (ref 8.5–10.1)
CALCIUM SERPL-MCNC: 8.1 MG/DL — LOW (ref 8.5–10.1)
CHLORIDE SERPL-SCNC: 109 MMOL/L — HIGH (ref 96–108)
CHLORIDE SERPL-SCNC: 109 MMOL/L — HIGH (ref 96–108)
CO2 SERPL-SCNC: 28 MMOL/L — SIGNIFICANT CHANGE UP (ref 22–31)
CO2 SERPL-SCNC: 28 MMOL/L — SIGNIFICANT CHANGE UP (ref 22–31)
CREAT SERPL-MCNC: 1.02 MG/DL — SIGNIFICANT CHANGE UP (ref 0.5–1.3)
CREAT SERPL-MCNC: 1.02 MG/DL — SIGNIFICANT CHANGE UP (ref 0.5–1.3)
EGFR: 79 ML/MIN/1.73M2 — SIGNIFICANT CHANGE UP
EGFR: 79 ML/MIN/1.73M2 — SIGNIFICANT CHANGE UP
EOSINOPHIL # BLD AUTO: 0.18 K/UL — SIGNIFICANT CHANGE UP (ref 0–0.5)
EOSINOPHIL # BLD AUTO: 0.18 K/UL — SIGNIFICANT CHANGE UP (ref 0–0.5)
EOSINOPHIL NFR BLD AUTO: 2.5 % — SIGNIFICANT CHANGE UP (ref 0–6)
EOSINOPHIL NFR BLD AUTO: 2.5 % — SIGNIFICANT CHANGE UP (ref 0–6)
GLUCOSE SERPL-MCNC: 93 MG/DL — SIGNIFICANT CHANGE UP (ref 70–99)
GLUCOSE SERPL-MCNC: 93 MG/DL — SIGNIFICANT CHANGE UP (ref 70–99)
HCT VFR BLD CALC: 29.6 % — LOW (ref 39–50)
HCT VFR BLD CALC: 29.6 % — LOW (ref 39–50)
HGB BLD-MCNC: 9.6 G/DL — LOW (ref 13–17)
HGB BLD-MCNC: 9.6 G/DL — LOW (ref 13–17)
IMM GRANULOCYTES NFR BLD AUTO: 0.7 % — SIGNIFICANT CHANGE UP (ref 0–0.9)
IMM GRANULOCYTES NFR BLD AUTO: 0.7 % — SIGNIFICANT CHANGE UP (ref 0–0.9)
LYMPHOCYTES # BLD AUTO: 1.33 K/UL — SIGNIFICANT CHANGE UP (ref 1–3.3)
LYMPHOCYTES # BLD AUTO: 1.33 K/UL — SIGNIFICANT CHANGE UP (ref 1–3.3)
LYMPHOCYTES # BLD AUTO: 18.5 % — SIGNIFICANT CHANGE UP (ref 13–44)
LYMPHOCYTES # BLD AUTO: 18.5 % — SIGNIFICANT CHANGE UP (ref 13–44)
MAGNESIUM SERPL-MCNC: 2.4 MG/DL — SIGNIFICANT CHANGE UP (ref 1.6–2.6)
MAGNESIUM SERPL-MCNC: 2.4 MG/DL — SIGNIFICANT CHANGE UP (ref 1.6–2.6)
MCHC RBC-ENTMCNC: 28.2 PG — SIGNIFICANT CHANGE UP (ref 27–34)
MCHC RBC-ENTMCNC: 28.2 PG — SIGNIFICANT CHANGE UP (ref 27–34)
MCHC RBC-ENTMCNC: 32.4 GM/DL — SIGNIFICANT CHANGE UP (ref 32–36)
MCHC RBC-ENTMCNC: 32.4 GM/DL — SIGNIFICANT CHANGE UP (ref 32–36)
MCV RBC AUTO: 87.1 FL — SIGNIFICANT CHANGE UP (ref 80–100)
MCV RBC AUTO: 87.1 FL — SIGNIFICANT CHANGE UP (ref 80–100)
MONOCYTES # BLD AUTO: 0.82 K/UL — SIGNIFICANT CHANGE UP (ref 0–0.9)
MONOCYTES # BLD AUTO: 0.82 K/UL — SIGNIFICANT CHANGE UP (ref 0–0.9)
MONOCYTES NFR BLD AUTO: 11.4 % — SIGNIFICANT CHANGE UP (ref 2–14)
MONOCYTES NFR BLD AUTO: 11.4 % — SIGNIFICANT CHANGE UP (ref 2–14)
NEUTROPHILS # BLD AUTO: 4.81 K/UL — SIGNIFICANT CHANGE UP (ref 1.8–7.4)
NEUTROPHILS # BLD AUTO: 4.81 K/UL — SIGNIFICANT CHANGE UP (ref 1.8–7.4)
NEUTROPHILS NFR BLD AUTO: 66.8 % — SIGNIFICANT CHANGE UP (ref 43–77)
NEUTROPHILS NFR BLD AUTO: 66.8 % — SIGNIFICANT CHANGE UP (ref 43–77)
PLATELET # BLD AUTO: 367 K/UL — SIGNIFICANT CHANGE UP (ref 150–400)
PLATELET # BLD AUTO: 367 K/UL — SIGNIFICANT CHANGE UP (ref 150–400)
POTASSIUM SERPL-MCNC: 4 MMOL/L — SIGNIFICANT CHANGE UP (ref 3.5–5.3)
POTASSIUM SERPL-MCNC: 4 MMOL/L — SIGNIFICANT CHANGE UP (ref 3.5–5.3)
POTASSIUM SERPL-SCNC: 4 MMOL/L — SIGNIFICANT CHANGE UP (ref 3.5–5.3)
POTASSIUM SERPL-SCNC: 4 MMOL/L — SIGNIFICANT CHANGE UP (ref 3.5–5.3)
RBC # BLD: 3.4 M/UL — LOW (ref 4.2–5.8)
RBC # BLD: 3.4 M/UL — LOW (ref 4.2–5.8)
RBC # FLD: 15.9 % — HIGH (ref 10.3–14.5)
RBC # FLD: 15.9 % — HIGH (ref 10.3–14.5)
SODIUM SERPL-SCNC: 143 MMOL/L — SIGNIFICANT CHANGE UP (ref 135–145)
SODIUM SERPL-SCNC: 143 MMOL/L — SIGNIFICANT CHANGE UP (ref 135–145)
WBC # BLD: 7.2 K/UL — SIGNIFICANT CHANGE UP (ref 3.8–10.5)
WBC # BLD: 7.2 K/UL — SIGNIFICANT CHANGE UP (ref 3.8–10.5)
WBC # FLD AUTO: 7.2 K/UL — SIGNIFICANT CHANGE UP (ref 3.8–10.5)
WBC # FLD AUTO: 7.2 K/UL — SIGNIFICANT CHANGE UP (ref 3.8–10.5)

## 2023-10-30 PROCEDURE — 99232 SBSQ HOSP IP/OBS MODERATE 35: CPT

## 2023-10-30 PROCEDURE — 73721 MRI JNT OF LWR EXTRE W/O DYE: CPT | Mod: 26,LT

## 2023-10-30 RX ORDER — ALBUTEROL 90 UG/1
2 AEROSOL, METERED ORAL EVERY 6 HOURS
Refills: 0 | Status: DISCONTINUED | OUTPATIENT
Start: 2023-10-30 | End: 2023-11-08

## 2023-10-30 RX ADMIN — PIPERACILLIN AND TAZOBACTAM 25 GRAM(S): 4; .5 INJECTION, POWDER, LYOPHILIZED, FOR SOLUTION INTRAVENOUS at 05:51

## 2023-10-30 RX ADMIN — HEPARIN SODIUM 5000 UNIT(S): 5000 INJECTION INTRAVENOUS; SUBCUTANEOUS at 05:52

## 2023-10-30 RX ADMIN — PIPERACILLIN AND TAZOBACTAM 25 GRAM(S): 4; .5 INJECTION, POWDER, LYOPHILIZED, FOR SOLUTION INTRAVENOUS at 13:43

## 2023-10-30 RX ADMIN — ATORVASTATIN CALCIUM 80 MILLIGRAM(S): 80 TABLET, FILM COATED ORAL at 21:41

## 2023-10-30 RX ADMIN — HEPARIN SODIUM 5000 UNIT(S): 5000 INJECTION INTRAVENOUS; SUBCUTANEOUS at 21:42

## 2023-10-30 RX ADMIN — Medication 81 MILLIGRAM(S): at 09:45

## 2023-10-30 RX ADMIN — Medication 100 MILLIGRAM(S): at 09:45

## 2023-10-30 RX ADMIN — LOSARTAN POTASSIUM 25 MILLIGRAM(S): 100 TABLET, FILM COATED ORAL at 10:04

## 2023-10-30 RX ADMIN — NEBIVOLOL HYDROCHLORIDE 2.5 MILLIGRAM(S): 5 TABLET ORAL at 10:04

## 2023-10-30 RX ADMIN — Medication 88 MICROGRAM(S): at 05:52

## 2023-10-30 RX ADMIN — HEPARIN SODIUM 5000 UNIT(S): 5000 INJECTION INTRAVENOUS; SUBCUTANEOUS at 13:43

## 2023-10-30 RX ADMIN — Medication 20 MILLIGRAM(S): at 10:03

## 2023-10-30 NOTE — PROGRESS NOTE ADULT - ASSESSMENT
72 y/o male with PMHX of HTN, severe PVD s/p multiple stents b/l legs, s/p fem/popliteal bypass, chronic left lower ankle ulceration, GERD, HLD who presented to  with CC of left LE pain and bleeding and near syncope.    Acute blood loss anemia from bleeding L heel ulcer  -No further bleeding   -H/H stable   -Podiatry consult noted     Acute hypoxic respiratory failure   Possible, TRALI/TACO s/p 1 unit of blood   Hyperactive delirium from hypoxia  Acute pulmonary edema  vs. pneumonia  -Now breathing well on RA  -Asymptomatic   -CXR shows B/L Infiltrates R>L  -continue diuresis, on zosyn possible pneumonia (doubt) would d/c zosyn after 5 days  -Continue zosyn  for probable PNA    Acute on chronic HFrEF   Non obstructive CAD  -Now stable  -TTE as above, EF: 45-50%  -S/p LH   -Cardiology following     Severe small vessel disease on left lower extremity   Chronic limb ischemia  -S/p multiple revascularization interventions, now occluded fem-pop and fem-fem bypass  -planned for OR wish vascular surgery sometime this week   -Cardiology clearance noted     Pre-operative risk evaluation   -S/p LHC with non obstructive disease, TTE as above   -Cleared by cardiology   -METs > 4  -RCRI (revised cardiac risk index score): 3%. 15% 30-risk of death, MI, cardiac arrest  -Benefits of surgery outweigh the risk  -Patient is currently optimized from a medical standpoint therefore no absolute contraindication to proceeding with procedure    Heel ulcer  -podiatry consult appreciated  -f/u MRI to r/o OM    HTN emergency  -Resolved  -Continue losartan, nebivolol, furosemide     #Severe protein-calorie malnutrition  -Nutrition consult appreciated  -Add supplements/ensure     JENIFER  -Improved     VTE ppx: heparin subQ

## 2023-10-30 NOTE — PROGRESS NOTE ADULT - SUBJECTIVE AND OBJECTIVE BOX
Date of service: 10/30/2023  HPI: 74 y/o male with PMHX of HTN, severe PVD s/p multiple stents b/l legs, s/p fem/popliteal bypass, chronic left lower ankle ulceration, GERD, HLD who presented to  with CC of left LE pain and bleeding.  Patient notes he was at his family's house earlier 10/22.  He was sitting on the floor with his grandson when the family noticed significant bleeding in his left leg.  His wife tried to get him up and into the bathroom to stop the bleeding.  She got him to sit on the side of the bathtub and was trying to hold pressure over the bleeding when he seemed to lose consciousness.  He didn't fall over but as per wife, turned white and wasn't responding to her.  They called 911.  He wasn't responding for a few minutes and then came to.  She denies hx of syncope/ near syncope in the past.  In the ER, bleeding had mostly stopped.  Patient was c/o severe pain left foot.  Left foot discoloration and vascular surgery called for consult.  Labs with ARF with Cr of 2.2-- unclear baseline.  Patient has been taking advil at home for pain in foot.      Podiatry was consulted for left foot ulcer. Pt resting comfortably bedside today. Pt tender to wound site.     10/30/2023: Pt seen by podiatry team today, pt resting bedside, NAD, pain controlled. No other pedal complaints    PAST MEDICAL & SURGICAL HISTORY:  Essential hypertension  PVD (peripheral vascular disease)  Gastroesophageal reflux disease, esophagitis presence not specified  Hypothyroidism  Chronic obstructive pulmonary disease, unspecified COPD type  Eczema  Hyperlipidemia, unspecified hyperlipidemia type  Medial meniscus tear  left knee  Femoral popliteal artery thrombus  h/o Fem pop bypass 1997  PVD (peripheral vascular disease)  s/p peripheral stent insertion bilateral lower extemities x 10    MEDICATIONS  (STANDING):  aspirin enteric coated 81 milliGRAM(s) Oral daily  atorvastatin 80 milliGRAM(s) Oral at bedtime  chlorhexidine 2% Cloths 1 Application(s) Topical <User Schedule>  furosemide    Tablet 20 milliGRAM(s) Oral daily  heparin   Injectable 5000 Unit(s) SubCutaneous every 8 hours  influenza  Vaccine (HIGH DOSE) 0.7 milliLiter(s) IntraMuscular once  levothyroxine 88 MICROGram(s) Oral daily  losartan 25 milliGRAM(s) Oral daily  nebivolol 2.5 milliGRAM(s) Oral daily  piperacillin/tazobactam IVPB.. 3.375 Gram(s) IV Intermittent every 8 hours  silver sulfADIAZINE 1% Cream 1 Application(s) Topical once  silver sulfADIAZINE 1% Cream 1 Application(s) Topical daily  thiamine 100 milliGRAM(s) Oral daily    MEDICATIONS  (PRN):  acetaminophen     Tablet .. 650 milliGRAM(s) Oral every 6 hours PRN Mild Pain (1 - 3)  albuterol    90 MICROgram(s) HFA Inhaler 2 Puff(s) Inhalation every 6 hours PRN Shortness of Breath and/or Wheezing  aluminum hydroxide/magnesium hydroxide/simethicone Suspension 30 milliLiter(s) Oral every 4 hours PRN Dyspepsia  melatonin 3 milliGRAM(s) Oral at bedtime PRN Insomnia  ondansetron Injectable 4 milliGRAM(s) IV Push every 6 hours PRN Nausea and/or Vomiting  oxyCODONE    IR 2.5 milliGRAM(s) Oral every 4 hours PRN Severe Pain (7 - 10)  traMADol 25 milliGRAM(s) Oral every 8 hours PRN Moderate Pain (4 - 6)          FAMILY HISTORY:  Family hx of vascular disease      Social History:    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    Ex-smoker-- quit 20 years ago      Allergies:  Betadine (Hives; Rash)   (22 Oct 2023 14:35)            PMH: Essential hypertension    PVD (peripheral vascular disease)    Gastroesophageal reflux disease, esophagitis presence not specified    Hypothyroidism    Chronic obstructive pulmonary disease, unspecified COPD type    Eczema    Hyperlipidemia, unspecified hyperlipidemia type    Medial meniscus tear      PSH:Femoral popliteal artery thrombus    PVD (peripheral vascular disease)        Allergies:Betadine (Hives; Rash)      Labs:                                   9.6    7.20  )-----------( 367      ( 30 Oct 2023 06:49 )             29.6     10-30    143  |  109<H>  |  19  ----------------------------<  93  4.0   |  28  |  1.02    Ca    8.1<L>      30 Oct 2023 06:49  Mg     2.4     10-30      Vital Signs Last 24 Hrs  T(C): 36.6 (30 Oct 2023 08:11), Max: 37 (29 Oct 2023 22:28)  T(F): 97.8 (30 Oct 2023 08:11), Max: 98.6 (29 Oct 2023 22:28)  HR: 87 (30 Oct 2023 08:11) (56 - 87)  BP: 159/98 (30 Oct 2023 08:11) (126/57 - 159/98)  BP(mean): --  RR: 18 (30 Oct 2023 08:11) (18 - 18)  SpO2: 97% (30 Oct 2023 08:11) (95% - 98%)    Parameters below as of 30 Oct 2023 08:11  Patient On (Oxygen Delivery Method): room air              REVIEW OF SYSTEMS:    CONSTITUTIONAL: No weakness, fevers or chills  EYES: No visual changes  RESPIRATORY: No cough, wheezing; No shortness of breath  CARDIOVASCULAR: No chest pain or palpitations  GASTROINTESTINAL: No abdominal or epigastric pain. No nausea, vomiting; No diarrhea or constipation.   GENITOURINARY: No dysuria, frequency or hematuria  NEUROLOGICAL: No numbness or weakness  SKIN: See physical examination.  All other review of systems is negative unless indicated above    Physical Exam:   Constitutional: NAD, alert;  Lower Extremity Focus  Derm:  Skin warm, dry and supple bilateral.    Ulceration to the left medial heel, wound base is mostly eschar, wound size is 3.5cm x 3.5cm, no periwound edema or erythema noted. no purulence or pus noted, no tracking/tunneling noted, no PTB, no malodor  Vascular: Dorsalis Pedis and Posterior Tibial pulses non palpable.  Capillary re-fill time more then 3 seconds digits 1-5 bilateral.    Neuro: Protective sensation diminished to the level of the digits bilateral.  MSK: Muscle strength 5/5 all major muscle groups bilateral. TTP to the wound site on the left medial mal          < from: Xray Foot AP + Lateral + Oblique, Left (10.22.23 @ 11:51) >  PROCEDURE DATE:  10/22/2023          INTERPRETATION:  Left ankle, left foot, and chest. Concern is chronic   wound around the malleoli.    Left ankle. 3 views.    There is long area of soft tissue calcification likely in a venous   structure in the medial lower leg.    There is mild degeneration of the malleolar tips.    There is some swelling seen approaching cavers triangle.    No bone destruction or fracture.    Left foot. 3 views.    No bone destruction or fracture is evident.    AP chest on October 22, 2023 at 12:34 PM.    Heart magnified by technique.    Lungs are clear.    Chest is similar to August 28, 2019.    IMPRESSION: No acute chest finding. Mild edema around the left ankle   posteriorly. Benign-appearing calcifications in the medial left lower leg   are likely venous. No acute bony finding.    --- End of Report ---        < end of copied text >

## 2023-10-30 NOTE — PROGRESS NOTE ADULT - ASSESSMENT
74yo M presents w/ bleeding LLE venous stasis ulcer. Severe small vessel disease on left lower extremity with multiple revascularization interventions, now occluded fem-pop and fem-fem bypass. Chronic limb ischemia, JENIFER on CKD , chronic NSAID intake. Patient was in ICU s/p development of pulmonary edema and suspected NSTEMI v type 2 MI. s/p cardiac catheterization-- nonobstructive CAD.    Recommendation:  - Obtain left foot MR to evaluate for OM  - Cleared by Medicine & Cardiology  - Planning for lower extremity axillobifem bypass some this week  - Rest of care per primary team    Plan discussed with vascular surgery attending

## 2023-10-30 NOTE — PROGRESS NOTE ADULT - ASSESSMENT
A: 73y old male seen for the following:   - Full thickness wound to the left foot, chronic   - Pain to left foot  - Difficulty ambulation    P:   Chart reviewed and Patient evaluated;  Discussed diagnosis and treatment with patient. Discussed importance of daily foot examinations, proper shoe gear, importance of tight glycemic control.   X-rays reviewed : on wet read showing no soft tissue gas, no acute bony findings, mild edema noted to the left ankle posteriorly  There is a full thickness wound to the left medial mal, etiology of wound most likely due to arterial problem, wound looks chronic and stable without acute SOI  Wound flush with normal saline   Applied silvadene with dry sterile dressing  WC: silvadene and DSD  WBAT using surgical shoe  Offloading to bilateral heels while bedbound.   Continue with antibiotics as per ID  All additional care per Med appreciated  Patient demonstrated verbal understanding of all interventions and tolerated interventions well without any complications.   Podiatry will follow while in house      Case D/W attending Dr. Joseph

## 2023-10-30 NOTE — PROGRESS NOTE ADULT - SUBJECTIVE AND OBJECTIVE BOX
HPI: 74 y/o male with PMHX of HTN, severe PVD s/p multiple stents b/l legs, s/p fem/popliteal bypass, chronic left lower ankle ulceration, GERD, HLD who presented to  with CC of left LE pain and bleeding.  Patient notes he was at his family's house earlier today.  He was sitting on the floor with his grandson when the family noticed significant bleeding in his left leg.  His wife tried to get him up and into the bathroom to stop the bleeding.  She got him to sit on the side of the bathtub and was trying to hold pressure over the bleeding when he seemed to lose consciousness.  He didn't fall over but as per wife, turned white and wasn't responding to her.  They called 911.  He wasn't responding for a few minutes and then came to.  She denies hx of syncope/ near syncope in the past.  In the ER, bleeding had mostly stopped.  Patient was c/o severe pain left foot.  Left foot discoloration and vascular surgery called for consult.  Labs with ARF with Cr of 2.2-- unclear baseline.  Patient has been taking advil at home for pain in foot. RR called for acute hypoxia and agitation. SpO2 75%. /90. Placed on NRB, given morphine 2mg IVP x2 POCUS showed diffuse anterior b lines. Placed on BIPAP transferred to ICU. cxr CHF vs. right sided infiltrate, diuresis and started on zosyn. Pt with acute and chronic diastolic CHF and elevated trop. Was taken to the cath lab for ischemic evaluation. Experienced hyperactive delirium from hypoxia, downgraded to medicine 10/28.     Subjective: Patient seen and examined today, feels well, s/p MRI today     REVIEW OF SYSTEMS:    CONSTITUTIONAL: No weakness, fevers or chills  EYES/ENT: No visual changes;  No vertigo or throat pain   NECK: No pain or stiffness  RESPIRATORY: No cough, wheezing, hemoptysis; No shortness of breath  CARDIOVASCULAR: No chest pain or palpitations  GASTROINTESTINAL: No abdominal or epigastric pain. No nausea, vomiting, or hematemesis; No diarrhea or constipation. No melena or hematochezia.  GENITOURINARY: No dysuria, frequency or hematuria  NEUROLOGICAL: No numbness or weakness  SKIN: No itching, rashes        MEDICATIONS  (STANDING):  albuterol    0.083% 2.5 milliGRAM(s) Nebulizer every 10 minutes  aspirin enteric coated 81 milliGRAM(s) Oral daily  atorvastatin 80 milliGRAM(s) Oral at bedtime  chlorhexidine 2% Cloths 1 Application(s) Topical <User Schedule>  furosemide    Tablet 20 milliGRAM(s) Oral daily  heparin   Injectable 5000 Unit(s) SubCutaneous every 8 hours  influenza  Vaccine (HIGH DOSE) 0.7 milliLiter(s) IntraMuscular once  levothyroxine 88 MICROGram(s) Oral daily  losartan 25 milliGRAM(s) Oral daily  nebivolol 2.5 milliGRAM(s) Oral daily  piperacillin/tazobactam IVPB.. 3.375 Gram(s) IV Intermittent every 8 hours  silver sulfADIAZINE 1% Cream 1 Application(s) Topical daily  silver sulfADIAZINE 1% Cream 1 Application(s) Topical once  thiamine 100 milliGRAM(s) Oral daily    MEDICATIONS  (PRN):  acetaminophen     Tablet .. 650 milliGRAM(s) Oral every 6 hours PRN Mild Pain (1 - 3)  aluminum hydroxide/magnesium hydroxide/simethicone Suspension 30 milliLiter(s) Oral every 4 hours PRN Dyspepsia  melatonin 3 milliGRAM(s) Oral at bedtime PRN Insomnia  morphine  - Injectable 2 milliGRAM(s) IV Push every 6 hours PRN Severe Pain (7 - 10)  ondansetron Injectable 4 milliGRAM(s) IV Push every 6 hours PRN Nausea and/or Vomiting  oxycodone    5 mG/acetaminophen 325 mG 0.5 Tablet(s) Oral every 6 hours PRN Moderate Pain (4 - 6)      Vital Signs Last 24 Hrs  T(C): 36.8 (30 Oct 2023 15:10), Max: 37 (29 Oct 2023 22:28)  T(F): 98.2 (30 Oct 2023 15:10), Max: 98.6 (29 Oct 2023 22:28)  HR: 54 (30 Oct 2023 15:10) (54 - 87)  BP: 124/62 (30 Oct 2023 15:10) (124/62 - 159/98)  RR: 18 (30 Oct 2023 15:10) (18 - 18)  SpO2: 95% (30 Oct 2023 15:10) (95% - 97%)    Parameters below as of 30 Oct 2023 15:10  Patient On (Oxygen Delivery Method): room air    PHYSICAL EXAM:  GENERAL: NAD, lying in bed comfortably  HEAD:  Atraumatic, Normocephalic  EYES: conjunctiva and sclera clear  ENT: Moist mucous membranes  NECK: Supple, No JVD  CHEST/LUNG: Clear to auscultation bilaterally; No rales, rhonchi, wheezing. Unlabored respirations  HEART: Regular rate and rhythm; No murmurs  ABDOMEN: Bowel sounds present; Soft, Nontender, Nondistended.   EXTREMITIES:  2+ Peripheral Pulses, brisk capillary refill. No clubbing, cyanosis, or edema  NERVOUS SYSTEM:  Alert & Oriented X3, speech clear. No deficits   MSK: FROM all 4 extremities, full and equal strength  SKIN: LLE wound to medial aspect of foot, eschar (chronic ulcer)        LABS:                          9.6    7.20  )-----------( 367      ( 30 Oct 2023 06:49 )             29.6   10-30    143  |  109<H>  |  19  ----------------------------<  93  4.0   |  28  |  1.02    Ca    8.1<L>      30 Oct 2023 06:49  Mg     2.4     10-30            CAPILLARY BLOOD GLUCOSE            Urinalysis Basic - ( 29 Oct 2023 07:21 )    Color: x / Appearance: x / SG: x / pH: x  Gluc: 99 mg/dL / Ketone: x  / Bili: x / Urobili: x   Blood: x / Protein: x / Nitrite: x   Leuk Esterase: x / RBC: x / WBC x   Sq Epi: x / Non Sq Epi: x / Bacteria: x        RADIOLOGY:  < from: TTE Echo Complete w/o Contrast w/ Doppler (10.23.23 @ 10:51) >   Impression     Summary     The left ventricle is normal in size, wall thickness, wall motion and   contractility.   Estimated left ventricular ejection fraction is 55-60 %.   The left atrium is mildly dilated.   Mild to Moderate aortic regurgitation.   Mild (1+) tricuspid valve regurgitation.    < from: Cardiac Catheterization (10.27.23 @ 09:25) >  Coronary Angiography   The coronary circulation is left dominant.      LM   Distal left main: There is a 30 % stenosis.      LAD   Left anterior descending artery: The segment is large. Angiography  shows mild atherosclerosis.    CX   Circumflex: The segment is large, dominant and mildly calcified.  Angiography shows mild atherosclerosis. Mid circumflex:  There is a 30 % stenosis.      RCA   Right coronary artery: The segment is small, non-dominant. Angiography  shows no disease.    Left Heart Cath   Left ventricular function was assessed and demonstrates abnormal LV  wall motion. The anterobasal, anterolateral, apical,  diaphragmatic and posterobasal wall segments are hypokinetic. Global  left ventricular function is mildly depressed. Ejection  fraction was visually estimated by estimation with a value of 45%. LV  to AO pullback was performed. The left ventricular end  diastolic pressure was 30 mmHg. LHC performed: Aortic valve crossed  and left ventricular pressure obtained.

## 2023-10-30 NOTE — PROGRESS NOTE ADULT - SUBJECTIVE AND OBJECTIVE BOX
SURGERY DAILY PROGRESS NOTE:     Subjective:  Patient seen and examined this AM at bedside. No acute events overnight and patient resting comfortably. Denies pain in his left leg/foot, fever/chills, shortness of breath, chest pain. VS reviewed    Objective:    MEDICATIONS  (STANDING):  albuterol    0.083% 2.5 milliGRAM(s) Nebulizer every 10 minutes  aspirin enteric coated 81 milliGRAM(s) Oral daily  atorvastatin 80 milliGRAM(s) Oral at bedtime  chlorhexidine 2% Cloths 1 Application(s) Topical <User Schedule>  furosemide    Tablet 20 milliGRAM(s) Oral daily  heparin   Injectable 5000 Unit(s) SubCutaneous every 8 hours  influenza  Vaccine (HIGH DOSE) 0.7 milliLiter(s) IntraMuscular once  levothyroxine 88 MICROGram(s) Oral daily  losartan 25 milliGRAM(s) Oral daily  nebivolol 2.5 milliGRAM(s) Oral daily  piperacillin/tazobactam IVPB.. 3.375 Gram(s) IV Intermittent every 8 hours  silver sulfADIAZINE 1% Cream 1 Application(s) Topical daily  silver sulfADIAZINE 1% Cream 1 Application(s) Topical once  thiamine 100 milliGRAM(s) Oral daily    MEDICATIONS  (PRN):  acetaminophen     Tablet .. 650 milliGRAM(s) Oral every 6 hours PRN Mild Pain (1 - 3)  aluminum hydroxide/magnesium hydroxide/simethicone Suspension 30 milliLiter(s) Oral every 4 hours PRN Dyspepsia  melatonin 3 milliGRAM(s) Oral at bedtime PRN Insomnia  ondansetron Injectable 4 milliGRAM(s) IV Push every 6 hours PRN Nausea and/or Vomiting  oxyCODONE    IR 2.5 milliGRAM(s) Oral every 4 hours PRN Severe Pain (7 - 10)  traMADol 25 milliGRAM(s) Oral every 8 hours PRN Moderate Pain (4 - 6)      Vital Signs Last 24 Hrs  T(C): 37 (29 Oct 2023 22:28), Max: 37 (29 Oct 2023 22:28)  T(F): 98.6 (29 Oct 2023 22:28), Max: 98.6 (29 Oct 2023 22:28)  HR: 64 (29 Oct 2023 22:28) (56 - 69)  BP: 146/57 (29 Oct 2023 22:28) (126/57 - 163/89)  BP(mean): --  RR: 18 (29 Oct 2023 22:28) (18 - 18)  SpO2: 95% (29 Oct 2023 22:28) (95% - 98%)    Parameters below as of 29 Oct 2023 22:28  Patient On (Oxygen Delivery Method): room air          PHYSICAL EXAM   GENERAL: NAD, well developed, well nourished  HEAD: Atraumatic, normocephalic  EYES: EOMI, PERRLA, conjunctiva and sclera clear  ENT: moist mucous membrane  NECK: supple, No JVD, midline trachea  CHEST/LUNG: No increased WOB, symmetric excursions  Heart: RRR ppp, no peripheral edema  ABDOMEN: Round, nondistended, soft, nontender. no organomegaly  EXTREMITIES: Left foot mildly paler than right, eschar around left medial malleolus precludes PT doppler exam, no doppler signal of left DP, left popliteal doppler signal present. Right DP palpable. Motor and sensation of the left toes diminished compared to the right.  NERVOUS SYSTEM: AOx4, speech clear, no neuro-deficits  MSK: full ROM, no deformities  SKIN: warm to touch, no rash or lesions aside from aforementioned left ankle      I&O's Detail    28 Oct 2023 07:01  -  29 Oct 2023 07:00  --------------------------------------------------------  IN:    IV PiggyBack: 100 mL  Total IN: 100 mL    OUT:    Voided (mL): 625 mL  Total OUT: 625 mL    Total NET: -525 mL      29 Oct 2023 07:01  -  30 Oct 2023 03:11  --------------------------------------------------------  IN:  Total IN: 0 mL    OUT:    Voided (mL): 250 mL  Total OUT: 250 mL    Total NET: -250 mL          Daily     Daily Weight in k.1 (29 Oct 2023 06:19)    LABS:                        9.2    8.80  )-----------( 325      ( 29 Oct 2023 07:21 )             28.4     10-29    145  |  110<H>  |  23  ----------------------------<  99  3.7   |  28  |  0.95    Ca    8.1<L>      29 Oct 2023 07:21        Urinalysis Basic - ( 29 Oct 2023 07:21 )    Color: x / Appearance: x / SG: x / pH: x  Gluc: 99 mg/dL / Ketone: x  / Bili: x / Urobili: x   Blood: x / Protein: x / Nitrite: x   Leuk Esterase: x / RBC: x / WBC x   Sq Epi: x / Non Sq Epi: x / Bacteria: x        RADIOLOGY & ADDITIONAL STUDIES:    MRI of the left foot pending

## 2023-10-31 LAB
ANION GAP SERPL CALC-SCNC: 5 MMOL/L — SIGNIFICANT CHANGE UP (ref 5–17)
ANION GAP SERPL CALC-SCNC: 5 MMOL/L — SIGNIFICANT CHANGE UP (ref 5–17)
APTT BLD: 28.2 SEC — SIGNIFICANT CHANGE UP (ref 24.5–35.6)
APTT BLD: 28.2 SEC — SIGNIFICANT CHANGE UP (ref 24.5–35.6)
BUN SERPL-MCNC: 15 MG/DL — SIGNIFICANT CHANGE UP (ref 7–23)
BUN SERPL-MCNC: 15 MG/DL — SIGNIFICANT CHANGE UP (ref 7–23)
CALCIUM SERPL-MCNC: 7.6 MG/DL — LOW (ref 8.5–10.1)
CALCIUM SERPL-MCNC: 7.6 MG/DL — LOW (ref 8.5–10.1)
CHLORIDE SERPL-SCNC: 111 MMOL/L — HIGH (ref 96–108)
CHLORIDE SERPL-SCNC: 111 MMOL/L — HIGH (ref 96–108)
CO2 SERPL-SCNC: 25 MMOL/L — SIGNIFICANT CHANGE UP (ref 22–31)
CO2 SERPL-SCNC: 25 MMOL/L — SIGNIFICANT CHANGE UP (ref 22–31)
CREAT SERPL-MCNC: 0.96 MG/DL — SIGNIFICANT CHANGE UP (ref 0.5–1.3)
CREAT SERPL-MCNC: 0.96 MG/DL — SIGNIFICANT CHANGE UP (ref 0.5–1.3)
EGFR: 84 ML/MIN/1.73M2 — SIGNIFICANT CHANGE UP
EGFR: 84 ML/MIN/1.73M2 — SIGNIFICANT CHANGE UP
GLUCOSE SERPL-MCNC: 101 MG/DL — HIGH (ref 70–99)
GLUCOSE SERPL-MCNC: 101 MG/DL — HIGH (ref 70–99)
HCT VFR BLD CALC: 30.9 % — LOW (ref 39–50)
HCT VFR BLD CALC: 30.9 % — LOW (ref 39–50)
HGB BLD-MCNC: 9.8 G/DL — LOW (ref 13–17)
HGB BLD-MCNC: 9.8 G/DL — LOW (ref 13–17)
INR BLD: 1.04 RATIO — SIGNIFICANT CHANGE UP (ref 0.85–1.18)
INR BLD: 1.04 RATIO — SIGNIFICANT CHANGE UP (ref 0.85–1.18)
MAGNESIUM SERPL-MCNC: 2.4 MG/DL — SIGNIFICANT CHANGE UP (ref 1.6–2.6)
MAGNESIUM SERPL-MCNC: 2.4 MG/DL — SIGNIFICANT CHANGE UP (ref 1.6–2.6)
MCHC RBC-ENTMCNC: 28 PG — SIGNIFICANT CHANGE UP (ref 27–34)
MCHC RBC-ENTMCNC: 28 PG — SIGNIFICANT CHANGE UP (ref 27–34)
MCHC RBC-ENTMCNC: 31.7 GM/DL — LOW (ref 32–36)
MCHC RBC-ENTMCNC: 31.7 GM/DL — LOW (ref 32–36)
MCV RBC AUTO: 88.3 FL — SIGNIFICANT CHANGE UP (ref 80–100)
MCV RBC AUTO: 88.3 FL — SIGNIFICANT CHANGE UP (ref 80–100)
PHOSPHATE SERPL-MCNC: 2.3 MG/DL — LOW (ref 2.5–4.5)
PHOSPHATE SERPL-MCNC: 2.3 MG/DL — LOW (ref 2.5–4.5)
PLATELET # BLD AUTO: 428 K/UL — HIGH (ref 150–400)
PLATELET # BLD AUTO: 428 K/UL — HIGH (ref 150–400)
POTASSIUM SERPL-MCNC: 3.9 MMOL/L — SIGNIFICANT CHANGE UP (ref 3.5–5.3)
POTASSIUM SERPL-MCNC: 3.9 MMOL/L — SIGNIFICANT CHANGE UP (ref 3.5–5.3)
POTASSIUM SERPL-SCNC: 3.9 MMOL/L — SIGNIFICANT CHANGE UP (ref 3.5–5.3)
POTASSIUM SERPL-SCNC: 3.9 MMOL/L — SIGNIFICANT CHANGE UP (ref 3.5–5.3)
PROTHROM AB SERPL-ACNC: 11.7 SEC — SIGNIFICANT CHANGE UP (ref 9.5–13)
PROTHROM AB SERPL-ACNC: 11.7 SEC — SIGNIFICANT CHANGE UP (ref 9.5–13)
RBC # BLD: 3.5 M/UL — LOW (ref 4.2–5.8)
RBC # BLD: 3.5 M/UL — LOW (ref 4.2–5.8)
RBC # FLD: 15.9 % — HIGH (ref 10.3–14.5)
RBC # FLD: 15.9 % — HIGH (ref 10.3–14.5)
SODIUM SERPL-SCNC: 141 MMOL/L — SIGNIFICANT CHANGE UP (ref 135–145)
SODIUM SERPL-SCNC: 141 MMOL/L — SIGNIFICANT CHANGE UP (ref 135–145)
WBC # BLD: 9.23 K/UL — SIGNIFICANT CHANGE UP (ref 3.8–10.5)
WBC # BLD: 9.23 K/UL — SIGNIFICANT CHANGE UP (ref 3.8–10.5)
WBC # FLD AUTO: 9.23 K/UL — SIGNIFICANT CHANGE UP (ref 3.8–10.5)
WBC # FLD AUTO: 9.23 K/UL — SIGNIFICANT CHANGE UP (ref 3.8–10.5)

## 2023-10-31 PROCEDURE — 99232 SBSQ HOSP IP/OBS MODERATE 35: CPT

## 2023-10-31 PROCEDURE — 36200 PLACE CATHETER IN AORTA: CPT | Mod: LT

## 2023-10-31 RX ORDER — DIPHENHYDRAMINE HCL 50 MG
50 CAPSULE ORAL ONCE
Refills: 0 | Status: COMPLETED | OUTPATIENT
Start: 2023-10-31 | End: 2023-10-31

## 2023-10-31 RX ORDER — SODIUM CHLORIDE 9 MG/ML
1000 INJECTION INTRAMUSCULAR; INTRAVENOUS; SUBCUTANEOUS
Refills: 0 | Status: DISCONTINUED | OUTPATIENT
Start: 2023-10-31 | End: 2023-11-02

## 2023-10-31 RX ORDER — FENTANYL CITRATE 50 UG/ML
50 INJECTION INTRAVENOUS
Refills: 0 | Status: DISCONTINUED | OUTPATIENT
Start: 2023-10-31 | End: 2023-10-31

## 2023-10-31 RX ORDER — POTASSIUM PHOSPHATE, MONOBASIC POTASSIUM PHOSPHATE, DIBASIC 236; 224 MG/ML; MG/ML
15 INJECTION, SOLUTION INTRAVENOUS ONCE
Refills: 0 | Status: COMPLETED | OUTPATIENT
Start: 2023-10-31 | End: 2023-10-31

## 2023-10-31 RX ORDER — OXYCODONE HYDROCHLORIDE 5 MG/1
5 TABLET ORAL ONCE
Refills: 0 | Status: DISCONTINUED | OUTPATIENT
Start: 2023-10-31 | End: 2023-10-31

## 2023-10-31 RX ORDER — DIPHENHYDRAMINE HCL 50 MG
50 CAPSULE ORAL ONCE
Refills: 0 | Status: COMPLETED | OUTPATIENT
Start: 2023-11-01 | End: 2023-11-01

## 2023-10-31 RX ORDER — ONDANSETRON 8 MG/1
4 TABLET, FILM COATED ORAL ONCE
Refills: 0 | Status: DISCONTINUED | OUTPATIENT
Start: 2023-10-31 | End: 2023-10-31

## 2023-10-31 RX ORDER — SODIUM CHLORIDE 9 MG/ML
1000 INJECTION, SOLUTION INTRAVENOUS
Refills: 0 | Status: DISCONTINUED | OUTPATIENT
Start: 2023-10-31 | End: 2023-10-31

## 2023-10-31 RX ADMIN — Medication 88 MICROGRAM(S): at 07:04

## 2023-10-31 RX ADMIN — FENTANYL CITRATE 50 MICROGRAM(S): 50 INJECTION INTRAVENOUS at 19:52

## 2023-10-31 RX ADMIN — FENTANYL CITRATE 50 MICROGRAM(S): 50 INJECTION INTRAVENOUS at 19:22

## 2023-10-31 RX ADMIN — NEBIVOLOL HYDROCHLORIDE 2.5 MILLIGRAM(S): 5 TABLET ORAL at 09:34

## 2023-10-31 RX ADMIN — Medication 50 MILLIGRAM(S): at 13:47

## 2023-10-31 RX ADMIN — Medication 100 MILLIGRAM(S): at 09:34

## 2023-10-31 RX ADMIN — POTASSIUM PHOSPHATE, MONOBASIC POTASSIUM PHOSPHATE, DIBASIC 62.5 MILLIMOLE(S): 236; 224 INJECTION, SOLUTION INTRAVENOUS at 10:41

## 2023-10-31 RX ADMIN — Medication 20 MILLIGRAM(S): at 09:34

## 2023-10-31 RX ADMIN — SODIUM CHLORIDE 75 MILLILITER(S): 9 INJECTION INTRAMUSCULAR; INTRAVENOUS; SUBCUTANEOUS at 23:52

## 2023-10-31 RX ADMIN — ATORVASTATIN CALCIUM 80 MILLIGRAM(S): 80 TABLET, FILM COATED ORAL at 23:45

## 2023-10-31 RX ADMIN — SODIUM CHLORIDE 100 MILLILITER(S): 9 INJECTION INTRAMUSCULAR; INTRAVENOUS; SUBCUTANEOUS at 13:55

## 2023-10-31 RX ADMIN — Medication 32 MILLIGRAM(S): at 21:20

## 2023-10-31 RX ADMIN — SODIUM CHLORIDE 100 MILLILITER(S): 9 INJECTION INTRAMUSCULAR; INTRAVENOUS; SUBCUTANEOUS at 03:00

## 2023-10-31 RX ADMIN — Medication 50 MILLIGRAM(S): at 09:32

## 2023-10-31 RX ADMIN — LOSARTAN POTASSIUM 25 MILLIGRAM(S): 100 TABLET, FILM COATED ORAL at 09:34

## 2023-10-31 RX ADMIN — Medication 50 MILLIGRAM(S): at 03:00

## 2023-10-31 NOTE — PROGRESS NOTE ADULT - ASSESSMENT
72 y/o male with PMHX of HTN, severe PVD s/p multiple stents b/l legs, s/p fem/popliteal bypass, chronic left lower ankle ulceration, GERD, HLD who presented to  with CC of left LE pain and bleeding and near syncope.    Acute blood loss anemia from bleeding L heel ulcer  -No further bleeding   -H/H stable   -Podiatry consult noted     Acute hypoxic respiratory failure   Possible, TRALI/TACO s/p 1 unit of blood   Hyperactive delirium from hypoxia  Acute pulmonary edema  vs. pneumonia  -Now breathing well on RA  -Asymptomatic   -CXR shows B/L Infiltrates R>L  -continue diuresis  -s/p zosyn possible pneumonia x  5 days    Acute on chronic HFrEF   Non obstructive CAD  -Now stable  -TTE as above, EF: 45-50%  -S/p [facility]   -Cardiology following     Severe small vessel disease on left lower extremity   Chronic limb ischemia  -S/p multiple revascularization interventions, now occluded fem-pop and fem-fem bypass  -planned for OR wish vascular surgery sometime this week   -Cardiology clearance noted     Pre-operative risk evaluation   -S/p LHC with non obstructive disease, TTE as above   -Cleared by cardiology   -METs > 4  -RCRI (revised cardiac risk index score): 3%. 15% 30-risk of death, MI, cardiac arrest  -Benefits of surgery outweigh the risk  -Patient is currently optimized from a medical standpoint therefore no absolute contraindication to proceeding with procedure    Heel ulcer  -podiatry consult appreciated  -MRI to r/o OM: no OM     HTN emergency  -Resolved  -Continue losartan, nebivolol, furosemide     #Severe protein-calorie malnutrition  -Nutrition consult appreciated  -Add supplements/ensure     JENIFER  -Improved     VTE ppx: heparin subQ

## 2023-10-31 NOTE — PROGRESS NOTE ADULT - ASSESSMENT
74yo M presents w/ bleeding LLE venous stasis ulcer. Severe small vessel disease on left lower extremity with multiple revascularization interventions, now occluded fem-pop and fem-fem bypass. 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 Awaiting angiogram today, started premedication for contrast allergy overnight.    Recommendation:  - Continue premeciation for contrast allergy, prednisone x 3 & Benadryl x 1 preop--ordered  - NPO for angiogram today  - Cleared by Medicine & Cardiology  - Planning for lower extremity distal bypass tomorrow  - Rest of care per primary team    Plan discussed with vascular surgery attending

## 2023-10-31 NOTE — PROGRESS NOTE ADULT - SUBJECTIVE AND OBJECTIVE BOX
HPI: 74 y/o male with PMHX of HTN, severe PVD s/p multiple stents b/l legs, s/p fem/popliteal bypass, chronic left lower ankle ulceration, GERD, HLD who presented to  with CC of left LE pain and bleeding.  Patient notes he was at his family's house earlier today.  He was sitting on the floor with his grandson when the family noticed significant bleeding in his left leg.  His wife tried to get him up and into the bathroom to stop the bleeding.  She got him to sit on the side of the bathtub and was trying to hold pressure over the bleeding when he seemed to lose consciousness.  He didn't fall over but as per wife, turned white and wasn't responding to her.  They called 911.  He wasn't responding for a few minutes and then came to.  She denies hx of syncope/ near syncope in the past.  In the ER, bleeding had mostly stopped.  Patient was c/o severe pain left foot.  Left foot discoloration and vascular surgery called for consult.  Labs with ARF with Cr of 2.2-- unclear baseline.  Patient has been taking advil at home for pain in foot. RR called for acute hypoxia and agitation. SpO2 75%. /90. Placed on NRB, given morphine 2mg IVP x2 POCUS showed diffuse anterior b lines. Placed on BIPAP transferred to ICU. cxr CHF vs. right sided infiltrate, diuresis and started on zosyn. Pt with acute and chronic diastolic CHF and elevated trop. Was taken to the cath lab for ischemic evaluation. Experienced hyperactive delirium from hypoxia, downgraded to medicine 10/28.     Subjective: Patient seen and examined today, no overnight issues reported.     REVIEW OF SYSTEMS:    CONSTITUTIONAL: No weakness, fevers or chills  EYES/ENT: No visual changes;  No vertigo or throat pain   NECK: No pain or stiffness  RESPIRATORY: No cough, wheezing, hemoptysis; No shortness of breath  CARDIOVASCULAR: No chest pain or palpitations  GASTROINTESTINAL: No abdominal or epigastric pain. No nausea, vomiting, or hematemesis; No diarrhea or constipation. No melena or hematochezia.  GENITOURINARY: No dysuria, frequency or hematuria  NEUROLOGICAL: No numbness or weakness  SKIN: No itching, rashes    Vital Signs Last 24 Hrs  T(C): 36.8 (31 Oct 2023 08:03), Max: 37 (30 Oct 2023 22:00)  T(F): 98.2 (31 Oct 2023 08:03), Max: 98.6 (30 Oct 2023 22:00)  HR: 68 (31 Oct 2023 08:03) (60 - 68)  BP: 166/51 (31 Oct 2023 08:03) (139/65 - 166/51)  RR: 18 (31 Oct 2023 08:03) (18 - 18)  SpO2: 94% (31 Oct 2023 08:03) (94% - 97%)    Parameters below as of 31 Oct 2023 08:03  Patient On (Oxygen Delivery Method): room air    PHYSICAL EXAM:  GENERAL: NAD, lying in bed comfortably  HEAD:  Atraumatic, Normocephalic  EYES: conjunctiva and sclera clear  ENT: Moist mucous membranes  NECK: Supple, No JVD  CHEST/LUNG: Clear to auscultation bilaterally; No rales, rhonchi, wheezing. Unlabored respirations  HEART: Regular rate and rhythm; No murmurs  ABDOMEN: Bowel sounds present; Soft, Nontender, Nondistended.   EXTREMITIES:  2+ Peripheral Pulses, brisk capillary refill. No clubbing, cyanosis, or edema  NERVOUS SYSTEM:  Alert & Oriented X3, speech clear. No deficits   MSK: FROM all 4 extremities, full and equal strength  SKIN: LLE wound to medial aspect of foot, eschar (chronic ulcer)    MEDICATIONS  (STANDING):  aspirin enteric coated 81 milliGRAM(s) Oral daily  atorvastatin 80 milliGRAM(s) Oral at bedtime  chlorhexidine 2% Cloths 1 Application(s) Topical <User Schedule>  furosemide    Tablet 20 milliGRAM(s) Oral daily  heparin   Injectable 5000 Unit(s) SubCutaneous every 8 hours  influenza  Vaccine (HIGH DOSE) 0.7 milliLiter(s) IntraMuscular once  levothyroxine 88 MICROGram(s) Oral daily  losartan 25 milliGRAM(s) Oral daily  nebivolol 2.5 milliGRAM(s) Oral daily  silver sulfADIAZINE 1% Cream 1 Application(s) Topical daily  silver sulfADIAZINE 1% Cream 1 Application(s) Topical once  sodium chloride 0.9%. 1000 milliLiter(s) (100 mL/Hr) IV Continuous <Continuous>  thiamine 100 milliGRAM(s) Oral daily    MEDICATIONS  (PRN):  acetaminophen     Tablet .. 650 milliGRAM(s) Oral every 6 hours PRN Mild Pain (1 - 3)  albuterol    90 MICROgram(s) HFA Inhaler 2 Puff(s) Inhalation every 6 hours PRN Shortness of Breath and/or Wheezing  aluminum hydroxide/magnesium hydroxide/simethicone Suspension 30 milliLiter(s) Oral every 4 hours PRN Dyspepsia  melatonin 3 milliGRAM(s) Oral at bedtime PRN Insomnia  ondansetron Injectable 4 milliGRAM(s) IV Push every 6 hours PRN Nausea and/or Vomiting  oxyCODONE    IR 2.5 milliGRAM(s) Oral every 4 hours PRN Severe Pain (7 - 10)  traMADol 25 milliGRAM(s) Oral every 8 hours PRN Moderate Pain (4 - 6)          LABS:                        9.8    9.23  )-----------( 428      ( 31 Oct 2023 06:33 )             30.9   10-31    141  |  111<H>  |  15  ----------------------------<  101<H>  3.9   |  25  |  0.96    Ca    7.6<L>      31 Oct 2023 06:33  Phos  2.3     10-31  Mg     2.4     10-31              CAPILLARY BLOOD GLUCOSE            Urinalysis Basic - ( 29 Oct 2023 07:21 )    Color: x / Appearance: x / SG: x / pH: x  Gluc: 99 mg/dL / Ketone: x  / Bili: x / Urobili: x   Blood: x / Protein: x / Nitrite: x   Leuk Esterase: x / RBC: x / WBC x   Sq Epi: x / Non Sq Epi: x / Bacteria: x        RADIOLOGY:  < from: TTE Echo Complete w/o Contrast w/ Doppler (10.23.23 @ 10:51) >   Impression     Summary     The left ventricle is normal in size, wall thickness, wall motion and   contractility.   Estimated left ventricular ejection fraction is 55-60 %.   The left atrium is mildly dilated.   Mild to Moderate aortic regurgitation.   Mild (1+) tricuspid valve regurgitation.    < from: Cardiac Catheterization (10.27.23 @ 09:25) >  Coronary Angiography   The coronary circulation is left dominant.      LM   Distal left main: There is a 30 % stenosis.      LAD   Left anterior descending artery: The segment is large. Angiography  shows mild atherosclerosis.    CX   Circumflex: The segment is large, dominant and mildly calcified.  Angiography shows mild atherosclerosis. Mid circumflex:  There is a 30 % stenosis.      RCA   Right coronary artery: The segment is small, non-dominant. Angiography  shows no disease.    Left Heart Cath   Left ventricular function was assessed and demonstrates abnormal LV  wall motion. The anterobasal, anterolateral, apical,  diaphragmatic and posterobasal wall segments are hypokinetic. Global  left ventricular function is mildly depressed. Ejection  fraction was visually estimated by estimation with a value of 45%. LV  to AO pullback was performed. The left ventricular end  diastolic pressure was 30 mmHg. LHC performed: Aortic valve crossed  and left ventricular pressure obtained.

## 2023-10-31 NOTE — PROGRESS NOTE ADULT - SUBJECTIVE AND OBJECTIVE BOX
SURGERY DAILY PROGRESS NOTE:     Subjective:  Patient seen and examined this AM at bedside. No acute events overnight and patient resting comfortably. Denies pain in his left leg/foot, fever/chills, shortness of breath, chest pain. VS reviewed    Objective:    MEDICATIONS  (STANDING):  aspirin enteric coated 81 milliGRAM(s) Oral daily  atorvastatin 80 milliGRAM(s) Oral at bedtime  chlorhexidine 2% Cloths 1 Application(s) Topical <User Schedule>  diphenhydrAMINE Elixir 50 milliGRAM(s) Oral once  furosemide    Tablet 20 milliGRAM(s) Oral daily  heparin   Injectable 5000 Unit(s) SubCutaneous every 8 hours  influenza  Vaccine (HIGH DOSE) 0.7 milliLiter(s) IntraMuscular once  levothyroxine 88 MICROGram(s) Oral daily  losartan 25 milliGRAM(s) Oral daily  nebivolol 2.5 milliGRAM(s) Oral daily  predniSONE   Tablet 50 milliGRAM(s) Oral once  predniSONE   Tablet 50 milliGRAM(s) Oral once  silver sulfADIAZINE 1% Cream 1 Application(s) Topical daily  silver sulfADIAZINE 1% Cream 1 Application(s) Topical once  sodium chloride 0.9%. 1000 milliLiter(s) (100 mL/Hr) IV Continuous <Continuous>  thiamine 100 milliGRAM(s) Oral daily    MEDICATIONS  (PRN):  acetaminophen     Tablet .. 650 milliGRAM(s) Oral every 6 hours PRN Mild Pain (1 - 3)  albuterol    90 MICROgram(s) HFA Inhaler 2 Puff(s) Inhalation every 6 hours PRN Shortness of Breath and/or Wheezing  aluminum hydroxide/magnesium hydroxide/simethicone Suspension 30 milliLiter(s) Oral every 4 hours PRN Dyspepsia  melatonin 3 milliGRAM(s) Oral at bedtime PRN Insomnia  ondansetron Injectable 4 milliGRAM(s) IV Push every 6 hours PRN Nausea and/or Vomiting  oxyCODONE    IR 2.5 milliGRAM(s) Oral every 4 hours PRN Severe Pain (7 - 10)  traMADol 25 milliGRAM(s) Oral every 8 hours PRN Moderate Pain (4 - 6)      Vital Signs Last 24 Hrs  T(C): 36.8 (30 Oct 2023 15:10), Max: 36.8 (30 Oct 2023 15:10)  T(F): 98.2 (30 Oct 2023 15:10), Max: 98.2 (30 Oct 2023 15:10)  HR: 54 (30 Oct 2023 15:10) (54 - 87)  BP: 124/62 (30 Oct 2023 15:10) (124/62 - 159/98)  BP(mean): --  RR: 18 (30 Oct 2023 15:10) (18 - 18)  SpO2: 95% (30 Oct 2023 15:10) (95% - 97%)    Parameters below as of 30 Oct 2023 15:10  Patient On (Oxygen Delivery Method): room air          PHYSICAL EXAM   GENERAL: NAD, well developed, well nourished  HEAD: Atraumatic, normocephalic  EYES: EOMI, PERRLA, conjunctiva and sclera clear  ENT: moist mucous membrane  NECK: supple, No JVD, midline trachea  CHEST/LUNG: No increased WOB, symmetric excursions  Heart: RRR ppp, no peripheral edema  ABDOMEN: Round, nondistended, soft, nontender. no organomegaly  EXTREMITIES: Left foot mildly paler than right, eschar around left medial malleolus precludes PT doppler exam, no doppler signal of left DP, left popliteal doppler signal present. Right DP palpable. Motor and sensation of the left toes diminished compared to the right.  NERVOUS SYSTEM: AOx4, speech clear, no neuro-deficits  MSK: full ROM, no deformities  SKIN: warm to touch, no rash or lesions aside from aforementioned left ankle      I&O's Detail    29 Oct 2023 07:01  -  30 Oct 2023 07:00  --------------------------------------------------------  IN:  Total IN: 0 mL    OUT:    Voided (mL): 475 mL  Total OUT: 475 mL    Total NET: -475 mL      30 Oct 2023 07:01  -  31 Oct 2023 05:44  --------------------------------------------------------  IN:    IV PiggyBack: 100 mL  Total IN: 100 mL    OUT:  Total OUT: 0 mL    Total NET: 100 mL          Daily     Daily     LABS:                        9.6    7.20  )-----------( 367      ( 30 Oct 2023 06:49 )             29.6     10-30    143  |  109<H>  |  19  ----------------------------<  93  4.0   |  28  |  1.02    Ca    8.1<L>      30 Oct 2023 06:49  Mg     2.4     10-30        Urinalysis Basic - ( 30 Oct 2023 06:49 )    Color: x / Appearance: x / SG: x / pH: x  Gluc: 93 mg/dL / Ketone: x  / Bili: x / Urobili: x   Blood: x / Protein: x / Nitrite: x   Leuk Esterase: x / RBC: x / WBC x   Sq Epi: x / Non Sq Epi: x / Bacteria: x        RADIOLOGY & ADDITIONAL STUDIES:

## 2023-10-31 NOTE — PROGRESS NOTE ADULT - SUBJECTIVE AND OBJECTIVE BOX
Date of service: 10/31/2023  HPI: 72 y/o male with PMHX of HTN, severe PVD s/p multiple stents b/l legs, s/p fem/popliteal bypass, chronic left lower ankle ulceration, GERD, HLD who presented to  with CC of left LE pain and bleeding.  Patient notes he was at his family's house earlier 10/22.  He was sitting on the floor with his grandson when the family noticed significant bleeding in his left leg.  His wife tried to get him up and into the bathroom to stop the bleeding.  She got him to sit on the side of the bathtub and was trying to hold pressure over the bleeding when he seemed to lose consciousness.  He didn't fall over but as per wife, turned white and wasn't responding to her.  They called 911.  He wasn't responding for a few minutes and then came to.  She denies hx of syncope/ near syncope in the past.  In the ER, bleeding had mostly stopped.  Patient was c/o severe pain left foot.  Left foot discoloration and vascular surgery called for consult.  Labs with ARF with Cr of 2.2-- unclear baseline.  Patient has been taking advil at home for pain in foot.      Podiatry was consulted for left foot ulcer. Pt resting comfortably bedside today. Pt tender to wound site.     10/31/2023: Pt seen by podiatry team, pt resting bedside, NAD, pain controlled. No other pedal complaints    PAST MEDICAL & SURGICAL HISTORY:  Essential hypertension  PVD (peripheral vascular disease)  Gastroesophageal reflux disease, esophagitis presence not specified  Hypothyroidism  Chronic obstructive pulmonary disease, unspecified COPD type  Eczema  Hyperlipidemia, unspecified hyperlipidemia type  Medial meniscus tear  left knee  Femoral popliteal artery thrombus  h/o Fem pop bypass 1997  PVD (peripheral vascular disease)  s/p peripheral stent insertion bilateral lower extemities x 10    MEDICATIONS  (STANDING):  aspirin enteric coated 81 milliGRAM(s) Oral daily  atorvastatin 80 milliGRAM(s) Oral at bedtime  chlorhexidine 2% Cloths 1 Application(s) Topical <User Schedule>  diphenhydrAMINE Elixir 50 milliGRAM(s) Oral once  furosemide    Tablet 20 milliGRAM(s) Oral daily  heparin   Injectable 5000 Unit(s) SubCutaneous every 8 hours  influenza  Vaccine (HIGH DOSE) 0.7 milliLiter(s) IntraMuscular once  levothyroxine 88 MICROGram(s) Oral daily  losartan 25 milliGRAM(s) Oral daily  nebivolol 2.5 milliGRAM(s) Oral daily  predniSONE   Tablet 50 milliGRAM(s) Oral once  predniSONE   Tablet 50 milliGRAM(s) Oral once  silver sulfADIAZINE 1% Cream 1 Application(s) Topical daily  silver sulfADIAZINE 1% Cream 1 Application(s) Topical once  sodium chloride 0.9%. 1000 milliLiter(s) (100 mL/Hr) IV Continuous <Continuous>  thiamine 100 milliGRAM(s) Oral daily    MEDICATIONS  (PRN):  acetaminophen     Tablet .. 650 milliGRAM(s) Oral every 6 hours PRN Mild Pain (1 - 3)  albuterol    90 MICROgram(s) HFA Inhaler 2 Puff(s) Inhalation every 6 hours PRN Shortness of Breath and/or Wheezing  aluminum hydroxide/magnesium hydroxide/simethicone Suspension 30 milliLiter(s) Oral every 4 hours PRN Dyspepsia  melatonin 3 milliGRAM(s) Oral at bedtime PRN Insomnia  ondansetron Injectable 4 milliGRAM(s) IV Push every 6 hours PRN Nausea and/or Vomiting  oxyCODONE    IR 2.5 milliGRAM(s) Oral every 4 hours PRN Severe Pain (7 - 10)  traMADol 25 milliGRAM(s) Oral every 8 hours PRN Moderate Pain (4 - 6)            FAMILY HISTORY:  Family hx of vascular disease      Social History:    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    Ex-smoker-- quit 20 years ago      Allergies:  Betadine (Hives; Rash)   (22 Oct 2023 14:35)            PMH: Essential hypertension    PVD (peripheral vascular disease)    Gastroesophageal reflux disease, esophagitis presence not specified    Hypothyroidism    Chronic obstructive pulmonary disease, unspecified COPD type    Eczema    Hyperlipidemia, unspecified hyperlipidemia type    Medial meniscus tear      PSH:Femoral popliteal artery thrombus    PVD (peripheral vascular disease)        Allergies:Betadine (Hives; Rash)      Labs:                                   9.8    9.23  )-----------( 428      ( 31 Oct 2023 06:33 )             30.9     10-31    141  |  111<H>  |  15  ----------------------------<  101<H>  3.9   |  25  |  0.96    Ca    7.6<L>      31 Oct 2023 06:33  Phos  2.3     10-31  Mg     2.4     10-31      Vital Signs Last 24 Hrs  T(C): 36.8 (31 Oct 2023 08:03), Max: 37 (30 Oct 2023 22:00)  T(F): 98.2 (31 Oct 2023 08:03), Max: 98.6 (30 Oct 2023 22:00)  HR: 68 (31 Oct 2023 08:03) (54 - 68)  BP: 166/51 (31 Oct 2023 08:03) (124/62 - 166/51)  BP(mean): --  RR: 18 (31 Oct 2023 08:03) (18 - 18)  SpO2: 94% (31 Oct 2023 08:03) (94% - 97%)    Parameters below as of 31 Oct 2023 08:03  Patient On (Oxygen Delivery Method): room air                REVIEW OF SYSTEMS:    CONSTITUTIONAL: No weakness, fevers or chills  EYES: No visual changes  RESPIRATORY: No cough, wheezing; No shortness of breath  CARDIOVASCULAR: No chest pain or palpitations  GASTROINTESTINAL: No abdominal or epigastric pain. No nausea, vomiting; No diarrhea or constipation.   GENITOURINARY: No dysuria, frequency or hematuria  NEUROLOGICAL: No numbness or weakness  SKIN: See physical examination.  All other review of systems is negative unless indicated above    Physical Exam:   Constitutional: NAD, alert;  Lower Extremity Focus  Derm:  Skin warm, dry and supple bilateral.    Ulceration to the left medial heel, wound base is mostly eschar, wound size is 3.5cm x 3.5cm, no periwound edema or erythema noted. no purulence or pus noted, no tracking/tunneling noted, no PTB, no malodor  Vascular: Dorsalis Pedis and Posterior Tibial pulses non palpable.  Capillary re-fill time more then 3 seconds digits 1-5 bilateral.    Neuro: Protective sensation diminished to the level of the digits bilateral.  MSK: Muscle strength 5/5 all major muscle groups bilateral. TTP to the wound site on the left medial mal          < from: Xray Foot AP + Lateral + Oblique, Left (10.22.23 @ 11:51) >  PROCEDURE DATE:  10/22/2023          INTERPRETATION:  Left ankle, left foot, and chest. Concern is chronic   wound around the malleoli.    Left ankle. 3 views.    There is long area of soft tissue calcification likely in a venous   structure in the medial lower leg.    There is mild degeneration of the malleolar tips.    There is some swelling seen approaching cavers triangle.    No bone destruction or fracture.    Left foot. 3 views.    No bone destruction or fracture is evident.    AP chest on October 22, 2023 at 12:34 PM.    Heart magnified by technique.    Lungs are clear.    Chest is similar to August 28, 2019.    IMPRESSION: No acute chest finding. Mild edema around the left ankle   posteriorly. Benign-appearing calcifications in the medial left lower leg   are likely venous. No acute bony finding.    --- End of Report ---        < end of copied text >

## 2023-11-01 ENCOUNTER — RESULT REVIEW (OUTPATIENT)
Age: 73
End: 2023-11-01

## 2023-11-01 ENCOUNTER — TRANSCRIPTION ENCOUNTER (OUTPATIENT)
Age: 73
End: 2023-11-01

## 2023-11-01 ENCOUNTER — APPOINTMENT (OUTPATIENT)
Dept: VASCULAR SURGERY | Facility: HOSPITAL | Age: 73
End: 2023-11-01

## 2023-11-01 LAB
ANION GAP SERPL CALC-SCNC: 6 MMOL/L — SIGNIFICANT CHANGE UP (ref 5–17)
ANION GAP SERPL CALC-SCNC: 6 MMOL/L — SIGNIFICANT CHANGE UP (ref 5–17)
BUN SERPL-MCNC: 24 MG/DL — HIGH (ref 7–23)
BUN SERPL-MCNC: 24 MG/DL — HIGH (ref 7–23)
CALCIUM SERPL-MCNC: 7.1 MG/DL — LOW (ref 8.5–10.1)
CALCIUM SERPL-MCNC: 7.1 MG/DL — LOW (ref 8.5–10.1)
CHLORIDE SERPL-SCNC: 114 MMOL/L — HIGH (ref 96–108)
CHLORIDE SERPL-SCNC: 114 MMOL/L — HIGH (ref 96–108)
CO2 SERPL-SCNC: 24 MMOL/L — SIGNIFICANT CHANGE UP (ref 22–31)
CO2 SERPL-SCNC: 24 MMOL/L — SIGNIFICANT CHANGE UP (ref 22–31)
CREAT SERPL-MCNC: 1.06 MG/DL — SIGNIFICANT CHANGE UP (ref 0.5–1.3)
CREAT SERPL-MCNC: 1.06 MG/DL — SIGNIFICANT CHANGE UP (ref 0.5–1.3)
EGFR: 75 ML/MIN/1.73M2 — SIGNIFICANT CHANGE UP
EGFR: 75 ML/MIN/1.73M2 — SIGNIFICANT CHANGE UP
GLUCOSE SERPL-MCNC: 121 MG/DL — HIGH (ref 70–99)
GLUCOSE SERPL-MCNC: 121 MG/DL — HIGH (ref 70–99)
HCT VFR BLD CALC: 24 % — LOW (ref 39–50)
HCT VFR BLD CALC: 24 % — LOW (ref 39–50)
HCT VFR BLD CALC: 25.3 % — LOW (ref 39–50)
HCT VFR BLD CALC: 25.3 % — LOW (ref 39–50)
HCT VFR BLD CALC: 26.2 % — LOW (ref 39–50)
HCT VFR BLD CALC: 26.2 % — LOW (ref 39–50)
HGB BLD-MCNC: 7.8 G/DL — LOW (ref 13–17)
HGB BLD-MCNC: 7.8 G/DL — LOW (ref 13–17)
HGB BLD-MCNC: 8.5 G/DL — LOW (ref 13–17)
HGB BLD-MCNC: 8.5 G/DL — LOW (ref 13–17)
HGB BLD-MCNC: 8.6 G/DL — LOW (ref 13–17)
HGB BLD-MCNC: 8.6 G/DL — LOW (ref 13–17)
MCHC RBC-ENTMCNC: 28.9 PG — SIGNIFICANT CHANGE UP (ref 27–34)
MCHC RBC-ENTMCNC: 28.9 PG — SIGNIFICANT CHANGE UP (ref 27–34)
MCHC RBC-ENTMCNC: 29.2 PG — SIGNIFICANT CHANGE UP (ref 27–34)
MCHC RBC-ENTMCNC: 29.2 PG — SIGNIFICANT CHANGE UP (ref 27–34)
MCHC RBC-ENTMCNC: 29.6 PG — SIGNIFICANT CHANGE UP (ref 27–34)
MCHC RBC-ENTMCNC: 29.6 PG — SIGNIFICANT CHANGE UP (ref 27–34)
MCHC RBC-ENTMCNC: 32.5 GM/DL — SIGNIFICANT CHANGE UP (ref 32–36)
MCHC RBC-ENTMCNC: 32.5 GM/DL — SIGNIFICANT CHANGE UP (ref 32–36)
MCHC RBC-ENTMCNC: 32.8 GM/DL — SIGNIFICANT CHANGE UP (ref 32–36)
MCHC RBC-ENTMCNC: 32.8 GM/DL — SIGNIFICANT CHANGE UP (ref 32–36)
MCHC RBC-ENTMCNC: 33.6 GM/DL — SIGNIFICANT CHANGE UP (ref 32–36)
MCHC RBC-ENTMCNC: 33.6 GM/DL — SIGNIFICANT CHANGE UP (ref 32–36)
MCV RBC AUTO: 88.2 FL — SIGNIFICANT CHANGE UP (ref 80–100)
MCV RBC AUTO: 88.2 FL — SIGNIFICANT CHANGE UP (ref 80–100)
MCV RBC AUTO: 88.8 FL — SIGNIFICANT CHANGE UP (ref 80–100)
MCV RBC AUTO: 88.8 FL — SIGNIFICANT CHANGE UP (ref 80–100)
MCV RBC AUTO: 88.9 FL — SIGNIFICANT CHANGE UP (ref 80–100)
MCV RBC AUTO: 88.9 FL — SIGNIFICANT CHANGE UP (ref 80–100)
PLATELET # BLD AUTO: 314 K/UL — SIGNIFICANT CHANGE UP (ref 150–400)
PLATELET # BLD AUTO: 314 K/UL — SIGNIFICANT CHANGE UP (ref 150–400)
PLATELET # BLD AUTO: 342 K/UL — SIGNIFICANT CHANGE UP (ref 150–400)
PLATELET # BLD AUTO: 342 K/UL — SIGNIFICANT CHANGE UP (ref 150–400)
PLATELET # BLD AUTO: 406 K/UL — HIGH (ref 150–400)
PLATELET # BLD AUTO: 406 K/UL — HIGH (ref 150–400)
POTASSIUM SERPL-MCNC: 4.6 MMOL/L — SIGNIFICANT CHANGE UP (ref 3.5–5.3)
POTASSIUM SERPL-MCNC: 4.6 MMOL/L — SIGNIFICANT CHANGE UP (ref 3.5–5.3)
POTASSIUM SERPL-SCNC: 4.6 MMOL/L — SIGNIFICANT CHANGE UP (ref 3.5–5.3)
POTASSIUM SERPL-SCNC: 4.6 MMOL/L — SIGNIFICANT CHANGE UP (ref 3.5–5.3)
RBC # BLD: 2.7 M/UL — LOW (ref 4.2–5.8)
RBC # BLD: 2.7 M/UL — LOW (ref 4.2–5.8)
RBC # BLD: 2.87 M/UL — LOW (ref 4.2–5.8)
RBC # BLD: 2.87 M/UL — LOW (ref 4.2–5.8)
RBC # BLD: 2.95 M/UL — LOW (ref 4.2–5.8)
RBC # BLD: 2.95 M/UL — LOW (ref 4.2–5.8)
RBC # FLD: 15.4 % — HIGH (ref 10.3–14.5)
RBC # FLD: 15.4 % — HIGH (ref 10.3–14.5)
RBC # FLD: 15.7 % — HIGH (ref 10.3–14.5)
RBC # FLD: 15.7 % — HIGH (ref 10.3–14.5)
RBC # FLD: 15.8 % — HIGH (ref 10.3–14.5)
RBC # FLD: 15.8 % — HIGH (ref 10.3–14.5)
SODIUM SERPL-SCNC: 144 MMOL/L — SIGNIFICANT CHANGE UP (ref 135–145)
SODIUM SERPL-SCNC: 144 MMOL/L — SIGNIFICANT CHANGE UP (ref 135–145)
WBC # BLD: 14.22 K/UL — HIGH (ref 3.8–10.5)
WBC # BLD: 14.22 K/UL — HIGH (ref 3.8–10.5)
WBC # BLD: 17.92 K/UL — HIGH (ref 3.8–10.5)
WBC # BLD: 17.92 K/UL — HIGH (ref 3.8–10.5)
WBC # BLD: 20.63 K/UL — HIGH (ref 3.8–10.5)
WBC # BLD: 20.63 K/UL — HIGH (ref 3.8–10.5)
WBC # FLD AUTO: 14.22 K/UL — HIGH (ref 3.8–10.5)
WBC # FLD AUTO: 14.22 K/UL — HIGH (ref 3.8–10.5)
WBC # FLD AUTO: 17.92 K/UL — HIGH (ref 3.8–10.5)
WBC # FLD AUTO: 17.92 K/UL — HIGH (ref 3.8–10.5)
WBC # FLD AUTO: 20.63 K/UL — HIGH (ref 3.8–10.5)
WBC # FLD AUTO: 20.63 K/UL — HIGH (ref 3.8–10.5)

## 2023-11-01 PROCEDURE — 75710 ARTERY X-RAYS ARM/LEG: CPT | Mod: 26,LT

## 2023-11-01 PROCEDURE — 37227: CPT | Mod: RT

## 2023-11-01 PROCEDURE — 36200 PLACE CATHETER IN AORTA: CPT | Mod: RT,59

## 2023-11-01 PROCEDURE — 35623 BPG AXILLARY-POP/-TIBIAL: CPT | Mod: LT

## 2023-11-01 PROCEDURE — 88305 TISSUE EXAM BY PATHOLOGIST: CPT | Mod: 26

## 2023-11-01 PROCEDURE — 99233 SBSQ HOSP IP/OBS HIGH 50: CPT

## 2023-11-01 RX ORDER — ONDANSETRON 8 MG/1
4 TABLET, FILM COATED ORAL ONCE
Refills: 0 | Status: DISCONTINUED | OUTPATIENT
Start: 2023-11-01 | End: 2023-11-02

## 2023-11-01 RX ORDER — FENTANYL CITRATE 50 UG/ML
50 INJECTION INTRAVENOUS
Refills: 0 | Status: DISCONTINUED | OUTPATIENT
Start: 2023-11-01 | End: 2023-11-02

## 2023-11-01 RX ORDER — PIPERACILLIN AND TAZOBACTAM 4; .5 G/20ML; G/20ML
3.38 INJECTION, POWDER, LYOPHILIZED, FOR SOLUTION INTRAVENOUS EVERY 8 HOURS
Refills: 0 | Status: COMPLETED | OUTPATIENT
Start: 2023-11-01 | End: 2023-11-03

## 2023-11-01 RX ORDER — HYDROMORPHONE HYDROCHLORIDE 2 MG/ML
0.5 INJECTION INTRAMUSCULAR; INTRAVENOUS; SUBCUTANEOUS
Refills: 0 | Status: DISCONTINUED | OUTPATIENT
Start: 2023-11-01 | End: 2023-11-01

## 2023-11-01 RX ORDER — SODIUM CHLORIDE 9 MG/ML
1000 INJECTION, SOLUTION INTRAVENOUS
Refills: 0 | Status: DISCONTINUED | OUTPATIENT
Start: 2023-11-01 | End: 2023-11-02

## 2023-11-01 RX ORDER — OXYCODONE HYDROCHLORIDE 5 MG/1
5 TABLET ORAL ONCE
Refills: 0 | Status: DISCONTINUED | OUTPATIENT
Start: 2023-11-01 | End: 2023-11-02

## 2023-11-01 RX ORDER — VANCOMYCIN HCL 1 G
1000 VIAL (EA) INTRAVENOUS ONCE
Refills: 0 | Status: DISCONTINUED | OUTPATIENT
Start: 2023-11-01 | End: 2023-11-03

## 2023-11-01 RX ORDER — OXYCODONE HYDROCHLORIDE 5 MG/1
10 TABLET ORAL ONCE
Refills: 0 | Status: DISCONTINUED | OUTPATIENT
Start: 2023-11-01 | End: 2023-11-01

## 2023-11-01 RX ADMIN — Medication 88 MICROGRAM(S): at 06:17

## 2023-11-01 RX ADMIN — HYDROMORPHONE HYDROCHLORIDE 0.5 MILLIGRAM(S): 2 INJECTION INTRAMUSCULAR; INTRAVENOUS; SUBCUTANEOUS at 18:49

## 2023-11-01 RX ADMIN — CHLORHEXIDINE GLUCONATE 1 APPLICATION(S): 213 SOLUTION TOPICAL at 06:19

## 2023-11-01 RX ADMIN — HEPARIN SODIUM 5000 UNIT(S): 5000 INJECTION INTRAVENOUS; SUBCUTANEOUS at 22:43

## 2023-11-01 RX ADMIN — PIPERACILLIN AND TAZOBACTAM 25 GRAM(S): 4; .5 INJECTION, POWDER, LYOPHILIZED, FOR SOLUTION INTRAVENOUS at 22:43

## 2023-11-01 RX ADMIN — Medication 50 MILLIGRAM(S): at 06:19

## 2023-11-01 RX ADMIN — SODIUM CHLORIDE 100 MILLILITER(S): 9 INJECTION, SOLUTION INTRAVENOUS at 22:30

## 2023-11-01 RX ADMIN — ATORVASTATIN CALCIUM 80 MILLIGRAM(S): 80 TABLET, FILM COATED ORAL at 22:43

## 2023-11-01 RX ADMIN — HYDROMORPHONE HYDROCHLORIDE 0.5 MILLIGRAM(S): 2 INJECTION INTRAMUSCULAR; INTRAVENOUS; SUBCUTANEOUS at 19:13

## 2023-11-01 RX ADMIN — Medication 81 MILLIGRAM(S): at 02:44

## 2023-11-01 RX ADMIN — Medication 32 MILLIGRAM(S): at 06:17

## 2023-11-01 RX ADMIN — OXYCODONE HYDROCHLORIDE 10 MILLIGRAM(S): 5 TABLET ORAL at 18:40

## 2023-11-01 RX ADMIN — OXYCODONE HYDROCHLORIDE 10 MILLIGRAM(S): 5 TABLET ORAL at 19:13

## 2023-11-01 RX ADMIN — HYDROMORPHONE HYDROCHLORIDE 0.5 MILLIGRAM(S): 2 INJECTION INTRAMUSCULAR; INTRAVENOUS; SUBCUTANEOUS at 17:15

## 2023-11-01 NOTE — PROGRESS NOTE ADULT - SUBJECTIVE AND OBJECTIVE BOX
HPI:  74 y/o male with PMHX of HTN, severe PVD s/p multiple stents b/l legs, s/p fem/popliteal bypass, chronic left lower ankle ulceration, GERD, HLD who presented to  with CC of left LE pain and bleeding.     11/1: Patient went to the OR today for revascularization of the LLE.      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    Ex-smoker-- quit 20 years ago      Allergies:  Betadine (Hives; Rash)   (22 Oct 2023 14:35)       PAST MEDICAL & SURGICAL HISTORY:  Essential hypertension      PVD (peripheral vascular disease)      Gastroesophageal reflux disease, esophagitis presence not specified      Hypothyroidism      Chronic obstructive pulmonary disease, unspecified COPD type      Eczema      Hyperlipidemia, unspecified hyperlipidemia type      Medial meniscus tear  left knee      Femoral popliteal artery thrombus  h/o Fem pop bypass 1997      PVD (peripheral vascular disease)  s/p peripheral stent insertion bilateral lower extemities x 10          FAMILY HISTORY:      Social Hx:    Allergies    IV Contrast (Hives)  Betadine (Hives; Rash)    Intolerances            ICU Vital Signs Last 24 Hrs  T(C): 36.1 (01 Nov 2023 16:05), Max: 36.8 (31 Oct 2023 21:30)  T(F): 97 (01 Nov 2023 16:05), Max: 98.2 (31 Oct 2023 21:30)  HR: 46 (01 Nov 2023 17:15) (45 - 70)  BP: 151/50 (01 Nov 2023 17:15) (130/66 - 189/66)  BP(mean): --  ABP: 155/39 (01 Nov 2023 17:15) (155/39 - 184/44)  ABP(mean): --  RR: 10 (01 Nov 2023 17:15) (10 - 18)  SpO2: 94% (01 Nov 2023 17:15) (93% - 100%)    O2 Parameters below as of 01 Nov 2023 16:05  Patient On (Oxygen Delivery Method): nasal cannula  O2 Flow (L/min): 3              I&O's Summary    31 Oct 2023 07:01  -  01 Nov 2023 07:00  --------------------------------------------------------  IN: 500 mL / OUT: 0 mL / NET: 500 mL    01 Nov 2023 07:01  -  01 Nov 2023 17:31  --------------------------------------------------------  IN: 2987 mL / OUT: 1350 mL / NET: 1637 mL                              8.6    17.92 )-----------( 314      ( 01 Nov 2023 14:50 )             26.2       11-01    141  |  112<H>  |  21  ----------------------------<  116<H>  3.9   |  23  |  0.98    Ca    7.8<L>      01 Nov 2023 06:54  Phos  2.8     11-01  Mg     2.4     11-01    TPro  5.9<L>  /  Alb  2.3<L>  /  TBili  0.5  /  DBili  x   /  AST  53<H>  /  ALT  64  /  AlkPhos  108  11-01                Urinalysis Basic - ( 01 Nov 2023 06:54 )    Color: x / Appearance: x / SG: x / pH: x  Gluc: 116 mg/dL / Ketone: x  / Bili: x / Urobili: x   Blood: x / Protein: x / Nitrite: x   Leuk Esterase: x / RBC: x / WBC x   Sq Epi: x / Non Sq Epi: x / Bacteria: x        MEDICATIONS  (STANDING):  aspirin enteric coated 81 milliGRAM(s) Oral daily  atorvastatin 80 milliGRAM(s) Oral at bedtime  chlorhexidine 2% Cloths 1 Application(s) Topical <User Schedule>  furosemide    Tablet 20 milliGRAM(s) Oral daily  heparin   Injectable 5000 Unit(s) SubCutaneous every 8 hours  influenza  Vaccine (HIGH DOSE) 0.7 milliLiter(s) IntraMuscular once  levothyroxine 88 MICROGram(s) Oral daily  losartan 25 milliGRAM(s) Oral daily  nebivolol 2.5 milliGRAM(s) Oral daily  piperacillin/tazobactam IVPB.. 3.375 Gram(s) IV Intermittent every 8 hours  silver sulfADIAZINE 1% Cream 1 Application(s) Topical daily  silver sulfADIAZINE 1% Cream 1 Application(s) Topical once  sodium chloride 0.9%. 1000 milliLiter(s) (75 mL/Hr) IV Continuous <Continuous>  thiamine 100 milliGRAM(s) Oral daily  vancomycin  IVPB 1000 milliGRAM(s) IV Intermittent once    MEDICATIONS  (PRN):  acetaminophen     Tablet .. 650 milliGRAM(s) Oral every 6 hours PRN Mild Pain (1 - 3)  albuterol    90 MICROgram(s) HFA Inhaler 2 Puff(s) Inhalation every 6 hours PRN Shortness of Breath and/or Wheezing  aluminum hydroxide/magnesium hydroxide/simethicone Suspension 30 milliLiter(s) Oral every 4 hours PRN Dyspepsia  HYDROmorphone  Injectable 0.5 milliGRAM(s) IV Push every 10 minutes PRN Severe Pain (7 - 10)  melatonin 3 milliGRAM(s) Oral at bedtime PRN Insomnia  ondansetron Injectable 4 milliGRAM(s) IV Push every 6 hours PRN Nausea and/or Vomiting  oxyCODONE    IR 2.5 milliGRAM(s) Oral every 4 hours PRN Severe Pain (7 - 10)  traMADol 25 milliGRAM(s) Oral every 8 hours PRN Moderate Pain (4 - 6)      DVT Prophylaxis: [facility]    Advanced Directives:  Discussed with:    Visit Information:    ** Time is exclusive of billed procedures and/or teaching and/or routine family updates.

## 2023-11-01 NOTE — BRIEF OPERATIVE NOTE - NSICDXBRIEFPREOP_GEN_ALL_CORE_FT
PRE-OP DIAGNOSIS:  PAD (peripheral artery disease) 31-Oct-2023 19:13:17  Kelsey Sosa  
PRE-OP DIAGNOSIS:  PAD (peripheral artery disease) 31-Oct-2023 19:13:17  Kelsey Sosa

## 2023-11-01 NOTE — PROGRESS NOTE ADULT - SUBJECTIVE AND OBJECTIVE BOX
SURGERY DAILY PROGRESS NOTE:     Subjective:  Patient seen and examined this AM at bedside. Attempted BLE angiogram via left radial access but unable to traverse tortuous aorta; no adverse reaction to IV contrast. Premedicated again overnight in preparation for bypass today. Patient resting comfortably. Denies fever/chills, shortness of breath, chest pain. VS reviewed    Objective:    MEDICATIONS  (STANDING):  aspirin enteric coated 81 milliGRAM(s) Oral daily  atorvastatin 80 milliGRAM(s) Oral at bedtime  chlorhexidine 2% Cloths 1 Application(s) Topical <User Schedule>  diphenhydrAMINE 50 milliGRAM(s) Oral once  furosemide    Tablet 20 milliGRAM(s) Oral daily  heparin   Injectable 5000 Unit(s) SubCutaneous every 8 hours  influenza  Vaccine (HIGH DOSE) 0.7 milliLiter(s) IntraMuscular once  levothyroxine 88 MICROGram(s) Oral daily  losartan 25 milliGRAM(s) Oral daily  methylPREDNISolone 32 milliGRAM(s) Oral once  nebivolol 2.5 milliGRAM(s) Oral daily  silver sulfADIAZINE 1% Cream 1 Application(s) Topical daily  silver sulfADIAZINE 1% Cream 1 Application(s) Topical once  sodium chloride 0.9%. 1000 milliLiter(s) (75 mL/Hr) IV Continuous <Continuous>  thiamine 100 milliGRAM(s) Oral daily    MEDICATIONS  (PRN):  acetaminophen     Tablet .. 650 milliGRAM(s) Oral every 6 hours PRN Mild Pain (1 - 3)  albuterol    90 MICROgram(s) HFA Inhaler 2 Puff(s) Inhalation every 6 hours PRN Shortness of Breath and/or Wheezing  aluminum hydroxide/magnesium hydroxide/simethicone Suspension 30 milliLiter(s) Oral every 4 hours PRN Dyspepsia  melatonin 3 milliGRAM(s) Oral at bedtime PRN Insomnia  ondansetron Injectable 4 milliGRAM(s) IV Push every 6 hours PRN Nausea and/or Vomiting  oxyCODONE    IR 2.5 milliGRAM(s) Oral every 4 hours PRN Severe Pain (7 - 10)  traMADol 25 milliGRAM(s) Oral every 8 hours PRN Moderate Pain (4 - 6)      Vital Signs Last 24 Hrs  T(C): 36.2 (31 Oct 2023 18:55), Max: 36.8 (31 Oct 2023 08:03)  T(F): 97.2 (31 Oct 2023 18:55), Max: 98.2 (31 Oct 2023 08:03)  HR: 55 (31 Oct 2023 20:00) (54 - 68)  BP: 175/60 (31 Oct 2023 20:00) (157/95 - 189/66)  BP(mean): --  RR: 15 (31 Oct 2023 20:00) (12 - 18)  SpO2: 99% (31 Oct 2023 20:00) (94% - 100%)    Parameters below as of 31 Oct 2023 20:00    O2 Flow (L/min): 2        PHYSICAL EXAM   GENERAL: NAD, well developed, well nourished  HEAD: Atraumatic, normocephalic  EYES: EOMI, PERRLA, conjunctiva and sclera clear  ENT: moist mucous membrane  NECK: supple, No JVD, midline trachea  CHEST/LUNG: No increased WOB, symmetric excursions  Heart: RRR ppp, no peripheral edema  ABDOMEN: Round, nondistended, soft, nontender. no organomegaly  EXTREMITIES: Left foot mildly paler than right, eschar around left medial malleolus precludes PT doppler exam, no doppler signal of left DP, left popliteal doppler signal present. Right DP palpable. Motor and sensation of the left toes diminished compared to the right.  NERVOUS SYSTEM: AOx4, speech clear, no neuro-deficits  MSK: full ROM, no deformities  SKIN: warm to touch, no rash or lesions aside from aforementioned left ankle      I&O's Detail    30 Oct 2023 07:01  -  31 Oct 2023 07:00  --------------------------------------------------------  IN:    IV PiggyBack: 100 mL  Total IN: 100 mL    OUT:  Total OUT: 0 mL    Total NET: 100 mL      31 Oct 2023 07:01  -  01 Nov 2023 02:28  --------------------------------------------------------  IN:    Other (mL): 500 mL  Total IN: 500 mL    OUT:  Total OUT: 0 mL    Total NET: 500 mL          Daily     Daily     LABS:                        9.8    9.23  )-----------( 428      ( 31 Oct 2023 06:33 )             30.9     10-31    141  |  111<H>  |  15  ----------------------------<  101<H>  3.9   |  25  |  0.96    Ca    7.6<L>      31 Oct 2023 06:33  Phos  2.3     10-31  Mg     2.4     10-31      PT/INR - ( 31 Oct 2023 06:33 )   PT: 11.7 sec;   INR: 1.04 ratio         PTT - ( 31 Oct 2023 06:33 )  PTT:28.2 sec  Urinalysis Basic - ( 31 Oct 2023 06:33 )    Color: x / Appearance: x / SG: x / pH: x  Gluc: 101 mg/dL / Ketone: x  / Bili: x / Urobili: x   Blood: x / Protein: x / Nitrite: x   Leuk Esterase: x / RBC: x / WBC x   Sq Epi: x / Non Sq Epi: x / Bacteria: x

## 2023-11-01 NOTE — BRIEF OPERATIVE NOTE - NSICDXBRIEFPOSTOP_GEN_ALL_CORE_FT
POST-OP DIAGNOSIS:  PAD (peripheral artery disease) 31-Oct-2023 19:13:24  Kelsey Sosa  
POST-OP DIAGNOSIS:  PAD (peripheral artery disease) 31-Oct-2023 19:13:24  Kelsey Sosa

## 2023-11-01 NOTE — PROGRESS NOTE ADULT - ASSESSMENT
A/P: 73 male S/P Revascularization of the LLE  PVD  HTN  CHF  hypothyroidism  GERD  HLD    Plan:  SICU  Neuro vascular checks  Pain control  ASA 81  Continue Zosyn and Vanco  Continue Synthroid  Advance diet as per surgery  SQH DVT Prophylaxis

## 2023-11-01 NOTE — PROGRESS NOTE ADULT - ASSESSMENT
74yo M presents w/ bleeding LLE venous stasis ulcer. Severe small vessel disease on left lower extremity with multiple revascularization interventions, now occluded fem-pop and fem-fem bypass. 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 Awaiting re-do bypass today, started premedication for contrast allergy overnight.    Recommendation:  - Continue premedication for contrast allergy, Solumedrol x 2 & Benadryl x 1 preop--ordered  - NPO for re-do bypass today  - Cleared by Medicine & Cardiology  - Continue aspirin  - Rest of care per primary team    Plan discussed with vascular surgery attending

## 2023-11-01 NOTE — BRIEF OPERATIVE NOTE - OPERATION/FINDINGS
unable to track flush catheter into descending aorta
Right femoral cutdown with aortogram and B/L LE angiogram   SFA covered stent placed in SFA psa  Left axillary to anterior tibial bypass with PTFE graft   Palpable AT at conclusion of case

## 2023-11-02 LAB
APTT BLD: 22.3 SEC — LOW (ref 24.5–35.6)
APTT BLD: 22.3 SEC — LOW (ref 24.5–35.6)
APTT BLD: 36.1 SEC — HIGH (ref 24.5–35.6)
APTT BLD: 36.1 SEC — HIGH (ref 24.5–35.6)

## 2023-11-02 PROCEDURE — 99233 SBSQ HOSP IP/OBS HIGH 50: CPT

## 2023-11-02 RX ORDER — CALCIUM GLUCONATE 100 MG/ML
1 VIAL (ML) INTRAVENOUS ONCE
Refills: 0 | Status: COMPLETED | OUTPATIENT
Start: 2023-11-02 | End: 2023-11-02

## 2023-11-02 RX ORDER — SODIUM CHLORIDE 9 MG/ML
1000 INJECTION INTRAMUSCULAR; INTRAVENOUS; SUBCUTANEOUS
Refills: 0 | Status: DISCONTINUED | OUTPATIENT
Start: 2023-11-02 | End: 2023-11-04

## 2023-11-02 RX ORDER — HEPARIN SODIUM 5000 [USP'U]/ML
500 INJECTION INTRAVENOUS; SUBCUTANEOUS
Qty: 25000 | Refills: 0 | Status: DISCONTINUED | OUTPATIENT
Start: 2023-11-02 | End: 2023-11-04

## 2023-11-02 RX ADMIN — Medication 81 MILLIGRAM(S): at 10:40

## 2023-11-02 RX ADMIN — PIPERACILLIN AND TAZOBACTAM 25 GRAM(S): 4; .5 INJECTION, POWDER, LYOPHILIZED, FOR SOLUTION INTRAVENOUS at 21:34

## 2023-11-02 RX ADMIN — NEBIVOLOL HYDROCHLORIDE 2.5 MILLIGRAM(S): 5 TABLET ORAL at 10:42

## 2023-11-02 RX ADMIN — Medication 100 MILLIGRAM(S): at 10:40

## 2023-11-02 RX ADMIN — Medication 20 MILLIGRAM(S): at 10:40

## 2023-11-02 RX ADMIN — PIPERACILLIN AND TAZOBACTAM 25 GRAM(S): 4; .5 INJECTION, POWDER, LYOPHILIZED, FOR SOLUTION INTRAVENOUS at 13:46

## 2023-11-02 RX ADMIN — Medication 88 MICROGRAM(S): at 06:18

## 2023-11-02 RX ADMIN — ATORVASTATIN CALCIUM 80 MILLIGRAM(S): 80 TABLET, FILM COATED ORAL at 21:34

## 2023-11-02 RX ADMIN — TRAMADOL HYDROCHLORIDE 25 MILLIGRAM(S): 50 TABLET ORAL at 22:00

## 2023-11-02 RX ADMIN — Medication 1 APPLICATION(S): at 13:30

## 2023-11-02 RX ADMIN — TRAMADOL HYDROCHLORIDE 25 MILLIGRAM(S): 50 TABLET ORAL at 21:34

## 2023-11-02 RX ADMIN — CHLORHEXIDINE GLUCONATE 1 APPLICATION(S): 213 SOLUTION TOPICAL at 03:00

## 2023-11-02 RX ADMIN — Medication 100 GRAM(S): at 03:00

## 2023-11-02 RX ADMIN — HEPARIN SODIUM 5000 UNIT(S): 5000 INJECTION INTRAVENOUS; SUBCUTANEOUS at 06:18

## 2023-11-02 RX ADMIN — PIPERACILLIN AND TAZOBACTAM 25 GRAM(S): 4; .5 INJECTION, POWDER, LYOPHILIZED, FOR SOLUTION INTRAVENOUS at 06:00

## 2023-11-02 RX ADMIN — LOSARTAN POTASSIUM 25 MILLIGRAM(S): 100 TABLET, FILM COATED ORAL at 10:41

## 2023-11-02 RX ADMIN — HEPARIN SODIUM 5 UNIT(S)/HR: 5000 INJECTION INTRAVENOUS; SUBCUTANEOUS at 10:37

## 2023-11-02 NOTE — PROGRESS NOTE ADULT - SUBJECTIVE AND OBJECTIVE BOX
SURGERY DAILY PROGRESS NOTE:     Subjective:  Patient seen and examined this AM at bedside. No acute events overnight and patient resting comfortably. Tolerating clears. Pain controlled. Denies fever/chills, shortness of breath, chest pain. VS reviewed    Objective:    MEDICATIONS  (STANDING):  aspirin enteric coated 81 milliGRAM(s) Oral daily  atorvastatin 80 milliGRAM(s) Oral at bedtime  calcium gluconate IVPB 1 Gram(s) IV Intermittent once  chlorhexidine 2% Cloths 1 Application(s) Topical <User Schedule>  furosemide    Tablet 20 milliGRAM(s) Oral daily  heparin   Injectable 5000 Unit(s) SubCutaneous every 8 hours  influenza  Vaccine (HIGH DOSE) 0.7 milliLiter(s) IntraMuscular once  levothyroxine 88 MICROGram(s) Oral daily  losartan 25 milliGRAM(s) Oral daily  nebivolol 2.5 milliGRAM(s) Oral daily  piperacillin/tazobactam IVPB.. 3.375 Gram(s) IV Intermittent every 8 hours  silver sulfADIAZINE 1% Cream 1 Application(s) Topical daily  silver sulfADIAZINE 1% Cream 1 Application(s) Topical once  sodium chloride 0.9%. 1000 milliLiter(s) (75 mL/Hr) IV Continuous <Continuous>  thiamine 100 milliGRAM(s) Oral daily  vancomycin  IVPB 1000 milliGRAM(s) IV Intermittent once    MEDICATIONS  (PRN):  acetaminophen     Tablet .. 650 milliGRAM(s) Oral every 6 hours PRN Mild Pain (1 - 3)  albuterol    90 MICROgram(s) HFA Inhaler 2 Puff(s) Inhalation every 6 hours PRN Shortness of Breath and/or Wheezing  aluminum hydroxide/magnesium hydroxide/simethicone Suspension 30 milliLiter(s) Oral every 4 hours PRN Dyspepsia  HYDROmorphone  Injectable 0.5 milliGRAM(s) IV Push every 10 minutes PRN Severe Pain (7 - 10)  melatonin 3 milliGRAM(s) Oral at bedtime PRN Insomnia  ondansetron Injectable 4 milliGRAM(s) IV Push every 6 hours PRN Nausea and/or Vomiting  oxyCODONE    IR 2.5 milliGRAM(s) Oral every 4 hours PRN Severe Pain (7 - 10)  traMADol 25 milliGRAM(s) Oral every 8 hours PRN Moderate Pain (4 - 6)      Vital Signs Last 24 Hrs  T(C): 36.5 (02 Nov 2023 01:00), Max: 36.5 (02 Nov 2023 01:00)  T(F): 97.7 (02 Nov 2023 01:00), Max: 97.7 (02 Nov 2023 01:00)  HR: 55 (02 Nov 2023 05:00) (45 - 56)  BP: 105/50 (02 Nov 2023 05:00) (93/43 - 151/50)  BP(mean): 58 (02 Nov 2023 05:00) (58 - 86)  RR: 11 (02 Nov 2023 05:00) (10 - 97)  SpO2: 100% (02 Nov 2023 05:00) (93% - 100%)    Parameters below as of 02 Nov 2023 05:00  Patient On (Oxygen Delivery Method): nasal cannula  O2 Flow (L/min): 2        PHYSICAL EXAM   GENERAL: NAD, well developed, well nourished  HEAD: Atraumatic, normocephalic  EYES: EOMI, PERRLA, conjunctiva and sclera clear  ENT: moist mucous membrane  NECK: supple, No JVD, midline trachea  CHEST/LUNG: No increased WOB, symmetric excursions  Heart: RRR ppp, no peripheral edema  ABDOMEN: Round, nondistended, soft, nontender. no organomegaly  : Bowles w/ clear yellow output in tubing  EXTREMITIES: Left foot mildly warmer than right, eschar around left medial malleolus precludes PT doppler exam, now palpable left DP. Right DP still palpable. Motor and sensation of the left toes diminished compared to the right.  NERVOUS SYSTEM: AOx4, speech clear, no neuro-deficits  MSK: full ROM, no deformities  SKIN: warm to touch, no rash or lesions aside from aforementioned left ankle, right groin and left lower leg dressings c/d/i      I&O's Detail    31 Oct 2023 07:01  -  01 Nov 2023 07:00  --------------------------------------------------------  IN:    Other (mL): 500 mL  Total IN: 500 mL    OUT:  Total OUT: 0 mL    Total NET: 500 mL      01 Nov 2023 07:01  -  02 Nov 2023 05:42  --------------------------------------------------------  IN:    Lactated Ringers: 225 mL    Other (mL): 2500 mL    PRBCs (Packed Red Blood Cells): 487 mL  Total IN: 3212 mL    OUT:    Blood Loss (mL): 1000 mL    Indwelling Catheter - Urethral (mL): 500 mL  Total OUT: 1500 mL    Total NET: 1712 mL          Daily     Daily     LABS:                        8.5    20.63 )-----------( 406      ( 01 Nov 2023 21:00 )             25.3     11-01    144  |  114<H>  |  24<H>  ----------------------------<  121<H>  4.6   |  24  |  1.06    Ca    7.1<L>      01 Nov 2023 21:00  Phos  2.8     11-01  Mg     2.4     11-01    TPro  5.9<L>  /  Alb  2.3<L>  /  TBili  0.5  /  DBili  x   /  AST  53<H>  /  ALT  64  /  AlkPhos  108  11-01    PT/INR - ( 01 Nov 2023 06:54 )   PT: 11.3 sec;   INR: 1.00 ratio         PTT - ( 01 Nov 2023 06:54 )  PTT:26.3 sec  Urinalysis Basic - ( 01 Nov 2023 21:00 )    Color: x / Appearance: x / SG: x / pH: x  Gluc: 121 mg/dL / Ketone: x  / Bili: x / Urobili: x   Blood: x / Protein: x / Nitrite: x   Leuk Esterase: x / RBC: x / WBC x   Sq Epi: x / Non Sq Epi: x / Bacteria: x

## 2023-11-02 NOTE — PROGRESS NOTE ADULT - ASSESSMENT
72yo M presents w/ bleeding LLE venous stasis ulcer. Severe small vessel disease on left lower extremity with multiple revascularization interventions, now occluded fem-pop and fem-fem bypass. 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 Now POD 1 s/p right fem cutdown aortogram, b/l angiogram, right SFA stent placement fro pseudoaneurysm, left axilarry to AT bypass with PTFE, recovering as expected.    Recommendation:  - Start heparin at 500 u/hr  - Continue aspirin  - Likely trial of void today  - On regular diet  - Reduce vs HL IVF with adequate PO intake  - Vanc & Zosyn  - Rest of care per primary team    Plan discussed with vascular surgery attending

## 2023-11-02 NOTE — PROGRESS NOTE ADULT - SUBJECTIVE AND OBJECTIVE BOX
Patient is a 73y old  Male who presents with a chief complaint of pain and bleeding left foot (02 Nov 2023 05:42)    BRIEF HOSPITAL COURSE: ***    11/2 pt reports feeling well, has mild pain and burning sensation in LLE but states it is tolerable. denies chest pain, sob, abd pain,     PAST MEDICAL & SURGICAL HISTORY:  Essential hypertension  PVD (peripheral vascular disease)  Gastroesophageal reflux disease, esophagitis presence not specified  Hypothyroidism  Chronic obstructive pulmonary disease, unspecified COPD type  Eczema  Hyperlipidemia, unspecified hyperlipidemia type  Medial meniscus tear  left knee  Femoral popliteal artery thrombus  h/o Fem pop bypass 1997  PVD (peripheral vascular disease)  s/p peripheral stent insertion bilateral lower extemities x 10    Medications:  piperacillin/tazobactam IVPB.. 3.375 Gram(s) IV Intermittent every 8 hours  vancomycin  IVPB 1000 milliGRAM(s) IV Intermittent once  furosemide    Tablet 20 milliGRAM(s) Oral daily  losartan 25 milliGRAM(s) Oral daily  nebivolol 2.5 milliGRAM(s) Oral daily  albuterol    90 MICROgram(s) HFA Inhaler 2 Puff(s) Inhalation every 6 hours OH  acetaminophen     Tablet .. 650 milliGRAM(s) Oral every 6 hours PRN  HYDROmorphone  Injectable 0.5 milliGRAM(s) IV Push every 10 minutes PRN  melatonin 3 milliGRAM(s) Oral at bedtime PRN  ondansetron Injectable 4 milliGRAM(s) IV Push every 6 hours PRN  oxyCODONE    IR 2.5 milliGRAM(s) Oral every 4 hours PRN  traMADol 25 milliGRAM(s) Oral every 8 hours PRN  aspirin enteric coated 81 milliGRAM(s) Oral daily  heparin  Infusion 500 Unit(s)/Hr IV Continuous <Continuous  aluminum hydroxide/magnesium hydroxide/simethicone Suspension 30 milliLiter(s) Oral every 4 hours PRN  atorvastatin 80 milliGRAM(s) Oral at bedtime  levothyroxine 88 MICROGram(s) Oral daily  sodium chloride 0.9%. 1000 milliLiter(s) IV Continuous <Continuous>  thiamine 100 milliGRAM(s) Oral daily  influenza  Vaccine (HIGH DOSE) 0.7 milliLiter(s) IntraMuscular once  chlorhexidine 2% Cloths 1 Application(s) Topical <User Schedule>  silver sulfADIAZINE 1% Cream 1 Application(s) Topical daily  silver sulfADIAZINE 1% Cream 1 Application(s) Topical once    ICU Vital Signs Last 24 Hrs  T(C): 36.3 (02 Nov 2023 09:35), Max: 36.6 (02 Nov 2023 07:00)  T(F): 97.4 (02 Nov 2023 09:35), Max: 97.9 (02 Nov 2023 07:00)  HR: 54 (02 Nov 2023 06:00) (45 - 56)  BP: 121/41 (02 Nov 2023 06:00) (93/43 - 151/50)  BP(mean): 65 (02 Nov 2023 06:00) (58 - 86)  ABP: 150/39 (02 Nov 2023 06:00) (111/31 - 184/44)  ABP(mean): 78 (02 Nov 2023 04:00) (64 - 78)  RR: 13 (02 Nov 2023 06:00) (10 - 97)  SpO2: 94% (02 Nov 2023 06:00) (93% - 100%)    O2 Parameters below as of 02 Nov 2023 06:00  Patient On (Oxygen Delivery Method): nasal cannula  O2 Flow (L/min): 2      I&O's Detail    01 Nov 2023 07:01  -  02 Nov 2023 07:00  --------------------------------------------------------  IN:    IV PiggyBack: 50 mL    Lactated Ringers: 300 mL    Other (mL): 2500 mL    PRBCs (Packed Red Blood Cells): 487 mL    sodium chloride 0.9%: 525 mL  Total IN: 3862 mL    OUT:    Blood Loss (mL): 1000 mL    Indwelling Catheter - Urethral (mL): 1050 mL  Total OUT: 2050 mL    Total NET: 1812 mL      LABS:                        8.6    10.45 )-----------( 417      ( 02 Nov 2023 08:35 )             26.1     11-02    144  |  113<H>  |  29<H>  ----------------------------<  96  4.0   |  23  |  1.22    Ca    7.5<L>      02 Nov 2023 08:35  Phos  3.5     11-02  Mg     2.3     11-02    TPro  4.8<L>  /  Alb  2.2<L>  /  TBili  0.6  /  DBili  x   /  AST  55<H>  /  ALT  44  /  AlkPhos  84  11-02    CAPILLARY BLOOD GLUCOSE  POCT Blood Glucose.: 103 mg/dL (02 Nov 2023 09:29)    PT/INR - ( 01 Nov 2023 06:54 )   PT: 11.3 sec;   INR: 1.00 ratio       PTT - ( 01 Nov 2023 06:54 )  PTT:26.3 sec  Urinalysis Basic - ( 02 Nov 2023 08:35 )    Color: x / Appearance: x / SG: x / pH: x  Gluc: 96 mg/dL / Ketone: x  / Bili: x / Urobili: x   Blood: x / Protein: x / Nitrite: x   Leuk Esterase: x / RBC: x / WBC x   Sq Epi: x / Non Sq Epi: x / Bacteria: x      CULTURES:      Physical Examination:  General: No acute distress.    HEENT: Pupils equal, reactive to light.  Symmetric.  PULM: Clear to auscultation bilaterally, no wheezing  CVS: Regular rate and rhythm, no murmurs  ABD: Soft, nondistended, nontender  EXT: No LE edema, nontender, L calf dressing clean, no bloody, L thigh incision clean and dry, L axillary scar no discharge  SKIN: Warm and well perfused, good cap refill, L medial ankle ulcer.   NEURO: Alert, oriented, interactive    DEVICES:   staton  A line    RADIOLOGY: ***    CRITICAL CARE TIME SPENT: ***

## 2023-11-02 NOTE — PROGRESS NOTE ADULT - NS ATTEND AMEND GEN_ALL_CORE FT
72 yo male with PMHX of HTN, severe PVD s/p multiple stents b/l legs, s/p fem/popliteal bypass, chronic left lower ankle ulceration, GERD, HLD who presented to  with CC of left LE pain and bleeding    Initially admitted to med/surg for severe PVD, non healing ulcer L medial ankle  US arterial doppler revealing occluded L fem pop and fem fem grafts    Upgraded to ICU for hypoxia and agitation 2/2 transfusion pRBC. received diuretics and abx, placed on NIV    Admitted for  1. Severe PVD with non healing L medial malleolus ulcer  2.  occlusion L fem-fem, fem -pop grafts  3. Acute on chronic HFpEF exac 2/2 pulm edema after blood transfusion vs PNA - resolved  4. JENIFER    s/p LHC 10/27 revealing non obstructive disease    s/p R fem cutdown with aortogram, b/l LE angiogram with SFA stent placement and L axillary to anterior tibial artery bypass with PTFE graft  EBL 600cc, received 2units pRBC, 2L IVfluids      Plan:  SICU  Starting Fixed UFH  Neurovascular checks  ASA  Po Diet  Continue Zosyn  Wound care to L ankle

## 2023-11-02 NOTE — PROGRESS NOTE ADULT - SUBJECTIVE AND OBJECTIVE BOX
Date of service: 11/02/2023  HPI: 72 y/o male with PMHX of HTN, severe PVD s/p multiple stents b/l legs, s/p fem/popliteal bypass, chronic left lower ankle ulceration, GERD, HLD who presented to  with CC of left LE pain and bleeding.  Patient notes he was at his family's house earlier 10/22.  He was sitting on the floor with his grandson when the family noticed significant bleeding in his left leg.  His wife tried to get him up and into the bathroom to stop the bleeding.  She got him to sit on the side of the bathtub and was trying to hold pressure over the bleeding when he seemed to lose consciousness.  He didn't fall over but as per wife, turned white and wasn't responding to her.  They called 911.  He wasn't responding for a few minutes and then came to.  She denies hx of syncope/ near syncope in the past.  In the ER, bleeding had mostly stopped.  Patient was c/o severe pain left foot.  Left foot discoloration and vascular surgery called for consult.  Labs with ARF with Cr of 2.2-- unclear baseline.  Patient has been taking advil at home for pain in foot.      Podiatry was consulted for left foot ulcer. Pt resting comfortably bedside today. Pt tender to wound site.     11/02/2023: Pt seen by podiatry team, pt resting bedside, NAD, pain controlled. No other pedal complaints    PAST MEDICAL & SURGICAL HISTORY:  Essential hypertension  PVD (peripheral vascular disease)  Gastroesophageal reflux disease, esophagitis presence not specified  Hypothyroidism  Chronic obstructive pulmonary disease, unspecified COPD type  Eczema  Hyperlipidemia, unspecified hyperlipidemia type  Medial meniscus tear  left knee  Femoral popliteal artery thrombus  h/o Fem pop bypass 1997  PVD (peripheral vascular disease)  s/p peripheral stent insertion bilateral lower extemities x 10    MEDICATIONS  (STANDING):  aspirin enteric coated 81 milliGRAM(s) Oral daily  atorvastatin 80 milliGRAM(s) Oral at bedtime  chlorhexidine 2% Cloths 1 Application(s) Topical <User Schedule>  diphenhydrAMINE Elixir 50 milliGRAM(s) Oral once  furosemide    Tablet 20 milliGRAM(s) Oral daily  heparin   Injectable 5000 Unit(s) SubCutaneous every 8 hours  influenza  Vaccine (HIGH DOSE) 0.7 milliLiter(s) IntraMuscular once  levothyroxine 88 MICROGram(s) Oral daily  losartan 25 milliGRAM(s) Oral daily  nebivolol 2.5 milliGRAM(s) Oral daily  predniSONE   Tablet 50 milliGRAM(s) Oral once  predniSONE   Tablet 50 milliGRAM(s) Oral once  silver sulfADIAZINE 1% Cream 1 Application(s) Topical daily  silver sulfADIAZINE 1% Cream 1 Application(s) Topical once  sodium chloride 0.9%. 1000 milliLiter(s) (100 mL/Hr) IV Continuous <Continuous>  thiamine 100 milliGRAM(s) Oral daily    MEDICATIONS  (PRN):  acetaminophen     Tablet .. 650 milliGRAM(s) Oral every 6 hours PRN Mild Pain (1 - 3)  albuterol    90 MICROgram(s) HFA Inhaler 2 Puff(s) Inhalation every 6 hours PRN Shortness of Breath and/or Wheezing  aluminum hydroxide/magnesium hydroxide/simethicone Suspension 30 milliLiter(s) Oral every 4 hours PRN Dyspepsia  melatonin 3 milliGRAM(s) Oral at bedtime PRN Insomnia  ondansetron Injectable 4 milliGRAM(s) IV Push every 6 hours PRN Nausea and/or Vomiting  oxyCODONE    IR 2.5 milliGRAM(s) Oral every 4 hours PRN Severe Pain (7 - 10)  traMADol 25 milliGRAM(s) Oral every 8 hours PRN Moderate Pain (4 - 6)            FAMILY HISTORY:  Family hx of vascular disease      Social History:    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    Ex-smoker-- quit 20 years ago      Allergies:  Betadine (Hives; Rash)   (22 Oct 2023 14:35)            PMH: Essential hypertension    PVD (peripheral vascular disease)    Gastroesophageal reflux disease, esophagitis presence not specified    Hypothyroidism    Chronic obstructive pulmonary disease, unspecified COPD type    Eczema    Hyperlipidemia, unspecified hyperlipidemia type    Medial meniscus tear      PSH:Femoral popliteal artery thrombus    PVD (peripheral vascular disease)        Allergies:Betadine (Hives; Rash)      Labs:                                   9.8    9.23  )-----------( 428      ( 31 Oct 2023 06:33 )             30.9     10-31    141  |  111<H>  |  15  ----------------------------<  101<H>  3.9   |  25  |  0.96    Ca    7.6<L>      31 Oct 2023 06:33  Phos  2.3     10-31  Mg     2.4     10-31      Vital Signs Last 24 Hrs  T(C): 36.3 (02 Nov 2023 09:35), Max: 36.6 (02 Nov 2023 07:00)  T(F): 97.4 (02 Nov 2023 09:35), Max: 97.9 (02 Nov 2023 07:00)  HR: 67 (02 Nov 2023 12:08) (45 - 83)  BP: 124/49 (02 Nov 2023 12:08) (93/43 - 151/50)  BP(mean): 71 (02 Nov 2023 12:08) (58 - 99)  RR: 22 (02 Nov 2023 12:08) (10 - 97)  SpO2: 100% (02 Nov 2023 12:08) (85% - 100%)    Parameters below as of 02 Nov 2023 12:08  Patient On (Oxygen Delivery Method): room air            REVIEW OF SYSTEMS:    CONSTITUTIONAL: No weakness, fevers or chills  EYES: No visual changes  RESPIRATORY: No cough, wheezing; No shortness of breath  CARDIOVASCULAR: No chest pain or palpitations  GASTROINTESTINAL: No abdominal or epigastric pain. No nausea, vomiting; No diarrhea or constipation.   GENITOURINARY: No dysuria, frequency or hematuria  NEUROLOGICAL: No numbness or weakness  SKIN: See physical examination.  All other review of systems is negative unless indicated above    Physical Exam:   Constitutional: NAD, alert;  Lower Extremity Focus  Derm:  Skin warm, dry and supple bilateral.    Ulceration to the left medial heel, wound base is mostly eschar, wound size is 3.5cm x 3.5cm, no periwound edema or erythema noted. no purulence or pus noted, no tracking/tunneling noted, no PTB, no malodor  Vascular: Dorsalis Pedis and Posterior Tibial pulses non palpable.  Capillary re-fill time more then 3 seconds digits 1-5 bilateral.    Neuro: Protective sensation diminished to the level of the digits bilateral.  MSK: Muscle strength 5/5 all major muscle groups bilateral. TTP to the wound site on the left medial mal          < from: Xray Foot AP + Lateral + Oblique, Left (10.22.23 @ 11:51) >  PROCEDURE DATE:  10/22/2023          INTERPRETATION:  Left ankle, left foot, and chest. Concern is chronic   wound around the malleoli.    Left ankle. 3 views.    There is long area of soft tissue calcification likely in a venous   structure in the medial lower leg.    There is mild degeneration of the malleolar tips.    There is some swelling seen approaching cavers triangle.    No bone destruction or fracture.    Left foot. 3 views.    No bone destruction or fracture is evident.    AP chest on October 22, 2023 at 12:34 PM.    Heart magnified by technique.    Lungs are clear.    Chest is similar to August 28, 2019.    IMPRESSION: No acute chest finding. Mild edema around the left ankle   posteriorly. Benign-appearing calcifications in the medial left lower leg   are likely venous. No acute bony finding.    --- End of Report ---        < end of copied text >

## 2023-11-02 NOTE — PROGRESS NOTE ADULT - ASSESSMENT
ASSESSMENT  72 yo male with PMHX of HTN, severe PVD s/p multiple stents b/l legs, s/p fem/popliteal bypass, chronic left lower ankle ulceration, GERD, HLD who presented to  with CC of left LE pain and bleeding    Initially admitted to med/surg for severe PVD, non healing ulcer L medial ankle  US arterial doppler revealing occluded L fem pop and fem fem grafts    Upgraded to ICU for hypoxia and agitation 2/2 transfusion pRBC. received diuretics and abx, placed on NIV    Admitted for  1. Severe PVD with non healing L medial malleolus ulcer  2.  occlusion L fem-fem, fem -pop grafts  3. Acute on chronic HFpEF exac 2/2 pulm edema after blood transfusion vs PNA - resolved  4. JENIFER    s/p C 10/27 revealing non obstructive disease    s/p R fem cutdown with aortogram, b/l LE angiogram with SFA stent placement and L axillary to anterior tibial artery bypass with PTFE graft  EBL 600cc, received 2units pRBC, 2L IVfluids    PLAN    Neuro: AAOx 4. pain control prn. will try to avoid nsaids due to blood thinners  CV: Normotensive. cont ASA, statin, nebivolol, losartan, lasix. start fixed dose IV heparin as per vasc surgery  Pulm: on room air.  GI: PO diet. PPI. bowel regimen prn  Nephro: Cr stable. monitor I & Os. dc IV fluids  Endo: cont synthroid  Vasc: neuro vasc checks as per sx recs. monitor surgical sites for bleeding. wound care/podiatry for L medial malleous ulcer  ID: cont IV zosyn for L ankle ulceration, s/p 1 dose vanco post op..  trend WBC. monitor temps.   Heme: DVT ppx - hep sq -> starting IV hep at fixed dose as per vasc sx.  PT eval    Dispo: SICU. neuro vasc checks. IV heparin. ASA. pain control. wound care    Will discuss with Dr. Hobson

## 2023-11-02 NOTE — PROGRESS NOTE ADULT - ASSESSMENT
A: 73y old male seen for the following:   - Full thickness wound to the left foot, chronic   - Pain to left foot  - Difficulty ambulation    P:   Chart reviewed and Patient evaluated;  Discussed diagnosis and treatment with patient. Discussed importance of daily foot examinations, proper shoe gear, importance of tight glycemic control.   X-rays reviewed : on wet read showing no soft tissue gas, no acute bony findings, mild edema noted to the left ankle posteriorly  There is a full thickness wound to the left medial mal, etiology of wound most likely due to arterial problem, wound looks chronic and stable without acute SOI  Wound flush with normal saline   WC: silvadene and DSD  WBAT using surgical shoe  Offloading to bilateral heels while bedbound.   Continue with antibiotics as per ID  All additional care per Med appreciated  Patient demonstrated verbal understanding of all interventions and tolerated interventions well without any complications.   Podiatry will follow while in house      Case D/W attending Dr. Joseph    Wound care instructions for Left LE to be changed every other day:  - Gently remove dressings.  - Clean the wound with saline and dry it thoroughly with dry gauze  - Apply silvadene, gauze, kerlix and secure it with tape

## 2023-11-03 LAB
ANION GAP SERPL CALC-SCNC: 8 MMOL/L — SIGNIFICANT CHANGE UP (ref 5–17)
ANION GAP SERPL CALC-SCNC: 8 MMOL/L — SIGNIFICANT CHANGE UP (ref 5–17)
APTT BLD: 107.1 SEC — HIGH (ref 24.5–35.6)
APTT BLD: 107.1 SEC — HIGH (ref 24.5–35.6)
APTT BLD: 67.6 SEC — HIGH (ref 24.5–35.6)
APTT BLD: 67.6 SEC — HIGH (ref 24.5–35.6)
APTT BLD: 84.6 SEC — HIGH (ref 24.5–35.6)
APTT BLD: 84.6 SEC — HIGH (ref 24.5–35.6)
APTT BLD: 90 SEC — HIGH (ref 24.5–35.6)
APTT BLD: 90 SEC — HIGH (ref 24.5–35.6)
BUN SERPL-MCNC: 24 MG/DL — HIGH (ref 7–23)
BUN SERPL-MCNC: 24 MG/DL — HIGH (ref 7–23)
CALCIUM SERPL-MCNC: 7.1 MG/DL — LOW (ref 8.5–10.1)
CALCIUM SERPL-MCNC: 7.1 MG/DL — LOW (ref 8.5–10.1)
CHLORIDE SERPL-SCNC: 107 MMOL/L — SIGNIFICANT CHANGE UP (ref 96–108)
CHLORIDE SERPL-SCNC: 107 MMOL/L — SIGNIFICANT CHANGE UP (ref 96–108)
CO2 SERPL-SCNC: 23 MMOL/L — SIGNIFICANT CHANGE UP (ref 22–31)
CO2 SERPL-SCNC: 23 MMOL/L — SIGNIFICANT CHANGE UP (ref 22–31)
CREAT SERPL-MCNC: 1.32 MG/DL — HIGH (ref 0.5–1.3)
CREAT SERPL-MCNC: 1.32 MG/DL — HIGH (ref 0.5–1.3)
EGFR: 58 ML/MIN/1.73M2 — LOW
EGFR: 58 ML/MIN/1.73M2 — LOW
GLUCOSE SERPL-MCNC: 107 MG/DL — HIGH (ref 70–99)
GLUCOSE SERPL-MCNC: 107 MG/DL — HIGH (ref 70–99)
HCT VFR BLD CALC: 25.3 % — LOW (ref 39–50)
HCT VFR BLD CALC: 25.3 % — LOW (ref 39–50)
HGB BLD-MCNC: 8.3 G/DL — LOW (ref 13–17)
HGB BLD-MCNC: 8.3 G/DL — LOW (ref 13–17)
MAGNESIUM SERPL-MCNC: 2.1 MG/DL — SIGNIFICANT CHANGE UP (ref 1.6–2.6)
MAGNESIUM SERPL-MCNC: 2.1 MG/DL — SIGNIFICANT CHANGE UP (ref 1.6–2.6)
MCHC RBC-ENTMCNC: 29.2 PG — SIGNIFICANT CHANGE UP (ref 27–34)
MCHC RBC-ENTMCNC: 29.2 PG — SIGNIFICANT CHANGE UP (ref 27–34)
MCHC RBC-ENTMCNC: 32.8 GM/DL — SIGNIFICANT CHANGE UP (ref 32–36)
MCHC RBC-ENTMCNC: 32.8 GM/DL — SIGNIFICANT CHANGE UP (ref 32–36)
MCV RBC AUTO: 89.1 FL — SIGNIFICANT CHANGE UP (ref 80–100)
MCV RBC AUTO: 89.1 FL — SIGNIFICANT CHANGE UP (ref 80–100)
PHOSPHATE SERPL-MCNC: 2.2 MG/DL — LOW (ref 2.5–4.5)
PHOSPHATE SERPL-MCNC: 2.2 MG/DL — LOW (ref 2.5–4.5)
PLATELET # BLD AUTO: 362 K/UL — SIGNIFICANT CHANGE UP (ref 150–400)
PLATELET # BLD AUTO: 362 K/UL — SIGNIFICANT CHANGE UP (ref 150–400)
POTASSIUM SERPL-MCNC: 3.9 MMOL/L — SIGNIFICANT CHANGE UP (ref 3.5–5.3)
POTASSIUM SERPL-MCNC: 3.9 MMOL/L — SIGNIFICANT CHANGE UP (ref 3.5–5.3)
POTASSIUM SERPL-SCNC: 3.9 MMOL/L — SIGNIFICANT CHANGE UP (ref 3.5–5.3)
POTASSIUM SERPL-SCNC: 3.9 MMOL/L — SIGNIFICANT CHANGE UP (ref 3.5–5.3)
RBC # BLD: 2.84 M/UL — LOW (ref 4.2–5.8)
RBC # BLD: 2.84 M/UL — LOW (ref 4.2–5.8)
RBC # FLD: 15.8 % — HIGH (ref 10.3–14.5)
RBC # FLD: 15.8 % — HIGH (ref 10.3–14.5)
SODIUM SERPL-SCNC: 138 MMOL/L — SIGNIFICANT CHANGE UP (ref 135–145)
SODIUM SERPL-SCNC: 138 MMOL/L — SIGNIFICANT CHANGE UP (ref 135–145)
WBC # BLD: 12.24 K/UL — HIGH (ref 3.8–10.5)
WBC # BLD: 12.24 K/UL — HIGH (ref 3.8–10.5)
WBC # FLD AUTO: 12.24 K/UL — HIGH (ref 3.8–10.5)
WBC # FLD AUTO: 12.24 K/UL — HIGH (ref 3.8–10.5)

## 2023-11-03 PROCEDURE — 99233 SBSQ HOSP IP/OBS HIGH 50: CPT

## 2023-11-03 PROCEDURE — 93010 ELECTROCARDIOGRAM REPORT: CPT

## 2023-11-03 RX ORDER — SODIUM CHLORIDE 9 MG/ML
1000 INJECTION, SOLUTION INTRAVENOUS
Refills: 0 | Status: DISCONTINUED | OUTPATIENT
Start: 2023-11-03 | End: 2023-11-03

## 2023-11-03 RX ORDER — SENNA PLUS 8.6 MG/1
2 TABLET ORAL ONCE
Refills: 0 | Status: COMPLETED | OUTPATIENT
Start: 2023-11-03 | End: 2023-11-03

## 2023-11-03 RX ORDER — VANCOMYCIN HCL 1 G
1000 VIAL (EA) INTRAVENOUS ONCE
Refills: 0 | Status: COMPLETED | OUTPATIENT
Start: 2023-11-03 | End: 2023-11-03

## 2023-11-03 RX ORDER — SODIUM,POTASSIUM PHOSPHATES 278-250MG
2 POWDER IN PACKET (EA) ORAL ONCE
Refills: 0 | Status: COMPLETED | OUTPATIENT
Start: 2023-11-03 | End: 2023-11-03

## 2023-11-03 RX ORDER — POLYETHYLENE GLYCOL 3350 17 G/17G
17 POWDER, FOR SOLUTION ORAL DAILY
Refills: 0 | Status: DISCONTINUED | OUTPATIENT
Start: 2023-11-03 | End: 2023-11-08

## 2023-11-03 RX ORDER — SODIUM,POTASSIUM PHOSPHATES 278-250MG
2 POWDER IN PACKET (EA) ORAL ONCE
Refills: 0 | Status: DISCONTINUED | OUTPATIENT
Start: 2023-11-03 | End: 2023-11-03

## 2023-11-03 RX ORDER — POTASSIUM CHLORIDE 20 MEQ
20 PACKET (EA) ORAL ONCE
Refills: 0 | Status: COMPLETED | OUTPATIENT
Start: 2023-11-03 | End: 2023-11-03

## 2023-11-03 RX ORDER — PIPERACILLIN AND TAZOBACTAM 4; .5 G/20ML; G/20ML
3.38 INJECTION, POWDER, LYOPHILIZED, FOR SOLUTION INTRAVENOUS EVERY 8 HOURS
Refills: 0 | Status: DISCONTINUED | OUTPATIENT
Start: 2023-11-04 | End: 2023-11-05

## 2023-11-03 RX ORDER — GABAPENTIN 400 MG/1
100 CAPSULE ORAL EVERY 8 HOURS
Refills: 0 | Status: DISCONTINUED | OUTPATIENT
Start: 2023-11-03 | End: 2023-11-05

## 2023-11-03 RX ORDER — CALCIUM GLUCONATE 100 MG/ML
1 VIAL (ML) INTRAVENOUS ONCE
Refills: 0 | Status: COMPLETED | OUTPATIENT
Start: 2023-11-03 | End: 2023-11-03

## 2023-11-03 RX ORDER — PIPERACILLIN AND TAZOBACTAM 4; .5 G/20ML; G/20ML
3.38 INJECTION, POWDER, LYOPHILIZED, FOR SOLUTION INTRAVENOUS EVERY 8 HOURS
Refills: 0 | Status: DISCONTINUED | OUTPATIENT
Start: 2023-11-03 | End: 2023-11-03

## 2023-11-03 RX ADMIN — NEBIVOLOL HYDROCHLORIDE 2.5 MILLIGRAM(S): 5 TABLET ORAL at 09:54

## 2023-11-03 RX ADMIN — SENNA PLUS 2 TABLET(S): 8.6 TABLET ORAL at 10:38

## 2023-11-03 RX ADMIN — HEPARIN SODIUM 5 UNIT(S)/HR: 5000 INJECTION INTRAVENOUS; SUBCUTANEOUS at 10:38

## 2023-11-03 RX ADMIN — Medication 100 MILLIGRAM(S): at 09:54

## 2023-11-03 RX ADMIN — Medication 2 TABLET(S): at 15:28

## 2023-11-03 RX ADMIN — Medication 20 MILLIEQUIVALENT(S): at 09:54

## 2023-11-03 RX ADMIN — POLYETHYLENE GLYCOL 3350 17 GRAM(S): 17 POWDER, FOR SOLUTION ORAL at 10:37

## 2023-11-03 RX ADMIN — PIPERACILLIN AND TAZOBACTAM 25 GRAM(S): 4; .5 INJECTION, POWDER, LYOPHILIZED, FOR SOLUTION INTRAVENOUS at 14:33

## 2023-11-03 RX ADMIN — Medication 88 MICROGRAM(S): at 05:02

## 2023-11-03 RX ADMIN — Medication 81 MILLIGRAM(S): at 09:54

## 2023-11-03 RX ADMIN — PIPERACILLIN AND TAZOBACTAM 25 GRAM(S): 4; .5 INJECTION, POWDER, LYOPHILIZED, FOR SOLUTION INTRAVENOUS at 05:02

## 2023-11-03 RX ADMIN — SODIUM CHLORIDE 50 MILLILITER(S): 9 INJECTION INTRAMUSCULAR; INTRAVENOUS; SUBCUTANEOUS at 19:41

## 2023-11-03 RX ADMIN — Medication 1 APPLICATION(S): at 06:00

## 2023-11-03 RX ADMIN — ATORVASTATIN CALCIUM 80 MILLIGRAM(S): 80 TABLET, FILM COATED ORAL at 21:49

## 2023-11-03 RX ADMIN — Medication 20 MILLIGRAM(S): at 09:54

## 2023-11-03 RX ADMIN — PIPERACILLIN AND TAZOBACTAM 25 GRAM(S): 4; .5 INJECTION, POWDER, LYOPHILIZED, FOR SOLUTION INTRAVENOUS at 21:49

## 2023-11-03 RX ADMIN — LOSARTAN POTASSIUM 25 MILLIGRAM(S): 100 TABLET, FILM COATED ORAL at 15:28

## 2023-11-03 NOTE — PROGRESS NOTE ADULT - SUBJECTIVE AND OBJECTIVE BOX
Date of service: 11/03/2023  HPI: 72 y/o male with PMHX of HTN, severe PVD s/p multiple stents b/l legs, s/p fem/popliteal bypass, chronic left lower ankle ulceration, GERD, HLD who presented to  with CC of left LE pain and bleeding.  Patient notes he was at his family's house earlier 10/22.  He was sitting on the floor with his grandson when the family noticed significant bleeding in his left leg.  His wife tried to get him up and into the bathroom to stop the bleeding.  She got him to sit on the side of the bathtub and was trying to hold pressure over the bleeding when he seemed to lose consciousness.  He didn't fall over but as per wife, turned white and wasn't responding to her.  They called 911.  He wasn't responding for a few minutes and then came to.  She denies hx of syncope/ near syncope in the past.  In the ER, bleeding had mostly stopped.  Patient was c/o severe pain left foot.  Left foot discoloration and vascular surgery called for consult.  Labs with ARF with Cr of 2.2-- unclear baseline.  Patient has been taking advil at home for pain in foot.      Podiatry was consulted for left foot ulcer. Pt resting comfortably bedside today. Pt tender to wound site.     11/03/2023: Pt seen by podiatry team w attending present, pt resting bedside, NAD, pain controlled. No other pedal complaints    PAST MEDICAL & SURGICAL HISTORY:  Essential hypertension  PVD (peripheral vascular disease)  Gastroesophageal reflux disease, esophagitis presence not specified  Hypothyroidism  Chronic obstructive pulmonary disease, unspecified COPD type  Eczema  Hyperlipidemia, unspecified hyperlipidemia type  Medial meniscus tear  left knee  Femoral popliteal artery thrombus  h/o Fem pop bypass 1997  PVD (peripheral vascular disease)  s/p peripheral stent insertion bilateral lower extemities x 10    MEDICATIONS  (STANDING):  aspirin enteric coated 81 milliGRAM(s) Oral daily  atorvastatin 80 milliGRAM(s) Oral at bedtime  chlorhexidine 2% Cloths 1 Application(s) Topical <User Schedule>  heparin  Infusion 500 Unit(s)/Hr (5 mL/Hr) IV Continuous <Continuous>  influenza  Vaccine (HIGH DOSE) 0.7 milliLiter(s) IntraMuscular once  levothyroxine 88 MICROGram(s) Oral daily  losartan 25 milliGRAM(s) Oral daily  nebivolol 2.5 milliGRAM(s) Oral daily  piperacillin/tazobactam IVPB.. 3.375 Gram(s) IV Intermittent every 8 hours  polyethylene glycol 3350 17 Gram(s) Oral daily  potassium phosphate / sodium phosphate Tablet (K-PHOS No. 2) 2 Tablet(s) Oral once  silver sulfADIAZINE 1% Cream 1 Application(s) Topical daily  sodium chloride 0.9%. 1000 milliLiter(s) (50 mL/Hr) IV Continuous <Continuous>  thiamine 100 milliGRAM(s) Oral daily  vancomycin  IVPB 1000 milliGRAM(s) IV Intermittent once    MEDICATIONS  (PRN):  acetaminophen     Tablet .. 650 milliGRAM(s) Oral every 6 hours PRN Mild Pain (1 - 3)  albuterol    90 MICROgram(s) HFA Inhaler 2 Puff(s) Inhalation every 6 hours PRN Shortness of Breath and/or Wheezing  aluminum hydroxide/magnesium hydroxide/simethicone Suspension 30 milliLiter(s) Oral every 4 hours PRN Dyspepsia  gabapentin 100 milliGRAM(s) Oral every 8 hours PRN neuropathic LLE pain  HYDROmorphone  Injectable 0.5 milliGRAM(s) IV Push every 10 minutes PRN Severe Pain (7 - 10)  melatonin 3 milliGRAM(s) Oral at bedtime PRN Insomnia  ondansetron Injectable 4 milliGRAM(s) IV Push every 6 hours PRN Nausea and/or Vomiting  oxyCODONE    IR 2.5 milliGRAM(s) Oral every 4 hours PRN Severe Pain (7 - 10)  traMADol 25 milliGRAM(s) Oral every 8 hours PRN Moderate Pain (4 - 6)            FAMILY HISTORY:  Family hx of vascular disease      Social History:    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    Ex-smoker-- quit 20 years ago      Allergies:  Betadine (Hives; Rash)   (22 Oct 2023 14:35)            PMH: Essential hypertension    PVD (peripheral vascular disease)    Gastroesophageal reflux disease, esophagitis presence not specified    Hypothyroidism    Chronic obstructive pulmonary disease, unspecified COPD type    Eczema    Hyperlipidemia, unspecified hyperlipidemia type    Medial meniscus tear      PSH:Femoral popliteal artery thrombus    PVD (peripheral vascular disease)        Allergies:Betadine (Hives; Rash)      Labs:                        8.3    12.24 )-----------( 362      ( 03 Nov 2023 05:57 )             25.3     11-03    138  |  107  |  24<H>  ----------------------------<  107<H>  3.9   |  23  |  1.32<H>    Ca    7.1<L>      03 Nov 2023 05:57  Phos  2.2     11-03  Mg     2.1     11-03    TPro  4.8<L>  /  Alb  2.2<L>  /  TBili  0.6  /  DBili  x   /  AST  55<H>  /  ALT  44  /  AlkPhos  84  11-02                  Vital Signs Last 24 Hrs  T(C): 36.8 (03 Nov 2023 12:37), Max: 37.5 (03 Nov 2023 06:00)  T(F): 98.3 (03 Nov 2023 12:37), Max: 99.5 (03 Nov 2023 06:00)  HR: 74 (03 Nov 2023 13:20) (60 - 75)  BP: 115/66 (03 Nov 2023 13:00) (98/62 - 150/138)  BP(mean): 81 (03 Nov 2023 13:00) (63 - 144)  RR: 20 (03 Nov 2023 11:00) (15 - 27)  SpO2: 100% (03 Nov 2023 13:20) (91% - 100%)    Parameters below as of 03 Nov 2023 13:20  Patient On (Oxygen Delivery Method): room air            REVIEW OF SYSTEMS:    CONSTITUTIONAL: No weakness, fevers or chills  EYES: No visual changes  RESPIRATORY: No cough, wheezing; No shortness of breath  CARDIOVASCULAR: No chest pain or palpitations  GASTROINTESTINAL: No abdominal or epigastric pain. No nausea, vomiting; No diarrhea or constipation.   GENITOURINARY: No dysuria, frequency or hematuria  NEUROLOGICAL: No numbness or weakness  SKIN: See physical examination.  All other review of systems is negative unless indicated above    Physical Exam:   Constitutional: NAD, alert;  Lower Extremity Focus  Derm:  Skin warm, dry and supple bilateral.    Ulceration to the left medial heel, wound base is mostly eschar, wound size is 3.5cm x 3.5cm, no periwound edema or erythema noted. no purulence or pus noted, no tracking/tunneling noted, no PTB, no malodor  Vascular: Dorsalis Pedis and Posterior Tibial pulses non palpable.  Capillary re-fill time more then 3 seconds digits 1-5 bilateral.    Neuro: Protective sensation diminished to the level of the digits bilateral.  MSK: Muscle strength 5/5 all major muscle groups bilateral. TTP to the wound site on the left medial mal          < from: Xray Foot AP + Lateral + Oblique, Left (10.22.23 @ 11:51) >  PROCEDURE DATE:  10/22/2023          INTERPRETATION:  Left ankle, left foot, and chest. Concern is chronic   wound around the malleoli.    Left ankle. 3 views.    There is long area of soft tissue calcification likely in a venous   structure in the medial lower leg.    There is mild degeneration of the malleolar tips.    There is some swelling seen approaching cavers triangle.    No bone destruction or fracture.    Left foot. 3 views.    No bone destruction or fracture is evident.    AP chest on October 22, 2023 at 12:34 PM.    Heart magnified by technique.    Lungs are clear.    Chest is similar to August 28, 2019.    IMPRESSION: No acute chest finding. Mild edema around the left ankle   posteriorly. Benign-appearing calcifications in the medial left lower leg   are likely venous. No acute bony finding.    --- End of Report ---        < end of copied text >

## 2023-11-03 NOTE — PROGRESS NOTE ADULT - ASSESSMENT
72yo M presents w/ bleeding LLE venous stasis ulcer. Severe small vessel disease on left lower extremity with multiple revascularization interventions, now occluded fem-pop and fem-fem bypass. 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 Now POD 2 s/p right fem cutdown aortogram, b/l angiogram, right SFA stent placement fro pseudoaneurysm, left axilarry to AT bypass with PTFE, recovering as expected.    Recommendation:  - Downgrade  - Continue heparin gtt  - Continue aspirin  - On regular diet  - Maintain gentle IVF until Cr normalizes  - Vanc & Zosyn  - Rest of care per primary team    Plan discussed with vascular surgery attending

## 2023-11-03 NOTE — PHYSICAL THERAPY INITIAL EVALUATION ADULT - PERTINENT HX OF CURRENT PROBLEM, REHAB EVAL
72 y/o male with PMHX of HTN, severe PVD s/p multiple stents b/l legs, s/p fem/popliteal bypass, chronic left lower ankle ulceration, GERD, HLD who presented to  with CC of left LE pain and bleeding.  Patient notes he was at his family's house earlier today.  He was sitting on the floor with his grandson when the family noticed significant bleeding in his left leg.  His wife tried to get him up and into the bathroom to stop the bleeding.  She got him to sit on the side of the bathtub and was trying to hold pressure over the bleeding when he seemed to lose consciousness.  He didn't fall over but as per wife, turned white and wasn't responding to her.  They called 911.  He wasn't responding for a few minutes and then came to.  She denies hx of syncope/ near syncope in the past.  In the ER, bleeding had mostly stopped.  Patient was c/o severe pain left foot.  Left foot discoloration and vascular surgery called for consult.  Labs with ARF with Cr of 2.2-- unclear baseline.  Patient has been taking advil at home for pain in foot.    Bleeding from Chronic Vascular Ulcer
see PT IE. initially admitted to med/surg for severe PVD, non healing ulcer L medial ankle  US arterial doppler revealing occluded L fem pop and fem fem grafts. Upgraded to ICU for hypoxia and agitation 2/2 transfusion pRBC. Acute on chronic HFpEF exac 2/2 pulm edema after blood transfusion vs PNA -received diuretics and abx, placed on NIV. elevated CE. [facility] 10/27- non-obstr dz. 11/1  s/p R fem cutdown with aortogram, b/l LE angiogram with SFA stent placement and L axillary to anterior tibial artery bypass with PTFE graft.

## 2023-11-03 NOTE — PHYSICAL THERAPY INITIAL EVALUATION ADULT - DIAGNOSIS, PT EVAL
severe PAD, non healing ulcer left foot, previous occluded left fem pop, fem, fem bypass; course was complicated by episode chf, nstemi, S/P LHC- non obstructive disease, S/P left ax to ant tibial bypass with PTFE, JENIFER
Left LE pain secondary to Chronic Vascular Ulcer

## 2023-11-03 NOTE — PROGRESS NOTE ADULT - SUBJECTIVE AND OBJECTIVE BOX
SURGERY DAILY PROGRESS NOTE:     Subjective:  Patient seen and examined this AM at bedside. No acute events overnight and patient resting comfortably. Pain controlled. Voiding well. Denies fever/chills, shortness of breath, chest pain. VS reviewed    Objective:    MEDICATIONS  (STANDING):  aspirin enteric coated 81 milliGRAM(s) Oral daily  atorvastatin 80 milliGRAM(s) Oral at bedtime  chlorhexidine 2% Cloths 1 Application(s) Topical <User Schedule>  furosemide    Tablet 20 milliGRAM(s) Oral daily  heparin  Infusion 500 Unit(s)/Hr (5 mL/Hr) IV Continuous <Continuous>  influenza  Vaccine (HIGH DOSE) 0.7 milliLiter(s) IntraMuscular once  levothyroxine 88 MICROGram(s) Oral daily  losartan 25 milliGRAM(s) Oral daily  nebivolol 2.5 milliGRAM(s) Oral daily  piperacillin/tazobactam IVPB.. 3.375 Gram(s) IV Intermittent every 8 hours  silver sulfADIAZINE 1% Cream 1 Application(s) Topical daily  sodium chloride 0.9%. 1000 milliLiter(s) (50 mL/Hr) IV Continuous <Continuous>  thiamine 100 milliGRAM(s) Oral daily  vancomycin  IVPB 1000 milliGRAM(s) IV Intermittent once    MEDICATIONS  (PRN):  acetaminophen     Tablet .. 650 milliGRAM(s) Oral every 6 hours PRN Mild Pain (1 - 3)  albuterol    90 MICROgram(s) HFA Inhaler 2 Puff(s) Inhalation every 6 hours PRN Shortness of Breath and/or Wheezing  aluminum hydroxide/magnesium hydroxide/simethicone Suspension 30 milliLiter(s) Oral every 4 hours PRN Dyspepsia  HYDROmorphone  Injectable 0.5 milliGRAM(s) IV Push every 10 minutes PRN Severe Pain (7 - 10)  melatonin 3 milliGRAM(s) Oral at bedtime PRN Insomnia  ondansetron Injectable 4 milliGRAM(s) IV Push every 6 hours PRN Nausea and/or Vomiting  oxyCODONE    IR 2.5 milliGRAM(s) Oral every 4 hours PRN Severe Pain (7 - 10)  traMADol 25 milliGRAM(s) Oral every 8 hours PRN Moderate Pain (4 - 6)      Vital Signs Last 24 Hrs  T(C): 37.5 (2023 06:00), Max: 37.5 (2023 06:00)  T(F): 99.5 (2023 06:00), Max: 99.5 (2023 06:00)  HR: 64 (2023 06:30) (60 - 83)  BP: 100/84 (2023 06:00) (98/62 - 150/138)  BP(mean): 91 (2023 06:00) (63 - 144)  RR: 19 (2023 06:30) (16 - 26)  SpO2: 97% (2023 06:30) (85% - 100%)    Parameters below as of 2023 06:00  Patient On (Oxygen Delivery Method): room air          PHYSICAL EXAM   GENERAL: NAD, well developed, well nourished  HEAD: Atraumatic, normocephalic  EYES: EOMI, PERRLA, conjunctiva and sclera clear  ENT: moist mucous membrane  NECK: supple, No JVD, midline trachea  CHEST/LUNG: No increased WOB, symmetric excursions  Heart: RRR ppp, no peripheral edema  ABDOMEN: Round, nondistended, soft, nontender. no organomegaly  : Bowlse w/ clear yellow output in tubing  EXTREMITIES: Left foot mildly warmer than right, eschar around left medial malleolus precludes PT doppler exam, palpable left DP. Right DP still palpable. Motor and sensation of the left toes diminished compared to the right.  NERVOUS SYSTEM: AOx4, speech clear, no neuro-deficits  MSK: full ROM, no deformities  SKIN: warm to touch, no rash or lesions aside from aforementioned left ankle, right groin and left lower leg dressings c/d/i w/ stable strikethrough      I&O's Detail    2023 07:01  -  2023 07:00  --------------------------------------------------------  IN:    Heparin: 175 mL    IV PiggyBack: 200 mL    Oral Fluid: 600 mL    sodium chloride 0.9%: 694 mL  Total IN: 1669 mL    OUT:    Indwelling Catheter - Urethral (mL): 150 mL    Voided (mL): 1100 mL  Total OUT: 1250 mL    Total NET: 419 mL          Daily     Daily Weight in k (2023 05:00)    LABS:                        8.3    12.24 )-----------( 362      ( 2023 05:57 )             25.3     11    138  |  107  |  24<H>  ----------------------------<  107<H>  3.9   |  23  |  1.32<H>    Ca    7.1<L>      2023 05:57  Phos  2.2     11  Mg     2.1         TPro  4.8<L>  /  Alb  2.2<L>  /  TBili  0.6  /  DBili  x   /  AST  55<H>  /  ALT  44  /  AlkPhos  84  11-02    PTT - ( 2023 05:57 )  PTT:107.1 sec  Urinalysis Basic - ( 2023 05:57 )    Color: x / Appearance: x / SG: x / pH: x  Gluc: 107 mg/dL / Ketone: x  / Bili: x / Urobili: x   Blood: x / Protein: x / Nitrite: x   Leuk Esterase: x / RBC: x / WBC x   Sq Epi: x / Non Sq Epi: x / Bacteria: x

## 2023-11-03 NOTE — PHYSICAL THERAPY INITIAL EVALUATION ADULT - ACTIVE RANGE OF MOTION EXAMINATION, REHAB EVAL
except R shld elev ~90, L shld ~70 (c/o pain post-op axilla), lacks L TKE, L DF ltd/bilateral upper extremity Active ROM was WFL (within functional limits)/bilateral  lower extremity Active ROM was WFL (within functional limits)

## 2023-11-03 NOTE — PROVIDER CONTACT NOTE (MEDICATION) - ACTION/TREATMENT ORDERED:
next APTT in AM blood draw as per surgical heparin protocol continue heparin infusion at 12ml/hr. next APTT in AM 11/4 blood draw as per surgical heparin protocol

## 2023-11-03 NOTE — PHYSICAL THERAPY INITIAL EVALUATION ADULT - MANUAL MUSCLE TESTING RESULTS, REHAB EVAL
4+/5 throughout B UE/no strength deficits were identified
B shld NT d/t NSTEMI- R shld appears at least 3+, L at least 3-, B elbow 4/5, RLE WFL, L knee at least 3+, DF 3+/no strength deficits were identified

## 2023-11-03 NOTE — PROVIDER CONTACT NOTE (MEDICATION) - ACTION/TREATMENT ORDERED:
Notify surgical team (Dr. Brand) Repeat PTT was 107.1 at 1300units/hr. Hold for 1 hour and restart drip at 1200units/hr and repeat PTT 6 hours after restarting drip.

## 2023-11-03 NOTE — PROGRESS NOTE ADULT - SUBJECTIVE AND OBJECTIVE BOX
Patient is a 73y old  Male who presents with a chief complaint of pain and bleeding left foot (02 Nov 2023 05:42)    BRIEF HOSPITAL COURSE: ***    11/2 pt reports feeling well, has mild pain and burning sensation in LLE but states it is tolerable. denies chest pain, sob, abd pain,   11/3 had some R groin oozing yesterday, pt still c/o some sharp shooting pain in L ankle to foot. states its overall much better than prior to surgery, however uncomfortable still. extremities warm b/l.    PAST MEDICAL & SURGICAL HISTORY:  Essential hypertension  PVD (peripheral vascular disease)  Gastroesophageal reflux disease, esophagitis presence not specified  Hypothyroidism  Chronic obstructive pulmonary disease, unspecified COPD type  Eczema  Hyperlipidemia, unspecified hyperlipidemia type  Medial meniscus tear  left knee  Femoral popliteal artery thrombus  h/o Fem pop bypass 1997  PVD (peripheral vascular disease)  s/p peripheral stent insertion bilateral lower extemities x 10    MEDICATIONS  (STANDING):  aspirin enteric coated 81 milliGRAM(s) Oral daily  atorvastatin 80 milliGRAM(s) Oral at bedtime  chlorhexidine 2% Cloths 1 Application(s) Topical <User Schedule>  heparin  Infusion 500 Unit(s)/Hr (5 mL/Hr) IV Continuous <Continuous>  influenza  Vaccine (HIGH DOSE) 0.7 milliLiter(s) IntraMuscular once  levothyroxine 88 MICROGram(s) Oral daily  losartan 25 milliGRAM(s) Oral daily  nebivolol 2.5 milliGRAM(s) Oral daily  piperacillin/tazobactam IVPB.. 3.375 Gram(s) IV Intermittent every 8 hours  polyethylene glycol 3350 17 Gram(s) Oral daily  potassium phosphate / sodium phosphate Tablet (K-PHOS No. 2) 2 Tablet(s) Oral once  silver sulfADIAZINE 1% Cream 1 Application(s) Topical daily  sodium chloride 0.9%. 1000 milliLiter(s) (50 mL/Hr) IV Continuous <Continuous>  thiamine 100 milliGRAM(s) Oral daily  vancomycin  IVPB 1000 milliGRAM(s) IV Intermittent once    Vital Signs Last 24 Hrs  T(C): 36.7 (03 Nov 2023 08:25), Max: 37.5 (03 Nov 2023 06:00)  T(F): 98.1 (03 Nov 2023 08:25), Max: 99.5 (03 Nov 2023 06:00)  HR: 67 (03 Nov 2023 12:00) (60 - 74)  BP: 114/56 (03 Nov 2023 12:00) (98/62 - 150/138)  BP(mean): 73 (03 Nov 2023 12:00) (63 - 144)  RR: 20 (03 Nov 2023 11:00) (15 - 27)  SpO2: 84% (03 Nov 2023 12:00) (84% - 99%)    Parameters below as of 03 Nov 2023 09:49  Patient On (Oxygen Delivery Method): room air    I&O's Detail    02 Nov 2023 07:01  -  03 Nov 2023 07:00  --------------------------------------------------------  IN:    Heparin: 175 mL    IV PiggyBack: 200 mL    Oral Fluid: 600 mL    sodium chloride 0.9%: 694 mL  Total IN: 1669 mL    OUT:    Indwelling Catheter - Urethral (mL): 150 mL    Voided (mL): 1100 mL  Total OUT: 1250 mL    Total NET: 419 mL      03 Nov 2023 07:01  -  03 Nov 2023 12:11  --------------------------------------------------------  IN:  Total IN: 0 mL    OUT:    Voided (mL): 525 mL  Total OUT: 525 mL    Total NET: -525 mL      LABS:                           8.3    12.24 )-----------( 362      ( 03 Nov 2023 05:57 )             25.3     11-03    138  |  107  |  24<H>  ----------------------------<  107<H>  3.9   |  23  |  1.32<H>    Ca    7.1<L>      03 Nov 2023 05:57  Phos  2.2     11-03  Mg     2.1     11-03    TPro  4.8<L>  /  Alb  2.2<L>  /  TBili  0.6  /  DBili  x   /  AST  55<H>  /  ALT  44  /  AlkPhos  84  11-02    LIVER FUNCTIONS - ( 02 Nov 2023 08:35 )  Alb: 2.2 g/dL / Pro: 4.8 gm/dL / ALK PHOS: 84 U/L / ALT: 44 U/L / AST: 55 U/L / GGT: x           PTT - ( 03 Nov 2023 05:57 )  PTT:107.1 sec  Urinalysis Basic - ( 03 Nov 2023 05:57 )    Color: x / Appearance: x / SG: x / pH: x  Gluc: 107 mg/dL / Ketone: x  / Bili: x / Urobili: x   Blood: x / Protein: x / Nitrite: x   Leuk Esterase: x / RBC: x / WBC x   Sq Epi: x / Non Sq Epi: x / Bacteria: x      CULTURES:     Physical Examination:  General: No acute distress.    HEENT: Pupils equal, reactive to light.  Symmetric.  PULM: Clear to auscultation bilaterally, no wheezing  CVS: Regular rate and rhythm, no murmurs  ABD: Soft, nondistended, nontender  EXT: No LE edema, nontender, L calf dressing clean, no bloody, L thigh incision clean and dry, L axillary scar no discharge, R groin - no further oozing  SKIN: Warm and well perfused, good cap refill, L medial ankle ulcer.   NEURO: Alert, oriented, interactive    DEVICES:

## 2023-11-03 NOTE — PROGRESS NOTE ADULT - NS PANP COMMENT GEN_ALL_CORE FT
Patient seen with LUPE Francisco  Patient admitted with non healing ulcer left foot, previous occluded left fem pop, fem, fem bypass  course was complicated by episode chf, nstemi, had cath,   non obstructive disease  now underwent left ax to ant tibial bypass with PTFE,  now post op in stepdown    Foot now feels warm, some pain in foot  no chest pain, or sob    1. PVD  2. CAD non obstructive  3. CHF after blood transfuxion    Plan    1. aspitin  2. heparin /er vascular for graft  3. zosyn for ulcer  stable for transfer to floor

## 2023-11-03 NOTE — PROGRESS NOTE ADULT - ASSESSMENT
ASSESSMENT  74 yo male with PMHX of HTN, severe PVD s/p multiple stents b/l legs, s/p fem/popliteal bypass, chronic left lower ankle ulceration, GERD, HLD who presented to  with CC of left LE pain and bleeding    Initially admitted to med/surg for severe PVD, non healing ulcer L medial ankle  US arterial doppler revealing occluded L fem pop and fem fem grafts    Upgraded to ICU for hypoxia and agitation 2/2 transfusion pRBC. received diuretics and abx, placed on NIV    Admitted for  1. Severe PVD with non healing L medial malleolus ulcer  2.  occlusion L fem-fem, fem -pop grafts  3. Acute on chronic HFpEF exac 2/2 pulm edema after blood transfusion vs PNA - resolved  4. JENIFER    s/p C 10/27 revealing non obstructive disease    s/p R fem cutdown with aortogram, b/l LE angiogram with SFA stent placement and L axillary to anterior tibial artery bypass with PTFE graft  EBL 600cc, received 2units pRBC, 2L IVfluids    PLAN    Neuro: AAOx 4. pain control prn. will try to avoid nsaids due to inc Cr. added gabapentin 100mg tid prn for neuropathic pain (renally dosed)  CV: Normotensive. cont ASA, statin, nebivolol, losartan, cont fixed dose IV heparin as per vasc surgery. dc'd lasix today  Pulm: on room air.  GI: PO diet. PPI. bowel regimen prn  Nephro: Cr stable. monitor I & Os. started on gentle IV fluids as per vasc.  Endo: cont synthroid  Vasc: neuro vasc checks. monitor surgical sites for bleeding. wound care/podiatry for L medial malleous ulcer  ID: cont IV zosyn for L ankle ulceration, s/p 1 dose vanco post op. trend WBC. monitor temps.   Heme: DVT ppx - cont IV hep at fixed dose as per vasc sx.  PT eval    Dispo: STable for floor.. neuro vasc checks. IV heparin. ASA. pain control. wound care    Will discuss with Dr. Berger   ASSESSMENT  74 yo male with PMHX of HTN, severe PVD s/p multiple stents b/l legs, s/p fem/popliteal bypass, chronic left lower ankle ulceration, GERD, HLD who presented to  with CC of left LE pain and bleeding    Initially admitted to med/surg for severe PVD, non healing ulcer L medial ankle  US arterial doppler revealing occluded L fem pop and fem fem grafts    Upgraded to ICU for hypoxia and agitation 2/2 transfusion pRBC. received diuretics and abx, placed on NIV    Admitted for  1. Severe PVD with non healing L medial malleolus ulcer  2.  occlusion L fem-fem, fem -pop grafts  3. Acute on chronic HFpEF exac 2/2 pulm edema after blood transfusion vs PNA - resolved  4. JENIFER    s/p LHC 10/27 revealing non obstructive disease    s/p R fem cutdown with aortogram, b/l LE angiogram with SFA stent placement and L axillary to anterior tibial artery bypass with PTFE graft  EBL 600cc, received 2units pRBC, 2L IVfluids    PLAN    Neuro: AAOx 4. pain control prn. will try to avoid nsaids due to inc Cr. added gabapentin 100mg tid prn for neuropathic pain (renally dosed)  CV: Normotensive. cont ASA, statin, nebivolol, losartan, cont fixed dose IV heparin as per vasc surgery. dc'd lasix today  Pulm: on room air.  GI: PO diet. PPI. bowel regimen prn  Nephro: Cr stable. monitor I & Os. started on gentle IV fluids as per vasc.  Endo: cont synthroid  Vasc: neuro vasc checks. monitor surgical sites for bleeding. wound care/podiatry for L medial malleous ulcer  ID: cont IV zosyn for L ankle ulceration, s/p 1 dose vanco post op. trend WBC. monitor temps.   Heme: DVT ppx - cont IV hep at fixed dose as per vasc sx.  PT eval    Dispo: STable for floor.. neuro vasc checks. IV heparin. ASA. pain control. wound care    Will discuss with Dr. Berger    signed out to Dr. Brand 1:12pm

## 2023-11-03 NOTE — PHYSICAL THERAPY INITIAL EVALUATION ADULT - TRANSFER SAFETY CONCERNS NOTED: SIT/STAND, REHAB EVAL
upon stand realized +BM, nsg asst in to assist w/ hygiene care, pt then needing to have additional BM->transitioned to chair to sit on bedpan

## 2023-11-03 NOTE — PHYSICAL THERAPY INITIAL EVALUATION ADULT - GENERAL OBSERVATIONS, REHAB EVAL
pt rec'd supine in bed on SDU, spouse present, monitors, IV. issued heel protector for L foot when in bed and cast boot L for over dsg for amb. dsg also L CW.
Pt found supine in bed on 5S in NAD, agreeable to participate in PT Evaluation. Pt is able to perform bed mobility and sit<>stand transfers with supervision. Pt is able to ambulate 150 feet with RW with 3 point gait and step to pattern secondary to left foot pain. Pt returned to bed with call bell and phone in reach, bed alarm on, DULCE Dumont is aware.

## 2023-11-03 NOTE — PHYSICAL THERAPY INITIAL EVALUATION ADULT - MODALITIES TREATMENT COMMENTS
prior to OOB performed BS exer: APs, QS, GS. pt left sitting in BS chair, on bed pan, nsg asst in attendance, CBIR, monitors, chair alarm

## 2023-11-04 LAB
ANION GAP SERPL CALC-SCNC: 6 MMOL/L — SIGNIFICANT CHANGE UP (ref 5–17)
ANION GAP SERPL CALC-SCNC: 6 MMOL/L — SIGNIFICANT CHANGE UP (ref 5–17)
APTT BLD: 78.6 SEC — HIGH (ref 24.5–35.6)
APTT BLD: 78.6 SEC — HIGH (ref 24.5–35.6)
APTT BLD: 84.8 SEC — HIGH (ref 24.5–35.6)
APTT BLD: 84.8 SEC — HIGH (ref 24.5–35.6)
BUN SERPL-MCNC: 18 MG/DL — SIGNIFICANT CHANGE UP (ref 7–23)
BUN SERPL-MCNC: 18 MG/DL — SIGNIFICANT CHANGE UP (ref 7–23)
CALCIUM SERPL-MCNC: 6.9 MG/DL — LOW (ref 8.5–10.1)
CALCIUM SERPL-MCNC: 6.9 MG/DL — LOW (ref 8.5–10.1)
CHLORIDE SERPL-SCNC: 110 MMOL/L — HIGH (ref 96–108)
CHLORIDE SERPL-SCNC: 110 MMOL/L — HIGH (ref 96–108)
CO2 SERPL-SCNC: 23 MMOL/L — SIGNIFICANT CHANGE UP (ref 22–31)
CO2 SERPL-SCNC: 23 MMOL/L — SIGNIFICANT CHANGE UP (ref 22–31)
CREAT SERPL-MCNC: 0.97 MG/DL — SIGNIFICANT CHANGE UP (ref 0.5–1.3)
CREAT SERPL-MCNC: 0.97 MG/DL — SIGNIFICANT CHANGE UP (ref 0.5–1.3)
EGFR: 83 ML/MIN/1.73M2 — SIGNIFICANT CHANGE UP
EGFR: 83 ML/MIN/1.73M2 — SIGNIFICANT CHANGE UP
GLUCOSE SERPL-MCNC: 113 MG/DL — HIGH (ref 70–99)
GLUCOSE SERPL-MCNC: 113 MG/DL — HIGH (ref 70–99)
HCT VFR BLD CALC: 23.2 % — LOW (ref 39–50)
HCT VFR BLD CALC: 23.2 % — LOW (ref 39–50)
HGB BLD-MCNC: 7.5 G/DL — LOW (ref 13–17)
HGB BLD-MCNC: 7.5 G/DL — LOW (ref 13–17)
MAGNESIUM SERPL-MCNC: 2.1 MG/DL — SIGNIFICANT CHANGE UP (ref 1.6–2.6)
MAGNESIUM SERPL-MCNC: 2.1 MG/DL — SIGNIFICANT CHANGE UP (ref 1.6–2.6)
MCHC RBC-ENTMCNC: 28.7 PG — SIGNIFICANT CHANGE UP (ref 27–34)
MCHC RBC-ENTMCNC: 28.7 PG — SIGNIFICANT CHANGE UP (ref 27–34)
MCHC RBC-ENTMCNC: 32.3 GM/DL — SIGNIFICANT CHANGE UP (ref 32–36)
MCHC RBC-ENTMCNC: 32.3 GM/DL — SIGNIFICANT CHANGE UP (ref 32–36)
MCV RBC AUTO: 88.9 FL — SIGNIFICANT CHANGE UP (ref 80–100)
MCV RBC AUTO: 88.9 FL — SIGNIFICANT CHANGE UP (ref 80–100)
PHOSPHATE SERPL-MCNC: 2.8 MG/DL — SIGNIFICANT CHANGE UP (ref 2.5–4.5)
PHOSPHATE SERPL-MCNC: 2.8 MG/DL — SIGNIFICANT CHANGE UP (ref 2.5–4.5)
PLATELET # BLD AUTO: 327 K/UL — SIGNIFICANT CHANGE UP (ref 150–400)
PLATELET # BLD AUTO: 327 K/UL — SIGNIFICANT CHANGE UP (ref 150–400)
POTASSIUM SERPL-MCNC: 3.6 MMOL/L — SIGNIFICANT CHANGE UP (ref 3.5–5.3)
POTASSIUM SERPL-MCNC: 3.6 MMOL/L — SIGNIFICANT CHANGE UP (ref 3.5–5.3)
POTASSIUM SERPL-SCNC: 3.6 MMOL/L — SIGNIFICANT CHANGE UP (ref 3.5–5.3)
POTASSIUM SERPL-SCNC: 3.6 MMOL/L — SIGNIFICANT CHANGE UP (ref 3.5–5.3)
RBC # BLD: 2.61 M/UL — LOW (ref 4.2–5.8)
RBC # BLD: 2.61 M/UL — LOW (ref 4.2–5.8)
RBC # FLD: 15.9 % — HIGH (ref 10.3–14.5)
RBC # FLD: 15.9 % — HIGH (ref 10.3–14.5)
SODIUM SERPL-SCNC: 139 MMOL/L — SIGNIFICANT CHANGE UP (ref 135–145)
SODIUM SERPL-SCNC: 139 MMOL/L — SIGNIFICANT CHANGE UP (ref 135–145)
WBC # BLD: 14.35 K/UL — HIGH (ref 3.8–10.5)
WBC # BLD: 14.35 K/UL — HIGH (ref 3.8–10.5)
WBC # FLD AUTO: 14.35 K/UL — HIGH (ref 3.8–10.5)
WBC # FLD AUTO: 14.35 K/UL — HIGH (ref 3.8–10.5)

## 2023-11-04 PROCEDURE — 99233 SBSQ HOSP IP/OBS HIGH 50: CPT

## 2023-11-04 RX ORDER — HEPARIN SODIUM 5000 [USP'U]/ML
1100 INJECTION INTRAVENOUS; SUBCUTANEOUS
Qty: 25000 | Refills: 0 | Status: DISCONTINUED | OUTPATIENT
Start: 2023-11-04 | End: 2023-11-06

## 2023-11-04 RX ADMIN — Medication 3 MILLIGRAM(S): at 21:24

## 2023-11-04 RX ADMIN — PIPERACILLIN AND TAZOBACTAM 25 GRAM(S): 4; .5 INJECTION, POWDER, LYOPHILIZED, FOR SOLUTION INTRAVENOUS at 05:45

## 2023-11-04 RX ADMIN — LOSARTAN POTASSIUM 25 MILLIGRAM(S): 100 TABLET, FILM COATED ORAL at 10:03

## 2023-11-04 RX ADMIN — GABAPENTIN 100 MILLIGRAM(S): 400 CAPSULE ORAL at 16:29

## 2023-11-04 RX ADMIN — Medication 100 GRAM(S): at 02:02

## 2023-11-04 RX ADMIN — Medication 2 TABLET(S): at 01:11

## 2023-11-04 RX ADMIN — Medication 88 MICROGRAM(S): at 05:20

## 2023-11-04 RX ADMIN — PIPERACILLIN AND TAZOBACTAM 25 GRAM(S): 4; .5 INJECTION, POWDER, LYOPHILIZED, FOR SOLUTION INTRAVENOUS at 21:22

## 2023-11-04 RX ADMIN — HEPARIN SODIUM 5 UNIT(S)/HR: 5000 INJECTION INTRAVENOUS; SUBCUTANEOUS at 10:10

## 2023-11-04 RX ADMIN — Medication 81 MILLIGRAM(S): at 10:03

## 2023-11-04 RX ADMIN — HEPARIN SODIUM 11 UNIT(S)/HR: 5000 INJECTION INTRAVENOUS; SUBCUTANEOUS at 11:01

## 2023-11-04 RX ADMIN — HEPARIN SODIUM 11 UNIT(S)/HR: 5000 INJECTION INTRAVENOUS; SUBCUTANEOUS at 19:21

## 2023-11-04 RX ADMIN — ATORVASTATIN CALCIUM 80 MILLIGRAM(S): 80 TABLET, FILM COATED ORAL at 21:24

## 2023-11-04 RX ADMIN — HEPARIN SODIUM 11 UNIT(S)/HR: 5000 INJECTION INTRAVENOUS; SUBCUTANEOUS at 20:45

## 2023-11-04 RX ADMIN — Medication 250 MILLIGRAM(S): at 03:00

## 2023-11-04 RX ADMIN — NEBIVOLOL HYDROCHLORIDE 2.5 MILLIGRAM(S): 5 TABLET ORAL at 10:04

## 2023-11-04 RX ADMIN — PIPERACILLIN AND TAZOBACTAM 25 GRAM(S): 4; .5 INJECTION, POWDER, LYOPHILIZED, FOR SOLUTION INTRAVENOUS at 13:04

## 2023-11-04 RX ADMIN — HEPARIN SODIUM 11 UNIT(S)/HR: 5000 INJECTION INTRAVENOUS; SUBCUTANEOUS at 14:40

## 2023-11-04 RX ADMIN — Medication 100 MILLIGRAM(S): at 10:03

## 2023-11-04 NOTE — PROGRESS NOTE ADULT - SUBJECTIVE AND OBJECTIVE BOX
SURGERY DAILY PROGRESS NOTE:     Subjective:  Patient seen and examined this AM at bedside. No acute events overnight and patient resting comfortably. Tolerating diet. Pain controlled. Eager to work with PT. Denies fever/chills, shortness of breath, chest pain. VS reviewed    Objective:    MEDICATIONS  (STANDING):  aspirin enteric coated 81 milliGRAM(s) Oral daily  atorvastatin 80 milliGRAM(s) Oral at bedtime  chlorhexidine 2% Cloths 1 Application(s) Topical <User Schedule>  heparin  Infusion 500 Unit(s)/Hr (5 mL/Hr) IV Continuous <Continuous>  influenza  Vaccine (HIGH DOSE) 0.7 milliLiter(s) IntraMuscular once  levothyroxine 88 MICROGram(s) Oral daily  losartan 25 milliGRAM(s) Oral daily  nebivolol 2.5 milliGRAM(s) Oral daily  piperacillin/tazobactam IVPB.. 3.375 Gram(s) IV Intermittent every 8 hours  polyethylene glycol 3350 17 Gram(s) Oral daily  silver sulfADIAZINE 1% Cream 1 Application(s) Topical daily  sodium chloride 0.9%. 1000 milliLiter(s) (50 mL/Hr) IV Continuous <Continuous>  thiamine 100 milliGRAM(s) Oral daily    MEDICATIONS  (PRN):  acetaminophen     Tablet .. 650 milliGRAM(s) Oral every 6 hours PRN Mild Pain (1 - 3)  albuterol    90 MICROgram(s) HFA Inhaler 2 Puff(s) Inhalation every 6 hours PRN Shortness of Breath and/or Wheezing  aluminum hydroxide/magnesium hydroxide/simethicone Suspension 30 milliLiter(s) Oral every 4 hours PRN Dyspepsia  gabapentin 100 milliGRAM(s) Oral every 8 hours PRN neuropathic LLE pain  HYDROmorphone  Injectable 0.5 milliGRAM(s) IV Push every 10 minutes PRN Severe Pain (7 - 10)  melatonin 3 milliGRAM(s) Oral at bedtime PRN Insomnia  ondansetron Injectable 4 milliGRAM(s) IV Push every 6 hours PRN Nausea and/or Vomiting  oxyCODONE    IR 2.5 milliGRAM(s) Oral every 4 hours PRN Severe Pain (7 - 10)  traMADol 25 milliGRAM(s) Oral every 8 hours PRN Moderate Pain (4 - 6)      Vital Signs Last 24 Hrs  T(C): 36.9 (03 Nov 2023 19:58), Max: 36.9 (03 Nov 2023 19:58)  T(F): 98.4 (03 Nov 2023 19:58), Max: 98.4 (03 Nov 2023 19:58)  HR: 62 (03 Nov 2023 19:58) (62 - 75)  BP: 115/49 (03 Nov 2023 19:58) (105/91 - 133/61)  BP(mean): 68 (03 Nov 2023 18:00) (68 - 98)  RR: 16 (03 Nov 2023 19:58) (15 - 27)  SpO2: 98% (03 Nov 2023 19:58) (91% - 100%)    Parameters below as of 03 Nov 2023 19:58  Patient On (Oxygen Delivery Method): room air          PHYSICAL EXAM   GENERAL: NAD, well developed, well nourished  HEAD: Atraumatic, normocephalic  EYES: EOMI, PERRLA, conjunctiva and sclera clear  ENT: moist mucous membrane  NECK: supple, No JVD, midline trachea  CHEST/LUNG: No increased WOB, symmetric excursions  Heart: RRR ppp, no peripheral edema  ABDOMEN: Round, nondistended, soft, nontender. no organomegaly  EXTREMITIES: Left foot mildly warmer than right, eschar around left medial malleolus precludes PT doppler exam, palpable left DP. Right DP still palpable. Motor and sensation of the left toes diminished compared to the right.  NERVOUS SYSTEM: AOx4, speech clear, no neuro-deficits  MSK: full ROM, no deformities  SKIN: warm to touch, no rash or lesions aside from aforementioned left ankle, right groin and left lower leg dressings c/d/i w/ stable strikethrough      I&O's Detail    02 Nov 2023 07:01  -  03 Nov 2023 07:00  --------------------------------------------------------  IN:    Heparin: 175 mL    IV PiggyBack: 200 mL    Oral Fluid: 600 mL    sodium chloride 0.9%: 694 mL  Total IN: 1669 mL    OUT:    Indwelling Catheter - Urethral (mL): 150 mL    Voided (mL): 1100 mL  Total OUT: 1250 mL    Total NET: 419 mL      03 Nov 2023 07:01  -  04 Nov 2023 06:49  --------------------------------------------------------  IN:    Heparin: 144 mL    IV PiggyBack: 400 mL    sodium chloride 0.9%: 600 mL  Total IN: 1144 mL    OUT:    Voided (mL): 1500 mL  Total OUT: 1500 mL    Total NET: -356 mL          Daily     Daily     LABS:                        7.5    14.35 )-----------( 327      ( 04 Nov 2023 05:40 )             23.2     11-04    139  |  110<H>  |  18  ----------------------------<  113<H>  3.6   |  23  |  0.97    Ca    6.9<L>      04 Nov 2023 05:40  Phos  2.8     11-04  Mg     2.1     11-04    TPro  4.8<L>  /  Alb  2.2<L>  /  TBili  0.6  /  DBili  x   /  AST  55<H>  /  ALT  44  /  AlkPhos  84  11-02    PTT - ( 04 Nov 2023 05:40 )  PTT:106.7 sec  Urinalysis Basic - ( 04 Nov 2023 05:40 )    Color: x / Appearance: x / SG: x / pH: x  Gluc: 113 mg/dL / Ketone: x  / Bili: x / Urobili: x   Blood: x / Protein: x / Nitrite: x   Leuk Esterase: x / RBC: x / WBC x   Sq Epi: x / Non Sq Epi: x / Bacteria: x

## 2023-11-04 NOTE — PROGRESS NOTE ADULT - ASSESSMENT
72yo M presents w/ bleeding LLE venous stasis ulcer. Severe small vessel disease on left lower extremity with multiple revascularization interventions, now occluded fem-pop and fem-fem bypass. 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 Now POD 3 s/p right fem cutdown aortogram, b/l angiogram, right SFA stent placement for pseudoaneurysm, left axillary to AT bypass with PTFE, recovering as expected.    - Continue heparin gtt, consider transition to DOAC once H/H stable  - Continue aspirin  - Possible transfusion for Hgb < 8, will f/u with Cardiology  - On regular diet  - Maintain gentle IVF until Cr normalizes  - Vanc & Zosyn    Plan discussed with Dr. Sosa

## 2023-11-04 NOTE — PROGRESS NOTE ADULT - ASSESSMENT
Assesment: 73y old male seen for the following:   - Full thickness wound to the left foot, chronic   - Pain to left foot  - Difficulty ambulation    Plan:   Chart reviewed and Patient evaluated;  Discussed diagnosis and treatment with patient. Discussed importance of daily foot examinations, proper shoe gear, importance of tight glycemic control.   X-rays reviewed : on wet read showing no soft tissue gas, no acute bony findings, mild edema noted to the left ankle posteriorly  There is a full thickness wound to the left medial mal, etiology of wound most likely due to arterial problem, wound looks chronic and stable without acute SOI  Wound flush with normal saline   WC: silvadene and DSD  WBAT using surgical shoe  Offloading to bilateral heels while bedbound.   Continue with antibiotics as per ID  All additional care per Med appreciated  Patient demonstrated verbal understanding of all interventions and tolerated interventions well without any complications.   Patient to follow up in wound care center [facility] 1 week after discharge w dr anai joseph  Case D/W attending Dr. Joseph    Wound care instructions for Left LE to be changed every other day:  - Gently remove dressings.  - Clean the wound with saline and dry it thoroughly with dry gauze  - Apply silvadene, gauze, kerlix and secure it with tape

## 2023-11-04 NOTE — PROVIDER CONTACT NOTE (MEDICATION) - BACKGROUND
as per special protocol set by surgery scanning heparin medication not available
asp er special protocol set by surgery scanning heparin medication not available
Pt on heparin drip and on personal protocol. PTT earlier was 67.6 with heparin infusing at 1300units/hr. Was notify by PA to keep drip at 1300units/hr and repeat PTT 6 hours later. goal is 60-80

## 2023-11-04 NOTE — PROVIDER CONTACT NOTE (MEDICATION) - SITUATION
pt on heparin infusion. surgical team with special protocol with targeted therapeutic range  of 60 -90 as per MD order.  PTT of 90. 2nd therapeutic  . first therapeutic 84.9. .
Aptt of 106.7 . pt on heparin infusion. surgical team with special protocol with targeted therapeutic range  of 60 -90 as per MD order. prior to new Aptt heparin infusion was running at 1200 units/hour

## 2023-11-04 NOTE — PROGRESS NOTE ADULT - SUBJECTIVE AND OBJECTIVE BOX
CHIEF COMPLAINT/INTERVAL HISTORY:    Patient is a 73y old  Male who presents with a chief complaint of pain and bleeding left foot (04 Nov 2023 06:49)      HPI:  74 y/o male with PMHX of HTN, severe PVD s/p multiple stents b/l legs, s/p fem/popliteal bypass, chronic left lower ankle ulceration, GERD, HLD who presented to  with CC of left LE pain and bleeding.  Patient notes he was at his family's house earlier today.  He was sitting on the floor with his grandson when the family noticed significant bleeding in his left leg.  His wife tried to get him up and into the bathroom to stop the bleeding.  She got him to sit on the side of the bathtub and was trying to hold pressure over the bleeding when he seemed to lose consciousness.  He didn't fall over but as per wife, turned white and wasn't responding to her.  They called 911.  He wasn't responding for a few minutes and then came to.  She denies hx of syncope/ near syncope in the past.  In the ER, bleeding had mostly stopped.  Patient was c/o severe pain left foot.  Left foot discoloration and vascular surgery called for consult.  Labs with ARF with Cr of 2.2-- unclear baseline.  Patient has been taking advil at home for pain in foot.      Downgraded from SD    ICU Vital Signs Last 24 Hrs  T(C): 36.6 (04 Nov 2023 07:56), Max: 36.9 (03 Nov 2023 19:58)  T(F): 97.9 (04 Nov 2023 07:56), Max: 98.4 (03 Nov 2023 19:58)  HR: 68 (04 Nov 2023 07:56) (62 - 75)  BP: 116/61 (04 Nov 2023 07:56) (107/77 - 133/61)  BP(mean): 68 (03 Nov 2023 18:00) (68 - 87)  ABP: --  ABP(mean): --  RR: 18 (04 Nov 2023 07:56) (16 - 23)  SpO2: 92% (04 Nov 2023 07:56) (91% - 100%)    O2 Parameters below as of 04 Nov 2023 07:56  Patient On (Oxygen Delivery Method): room air              MEDICATIONS  (STANDING):  aspirin enteric coated 81 milliGRAM(s) Oral daily  atorvastatin 80 milliGRAM(s) Oral at bedtime  chlorhexidine 2% Cloths 1 Application(s) Topical <User Schedule>  heparin  Infusion 500 Unit(s)/Hr (5 mL/Hr) IV Continuous <Continuous>  influenza  Vaccine (HIGH DOSE) 0.7 milliLiter(s) IntraMuscular once  levothyroxine 88 MICROGram(s) Oral daily  losartan 25 milliGRAM(s) Oral daily  nebivolol 2.5 milliGRAM(s) Oral daily  piperacillin/tazobactam IVPB.. 3.375 Gram(s) IV Intermittent every 8 hours  polyethylene glycol 3350 17 Gram(s) Oral daily  silver sulfADIAZINE 1% Cream 1 Application(s) Topical daily  sodium chloride 0.9%. 1000 milliLiter(s) (50 mL/Hr) IV Continuous <Continuous>  thiamine 100 milliGRAM(s) Oral daily    MEDICATIONS  (PRN):  acetaminophen     Tablet .. 650 milliGRAM(s) Oral every 6 hours PRN Mild Pain (1 - 3)  albuterol    90 MICROgram(s) HFA Inhaler 2 Puff(s) Inhalation every 6 hours PRN Shortness of Breath and/or Wheezing  aluminum hydroxide/magnesium hydroxide/simethicone Suspension 30 milliLiter(s) Oral every 4 hours PRN Dyspepsia  gabapentin 100 milliGRAM(s) Oral every 8 hours PRN neuropathic LLE pain  HYDROmorphone  Injectable 0.5 milliGRAM(s) IV Push every 10 minutes PRN Severe Pain (7 - 10)  melatonin 3 milliGRAM(s) Oral at bedtime PRN Insomnia  ondansetron Injectable 4 milliGRAM(s) IV Push every 6 hours PRN Nausea and/or Vomiting  oxyCODONE    IR 2.5 milliGRAM(s) Oral every 4 hours PRN Severe Pain (7 - 10)  traMADol 25 milliGRAM(s) Oral every 8 hours PRN Moderate Pain (4 - 6)        PHYSICAL EXAM:    GENERAL: NAD, well-groomed, well-developed  NERVOUS SYSTEM:  Alert & Oriented X3, Motor Strength 5/5 B/L upper and lower extremities; DTRs 2+ intact and symmetric  CHEST/LUNG: Clear to auscultation bilaterally; No rales, rhonchi, wheezing, or rubs  HEART: Regular rate and rhythm; No murmurs, rubs, or gallops  ABDOMEN: Soft, Nontender, Nondistended; Bowel sounds present  EXTREMITIES:  2+ Peripheral Pulses, No clubbing, cyanosis, or edema    LABS:                        7.5    14.35 )-----------( 327      ( 04 Nov 2023 05:40 )             23.2     11-04    139  |  110<H>  |  18  ----------------------------<  113<H>  3.6   |  23  |  0.97    Ca    6.9<L>      04 Nov 2023 05:40  Phos  2.8     11-04  Mg     2.1     11-04      PTT - ( 04 Nov 2023 05:40 )  PTT:106.7 sec  Urinalysis Basic - ( 04 Nov 2023 05:40 )    Color: x / Appearance: x / SG: x / pH: x  Gluc: 113 mg/dL / Ketone: x  / Bili: x / Urobili: x   Blood: x / Protein: x / Nitrite: x   Leuk Esterase: x / RBC: x / WBC x   Sq Epi: x / Non Sq Epi: x / Bacteria: x          RADIOLOGY & ADDITIONAL TESTS :reviewed      74 yo male with PMHX of HTN, severe PVD s/p multiple stents b/l legs, s/p fem/popliteal bypass, chronic left lower ankle ulceration, GERD, HLD who presented to  with CC of left LE pain and bleeding    Initially admitted to med/surg for severe PVD, non healing ulcer L medial ankle  US arterial doppler revealing occluded L fem pop and fem fem grafts    Upgraded to ICU for hypoxia and agitation 2/2 transfusion pRBC. received diuretics and abx, placed on NIV    Admitted for  1. Severe PVD with non healing L medial malleolus ulcer  2.  occlusion L fem-fem, fem -pop grafts  3. Acute on chronic HFpEF exac 2/2 pulm edema after blood transfusion vs PNA - resolved  4. JENIFER    s/p LHC 10/27 revealing non obstructive disease    s/p R fem cutdown with aortogram, b/l LE angiogram with SFA stent placement and L axillary to anterior tibial artery bypass with PTFE graft  EBL 600cc, received 2units pRBC, 2L IVfluids    PLAN    Neuro: AAOx 4. pain control prn. will try to avoid nsaids due to inc Cr. added gabapentin 100mg tid prn for neuropathic pain (renally dosed)  CV: Normotensive. cont ASA, statin, nebivolol, losartan, cont fixed dose IV heparin as per vasc surgery. dc'd lasix today  Pulm: on room air.  GI: PO diet. PPI. bowel regimen prn  Nephro: Cr stable. monitor I & Os. started on gentle IV fluids as per vasc.  Endo: cont synthroid  Vasc: neuro vasc checks. monitor surgical sites for bleeding. wound care/podiatry for L medial malleous ulcer  ID: cont IV zosyn for L ankle ulceration, s/p 1 dose vanco post op. trend WBC. monitor temps.   Heme: DVT ppx - cont IV hep at fixed dose as per vasc sx.  Severe small vessel disease on left lower extremity with multiple revascularization interventions, now occluded fem-pop and fem-fem bypass. 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 Now POD 2 s/p right fem cutdown aortogram, b/l angiogram, right SFA stent placement fro pseudoaneurysm, left axilarry to AT bypass with PTFE, recovering as expected.

## 2023-11-04 NOTE — PROVIDER CONTACT NOTE (MEDICATION) - ACTION/TREATMENT ORDERED:
hold heparin infusion for 1hour and restart at 1100 units/ hr . draw next Aptt  6 hours from the time of restart of heparin infusion . MD  telephone order verified with MARIALUISA Cavanaugh.

## 2023-11-04 NOTE — PROGRESS NOTE ADULT - SUBJECTIVE AND OBJECTIVE BOX
Date of service: 11/04/2023  HPI: 74 y/o male with PMHX of HTN, severe PVD s/p multiple stents b/l legs, s/p fem/popliteal bypass, chronic left lower ankle ulceration, GERD, HLD who presented to  with CC of left LE pain and bleeding.  Patient notes he was at his family's house earlier 10/22.  He was sitting on the floor with his grandson when the family noticed significant bleeding in his left leg.  His wife tried to get him up and into the bathroom to stop the bleeding.  She got him to sit on the side of the bathtub and was trying to hold pressure over the bleeding when he seemed to lose consciousness.  He didn't fall over but as per wife, turned white and wasn't responding to her.  They called 911.  He wasn't responding for a few minutes and then came to.  She denies hx of syncope/ near syncope in the past.  In the ER, bleeding had mostly stopped.  Patient was c/o severe pain left foot.  Left foot discoloration and vascular surgery called for consult.  Labs with ARF with Cr of 2.2-- unclear baseline.  Patient has been taking advil at home for pain in foot.      Podiatry was consulted for left foot ulcer. Pt resting comfortably bedside today. Pt tender to wound site.     11/04/2023: Pt seen by podiatry team w attending present, pt resting bedside, NAD, pain controlled. No other pedal complaints    PAST MEDICAL & SURGICAL HISTORY:  Essential hypertension  PVD (peripheral vascular disease)  Gastroesophageal reflux disease, esophagitis presence not specified  Hypothyroidism  Chronic obstructive pulmonary disease, unspecified COPD type  Eczema  Hyperlipidemia, unspecified hyperlipidemia type  Medial meniscus tear  left knee  Femoral popliteal artery thrombus  h/o Fem pop bypass 1997  PVD (peripheral vascular disease)  s/p peripheral stent insertion bilateral lower extemities x 10    MEDICATIONS  (STANDING):  aspirin enteric coated 81 milliGRAM(s) Oral daily  atorvastatin 80 milliGRAM(s) Oral at bedtime  chlorhexidine 2% Cloths 1 Application(s) Topical <User Schedule>  heparin  Infusion 1100 Unit(s)/Hr (11 mL/Hr) IV Continuous <Continuous>  influenza  Vaccine (HIGH DOSE) 0.7 milliLiter(s) IntraMuscular once  levothyroxine 88 MICROGram(s) Oral daily  losartan 25 milliGRAM(s) Oral daily  nebivolol 2.5 milliGRAM(s) Oral daily  piperacillin/tazobactam IVPB.. 3.375 Gram(s) IV Intermittent every 8 hours  polyethylene glycol 3350 17 Gram(s) Oral daily  silver sulfADIAZINE 1% Cream 1 Application(s) Topical daily  thiamine 100 milliGRAM(s) Oral daily    MEDICATIONS  (PRN):  acetaminophen     Tablet .. 650 milliGRAM(s) Oral every 6 hours PRN Mild Pain (1 - 3)  albuterol    90 MICROgram(s) HFA Inhaler 2 Puff(s) Inhalation every 6 hours PRN Shortness of Breath and/or Wheezing  aluminum hydroxide/magnesium hydroxide/simethicone Suspension 30 milliLiter(s) Oral every 4 hours PRN Dyspepsia  gabapentin 100 milliGRAM(s) Oral every 8 hours PRN neuropathic LLE pain  HYDROmorphone  Injectable 0.5 milliGRAM(s) IV Push every 10 minutes PRN Severe Pain (7 - 10)  melatonin 3 milliGRAM(s) Oral at bedtime PRN Insomnia  ondansetron Injectable 4 milliGRAM(s) IV Push every 6 hours PRN Nausea and/or Vomiting  oxyCODONE    IR 2.5 milliGRAM(s) Oral every 4 hours PRN Severe Pain (7 - 10)  traMADol 25 milliGRAM(s) Oral every 8 hours PRN Moderate Pain (4 - 6)          FAMILY HISTORY:  Family hx of vascular disease      Social History:    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    Ex-smoker-- quit 20 years ago      Allergies:  Betadine (Hives; Rash)   (22 Oct 2023 14:35)            PMH: Essential hypertension    PVD (peripheral vascular disease)    Gastroesophageal reflux disease, esophagitis presence not specified    Hypothyroidism    Chronic obstructive pulmonary disease, unspecified COPD type    Eczema    Hyperlipidemia, unspecified hyperlipidemia type    Medial meniscus tear      PSH:Femoral popliteal artery thrombus    PVD (peripheral vascular disease)        Allergies:Betadine (Hives; Rash)      Labs:                        7.5    14.35 )-----------( 327      ( 04 Nov 2023 05:40 )             23.2     11-04    139  |  110<H>  |  18  ----------------------------<  113<H>  3.6   |  23  |  0.97    Ca    6.9<L>      04 Nov 2023 05:40  Phos  2.8     11-04  Mg     2.1     11-04               Vital Signs Last 24 Hrs  T(C): 36.8 (04 Nov 2023 15:11), Max: 36.9 (03 Nov 2023 19:58)  T(F): 98.2 (04 Nov 2023 15:11), Max: 98.4 (03 Nov 2023 19:58)  HR: 61 (04 Nov 2023 15:11) (61 - 68)  BP: 100/47 (04 Nov 2023 15:11) (100/47 - 123/49)  BP(mean): 68 (03 Nov 2023 18:00) (68 - 72)  RR: 18 (04 Nov 2023 15:11) (16 - 19)  SpO2: 94% (04 Nov 2023 15:11) (92% - 98%)    Parameters below as of 04 Nov 2023 15:11  Patient On (Oxygen Delivery Method): room air              REVIEW OF SYSTEMS:    CONSTITUTIONAL: No weakness, fevers or chills  EYES: No visual changes  RESPIRATORY: No cough, wheezing; No shortness of breath  CARDIOVASCULAR: No chest pain or palpitations  GASTROINTESTINAL: No abdominal or epigastric pain. No nausea, vomiting; No diarrhea or constipation.   GENITOURINARY: No dysuria, frequency or hematuria  NEUROLOGICAL: No numbness or weakness  SKIN: See physical examination.  All other review of systems is negative unless indicated above    Physical Exam:   Constitutional: NAD, alert;  Lower Extremity Focus  Derm:  Skin warm, dry and supple bilateral.    Ulceration to the left medial heel, wound base is mostly eschar, wound size is 3.5cm x 3.5cm, no periwound edema or erythema noted. no purulence or pus noted, no tracking/tunneling noted, no PTB, no malodor  Vascular: Dorsalis Pedis and Posterior Tibial pulses non palpable.  Capillary re-fill time more then 3 seconds digits 1-5 bilateral.    Neuro: Protective sensation diminished to the level of the digits bilateral.  MSK: Muscle strength 5/5 all major muscle groups bilateral. TTP to the wound site on the left medial mal          < from: Xray Foot AP + Lateral + Oblique, Left (10.22.23 @ 11:51) >  PROCEDURE DATE:  10/22/2023          INTERPRETATION:  Left ankle, left foot, and chest. Concern is chronic   wound around the malleoli.    Left ankle. 3 views.    There is long area of soft tissue calcification likely in a venous   structure in the medial lower leg.    There is mild degeneration of the malleolar tips.    There is some swelling seen approaching cavers triangle.    No bone destruction or fracture.    Left foot. 3 views.    No bone destruction or fracture is evident.    AP chest on October 22, 2023 at 12:34 PM.    Heart magnified by technique.    Lungs are clear.    Chest is similar to August 28, 2019.    IMPRESSION: No acute chest finding. Mild edema around the left ankle   posteriorly. Benign-appearing calcifications in the medial left lower leg   are likely venous. No acute bony finding.    --- End of Report ---        < end of copied text >

## 2023-11-05 LAB
ANION GAP SERPL CALC-SCNC: 4 MMOL/L — LOW (ref 5–17)
ANION GAP SERPL CALC-SCNC: 4 MMOL/L — LOW (ref 5–17)
APTT BLD: 81.1 SEC — HIGH (ref 24.5–35.6)
APTT BLD: 81.1 SEC — HIGH (ref 24.5–35.6)
BUN SERPL-MCNC: 13 MG/DL — SIGNIFICANT CHANGE UP (ref 7–23)
BUN SERPL-MCNC: 13 MG/DL — SIGNIFICANT CHANGE UP (ref 7–23)
CALCIUM SERPL-MCNC: 6.8 MG/DL — LOW (ref 8.5–10.1)
CALCIUM SERPL-MCNC: 6.8 MG/DL — LOW (ref 8.5–10.1)
CHLORIDE SERPL-SCNC: 111 MMOL/L — HIGH (ref 96–108)
CHLORIDE SERPL-SCNC: 111 MMOL/L — HIGH (ref 96–108)
CO2 SERPL-SCNC: 26 MMOL/L — SIGNIFICANT CHANGE UP (ref 22–31)
CO2 SERPL-SCNC: 26 MMOL/L — SIGNIFICANT CHANGE UP (ref 22–31)
CREAT SERPL-MCNC: 0.91 MG/DL — SIGNIFICANT CHANGE UP (ref 0.5–1.3)
CREAT SERPL-MCNC: 0.91 MG/DL — SIGNIFICANT CHANGE UP (ref 0.5–1.3)
EGFR: 90 ML/MIN/1.73M2 — SIGNIFICANT CHANGE UP
EGFR: 90 ML/MIN/1.73M2 — SIGNIFICANT CHANGE UP
GLUCOSE SERPL-MCNC: 111 MG/DL — HIGH (ref 70–99)
GLUCOSE SERPL-MCNC: 111 MG/DL — HIGH (ref 70–99)
HCT VFR BLD CALC: 25 % — LOW (ref 39–50)
HCT VFR BLD CALC: 25 % — LOW (ref 39–50)
HGB BLD-MCNC: 8.1 G/DL — LOW (ref 13–17)
HGB BLD-MCNC: 8.1 G/DL — LOW (ref 13–17)
MAGNESIUM SERPL-MCNC: 2.3 MG/DL — SIGNIFICANT CHANGE UP (ref 1.6–2.6)
MAGNESIUM SERPL-MCNC: 2.3 MG/DL — SIGNIFICANT CHANGE UP (ref 1.6–2.6)
MCHC RBC-ENTMCNC: 29.2 PG — SIGNIFICANT CHANGE UP (ref 27–34)
MCHC RBC-ENTMCNC: 29.2 PG — SIGNIFICANT CHANGE UP (ref 27–34)
MCHC RBC-ENTMCNC: 32.4 GM/DL — SIGNIFICANT CHANGE UP (ref 32–36)
MCHC RBC-ENTMCNC: 32.4 GM/DL — SIGNIFICANT CHANGE UP (ref 32–36)
MCV RBC AUTO: 90.3 FL — SIGNIFICANT CHANGE UP (ref 80–100)
MCV RBC AUTO: 90.3 FL — SIGNIFICANT CHANGE UP (ref 80–100)
PHOSPHATE SERPL-MCNC: 2.6 MG/DL — SIGNIFICANT CHANGE UP (ref 2.5–4.5)
PHOSPHATE SERPL-MCNC: 2.6 MG/DL — SIGNIFICANT CHANGE UP (ref 2.5–4.5)
PLATELET # BLD AUTO: 358 K/UL — SIGNIFICANT CHANGE UP (ref 150–400)
PLATELET # BLD AUTO: 358 K/UL — SIGNIFICANT CHANGE UP (ref 150–400)
POTASSIUM SERPL-MCNC: 3.7 MMOL/L — SIGNIFICANT CHANGE UP (ref 3.5–5.3)
POTASSIUM SERPL-MCNC: 3.7 MMOL/L — SIGNIFICANT CHANGE UP (ref 3.5–5.3)
POTASSIUM SERPL-SCNC: 3.7 MMOL/L — SIGNIFICANT CHANGE UP (ref 3.5–5.3)
POTASSIUM SERPL-SCNC: 3.7 MMOL/L — SIGNIFICANT CHANGE UP (ref 3.5–5.3)
RBC # BLD: 2.77 M/UL — LOW (ref 4.2–5.8)
RBC # BLD: 2.77 M/UL — LOW (ref 4.2–5.8)
RBC # FLD: 16.1 % — HIGH (ref 10.3–14.5)
RBC # FLD: 16.1 % — HIGH (ref 10.3–14.5)
SODIUM SERPL-SCNC: 141 MMOL/L — SIGNIFICANT CHANGE UP (ref 135–145)
SODIUM SERPL-SCNC: 141 MMOL/L — SIGNIFICANT CHANGE UP (ref 135–145)
WBC # BLD: 13.77 K/UL — HIGH (ref 3.8–10.5)
WBC # BLD: 13.77 K/UL — HIGH (ref 3.8–10.5)
WBC # FLD AUTO: 13.77 K/UL — HIGH (ref 3.8–10.5)
WBC # FLD AUTO: 13.77 K/UL — HIGH (ref 3.8–10.5)

## 2023-11-05 PROCEDURE — 99232 SBSQ HOSP IP/OBS MODERATE 35: CPT

## 2023-11-05 RX ORDER — GABAPENTIN 400 MG/1
200 CAPSULE ORAL EVERY 8 HOURS
Refills: 0 | Status: DISCONTINUED | OUTPATIENT
Start: 2023-11-05 | End: 2023-11-08

## 2023-11-05 RX ADMIN — HEPARIN SODIUM 11 UNIT(S)/HR: 5000 INJECTION INTRAVENOUS; SUBCUTANEOUS at 09:00

## 2023-11-05 RX ADMIN — Medication 1 APPLICATION(S): at 11:01

## 2023-11-05 RX ADMIN — GABAPENTIN 200 MILLIGRAM(S): 400 CAPSULE ORAL at 14:20

## 2023-11-05 RX ADMIN — CHLORHEXIDINE GLUCONATE 1 APPLICATION(S): 213 SOLUTION TOPICAL at 06:06

## 2023-11-05 RX ADMIN — Medication 81 MILLIGRAM(S): at 11:00

## 2023-11-05 RX ADMIN — LOSARTAN POTASSIUM 25 MILLIGRAM(S): 100 TABLET, FILM COATED ORAL at 11:01

## 2023-11-05 RX ADMIN — HEPARIN SODIUM 11 UNIT(S)/HR: 5000 INJECTION INTRAVENOUS; SUBCUTANEOUS at 07:21

## 2023-11-05 RX ADMIN — Medication 100 MILLIGRAM(S): at 11:00

## 2023-11-05 RX ADMIN — Medication 88 MICROGRAM(S): at 05:27

## 2023-11-05 RX ADMIN — HEPARIN SODIUM 11 UNIT(S)/HR: 5000 INJECTION INTRAVENOUS; SUBCUTANEOUS at 21:58

## 2023-11-05 RX ADMIN — NEBIVOLOL HYDROCHLORIDE 2.5 MILLIGRAM(S): 5 TABLET ORAL at 11:00

## 2023-11-05 RX ADMIN — HEPARIN SODIUM 11 UNIT(S)/HR: 5000 INJECTION INTRAVENOUS; SUBCUTANEOUS at 01:47

## 2023-11-05 RX ADMIN — POLYETHYLENE GLYCOL 3350 17 GRAM(S): 17 POWDER, FOR SOLUTION ORAL at 11:00

## 2023-11-05 RX ADMIN — ATORVASTATIN CALCIUM 80 MILLIGRAM(S): 80 TABLET, FILM COATED ORAL at 21:05

## 2023-11-05 RX ADMIN — Medication 3 MILLIGRAM(S): at 21:05

## 2023-11-05 RX ADMIN — GABAPENTIN 200 MILLIGRAM(S): 400 CAPSULE ORAL at 21:05

## 2023-11-05 RX ADMIN — PIPERACILLIN AND TAZOBACTAM 25 GRAM(S): 4; .5 INJECTION, POWDER, LYOPHILIZED, FOR SOLUTION INTRAVENOUS at 05:26

## 2023-11-05 NOTE — PROGRESS NOTE ADULT - ASSESSMENT
74yo M presents w/ bleeding LLE venous stasis ulcer. Severe small vessel disease on left lower extremity with multiple revascularization interventions, now occluded fem-pop and fem-fem bypass. 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 Now POD 4 s/p right fem cutdown aortogram, b/l angiogram, right SFA stent placement for pseudoaneurysm, left axillary to AT bypass with PTFE, recovering as expected.    - Continue heparin gtt, consider transition to DOAC once H/H stable  - Continue aspirin  - On regular diet  - Vanc & Zosyn  - ID for discharge recommendations  - KALINA for RAMA placement    Plan discussed with Dr. Sosa

## 2023-11-05 NOTE — PROVIDER CONTACT NOTE (OTHER) - SITUATION
Oozing noted at right groin site after pt spent several hours in chair.  Marked and timed.
Pt staton d/c'd at 1000 this am.  Bladder scan done for 196 cc.
As per order provider notified for aPTT result Q6H. At 2034 result was 78.6.
PTT from 1700 result: 36.1
aPTT result at 0130 was 81.1. Third result at therapeutic range.

## 2023-11-05 NOTE — PROVIDER CONTACT NOTE (OTHER) - NAME OF MD/NP/PA/DO NOTIFIED:
Surgical resident
Dr. Brand, surgical resident
Surgical resident Jesse
surgical resident  MD Knight
surgical resident

## 2023-11-05 NOTE — PROGRESS NOTE ADULT - SUBJECTIVE AND OBJECTIVE BOX
SURGERY DAILY PROGRESS NOTE:     Subjective:  Patient seen and examined this AM at bedside. No acute events overnight and patient resting comfortably. Tolerating diet. Pain controlled. PTT therapeutic x 3. Denies fever/chills, shortness of breath, chest pain. VS reviewed    Objective:    PHYSICAL EXAM   GENERAL: NAD  HEAD: Atraumatic, normocephalic  EYES: EOMI, PERRLA, conjunctiva and sclera clear  ENT: moist mucous membrane  NECK: supple, No JVD, midline trachea  CHEST/LUNG: No increased WOB, symmetric excursions  Heart: RRR ppp, no peripheral edema  ABDOMEN: Round, nondistended, soft, nontender  EXTREMITIES: Left foot mildly warmer than right, eschar around left medial malleolus precludes PT doppler exam, palpable left DP. Right DP still palpable. Motor and sensation of the left toes diminished compared to the right.  NERVOUS SYSTEM: AOx4, speech clear, no neuro-deficits  MSK: full ROM, no deformities  SKIN: warm to touch, no rash or lesions aside from aforementioned left ankle, right groin and left lower leg dressings c/d/i w/ stable strikethrough    Vital Signs Last 24 Hrs  T(C): 37 (04 Nov 2023 20:26), Max: 37 (04 Nov 2023 20:26)  T(F): 98.6 (04 Nov 2023 20:26), Max: 98.6 (04 Nov 2023 20:26)  HR: 62 (04 Nov 2023 20:26) (61 - 68)  BP: 116/59 (04 Nov 2023 20:26) (100/47 - 116/61)  BP(mean): --  RR: 18 (04 Nov 2023 20:26) (18 - 18)  SpO2: 97% (04 Nov 2023 20:26) (92% - 97%)    Parameters below as of 04 Nov 2023 20:26  Patient On (Oxygen Delivery Method): room air                            7.5    14.35 )-----------( 327      ( 04 Nov 2023 05:40 )             23.2     11-04    139  |  110<H>  |  18  ----------------------------<  113<H>  3.6   |  23  |  0.97    Ca    6.9<L>      04 Nov 2023 05:40  Phos  2.8     11-04  Mg     2.1     11-04      I&O's Summary    03 Nov 2023 07:01  -  04 Nov 2023 07:00  --------------------------------------------------------  IN: 1144 mL / OUT: 1500 mL / NET: -356 mL    04 Nov 2023 08:01  -  05 Nov 2023 04:53  --------------------------------------------------------  IN: 0 mL / OUT: 450 mL / NET: -450 mL

## 2023-11-05 NOTE — PROGRESS NOTE ADULT - SUBJECTIVE AND OBJECTIVE BOX
CHIEF COMPLAINT/INTERVAL HISTORY:    Patient is a 73y old  Male who presents with a chief complaint of pain and bleeding left foot (04 Nov 2023 06:49)      HPI:  74 y/o male with PMHX of HTN, severe PVD s/p multiple stents b/l legs, s/p fem/popliteal bypass, chronic left lower ankle ulceration, GERD, HLD who presented to  with CC of left LE pain and bleeding.  Patient notes he was at his family's house earlier today.  He was sitting on the floor with his grandson when the family noticed significant bleeding in his left leg.  His wife tried to get him up and into the bathroom to stop the bleeding.  She got him to sit on the side of the bathtub and was trying to hold pressure over the bleeding when he seemed to lose consciousness.  He didn't fall over but as per wife, turned white and wasn't responding to her.  They called 911.  He wasn't responding for a few minutes and then came to.  She denies hx of syncope/ near syncope in the past.  In the ER, bleeding had mostly stopped.  Patient was c/o severe pain left foot.  Left foot discoloration and vascular surgery called for consult.  Labs with ARF with Cr of 2.2-- unclear baseline.  Patient has been taking advil at home for pain in foot.      Downgraded from SD    Vital Signs Last 24 Hrs  T(C): 36.8 (05 Nov 2023 07:49), Max: 37 (04 Nov 2023 20:26)  T(F): 98.2 (05 Nov 2023 07:49), Max: 98.6 (04 Nov 2023 20:26)  HR: 57 (05 Nov 2023 07:49) (57 - 62)  BP: 146/51 (05 Nov 2023 07:49) (100/47 - 146/51)  BP(mean): --  RR: 19 (05 Nov 2023 07:49) (18 - 19)  SpO2: 96% (05 Nov 2023 07:49) (94% - 97%)    Parameters below as of 05 Nov 2023 07:49  Patient On (Oxygen Delivery Method): room air                  MEDICATIONS  (STANDING):  aspirin enteric coated 81 milliGRAM(s) Oral daily  atorvastatin 80 milliGRAM(s) Oral at bedtime  chlorhexidine 2% Cloths 1 Application(s) Topical <User Schedule>  heparin  Infusion 500 Unit(s)/Hr (5 mL/Hr) IV Continuous <Continuous>  influenza  Vaccine (HIGH DOSE) 0.7 milliLiter(s) IntraMuscular once  levothyroxine 88 MICROGram(s) Oral daily  losartan 25 milliGRAM(s) Oral daily  nebivolol 2.5 milliGRAM(s) Oral daily  piperacillin/tazobactam IVPB.. 3.375 Gram(s) IV Intermittent every 8 hours  polyethylene glycol 3350 17 Gram(s) Oral daily  silver sulfADIAZINE 1% Cream 1 Application(s) Topical daily  sodium chloride 0.9%. 1000 milliLiter(s) (50 mL/Hr) IV Continuous <Continuous>  thiamine 100 milliGRAM(s) Oral daily    MEDICATIONS  (PRN):  acetaminophen     Tablet .. 650 milliGRAM(s) Oral every 6 hours PRN Mild Pain (1 - 3)  albuterol    90 MICROgram(s) HFA Inhaler 2 Puff(s) Inhalation every 6 hours PRN Shortness of Breath and/or Wheezing  aluminum hydroxide/magnesium hydroxide/simethicone Suspension 30 milliLiter(s) Oral every 4 hours PRN Dyspepsia  gabapentin 100 milliGRAM(s) Oral every 8 hours PRN neuropathic LLE pain  HYDROmorphone  Injectable 0.5 milliGRAM(s) IV Push every 10 minutes PRN Severe Pain (7 - 10)  melatonin 3 milliGRAM(s) Oral at bedtime PRN Insomnia  ondansetron Injectable 4 milliGRAM(s) IV Push every 6 hours PRN Nausea and/or Vomiting  oxyCODONE    IR 2.5 milliGRAM(s) Oral every 4 hours PRN Severe Pain (7 - 10)  traMADol 25 milliGRAM(s) Oral every 8 hours PRN Moderate Pain (4 - 6)        PHYSICAL EXAM:      NERVOUS SYSTEM:  Alert & Oriented X3, Motor Strength 5/5 B/L upper and lower extremities; DTRs 2+ intact and symmetric  CHEST/LUNG: Clear to auscultation bilaterally; No rales, rhonchi, wheezing, or rubs  HEART: Regular rate and rhythm; No murmurs, rubs, or gallops  ABDOMEN: Soft, Nontender, Nondistended; Bowel sounds present  EXTREMITIES:  Left foot mildly warmer than right, eschar around left medial malleolus precludes PT doppler exam, palpable left DP. Right DP still palpable. Motor and sensation of the left toes diminished compared to the right.    LABS:                                     8.1    13.77 )-----------( 358      ( 05 Nov 2023 06:32 )             25.0   11-05    141  |  111<H>  |  13  ----------------------------<  111<H>  3.7   |  26  |  0.91    Ca    6.8<L>      05 Nov 2023 06:32  Phos  2.6     11-05  Mg     2.3     11-05              RADIOLOGY & ADDITIONAL TESTS :reviewed    < from: TTE Echo Complete w/o Contrast w/ Doppler (10.23.23 @ 10:51) >     The left ventricle is normal in size, wall thickness, wall motion and   contractility.   Estimated left ventricular ejection fraction is 55-60 %.   The left atrium is mildly dilated.   Mild to Moderate aortic regurgitation.   Mild (1+) tricuspid valve regurgitation.          72 yo male with PMHX of HTN, severe PVD s/p multiple stents b/l legs, s/p fem/popliteal bypass, chronic left lower ankle ulceration, GERD, HLD who presented to  with CC of left LE pain and bleeding  Initially admitted to med/surg for severe PVD, non healing ulcer L medial ankle  US arterial doppler revealing occluded L fem pop and fem fem grafts  Upgraded to ICU for hypoxia and agitation 2/2 transfusion pRBC. received diuretics and abx, placed on NIV    *  Severe PVD with non healing L medial malleolus ulcer  change to PO Doxy for the ulcer  occlusion L fem-fem, fem -pop grafts  UFH drip  neuropathy increase neurontin  s/p C 10/27 revealing non obstructive disease  s/p R fem cutdown with aortogram, b/l LE angiogram with SFA stent placement and L axillary to anterior tibial artery bypass with PTFE graft    * ABLA s/p PRB stable  on UFH drip    *Acute on chronic HFpEF exac 2/2 pulm edema after blood transfusion vs PNA - resolved      PT eval and rehab  time spent 45 min  prepare for dc

## 2023-11-05 NOTE — PROVIDER CONTACT NOTE (OTHER) - ACTION/TREATMENT ORDERED:
Provider asked for one more therapeutic result after 6H.
continue with same rate, to be passed off to next team.
Please rescan pt at 2000 and report volume to surgical resident.
Bony site and continue to monitor neurovascular q 2 hours.
maintain heparin per order.  Increased to 900 units/hour at 1700; will continue to increase by 200 units/hour q 3 hours and activate next PTT at 2300.

## 2023-11-05 NOTE — PROVIDER CONTACT NOTE (OTHER) - REASON
1700 PTT resulted
aPTT result
oozing noted at R groin site
Pt unable to void independently
aPTT result

## 2023-11-06 LAB
ANION GAP SERPL CALC-SCNC: 4 MMOL/L — LOW (ref 5–17)
ANION GAP SERPL CALC-SCNC: 4 MMOL/L — LOW (ref 5–17)
APTT BLD: 95.5 SEC — HIGH (ref 24.5–35.6)
APTT BLD: 95.5 SEC — HIGH (ref 24.5–35.6)
BASOPHILS # BLD AUTO: 0.02 K/UL — SIGNIFICANT CHANGE UP (ref 0–0.2)
BASOPHILS # BLD AUTO: 0.02 K/UL — SIGNIFICANT CHANGE UP (ref 0–0.2)
BASOPHILS NFR BLD AUTO: 0.2 % — SIGNIFICANT CHANGE UP (ref 0–2)
BASOPHILS NFR BLD AUTO: 0.2 % — SIGNIFICANT CHANGE UP (ref 0–2)
BUN SERPL-MCNC: 14 MG/DL — SIGNIFICANT CHANGE UP (ref 7–23)
BUN SERPL-MCNC: 14 MG/DL — SIGNIFICANT CHANGE UP (ref 7–23)
CALCIUM SERPL-MCNC: 7.2 MG/DL — LOW (ref 8.5–10.1)
CALCIUM SERPL-MCNC: 7.2 MG/DL — LOW (ref 8.5–10.1)
CHLORIDE SERPL-SCNC: 112 MMOL/L — HIGH (ref 96–108)
CHLORIDE SERPL-SCNC: 112 MMOL/L — HIGH (ref 96–108)
CO2 SERPL-SCNC: 25 MMOL/L — SIGNIFICANT CHANGE UP (ref 22–31)
CO2 SERPL-SCNC: 25 MMOL/L — SIGNIFICANT CHANGE UP (ref 22–31)
CREAT SERPL-MCNC: 0.79 MG/DL — SIGNIFICANT CHANGE UP (ref 0.5–1.3)
CREAT SERPL-MCNC: 0.79 MG/DL — SIGNIFICANT CHANGE UP (ref 0.5–1.3)
EGFR: 95 ML/MIN/1.73M2 — SIGNIFICANT CHANGE UP
EGFR: 95 ML/MIN/1.73M2 — SIGNIFICANT CHANGE UP
EOSINOPHIL # BLD AUTO: 0.19 K/UL — SIGNIFICANT CHANGE UP (ref 0–0.5)
EOSINOPHIL # BLD AUTO: 0.19 K/UL — SIGNIFICANT CHANGE UP (ref 0–0.5)
EOSINOPHIL NFR BLD AUTO: 1.6 % — SIGNIFICANT CHANGE UP (ref 0–6)
EOSINOPHIL NFR BLD AUTO: 1.6 % — SIGNIFICANT CHANGE UP (ref 0–6)
GLUCOSE SERPL-MCNC: 127 MG/DL — HIGH (ref 70–99)
GLUCOSE SERPL-MCNC: 127 MG/DL — HIGH (ref 70–99)
HCT VFR BLD CALC: 24.5 % — LOW (ref 39–50)
HCT VFR BLD CALC: 24.5 % — LOW (ref 39–50)
HGB BLD-MCNC: 8 G/DL — LOW (ref 13–17)
HGB BLD-MCNC: 8 G/DL — LOW (ref 13–17)
IMM GRANULOCYTES NFR BLD AUTO: 1.6 % — HIGH (ref 0–0.9)
IMM GRANULOCYTES NFR BLD AUTO: 1.6 % — HIGH (ref 0–0.9)
INR BLD: 1.11 RATIO — SIGNIFICANT CHANGE UP (ref 0.85–1.18)
INR BLD: 1.11 RATIO — SIGNIFICANT CHANGE UP (ref 0.85–1.18)
LYMPHOCYTES # BLD AUTO: 1.62 K/UL — SIGNIFICANT CHANGE UP (ref 1–3.3)
LYMPHOCYTES # BLD AUTO: 1.62 K/UL — SIGNIFICANT CHANGE UP (ref 1–3.3)
LYMPHOCYTES # BLD AUTO: 13.4 % — SIGNIFICANT CHANGE UP (ref 13–44)
LYMPHOCYTES # BLD AUTO: 13.4 % — SIGNIFICANT CHANGE UP (ref 13–44)
MAGNESIUM SERPL-MCNC: 2.3 MG/DL — SIGNIFICANT CHANGE UP (ref 1.6–2.6)
MAGNESIUM SERPL-MCNC: 2.3 MG/DL — SIGNIFICANT CHANGE UP (ref 1.6–2.6)
MCHC RBC-ENTMCNC: 29.4 PG — SIGNIFICANT CHANGE UP (ref 27–34)
MCHC RBC-ENTMCNC: 29.4 PG — SIGNIFICANT CHANGE UP (ref 27–34)
MCHC RBC-ENTMCNC: 32.7 GM/DL — SIGNIFICANT CHANGE UP (ref 32–36)
MCHC RBC-ENTMCNC: 32.7 GM/DL — SIGNIFICANT CHANGE UP (ref 32–36)
MCV RBC AUTO: 90.1 FL — SIGNIFICANT CHANGE UP (ref 80–100)
MCV RBC AUTO: 90.1 FL — SIGNIFICANT CHANGE UP (ref 80–100)
MONOCYTES # BLD AUTO: 1.06 K/UL — HIGH (ref 0–0.9)
MONOCYTES # BLD AUTO: 1.06 K/UL — HIGH (ref 0–0.9)
MONOCYTES NFR BLD AUTO: 8.8 % — SIGNIFICANT CHANGE UP (ref 2–14)
MONOCYTES NFR BLD AUTO: 8.8 % — SIGNIFICANT CHANGE UP (ref 2–14)
NEUTROPHILS # BLD AUTO: 9 K/UL — HIGH (ref 1.8–7.4)
NEUTROPHILS # BLD AUTO: 9 K/UL — HIGH (ref 1.8–7.4)
NEUTROPHILS NFR BLD AUTO: 74.4 % — SIGNIFICANT CHANGE UP (ref 43–77)
NEUTROPHILS NFR BLD AUTO: 74.4 % — SIGNIFICANT CHANGE UP (ref 43–77)
PHOSPHATE SERPL-MCNC: 2.3 MG/DL — LOW (ref 2.5–4.5)
PHOSPHATE SERPL-MCNC: 2.3 MG/DL — LOW (ref 2.5–4.5)
PLATELET # BLD AUTO: 335 K/UL — SIGNIFICANT CHANGE UP (ref 150–400)
PLATELET # BLD AUTO: 335 K/UL — SIGNIFICANT CHANGE UP (ref 150–400)
POTASSIUM SERPL-MCNC: 4.1 MMOL/L — SIGNIFICANT CHANGE UP (ref 3.5–5.3)
POTASSIUM SERPL-MCNC: 4.1 MMOL/L — SIGNIFICANT CHANGE UP (ref 3.5–5.3)
POTASSIUM SERPL-SCNC: 4.1 MMOL/L — SIGNIFICANT CHANGE UP (ref 3.5–5.3)
POTASSIUM SERPL-SCNC: 4.1 MMOL/L — SIGNIFICANT CHANGE UP (ref 3.5–5.3)
PROTHROM AB SERPL-ACNC: 12.5 SEC — SIGNIFICANT CHANGE UP (ref 9.5–13)
PROTHROM AB SERPL-ACNC: 12.5 SEC — SIGNIFICANT CHANGE UP (ref 9.5–13)
RBC # BLD: 2.72 M/UL — LOW (ref 4.2–5.8)
RBC # BLD: 2.72 M/UL — LOW (ref 4.2–5.8)
RBC # FLD: 16.4 % — HIGH (ref 10.3–14.5)
RBC # FLD: 16.4 % — HIGH (ref 10.3–14.5)
SODIUM SERPL-SCNC: 141 MMOL/L — SIGNIFICANT CHANGE UP (ref 135–145)
SODIUM SERPL-SCNC: 141 MMOL/L — SIGNIFICANT CHANGE UP (ref 135–145)
WBC # BLD: 12.08 K/UL — HIGH (ref 3.8–10.5)
WBC # BLD: 12.08 K/UL — HIGH (ref 3.8–10.5)
WBC # FLD AUTO: 12.08 K/UL — HIGH (ref 3.8–10.5)
WBC # FLD AUTO: 12.08 K/UL — HIGH (ref 3.8–10.5)

## 2023-11-06 PROCEDURE — 99233 SBSQ HOSP IP/OBS HIGH 50: CPT

## 2023-11-06 RX ORDER — APIXABAN 2.5 MG/1
5 TABLET, FILM COATED ORAL ONCE
Refills: 0 | Status: DISCONTINUED | OUTPATIENT
Start: 2023-11-06 | End: 2023-11-07

## 2023-11-06 RX ORDER — SODIUM,POTASSIUM PHOSPHATES 278-250MG
2 POWDER IN PACKET (EA) ORAL EVERY 4 HOURS
Refills: 0 | Status: COMPLETED | OUTPATIENT
Start: 2023-11-06 | End: 2023-11-06

## 2023-11-06 RX ORDER — APIXABAN 2.5 MG/1
5 TABLET, FILM COATED ORAL EVERY 12 HOURS
Refills: 0 | Status: DISCONTINUED | OUTPATIENT
Start: 2023-11-06 | End: 2023-11-08

## 2023-11-06 RX ADMIN — ATORVASTATIN CALCIUM 80 MILLIGRAM(S): 80 TABLET, FILM COATED ORAL at 21:30

## 2023-11-06 RX ADMIN — Medication 2 PACKET(S): at 13:22

## 2023-11-06 RX ADMIN — APIXABAN 5 MILLIGRAM(S): 2.5 TABLET, FILM COATED ORAL at 15:56

## 2023-11-06 RX ADMIN — GABAPENTIN 200 MILLIGRAM(S): 400 CAPSULE ORAL at 13:22

## 2023-11-06 RX ADMIN — Medication 88 MICROGRAM(S): at 05:10

## 2023-11-06 RX ADMIN — CHLORHEXIDINE GLUCONATE 1 APPLICATION(S): 213 SOLUTION TOPICAL at 06:25

## 2023-11-06 RX ADMIN — HEPARIN SODIUM 11 UNIT(S)/HR: 5000 INJECTION INTRAVENOUS; SUBCUTANEOUS at 07:20

## 2023-11-06 RX ADMIN — Medication 81 MILLIGRAM(S): at 10:08

## 2023-11-06 RX ADMIN — HEPARIN SODIUM 11 UNIT(S)/HR: 5000 INJECTION INTRAVENOUS; SUBCUTANEOUS at 08:14

## 2023-11-06 RX ADMIN — NEBIVOLOL HYDROCHLORIDE 2.5 MILLIGRAM(S): 5 TABLET ORAL at 10:08

## 2023-11-06 RX ADMIN — GABAPENTIN 200 MILLIGRAM(S): 400 CAPSULE ORAL at 05:12

## 2023-11-06 RX ADMIN — GABAPENTIN 200 MILLIGRAM(S): 400 CAPSULE ORAL at 21:30

## 2023-11-06 RX ADMIN — Medication 100 MILLIGRAM(S): at 10:08

## 2023-11-06 RX ADMIN — Medication 2 PACKET(S): at 15:59

## 2023-11-06 RX ADMIN — Medication 1 APPLICATION(S): at 10:11

## 2023-11-06 RX ADMIN — LOSARTAN POTASSIUM 25 MILLIGRAM(S): 100 TABLET, FILM COATED ORAL at 10:08

## 2023-11-06 RX ADMIN — Medication 3 MILLIGRAM(S): at 21:30

## 2023-11-06 NOTE — PROGRESS NOTE ADULT - ASSESSMENT
72 yo male with PMHX of HTN, severe PVD s/p multiple stents b/l legs, s/p fem/popliteal bypass, chronic left lower ankle ulceration, GERD, HLD who presented to  with CC of left LE pain and bleeding  Initially admitted to med/surg for severe PVD, non healing ulcer L medial ankle  US arterial doppler revealing occluded L fem pop and fem fem grafts  Upgraded to ICU for hypoxia and agitation 2/2 transfusion pRBC. received diuretics and abx, placed on NIV    *  Severe PVD with non healing L medial malleolus ulcer  change to PO Doxy for the ulcer  occlusion L fem-fem, fem -pop grafts  UFH drip  neuropathy increase neurontin  s/p LHC 10/27 revealing non obstructive disease  s/p R fem cutdown with aortogram, b/l LE angiogram with SFA stent placement and L axillary to anterior tibial artery bypass with PTFE graft    * ABLA s/p PRB stable  on UFH drip    *Acute on chronic HFpEF exac 2/2 pulm edema after blood transfusion vs PNA - resolved 72 yo male with PMHX of HTN, severe PVD s/p multiple stents b/l legs, s/p fem/popliteal bypass, chronic left lower ankle ulceration, GERD, HLD who presented to  with CC of left LE pain and bleeding  Initially admitted to med/surg for severe PVD, non healing ulcer L medial ankle  US arterial doppler revealing occluded L fem pop and fem fem grafts  Upgraded to ICU for hypoxia and agitation 2/2 transfusion pRBC. received diuretics and abx, placed on NIV    *  Severe PVD with non healing L medial malleolus ulcer  change to PO Doxy for the ulcer  occlusion L fem-fem, fem -pop grafts  UFH drip stopped and PO Eliquis started  neuropathy increase neurontin  s/p LHC 10/27 revealing non obstructive disease  s/p R fem cutdown with aortogram, b/l LE angiogram with SFA stent placement and L axillary to anterior tibial artery bypass with PTFE graft    * ABLA s/p PRB stable  on UFH drip    *Acute on chronic HFpEF exac 2/2 pulm edema after blood transfusion vs PNA - resolved

## 2023-11-06 NOTE — PROGRESS NOTE ADULT - SUBJECTIVE AND OBJECTIVE BOX
SURGERY DAILY PROGRESS NOTE:     Subjective:  Patient seen and examined this AM at bedside. No acute events overnight and patient resting comfortably. Tolerating diet. Pain controlled. On heparin drip , PTT therapeutic x 3.   Denies fever/chills, shortness of breath, chest pain. VS reviewed    Objective:    MEDICATIONS  (STANDING):  aspirin enteric coated 81 milliGRAM(s) Oral daily  atorvastatin 80 milliGRAM(s) Oral at bedtime  chlorhexidine 2% Cloths 1 Application(s) Topical <User Schedule>  gabapentin 200 milliGRAM(s) Oral every 8 hours  heparin  Infusion 1100 Unit(s)/Hr (11 mL/Hr) IV Continuous <Continuous>  influenza  Vaccine (HIGH DOSE) 0.7 milliLiter(s) IntraMuscular once  levothyroxine 88 MICROGram(s) Oral daily  losartan 25 milliGRAM(s) Oral daily  nebivolol 2.5 milliGRAM(s) Oral daily  polyethylene glycol 3350 17 Gram(s) Oral daily  silver sulfADIAZINE 1% Cream 1 Application(s) Topical daily  thiamine 100 milliGRAM(s) Oral daily    MEDICATIONS  (PRN):  acetaminophen     Tablet .. 650 milliGRAM(s) Oral every 6 hours PRN Mild Pain (1 - 3)  albuterol    90 MICROgram(s) HFA Inhaler 2 Puff(s) Inhalation every 6 hours PRN Shortness of Breath and/or Wheezing  aluminum hydroxide/magnesium hydroxide/simethicone Suspension 30 milliLiter(s) Oral every 4 hours PRN Dyspepsia  HYDROmorphone  Injectable 0.5 milliGRAM(s) IV Push every 10 minutes PRN Severe Pain (7 - 10)  melatonin 3 milliGRAM(s) Oral at bedtime PRN Insomnia  ondansetron Injectable 4 milliGRAM(s) IV Push every 6 hours PRN Nausea and/or Vomiting      Vital Signs Last 24 Hrs  T(C): 36.8 (05 Nov 2023 21:00), Max: 36.8 (05 Nov 2023 07:49)  T(F): 98.2 (05 Nov 2023 21:00), Max: 98.2 (05 Nov 2023 07:49)  HR: 66 (05 Nov 2023 21:00) (57 - 66)  BP: 114/60 (05 Nov 2023 21:00) (114/60 - 146/51)  BP(mean): --  RR: 18 (05 Nov 2023 21:00) (18 - 19)  SpO2: 97% (05 Nov 2023 21:00) (96% - 98%)    Parameters below as of 05 Nov 2023 21:00  Patient On (Oxygen Delivery Method): room air          PHYSICAL EXAM   GENERAL: NAD  HEAD: Atraumatic, normocephalic  EYES: EOMI, PERRLA, conjunctiva and sclera clear  ENT: moist mucous membrane  NECK: supple, No JVD, midline trachea  CHEST/LUNG: No increased WOB, symmetric excursions  Heart: RRR ppp, no peripheral edema  ABDOMEN: Round, nondistended, soft, nontender  EXTREMITIES: Left foot mildly warmer than right, eschar around left medial malleolus precludes PT doppler exam, palpable left DP. Right DP still palpable. Motor and sensation of the left toes diminished compared to the right.  NERVOUS SYSTEM: AOx4, speech clear, no neuro-deficits  MSK: full ROM, no deformities  SKIN: warm to touch, no rash or lesions aside from aforementioned left ankle, right groin and left lower leg dressings c/d/i w/ stable strikethrough      I&O's Detail    04 Nov 2023 08:01  -  05 Nov 2023 07:00  --------------------------------------------------------  IN:  Total IN: 0 mL    OUT:    Voided (mL): 800 mL  Total OUT: 800 mL    Total NET: -800 mL          LABS:                        8.1    13.77 )-----------( 358      ( 05 Nov 2023 06:32 )             25.0     11-05    141  |  111<H>  |  13  ----------------------------<  111<H>  3.7   |  26  |  0.91    Ca    6.8<L>      05 Nov 2023 06:32  Phos  2.6     11-05  Mg     2.3     11-05      PTT - ( 05 Nov 2023 01:17 )  PTT:81.1 sec  Urinalysis Basic - ( 05 Nov 2023 06:32 )    Color: x / Appearance: x / SG: x / pH: x  Gluc: 111 mg/dL / Ketone: x  / Bili: x / Urobili: x   Blood: x / Protein: x / Nitrite: x   Leuk Esterase: x / RBC: x / WBC x   Sq Epi: x / Non Sq Epi: x / Bacteria: x

## 2023-11-06 NOTE — PROGRESS NOTE ADULT - SUBJECTIVE AND OBJECTIVE BOX
Date of service: 11/06/2023  HPI: 72 y/o male with PMHX of HTN, severe PVD s/p multiple stents b/l legs, s/p fem/popliteal bypass, chronic left lower ankle ulceration, GERD, HLD who presented to  with CC of left LE pain and bleeding.  Patient notes he was at his family's house earlier 10/22.  He was sitting on the floor with his grandson when the family noticed significant bleeding in his left leg.  His wife tried to get him up and into the bathroom to stop the bleeding.  She got him to sit on the side of the bathtub and was trying to hold pressure over the bleeding when he seemed to lose consciousness.  He didn't fall over but as per wife, turned white and wasn't responding to her.  They called 911.  He wasn't responding for a few minutes and then came to.  She denies hx of syncope/ near syncope in the past.  In the ER, bleeding had mostly stopped.  Patient was c/o severe pain left foot.  Left foot discoloration and vascular surgery called for consult.  Labs with ARF with Cr of 2.2-- unclear baseline.  Patient has been taking advil at home for pain in foot.      Podiatry was consulted for left foot ulcer. Pt resting comfortably bedside today. Pt tender to wound site.     11/06/2023: Pt seen by podiatry team w attending present, pt resting bedside, NAD, pain controlled. No other pedal complaints    PAST MEDICAL & SURGICAL HISTORY:  Essential hypertension  PVD (peripheral vascular disease)  Gastroesophageal reflux disease, esophagitis presence not specified  Hypothyroidism  Chronic obstructive pulmonary disease, unspecified COPD type  Eczema  Hyperlipidemia, unspecified hyperlipidemia type  Medial meniscus tear  left knee  Femoral popliteal artery thrombus  h/o Fem pop bypass 1997  PVD (peripheral vascular disease)  s/p peripheral stent insertion bilateral lower extemities x 10    MEDICATIONS  (STANDING):  aspirin enteric coated 81 milliGRAM(s) Oral daily  atorvastatin 80 milliGRAM(s) Oral at bedtime  chlorhexidine 2% Cloths 1 Application(s) Topical <User Schedule>  heparin  Infusion 1100 Unit(s)/Hr (11 mL/Hr) IV Continuous <Continuous>  influenza  Vaccine (HIGH DOSE) 0.7 milliLiter(s) IntraMuscular once  levothyroxine 88 MICROGram(s) Oral daily  losartan 25 milliGRAM(s) Oral daily  nebivolol 2.5 milliGRAM(s) Oral daily  piperacillin/tazobactam IVPB.. 3.375 Gram(s) IV Intermittent every 8 hours  polyethylene glycol 3350 17 Gram(s) Oral daily  silver sulfADIAZINE 1% Cream 1 Application(s) Topical daily  thiamine 100 milliGRAM(s) Oral daily    MEDICATIONS  (PRN):  acetaminophen     Tablet .. 650 milliGRAM(s) Oral every 6 hours PRN Mild Pain (1 - 3)  albuterol    90 MICROgram(s) HFA Inhaler 2 Puff(s) Inhalation every 6 hours PRN Shortness of Breath and/or Wheezing  aluminum hydroxide/magnesium hydroxide/simethicone Suspension 30 milliLiter(s) Oral every 4 hours PRN Dyspepsia  gabapentin 100 milliGRAM(s) Oral every 8 hours PRN neuropathic LLE pain  HYDROmorphone  Injectable 0.5 milliGRAM(s) IV Push every 10 minutes PRN Severe Pain (7 - 10)  melatonin 3 milliGRAM(s) Oral at bedtime PRN Insomnia  ondansetron Injectable 4 milliGRAM(s) IV Push every 6 hours PRN Nausea and/or Vomiting  oxyCODONE    IR 2.5 milliGRAM(s) Oral every 4 hours PRN Severe Pain (7 - 10)  traMADol 25 milliGRAM(s) Oral every 8 hours PRN Moderate Pain (4 - 6)          FAMILY HISTORY:  Family hx of vascular disease      Social History:    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    Ex-smoker-- quit 20 years ago      Allergies:  Betadine (Hives; Rash)   (22 Oct 2023 14:35)            PMH: Essential hypertension    PVD (peripheral vascular disease)    Gastroesophageal reflux disease, esophagitis presence not specified    Hypothyroidism    Chronic obstructive pulmonary disease, unspecified COPD type    Eczema    Hyperlipidemia, unspecified hyperlipidemia type    Medial meniscus tear      PSH:Femoral popliteal artery thrombus    PVD (peripheral vascular disease)        Allergies:Betadine (Hives; Rash)      Labs:                        7.5    14.35 )-----------( 327      ( 04 Nov 2023 05:40 )             23.2     11-04    139  |  110<H>  |  18  ----------------------------<  113<H>  3.6   |  23  |  0.97    Ca    6.9<L>      04 Nov 2023 05:40  Phos  2.8     11-04  Mg     2.1     11-04               Vital Signs Last 24 Hrs  T(C): 36.6 (06 Nov 2023 15:39), Max: 36.8 (05 Nov 2023 21:00)  T(F): 97.9 (06 Nov 2023 15:39), Max: 98.2 (05 Nov 2023 21:00)  HR: 57 (06 Nov 2023 15:39) (57 - 66)  BP: 111/51 (06 Nov 2023 15:39) (111/51 - 119/60)  BP(mean): --  RR: 18 (06 Nov 2023 15:39) (18 - 18)  SpO2: 97% (06 Nov 2023 15:39) (95% - 97%)    Parameters below as of 06 Nov 2023 15:39  Patient On (Oxygen Delivery Method): room air        REVIEW OF SYSTEMS:    CONSTITUTIONAL: No weakness, fevers or chills  EYES: No visual changes  RESPIRATORY: No cough, wheezing; No shortness of breath  CARDIOVASCULAR: No chest pain or palpitations  GASTROINTESTINAL: No abdominal or epigastric pain. No nausea, vomiting; No diarrhea or constipation.   GENITOURINARY: No dysuria, frequency or hematuria  NEUROLOGICAL: No numbness or weakness  SKIN: See physical examination.  All other review of systems is negative unless indicated above    Physical Exam:   Constitutional: NAD, alert;  Lower Extremity Focus  Derm:  Skin warm, dry and supple bilateral.    Ulceration to the left medial heel, wound base is mostly eschar, wound size is 3.5cm x 3.5cm, no periwound edema or erythema noted. no purulence or pus noted, no tracking/tunneling noted, no PTB, no malodor  Vascular: Dorsalis Pedis and Posterior Tibial pulses non palpable.  Capillary re-fill time more then 3 seconds digits 1-5 bilateral.    Neuro: Protective sensation diminished to the level of the digits bilateral.  MSK: Muscle strength 5/5 all major muscle groups bilateral. TTP to the wound site on the left medial mal          < from: Xray Foot AP + Lateral + Oblique, Left (10.22.23 @ 11:51) >  PROCEDURE DATE:  10/22/2023          INTERPRETATION:  Left ankle, left foot, and chest. Concern is chronic   wound around the malleoli.    Left ankle. 3 views.    There is long area of soft tissue calcification likely in a venous   structure in the medial lower leg.    There is mild degeneration of the malleolar tips.    There is some swelling seen approaching cavers triangle.    No bone destruction or fracture.    Left foot. 3 views.    No bone destruction or fracture is evident.    AP chest on October 22, 2023 at 12:34 PM.    Heart magnified by technique.    Lungs are clear.    Chest is similar to August 28, 2019.    IMPRESSION: No acute chest finding. Mild edema around the left ankle   posteriorly. Benign-appearing calcifications in the medial left lower leg   are likely venous. No acute bony finding.    --- End of Report ---        < end of copied text >

## 2023-11-06 NOTE — PROGRESS NOTE ADULT - ASSESSMENT
Assesment: 73y old male seen for the following:   - Full thickness wound to the left foot, chronic   - Pain to left foot  - Difficulty ambulation    Plan:   Chart reviewed and Patient evaluated;  Discussed diagnosis and treatment with patient. Discussed importance of daily foot examinations, proper shoe gear, importance of tight glycemic control.   X-rays reviewed : on wet read showing no soft tissue gas, no acute bony findings, mild edema noted to the left ankle posteriorly  There is a full thickness wound to the left medial mal, etiology of wound most likely due to arterial problem, wound looks chronic and stable without acute SOI  Wound flush with normal saline   WC: silvadene and DSD  WBAT using surgical shoe  Offloading to bilateral heels while bedbound.   Continue with antibiotics as per ID  All additional care per Med appreciated  Patient demonstrated verbal understanding of all interventions and tolerated interventions well without any complications.   Patient to follow up in wound care center [facility] 1 week after discharge w Dr Cade Joseph  Case D/W attending Dr. Joseph    Wound care instructions for Left LE to be changed every other day:  - Gently remove dressings.  - Clean the wound with saline and dry it thoroughly with dry gauze  - Apply silvadene, gauze, kerlix and secure it with tape

## 2023-11-06 NOTE — PROGRESS NOTE ADULT - SUBJECTIVE AND OBJECTIVE BOX
74 y/o male with PMHX of HTN, severe PVD s/p multiple stents b/l legs, s/p fem/popliteal bypass, chronic left lower ankle ulceration, GERD, HLD who presented to  with CC of left LE pain and bleeding.  Patient notes he was at his family's house earlier today.  He was sitting on the floor with his grandson when the family noticed significant bleeding in his left leg.  His wife tried to get him up and into the bathroom to stop the bleeding.  She got him to sit on the side of the bathtub and was trying to hold pressure over the bleeding when he seemed to lose consciousness.  He didn't fall over but as per wife, turned white and wasn't responding to her.  They called 911.  He wasn't responding for a few minutes and then came to.  She denies hx of syncope/ near syncope in the past.  In the ER, bleeding had mostly stopped.  Patient was c/o severe pain left foot.  Left foot discoloration and vascular surgery called for consult.  Labs with ARF with Cr of 2.2-- unclear baseline.  Patient has been taking advil at home for pain in foot.      Downgraded from SD    11/6/23   72 y/o male with PMHX of HTN, severe PVD s/p multiple stents b/l legs, s/p fem/popliteal bypass, chronic left lower ankle ulceration, GERD, HLD who presented to  with CC of left LE pain and bleeding.  Patient notes he was at his family's house earlier today.  He was sitting on the floor with his grandson when the family noticed significant bleeding in his left leg.  His wife tried to get him up and into the bathroom to stop the bleeding.  She got him to sit on the side of the bathtub and was trying to hold pressure over the bleeding when he seemed to lose consciousness.  He didn't fall over but as per wife, turned white and wasn't responding to her.  They called 911.  He wasn't responding for a few minutes and then came to.  She denies hx of syncope/ near syncope in the past.  In the ER, bleeding had mostly stopped.  Patient was c/o severe pain left foot.  Left foot discoloration and vascular surgery called for consult.  Labs with ARF with Cr of 2.2-- unclear baseline.  Patient has been taking advil at home for pain in foot.      Downgraded from SD    11/6/23  patient seen and examined with wife at bedside. Patient is without complaints. no cp, no sob, no n/v. Patient is hard of hearing.     T(C): 36.6 (11-06-23 @ 15:39), Max: 36.8 (11-05-23 @ 21:00)  HR: 57 (11-06-23 @ 15:39) (57 - 66)  BP: 111/51 (11-06-23 @ 15:39) (111/51 - 119/60)  RR: 18 (11-06-23 @ 15:39) (18 - 18)  SpO2: 97% (11-06-23 @ 15:39) (95% - 97%)    CONSTITUTIONAL: Well groomed, no apparent distress  EYES:  EOMI, No conjunctival or scleral injection, non-icteric  ENMT: Oral mucosa with moist membranes. Normal dentition; no pharyngeal injection or exudates             NECK: Supple, symmetric and without tracheal deviation   RESP: No respiratory distress, no use of accessory muscles; CTA b/l, no WRR  CV: RRR, +S1S2, no MRG; no JVD; no peripheral edema  GI: Soft, NT, ND, no rebound,   LYMPH: No cervical LAD or tenderness; no axillary LAD or tenderness; no inguinal LAD or tenderness  Skin: eschar left medial malleolus  PSYCH: Appropriate insight/judgment; A+O x 3, mood and affect appropriate, recent/remote memory intact                          8.0    12.08 )-----------( 335      ( 06 Nov 2023 06:54 )             24.5   11-06    141  |  112<H>  |  14  ----------------------------<  127<H>  4.1   |  25  |  0.79    Ca    7.2<L>      06 Nov 2023 06:54  Phos  2.3     11-06  Mg     2.3     11-06

## 2023-11-06 NOTE — PROGRESS NOTE ADULT - ASSESSMENT
72yo M presents w/ bleeding LLE venous stasis ulcer. Severe small vessel disease on left lower extremity with multiple revascularization interventions, now occluded fem-pop and fem-fem bypass. 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 Now POD 5 s/p right fem cutdown aortogram, b/l angiogram, right SFA stent placement for pseudoaneurysm, left axillary to AT bypass with PTFE, recovering as expected.    Plan:  -  transition to DOAC today  - Continue aspirin  - On regular diet  - Lopez & Zosyn  - KALINA for RAMA placement     74yo M presents w/ bleeding LLE venous stasis ulcer. Severe small vessel disease on left lower extremity with multiple revascularization interventions, now occluded fem-pop and fem-fem bypass. 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 Now POD 5 s/p right fem cutdown aortogram, b/l angiogram, right SFA stent placement for pseudoaneurysm, left axillary to AT bypass with PTFE, recovering as expected.    Plan:  -  transition to DOAC today  - Continue aspirin  - On regular diet  - no further antibiotics needed  -  for RAMA placement

## 2023-11-07 ENCOUNTER — TRANSCRIPTION ENCOUNTER (OUTPATIENT)
Age: 73
End: 2023-11-07

## 2023-11-07 LAB
SURGICAL PATHOLOGY STUDY: SIGNIFICANT CHANGE UP
SURGICAL PATHOLOGY STUDY: SIGNIFICANT CHANGE UP

## 2023-11-07 PROCEDURE — 99232 SBSQ HOSP IP/OBS MODERATE 35: CPT

## 2023-11-07 RX ADMIN — Medication 1 APPLICATION(S): at 11:35

## 2023-11-07 RX ADMIN — Medication 81 MILLIGRAM(S): at 09:34

## 2023-11-07 RX ADMIN — NEBIVOLOL HYDROCHLORIDE 2.5 MILLIGRAM(S): 5 TABLET ORAL at 09:34

## 2023-11-07 RX ADMIN — GABAPENTIN 200 MILLIGRAM(S): 400 CAPSULE ORAL at 06:14

## 2023-11-07 RX ADMIN — LOSARTAN POTASSIUM 25 MILLIGRAM(S): 100 TABLET, FILM COATED ORAL at 09:35

## 2023-11-07 RX ADMIN — APIXABAN 5 MILLIGRAM(S): 2.5 TABLET, FILM COATED ORAL at 09:35

## 2023-11-07 RX ADMIN — CHLORHEXIDINE GLUCONATE 1 APPLICATION(S): 213 SOLUTION TOPICAL at 10:34

## 2023-11-07 RX ADMIN — Medication 88 MICROGRAM(S): at 06:14

## 2023-11-07 RX ADMIN — GABAPENTIN 200 MILLIGRAM(S): 400 CAPSULE ORAL at 15:05

## 2023-11-07 RX ADMIN — Medication 650 MILLIGRAM(S): at 17:47

## 2023-11-07 RX ADMIN — POLYETHYLENE GLYCOL 3350 17 GRAM(S): 17 POWDER, FOR SOLUTION ORAL at 09:35

## 2023-11-07 RX ADMIN — APIXABAN 5 MILLIGRAM(S): 2.5 TABLET, FILM COATED ORAL at 21:41

## 2023-11-07 RX ADMIN — Medication 100 MILLIGRAM(S): at 09:34

## 2023-11-07 RX ADMIN — ATORVASTATIN CALCIUM 80 MILLIGRAM(S): 80 TABLET, FILM COATED ORAL at 21:41

## 2023-11-07 RX ADMIN — GABAPENTIN 200 MILLIGRAM(S): 400 CAPSULE ORAL at 21:41

## 2023-11-07 NOTE — DISCHARGE NOTE PROVIDER - HOSPITAL COURSE
74yo M presented to the ED on 10/22/2023 with bleeding from chronic L ankle ulcer. Early in admission, the patient had an episode of fluid overload progressing to pulmonary edema after receiving 1 unit of PRBC and was treated in the ICU. After recovery from the acute episode, he was taken to the OR for an aortic angiogram on 10/31/2023 and a left axillary to anterior tibial bypass with PTFE graft on 11/1/2023. Since surgery, he has been stable, worked with PT, and started on oral anticoagulation. he is cleared for discharge to subacute rehab. 74yo M presented to the ED on 10/22/2023 with bleeding from chronic L ankle ulcer. Early in admission, the patient had an episode of fluid overload progressing to pulmonary edema after receiving 1 unit of PRBC and was treated in the ICU. After recovery from the acute episode, he was taken to the OR for an aortic angiogram on 10/31/2023 and a left axillary to anterior tibial bypass with PTFE graft on 11/1/2023. Since surgery, he has been stable, worked with PT, and started on oral anticoagulation. he is cleared for discharge to subacute rehab.     Acute blood loss anemia from bleeding L heel ulcer  -No further bleeding   -H/H stable   -Podiatry consult noted     Acute hypoxic respiratory failure   Possible, TRALI/TACO s/p 1 unit of blood   Hyperactive delirium from hypoxia  Acute pulmonary edema  vs. pneumonia  -Now breathing well on RA  -Asymptomatic   -CXR shows B/L Infiltrates R>L  -continue diuresis  -s/p zosyn possible pneumonia x  5 days    Acute on chronic HFrEF   Non obstructive CAD  -Now stable  -TTE as above, EF: 45-50%  -S/p LHC   -Cardiology following     Severe small vessel disease on left lower extremity   Chronic limb ischemia  -S/p aortic angiogram on 10/31/2023 and a left axillary to anterior tibial bypass with PTFE graft on 11/1/2023    Heel ulcer  -podiatry consult appreciated  -MRI no OM     HTN emergency  -Resolved  -Continue losartan, nebivolol, furosemide     #Severe protein-calorie malnutrition  -Nutrition consult appreciated  -Add supplements/ensure     JENIFER  -Improved

## 2023-11-07 NOTE — DISCHARGE NOTE PROVIDER - PROVIDER TOKENS
PROVIDER:[TOKEN:[[id number]],FOLLOWUP:[2 weeks]],PROVIDER:[TOKEN:[[id number]],FOLLOWUP:[1 week]],PROVIDER:[TOKEN:[[id number]],FOLLOWUP:[2 weeks]]

## 2023-11-07 NOTE — PROGRESS NOTE ADULT - SUBJECTIVE AND OBJECTIVE BOX
74 y/o male with PMHX of HTN, severe PVD s/p multiple stents b/l legs, s/p fem/popliteal bypass, chronic left lower ankle ulceration, GERD, HLD who presented to  with CC of left LE pain and bleeding.  Patient notes he was at his family's house earlier today.  He was sitting on the floor with his grandson when the family noticed significant bleeding in his left leg.  His wife tried to get him up and into the bathroom to stop the bleeding.  She got him to sit on the side of the bathtub and was trying to hold pressure over the bleeding when he seemed to lose consciousness.  He didn't fall over but as per wife, turned white and wasn't responding to her.  They called 911.  He wasn't responding for a few minutes and then came to.  She denies hx of syncope/ near syncope in the past.  In the ER, bleeding had mostly stopped.  Patient was c/o severe pain left foot.  Left foot discoloration and vascular surgery called for consult.  Labs with ARF with Cr of 2.2-- unclear baseline.  Patient has been taking advil at home for pain in foot.      Downgraded from SD    11/6/23  patient seen and examined with wife at bedside. Patient is without complaints. no cp, no sob, no n/v. Patient is hard of hearing.     11/7  Patient seen and examined at bedside. No cp, no sob, no n/v.     Review of Systems:   · General	negative  · ENMT	negative  · Respiratory and Thorax	negative  · Cardiovascular	negative  · Gastrointestinal	negative  · Musculoskeletal	negative      Vital Signs Last 24 Hrs  T(C): 36.7 (07 Nov 2023 15:23), Max: 36.8 (07 Nov 2023 07:38)  T(F): 98.1 (07 Nov 2023 15:23), Max: 98.2 (07 Nov 2023 07:38)  HR: 65 (07 Nov 2023 15:23) (60 - 67)  BP: 108/52 (07 Nov 2023 15:23) (108/52 - 123/51)  BP(mean): 72 (06 Nov 2023 21:28) (72 - 72)  RR: 18 (07 Nov 2023 15:23) (16 - 18)  SpO2: 92% (07 Nov 2023 15:23) (92% - 98%)    Parameters below as of 07 Nov 2023 15:23  Patient On (Oxygen Delivery Method): room air    CONSTITUTIONAL: Well groomed, no apparent distress  EYES:  EOMI, No conjunctival or scleral injection, non-icteric  ENMT: Oral mucosa with moist membranes. Normal dentition; no pharyngeal injection or exudates             NECK: Supple, symmetric and without tracheal deviation   RESP: No respiratory distress, no use of accessory muscles; CTA b/l, no WRR  CV: RRR, +S1S2, no MRG; no JVD; no peripheral edema  GI: Soft, NT, ND, no rebound,   LYMPH: No cervical LAD or tenderness; no axillary LAD or tenderness; no inguinal LAD or tenderness  Skin: eschar left medial malleolus  PSYCH: Appropriate insight/judgment; A+O x 3, mood and affect appropriate, recent/remote memory intact                          7.3    11.04 )-----------( 355      ( 07 Nov 2023 07:14 )             22.8   11-07    141  |  111<H>  |  15  ----------------------------<  107<H>  4.4   |  26  |  0.74    Ca    7.4<L>      07 Nov 2023 07:14  Phos  3.2     11-07  Mg     2.3     11-07    TPro  4.1<L>  /  Alb  1.6<L>  /  TBili  0.8  /  DBili  x   /  AST  43<H>  /  ALT  29  /  AlkPhos  112  11-07

## 2023-11-07 NOTE — DISCHARGE NOTE PROVIDER - NSDCCPCAREPLAN_GEN_ALL_CORE_FT
PRINCIPAL DISCHARGE DIAGNOSIS  Diagnosis: JENIFER (acute kidney injury)  Assessment and Plan of Treatment:       SECONDARY DISCHARGE DIAGNOSES  Diagnosis: Anemia  Assessment and Plan of Treatment:     Diagnosis: Leg wound, left  Assessment and Plan of Treatment:      PRINCIPAL DISCHARGE DIAGNOSIS  Diagnosis: PVD (peripheral vascular disease)  Assessment and Plan of Treatment: Underwent an aortic angiogram on 10/31/2023 and a left axillary to anterior tibial bypass with PTFE graft on 11/1/2023. Follow up with vascular surgery. Continue with aspirin and eliquis.      SECONDARY DISCHARGE DIAGNOSES  Diagnosis: Leg wound, left  Assessment and Plan of Treatment: Wound care instructions for Left LE to be changed every other day:  - Gently remove dressings.  - Clean the wound with saline and dry it thoroughly with dry gauze  - Apply silvadene, gauze, kerlix and secure it with tape      Diagnosis: Anemia  Assessment and Plan of Treatment:

## 2023-11-07 NOTE — PROGRESS NOTE ADULT - ATTENDING SUPERVISION STATEMENT
Resident
Student
Resident
Resident
Student
Resident

## 2023-11-07 NOTE — PROGRESS NOTE ADULT - ASSESSMENT
74yo M presents w/ bleeding LLE venous stasis ulcer. Severe small vessel disease on left lower extremity with multiple revascularization interventions, now occluded fem-pop and fem-fem bypass. 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 Now POD 5 s/p right fem cutdown aortogram, b/l angiogram, right SFA stent placement for pseudoaneurysm, left axillary to AT bypass with PTFE, recovering as expected.    Plan:  - Continue eliquis and ASA  - F/u AM labs  - On regular diet  - no further antibiotics needed  - physical therapy  - SW for RAMA placement: pending insurance auth

## 2023-11-07 NOTE — DISCHARGE NOTE PROVIDER - CARE PROVIDER_API CALL
Kelsey Sosa  Vascular Surgery  [address]  [place] [address]  Phone: [phone]  Fax: [phone]  Follow Up Time: 2 weeks    Cade Joseph  Foot and Ankle Surgery  [address], [address]  [place] [address]  Phone: [phone]  Fax: [phone]  Follow Up Time: 1 week    Yesenia Almendarez  Cardiology  [address] [place] [address]  Phone: [phone]  Fax: [phone]  Follow Up Time: 2 weeks

## 2023-11-07 NOTE — PROGRESS NOTE ADULT - ASSESSMENT
74 yo male with PMHX of HTN, severe PVD s/p multiple stents b/l legs, s/p fem/popliteal bypass, chronic left lower ankle ulceration, GERD, HLD who presented to  with CC of left LE pain and bleeding  Initially admitted to med/surg for severe PVD, non healing ulcer L medial ankle  US arterial doppler revealing occluded L fem pop and fem fem grafts  Upgraded to ICU for hypoxia and agitation 2/2 transfusion pRBC. received diuretics and abx, placed on NIV    *  Severe PVD with non healing L medial malleolus ulcer  change to PO Doxy for the ulcer  occlusion L fem-fem, fem -pop grafts  UFH drip stopped and PO Eliquis restarted  neuropathy increase neurontin  s/p LHC 10/27 revealing non obstructive disease  s/p R fem cutdown with aortogram, b/l LE angiogram with SFA stent placement and L axillary to anterior tibial artery bypass with PTFE graft      *Acute on chronic HFpEF exac 2/2 pulm edema after blood transfusion vs PNA - resolved    Disposition: DC to [facility] when bed available, medically cleared for DC    Total time spent: 25 minutes

## 2023-11-07 NOTE — PROGRESS NOTE ADULT - ATTENDING COMMENTS
I agree with the assessment and plan
CLTI of LLE  OR Wednesday for distal bypass  MRI pending
OR tomorrow for redo bypass
Patient seen and examined with PA student Bambi Quiros,  I  was physically present for the key portions of the evaluation and management (E/M) service provided.  I agree with the above history, physical, and plan which I have reviewed and edited where appropriate.  - case d/w dr dias  - plan to transfuse 1 unit PRBC  - will needs vascular reflux  - CT reviewed from may.  bypass was   - repeat CT angio as per d/w vascular and arun.  patitent states he would want intervention with his surgeon at [facility] dr bernal
discharge planning   continue hep gtt
sp cardiac cath today with nonobstructive disease  continue wound care, will continue to discuss operative planning with wife
I agree w/ assesment and plan.
I agree with the assessment and plan
Patient seen and examined with PA student Bambi Quiros,  I  was physically present for the key portions of the evaluation and management (E/M) service provided.  I agree with the above history, physical, and plan which I have reviewed and edited where appropriate.  - metabolic encephalopathy 2/2 steroids and benadryl last night prior to ct  - CT reviewed with dr bender vascular and will need surgery during this admission  - will get podiatry eval. liklely MRI to r/o OM  - case d/w dr dias he will optimize and clear patient for planned vascular procedure  - seocnd unit PRBC transfused today with hgb > 8 goal
Tamra 5 CLTI of marcelo BARCENAS multiple failed revasc procedures  Patient and his family to decide if they want to remain at [facility] or go back to their vascular surgeon at [facility]
discharge to [facility]  follow up outpatient in 2 weeks
recovering well  afebrile, slightly uptrending wbc, will observe  continue theraputic heparin gtt
seen and examined at bedside  discussed with wife Bianca over phone  at this point wound is stable and with recent NSTEMI will need full cardiac workup prior to any revascularization procedures, which would require 6-8 hour surgeries under general anesthesia  In addition, it is highly likely that performing one operation will necessitate performing others    will continue to monitor
overnight events noted   CLTI of LLE, willie 5 with very high chances of limb loss  will need an open operation, potentially multiple, but has to be optimized from cardiac perspective
dc planning to RAMA  will switch to DOAC if no podiatric needs

## 2023-11-07 NOTE — PROGRESS NOTE ADULT - SUBJECTIVE AND OBJECTIVE BOX
SURGERY DAILY PROGRESS NOTE:     Subjective:  Patient seen and examined at bedside during am rounds. Switch to DOAC from heparin drip yesterday. Continues to do well.    AVSS. Denies any fevers, chills, n/v/d, chest pain or shortness of breath    Objective:    MEDICATIONS  (STANDING):  apixaban 5 milliGRAM(s) Oral every 12 hours  apixaban 5 milliGRAM(s) Oral once  aspirin enteric coated 81 milliGRAM(s) Oral daily  atorvastatin 80 milliGRAM(s) Oral at bedtime  chlorhexidine 2% Cloths 1 Application(s) Topical <User Schedule>  gabapentin 200 milliGRAM(s) Oral every 8 hours  influenza  Vaccine (HIGH DOSE) 0.7 milliLiter(s) IntraMuscular once  levothyroxine 88 MICROGram(s) Oral daily  losartan 25 milliGRAM(s) Oral daily  nebivolol 2.5 milliGRAM(s) Oral daily  polyethylene glycol 3350 17 Gram(s) Oral daily  silver sulfADIAZINE 1% Cream 1 Application(s) Topical daily  thiamine 100 milliGRAM(s) Oral daily    MEDICATIONS  (PRN):  acetaminophen     Tablet .. 650 milliGRAM(s) Oral every 6 hours PRN Mild Pain (1 - 3)  albuterol    90 MICROgram(s) HFA Inhaler 2 Puff(s) Inhalation every 6 hours PRN Shortness of Breath and/or Wheezing  aluminum hydroxide/magnesium hydroxide/simethicone Suspension 30 milliLiter(s) Oral every 4 hours PRN Dyspepsia  HYDROmorphone  Injectable 0.5 milliGRAM(s) IV Push every 10 minutes PRN Severe Pain (7 - 10)  melatonin 3 milliGRAM(s) Oral at bedtime PRN Insomnia  ondansetron Injectable 4 milliGRAM(s) IV Push every 6 hours PRN Nausea and/or Vomiting      Vital Signs Last 24 Hrs  T(C): 36.6 (06 Nov 2023 21:28), Max: 36.8 (06 Nov 2023 07:51)  T(F): 97.9 (06 Nov 2023 21:28), Max: 98.2 (06 Nov 2023 07:51)  HR: 60 (06 Nov 2023 21:28) (57 - 64)  BP: 120/53 (06 Nov 2023 21:28) (111/51 - 120/53)  BP(mean): 72 (06 Nov 2023 21:28) (72 - 72)  RR: 16 (06 Nov 2023 21:28) (16 - 18)  SpO2: 98% (06 Nov 2023 21:28) (95% - 98%)    Parameters below as of 06 Nov 2023 21:28  Patient On (Oxygen Delivery Method): room air          PHYSICAL EXAM   GENERAL: NAD  HEAD: Atraumatic, normocephalic  EYES: EOMI, PERRLA, conjunctiva and sclera clear  ENT: moist mucous membrane  NECK: supple, No JVD, midline trachea  CHEST/LUNG: No increased WOB, symmetric excursions  Heart: RRR ppp, no peripheral edema  ABDOMEN: Round, nondistended, soft, nontender  EXTREMITIES: Left foot mildly warmer than right, eschar around left medial malleolus precludes PT doppler exam, palpable left DP. Right DP still palpable.   NERVOUS SYSTEM: AOx4, speech clear, no neuro-deficits  MSK: full ROM, no deformities, L arm ecchymosis postop  SKIN: warm to touch, no rash or lesions aside from aforementioned left ankle, right groin and left lower leg dressings c/d/i w/ stable strikethrough    I&O's Detail    05 Nov 2023 07:01  -  06 Nov 2023 07:00  --------------------------------------------------------  IN:  Total IN: 0 mL    OUT:    Voided (mL): 450 mL  Total OUT: 450 mL    Total NET: -450 mL      06 Nov 2023 07:01  -  07 Nov 2023 05:14  --------------------------------------------------------  IN:  Total IN: 0 mL    OUT:    Voided (mL): 900 mL  Total OUT: 900 mL    Total NET: -900 mL          Daily     Daily     LABS:                        8.0    12.08 )-----------( 335      ( 06 Nov 2023 06:54 )             24.5     11-06    141  |  112<H>  |  14  ----------------------------<  127<H>  4.1   |  25  |  0.79    Ca    7.2<L>      06 Nov 2023 06:54  Phos  2.3     11-06  Mg     2.3     11-06      PT/INR - ( 06 Nov 2023 06:54 )   PT: 12.5 sec;   INR: 1.11 ratio         PTT - ( 06 Nov 2023 09:16 )  PTT:95.5 sec  Urinalysis Basic - ( 06 Nov 2023 06:54 )    Color: x / Appearance: x / SG: x / pH: x  Gluc: 127 mg/dL / Ketone: x  / Bili: x / Urobili: x   Blood: x / Protein: x / Nitrite: x   Leuk Esterase: x / RBC: x / WBC x   Sq Epi: x / Non Sq Epi: x / Bacteria: x

## 2023-11-07 NOTE — DISCHARGE NOTE PROVIDER - NSDCCPTREATMENT_GEN_ALL_CORE_FT
PRINCIPAL PROCEDURE  Procedure: Creation of bypass from axillary artery to tibial artery using graft  Findings and Treatment:

## 2023-11-07 NOTE — DISCHARGE NOTE PROVIDER - NSFOLLOWUPCLINICS_GEN_ALL_ED_FT
[facility]  Wound Care  [address] [place] [address]  Phone: [phone]  Fax:   Follow Up Time: 1 week

## 2023-11-07 NOTE — DISCHARGE NOTE PROVIDER - NSDCMRMEDTOKEN_GEN_ALL_CORE_FT
Calcium 600+D 600 mg-200 intl units oral tablet:  orally once a day  Centrum Silver oral tablet: 1 tab(s) orally once a day  levothyroxine 88 mcg (0.088 mg) oral tablet: 1 tab(s) orally once a day  lisinopril 40 mg oral tablet: 1 tab(s) orally 2 times a day  Multiple Vitamins oral tablet: 1 tab(s) orally once a day  nebivolol 2.5 mg oral tablet: 1 tab(s) orally once a day  Plavix 75 mg oral tablet: 1 tab(s) orally 3 times a week *** M, W, F***  Polysaccharide Iron (as elemental iron) 150 mg oral capsule: 1 cap(s) orally once a day  rosuvastatin 20 mg oral tablet: 1 tab(s) orally once a day (at bedtime)  spironolactone 25 mg oral tablet: 1 tab(s) orally once a day *** per patient spouse at bedside pt takes 1 tab not 0.5 tab***  torsemide 20 mg oral tablet: 3 tab(s) orally once a day  Vitamin D3 1000 intl units oral capsule: 1 cap(s) orally once a day   Calcium 600+D 600 mg-200 intl units oral tablet:  orally once a day  Centrum Silver oral tablet: 1 tab(s) orally once a day  levothyroxine 88 mcg (0.088 mg) oral tablet: 1 tab(s) orally once a day  lisinopril 40 mg oral tablet: 1 tab(s) orally 2 times a day  Multiple Vitamins oral tablet: 1 tab(s) orally once a day  nebivolol 2.5 mg oral tablet: 1 tab(s) orally once a day  Polysaccharide Iron (as elemental iron) 150 mg oral capsule: 1 cap(s) orally once a day  rosuvastatin 20 mg oral tablet: 1 tab(s) orally once a day (at bedtime)  spironolactone 25 mg oral tablet: 1 tab(s) orally once a day *** per patient spouse at bedside pt takes 1 tab not 0.5 tab***  torsemide 20 mg oral tablet: 3 tab(s) orally once a day  Vitamin D3 1000 intl units oral capsule: 1 cap(s) orally once a day   apixaban 5 mg oral tablet: 1 tab(s) orally every 12 hours  aspirin 81 mg oral delayed release tablet: 1 tab(s) orally once a day  Calcium 600+D 600 mg-200 intl units oral tablet:  orally once a day  Centrum Silver oral tablet: 1 tab(s) orally once a day  levothyroxine 88 mcg (0.088 mg) oral tablet: 1 tab(s) orally once a day  lisinopril 40 mg oral tablet: 1 tab(s) orally 2 times a day  Multiple Vitamins oral tablet: 1 tab(s) orally once a day  nebivolol 2.5 mg oral tablet: 1 tab(s) orally once a day  Polysaccharide Iron (as elemental iron) 150 mg oral capsule: 1 cap(s) orally once a day  rosuvastatin 20 mg oral tablet: 1 tab(s) orally once a day (at bedtime)  spironolactone 25 mg oral tablet: 1 tab(s) orally once a day  torsemide 20 mg oral tablet: 3 tab(s) orally once a day  Vitamin D3 1000 intl units oral capsule: 1 cap(s) orally once a day

## 2023-11-08 ENCOUNTER — TRANSCRIPTION ENCOUNTER (OUTPATIENT)
Age: 73
End: 2023-11-08

## 2023-11-08 VITALS
RESPIRATION RATE: 19 BRPM | DIASTOLIC BLOOD PRESSURE: 67 MMHG | TEMPERATURE: 98 F | OXYGEN SATURATION: 94 % | HEART RATE: 61 BPM | SYSTOLIC BLOOD PRESSURE: 113 MMHG

## 2023-11-08 LAB
ANION GAP SERPL CALC-SCNC: 5 MMOL/L — SIGNIFICANT CHANGE UP (ref 5–17)
ANION GAP SERPL CALC-SCNC: 5 MMOL/L — SIGNIFICANT CHANGE UP (ref 5–17)
BASOPHILS # BLD AUTO: 0.03 K/UL — SIGNIFICANT CHANGE UP (ref 0–0.2)
BASOPHILS # BLD AUTO: 0.03 K/UL — SIGNIFICANT CHANGE UP (ref 0–0.2)
BASOPHILS NFR BLD AUTO: 0.3 % — SIGNIFICANT CHANGE UP (ref 0–2)
BASOPHILS NFR BLD AUTO: 0.3 % — SIGNIFICANT CHANGE UP (ref 0–2)
BUN SERPL-MCNC: 14 MG/DL — SIGNIFICANT CHANGE UP (ref 7–23)
BUN SERPL-MCNC: 14 MG/DL — SIGNIFICANT CHANGE UP (ref 7–23)
CALCIUM SERPL-MCNC: 7.9 MG/DL — LOW (ref 8.5–10.1)
CALCIUM SERPL-MCNC: 7.9 MG/DL — LOW (ref 8.5–10.1)
CHLORIDE SERPL-SCNC: 110 MMOL/L — HIGH (ref 96–108)
CHLORIDE SERPL-SCNC: 110 MMOL/L — HIGH (ref 96–108)
CO2 SERPL-SCNC: 26 MMOL/L — SIGNIFICANT CHANGE UP (ref 22–31)
CO2 SERPL-SCNC: 26 MMOL/L — SIGNIFICANT CHANGE UP (ref 22–31)
CREAT SERPL-MCNC: 0.86 MG/DL — SIGNIFICANT CHANGE UP (ref 0.5–1.3)
CREAT SERPL-MCNC: 0.86 MG/DL — SIGNIFICANT CHANGE UP (ref 0.5–1.3)
EGFR: 93 ML/MIN/1.73M2 — SIGNIFICANT CHANGE UP
EGFR: 93 ML/MIN/1.73M2 — SIGNIFICANT CHANGE UP
EOSINOPHIL # BLD AUTO: 0.14 K/UL — SIGNIFICANT CHANGE UP (ref 0–0.5)
EOSINOPHIL # BLD AUTO: 0.14 K/UL — SIGNIFICANT CHANGE UP (ref 0–0.5)
EOSINOPHIL NFR BLD AUTO: 1.3 % — SIGNIFICANT CHANGE UP (ref 0–6)
EOSINOPHIL NFR BLD AUTO: 1.3 % — SIGNIFICANT CHANGE UP (ref 0–6)
GLUCOSE SERPL-MCNC: 96 MG/DL — SIGNIFICANT CHANGE UP (ref 70–99)
GLUCOSE SERPL-MCNC: 96 MG/DL — SIGNIFICANT CHANGE UP (ref 70–99)
HCT VFR BLD CALC: 25.3 % — LOW (ref 39–50)
HCT VFR BLD CALC: 25.3 % — LOW (ref 39–50)
HGB BLD-MCNC: 7.9 G/DL — LOW (ref 13–17)
HGB BLD-MCNC: 7.9 G/DL — LOW (ref 13–17)
IMM GRANULOCYTES NFR BLD AUTO: 1.9 % — HIGH (ref 0–0.9)
IMM GRANULOCYTES NFR BLD AUTO: 1.9 % — HIGH (ref 0–0.9)
LYMPHOCYTES # BLD AUTO: 1.43 K/UL — SIGNIFICANT CHANGE UP (ref 1–3.3)
LYMPHOCYTES # BLD AUTO: 1.43 K/UL — SIGNIFICANT CHANGE UP (ref 1–3.3)
LYMPHOCYTES # BLD AUTO: 12.9 % — LOW (ref 13–44)
LYMPHOCYTES # BLD AUTO: 12.9 % — LOW (ref 13–44)
MAGNESIUM SERPL-MCNC: 2.2 MG/DL — SIGNIFICANT CHANGE UP (ref 1.6–2.6)
MAGNESIUM SERPL-MCNC: 2.2 MG/DL — SIGNIFICANT CHANGE UP (ref 1.6–2.6)
MCHC RBC-ENTMCNC: 29 PG — SIGNIFICANT CHANGE UP (ref 27–34)
MCHC RBC-ENTMCNC: 29 PG — SIGNIFICANT CHANGE UP (ref 27–34)
MCHC RBC-ENTMCNC: 31.2 GM/DL — LOW (ref 32–36)
MCHC RBC-ENTMCNC: 31.2 GM/DL — LOW (ref 32–36)
MCV RBC AUTO: 93 FL — SIGNIFICANT CHANGE UP (ref 80–100)
MCV RBC AUTO: 93 FL — SIGNIFICANT CHANGE UP (ref 80–100)
MONOCYTES # BLD AUTO: 0.95 K/UL — HIGH (ref 0–0.9)
MONOCYTES # BLD AUTO: 0.95 K/UL — HIGH (ref 0–0.9)
MONOCYTES NFR BLD AUTO: 8.6 % — SIGNIFICANT CHANGE UP (ref 2–14)
MONOCYTES NFR BLD AUTO: 8.6 % — SIGNIFICANT CHANGE UP (ref 2–14)
NEUTROPHILS # BLD AUTO: 8.33 K/UL — HIGH (ref 1.8–7.4)
NEUTROPHILS # BLD AUTO: 8.33 K/UL — HIGH (ref 1.8–7.4)
NEUTROPHILS NFR BLD AUTO: 75 % — SIGNIFICANT CHANGE UP (ref 43–77)
NEUTROPHILS NFR BLD AUTO: 75 % — SIGNIFICANT CHANGE UP (ref 43–77)
PHOSPHATE SERPL-MCNC: 3.5 MG/DL — SIGNIFICANT CHANGE UP (ref 2.5–4.5)
PHOSPHATE SERPL-MCNC: 3.5 MG/DL — SIGNIFICANT CHANGE UP (ref 2.5–4.5)
PLATELET # BLD AUTO: 381 K/UL — SIGNIFICANT CHANGE UP (ref 150–400)
PLATELET # BLD AUTO: 381 K/UL — SIGNIFICANT CHANGE UP (ref 150–400)
POTASSIUM SERPL-MCNC: 4.8 MMOL/L — SIGNIFICANT CHANGE UP (ref 3.5–5.3)
POTASSIUM SERPL-MCNC: 4.8 MMOL/L — SIGNIFICANT CHANGE UP (ref 3.5–5.3)
POTASSIUM SERPL-SCNC: 4.8 MMOL/L — SIGNIFICANT CHANGE UP (ref 3.5–5.3)
POTASSIUM SERPL-SCNC: 4.8 MMOL/L — SIGNIFICANT CHANGE UP (ref 3.5–5.3)
RBC # BLD: 2.72 M/UL — LOW (ref 4.2–5.8)
RBC # BLD: 2.72 M/UL — LOW (ref 4.2–5.8)
RBC # FLD: 17.2 % — HIGH (ref 10.3–14.5)
RBC # FLD: 17.2 % — HIGH (ref 10.3–14.5)
SODIUM SERPL-SCNC: 141 MMOL/L — SIGNIFICANT CHANGE UP (ref 135–145)
SODIUM SERPL-SCNC: 141 MMOL/L — SIGNIFICANT CHANGE UP (ref 135–145)
WBC # BLD: 11.09 K/UL — HIGH (ref 3.8–10.5)
WBC # BLD: 11.09 K/UL — HIGH (ref 3.8–10.5)
WBC # FLD AUTO: 11.09 K/UL — HIGH (ref 3.8–10.5)
WBC # FLD AUTO: 11.09 K/UL — HIGH (ref 3.8–10.5)

## 2023-11-08 PROCEDURE — 99238 HOSP IP/OBS DSCHRG MGMT 30/<: CPT

## 2023-11-08 RX ORDER — ASPIRIN/CALCIUM CARB/MAGNESIUM 324 MG
1 TABLET ORAL
Qty: 0 | Refills: 0 | DISCHARGE
Start: 2023-11-08

## 2023-11-08 RX ORDER — APIXABAN 2.5 MG/1
1 TABLET, FILM COATED ORAL
Qty: 60 | Refills: 0
Start: 2023-11-08 | End: 2023-12-07

## 2023-11-08 RX ORDER — APIXABAN 2.5 MG/1
1 TABLET, FILM COATED ORAL
Qty: 0 | Refills: 0 | DISCHARGE
Start: 2023-11-08

## 2023-11-08 RX ADMIN — GABAPENTIN 200 MILLIGRAM(S): 400 CAPSULE ORAL at 14:06

## 2023-11-08 RX ADMIN — Medication 100 MILLIGRAM(S): at 09:45

## 2023-11-08 RX ADMIN — GABAPENTIN 200 MILLIGRAM(S): 400 CAPSULE ORAL at 06:18

## 2023-11-08 RX ADMIN — Medication 88 MICROGRAM(S): at 06:18

## 2023-11-08 RX ADMIN — LOSARTAN POTASSIUM 25 MILLIGRAM(S): 100 TABLET, FILM COATED ORAL at 09:45

## 2023-11-08 RX ADMIN — APIXABAN 5 MILLIGRAM(S): 2.5 TABLET, FILM COATED ORAL at 09:45

## 2023-11-08 RX ADMIN — POLYETHYLENE GLYCOL 3350 17 GRAM(S): 17 POWDER, FOR SOLUTION ORAL at 09:46

## 2023-11-08 RX ADMIN — Medication 1 APPLICATION(S): at 09:47

## 2023-11-08 RX ADMIN — NEBIVOLOL HYDROCHLORIDE 2.5 MILLIGRAM(S): 5 TABLET ORAL at 09:46

## 2023-11-08 RX ADMIN — CHLORHEXIDINE GLUCONATE 1 APPLICATION(S): 213 SOLUTION TOPICAL at 09:46

## 2023-11-08 RX ADMIN — Medication 81 MILLIGRAM(S): at 09:45

## 2023-11-08 NOTE — PROGRESS NOTE ADULT - SUBJECTIVE AND OBJECTIVE BOX
Date of service: 11/08/2023  HPI: 74 y/o male with PMHX of HTN, severe PVD s/p multiple stents b/l legs, s/p fem/popliteal bypass, chronic left lower ankle ulceration, GERD, HLD who presented to  with CC of left LE pain and bleeding.  Patient notes he was at his family's house earlier 10/22.  He was sitting on the floor with his grandson when the family noticed significant bleeding in his left leg.  His wife tried to get him up and into the bathroom to stop the bleeding.  She got him to sit on the side of the bathtub and was trying to hold pressure over the bleeding when he seemed to lose consciousness.  He didn't fall over but as per wife, turned white and wasn't responding to her.  They called 911.  He wasn't responding for a few minutes and then came to.  She denies hx of syncope/ near syncope in the past.  In the ER, bleeding had mostly stopped.  Patient was c/o severe pain left foot.  Left foot discoloration and vascular surgery called for consult.  Labs with ARF with Cr of 2.2-- unclear baseline.  Patient has been taking advil at home for pain in foot.      Podiatry was consulted for left foot ulcer. Pt resting comfortably bedside today. Pt tender to wound site.     11/08/2023: Pt seen by podiatry team, pt resting bedside, NAD, pain controlled. No other pedal complaints    PAST MEDICAL & SURGICAL HISTORY:  Essential hypertension  PVD (peripheral vascular disease)  Gastroesophageal reflux disease, esophagitis presence not specified  Hypothyroidism  Chronic obstructive pulmonary disease, unspecified COPD type  Eczema  Hyperlipidemia, unspecified hyperlipidemia type  Medial meniscus tear  left knee  Femoral popliteal artery thrombus  h/o Fem pop bypass 1997  PVD (peripheral vascular disease)  s/p peripheral stent insertion bilateral lower extemities x 10    MEDICATIONS  (STANDING):  aspirin enteric coated 81 milliGRAM(s) Oral daily  atorvastatin 80 milliGRAM(s) Oral at bedtime  chlorhexidine 2% Cloths 1 Application(s) Topical <User Schedule>  heparin  Infusion 1100 Unit(s)/Hr (11 mL/Hr) IV Continuous <Continuous>  influenza  Vaccine (HIGH DOSE) 0.7 milliLiter(s) IntraMuscular once  levothyroxine 88 MICROGram(s) Oral daily  losartan 25 milliGRAM(s) Oral daily  nebivolol 2.5 milliGRAM(s) Oral daily  piperacillin/tazobactam IVPB.. 3.375 Gram(s) IV Intermittent every 8 hours  polyethylene glycol 3350 17 Gram(s) Oral daily  silver sulfADIAZINE 1% Cream 1 Application(s) Topical daily  thiamine 100 milliGRAM(s) Oral daily    MEDICATIONS  (PRN):  acetaminophen     Tablet .. 650 milliGRAM(s) Oral every 6 hours PRN Mild Pain (1 - 3)  albuterol    90 MICROgram(s) HFA Inhaler 2 Puff(s) Inhalation every 6 hours PRN Shortness of Breath and/or Wheezing  aluminum hydroxide/magnesium hydroxide/simethicone Suspension 30 milliLiter(s) Oral every 4 hours PRN Dyspepsia  gabapentin 100 milliGRAM(s) Oral every 8 hours PRN neuropathic LLE pain  HYDROmorphone  Injectable 0.5 milliGRAM(s) IV Push every 10 minutes PRN Severe Pain (7 - 10)  melatonin 3 milliGRAM(s) Oral at bedtime PRN Insomnia  ondansetron Injectable 4 milliGRAM(s) IV Push every 6 hours PRN Nausea and/or Vomiting  oxyCODONE    IR 2.5 milliGRAM(s) Oral every 4 hours PRN Severe Pain (7 - 10)  traMADol 25 milliGRAM(s) Oral every 8 hours PRN Moderate Pain (4 - 6)          FAMILY HISTORY:  Family hx of vascular disease      Social History:    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    Ex-smoker-- quit 20 years ago      Allergies:  Betadine (Hives; Rash)   (22 Oct 2023 14:35)            PMH: Essential hypertension    PVD (peripheral vascular disease)    Gastroesophageal reflux disease, esophagitis presence not specified    Hypothyroidism    Chronic obstructive pulmonary disease, unspecified COPD type    Eczema    Hyperlipidemia, unspecified hyperlipidemia type    Medial meniscus tear      PSH:Femoral popliteal artery thrombus    PVD (peripheral vascular disease)        Allergies:Betadine (Hives; Rash)      Labs:                                   7.9    11.09 )-----------( 381      ( 08 Nov 2023 07:09 )             25.3   11-08    141  |  110<H>  |  14  ----------------------------<  96  4.8   |  26  |  0.86    Ca    7.9<L>      08 Nov 2023 07:09  Phos  3.5     11-08  Mg     2.2     11-08    TPro  4.1<L>  /  Alb  1.6<L>  /  TBili  0.8  /  DBili  x   /  AST  43<H>  /  ALT  29  /  AlkPhos  112  11-07               Vital Signs Last 24 Hrs  T(C): 36.8 (08 Nov 2023 07:43), Max: 36.8 (08 Nov 2023 07:43)  T(F): 98.2 (08 Nov 2023 07:43), Max: 98.2 (08 Nov 2023 07:43)  HR: 61 (08 Nov 2023 07:43) (61 - 73)  BP: 113/67 (08 Nov 2023 07:43) (106/61 - 113/67)  BP(mean): --  RR: 19 (08 Nov 2023 07:43) (18 - 19)  SpO2: 94% (08 Nov 2023 07:43) (92% - 95%)    Parameters below as of 08 Nov 2023 07:43  Patient On (Oxygen Delivery Method): room air          REVIEW OF SYSTEMS:    CONSTITUTIONAL: No weakness, fevers or chills  EYES: No visual changes  RESPIRATORY: No cough, wheezing; No shortness of breath  CARDIOVASCULAR: No chest pain or palpitations  GASTROINTESTINAL: No abdominal or epigastric pain. No nausea, vomiting; No diarrhea or constipation.   GENITOURINARY: No dysuria, frequency or hematuria  NEUROLOGICAL: No numbness or weakness  SKIN: See physical examination.  All other review of systems is negative unless indicated above    Physical Exam:   Constitutional: NAD, alert;  Lower Extremity Focus  Derm:  Skin warm, dry and supple bilateral.    Ulceration to the left medial heel, wound base is mostly eschar, wound size is 3.5cm x 3.5cm, no periwound edema or erythema noted. no purulence or pus noted, no tracking/tunneling noted, no PTB, no malodor  Vascular: Dorsalis Pedis and Posterior Tibial pulses non palpable.  Capillary re-fill time more then 3 seconds digits 1-5 bilateral.    Neuro: Protective sensation diminished to the level of the digits bilateral.  MSK: Muscle strength 5/5 all major muscle groups bilateral. TTP to the wound site on the left medial mal          < from: Xray Foot AP + Lateral + Oblique, Left (10.22.23 @ 11:51) >  PROCEDURE DATE:  10/22/2023          INTERPRETATION:  Left ankle, left foot, and chest. Concern is chronic   wound around the malleoli.    Left ankle. 3 views.    There is long area of soft tissue calcification likely in a venous   structure in the medial lower leg.    There is mild degeneration of the malleolar tips.    There is some swelling seen approaching cavers triangle.    No bone destruction or fracture.    Left foot. 3 views.    No bone destruction or fracture is evident.    AP chest on October 22, 2023 at 12:34 PM.    Heart magnified by technique.    Lungs are clear.    Chest is similar to August 28, 2019.    IMPRESSION: No acute chest finding. Mild edema around the left ankle   posteriorly. Benign-appearing calcifications in the medial left lower leg   are likely venous. No acute bony finding.    --- End of Report ---        < end of copied text >

## 2023-11-08 NOTE — DISCHARGE NOTE NURSING/CASE MANAGEMENT/SOCIAL WORK - NSTRANSFEREYEGLASSESPAIRS_GEN_A_NUR
Spoke to patient who states she has been experiencing panic attacks all day.  She states when she has panic attacks, her heart races, she feels mildly SOB and some chest tightness.  After the panic attack passes, the symptoms subside.  She has seen a doctor about 5 years ago for anxiety and was put on a medication, but it made her feel too tired, so she discontinued the medication.  She is currently asymptomatic and states that her anxiety today has been brought on by a bad breakup.  She denies SI, depression, is only concerned with panic attacks.  Based on protocol, patient should see doctor in 3 days.  She recently got new insurance and does not have a PCP.  Appointment scheduled to see Dr. Merritt via video visit tomorrow.  Advised patient to practice calming breathing exercises and distraction in the meantime.  Advised patient to callback or seek immediate medication attention if symptoms continue.  Patient very thankful and verbalized understanding.      Reason for Disposition  • [1] Symptoms of anxiety or panic AND [2] has not been evaluated for this by physician    Protocols used: ANXIETY AND PANIC ATTACK-A-     1 pair

## 2023-11-08 NOTE — PROGRESS NOTE ADULT - ASSESSMENT
74 yo male with PMHX of HTN, severe PVD s/p multiple stents b/l legs, s/p fem/popliteal bypass, chronic left lower ankle ulceration, GERD, HLD who presented to  with CC of left LE pain and bleeding    Severe small vessel disease on left lower extremity   Chronic limb ischemia  -S/p aortic angiogram on 10/31/2023 and a left axillary to anterior tibial bypass with PTFE graft on 11/1/2023  -continue with ASA, eliquis   -D/w Dr. Sosa today       Acute blood loss anemia from bleeding L heel ulcer  -No further bleeding   -H/H stable   -Podiatry consult noted     Acute hypoxic respiratory failure   Possible, TRALI/TACO s/p 1 unit of blood   Hyperactive delirium from hypoxia  Acute pulmonary edema  vs. pneumonia  -Now breathing well on RA  -Asymptomatic   -CXR shows B/L Infiltrates R>L  -continue diuresis  -s/p zosyn possible pneumonia x  5 days    Acute on chronic HFrEF   Non obstructive CAD  -Now stable  -TTE as above, EF: 45-50%  -S/p LHC   -Cardiology following         Heel ulcer  -podiatry consult appreciated  -MRI no OM     HTN emergency  -Resolved  -Continue losartan, nebivolol, furosemide     #Severe protein-calorie malnutrition  -Nutrition consult appreciated  -Add supplements/ensure     JENIFER  -Improved       DISPO: d/c RAMA today

## 2023-11-08 NOTE — PROGRESS NOTE ADULT - NUTRITIONAL ASSESSMENT
This patient has been assessed with a concern for Malnutrition and has been determined to have a diagnosis/diagnoses of Severe protein-calorie malnutrition.    This patient is being managed with:   Diet DASH/TLC-  Sodium & Cholesterol Restricted  Entered: Oct 25 2023  8:56AM  
This patient has been assessed with a concern for Malnutrition and has been determined to have a diagnosis/diagnoses of Severe protein-calorie malnutrition.    This patient is being managed with:   Diet DASH/TLC-  Sodium & Cholesterol Restricted  Supplement Feeding Modality:  Oral  Ensure Plus High Protein Cans or Servings Per Day:  2       Frequency:  Two Times a day  Entered: Nov 1 2023  4:44PM  
This patient has been assessed with a concern for Malnutrition and has been determined to have a diagnosis/diagnoses of Severe protein-calorie malnutrition.    This patient is being managed with:   Diet DASH/TLC-  Sodium & Cholesterol Restricted  Supplement Feeding Modality:  Oral  Ensure Plus High Protein Cans or Servings Per Day:  2       Frequency:  Two Times a day  Entered: Oct 28 2023 12:40PM  
This patient has been assessed with a concern for Malnutrition and has been determined to have a diagnosis/diagnoses of Severe protein-calorie malnutrition.    This patient is being managed with:   Diet DASH/TLC-  Sodium & Cholesterol Restricted     Special Instructions for Nursing:  Sodium & Cholesterol Restricted  Entered: Oct 27 2023 10:18AM  
This patient has been assessed with a concern for Malnutrition and has been determined to have a diagnosis/diagnoses of Severe protein-calorie malnutrition.    This patient is being managed with:   Diet DASH/TLC-  Sodium & Cholesterol Restricted  Entered: Oct 25 2023  8:56AM  
This patient has been assessed with a concern for Malnutrition and has been determined to have a diagnosis/diagnoses of Severe protein-calorie malnutrition.    This patient is being managed with:   Diet DASH/TLC-  Sodium & Cholesterol Restricted  Supplement Feeding Modality:  Oral  Ensure Plus High Protein Cans or Servings Per Day:  2       Frequency:  Two Times a day  Entered: Nov 1 2023  4:44PM  
This patient has been assessed with a concern for Malnutrition and has been determined to have a diagnosis/diagnoses of Severe protein-calorie malnutrition.    This patient is being managed with:   Diet DASH/TLC-  Sodium & Cholesterol Restricted     Special Instructions for Nursing:  Sodium & Cholesterol Restricted  Entered: Oct 27 2023 10:18AM  
This patient has been assessed with a concern for Malnutrition and has been determined to have a diagnosis/diagnoses of Severe protein-calorie malnutrition.    This patient is being managed with:   Diet DASH/TLC-  Sodium & Cholesterol Restricted  Supplement Feeding Modality:  Oral  Ensure Plus High Protein Cans or Servings Per Day:  2       Frequency:  Two Times a day  Entered: Nov 1 2023  4:44PM  
This patient has been assessed with a concern for Malnutrition and has been determined to have a diagnosis/diagnoses of Severe protein-calorie malnutrition.    This patient is being managed with:   Diet DASH/TLC-  Sodium & Cholesterol Restricted  Supplement Feeding Modality:  Oral  Ensure Plus High Protein Cans or Servings Per Day:  2       Frequency:  Two Times a day  Entered: Oct 28 2023 12:40PM  
This patient has been assessed with a concern for Malnutrition and has been determined to have a diagnosis/diagnoses of Severe protein-calorie malnutrition.    This patient is being managed with:   Diet NPO-  NPO for Procedure/Test     NPO Start Date: 31-Oct-2023   NPO Start Time: 00:01  Except Medications  Entered: Oct 31 2023 12:53AM  
This patient has been assessed with a concern for Malnutrition and has been determined to have a diagnosis/diagnoses of Severe protein-calorie malnutrition.    This patient is being managed with:   Diet DASH/TLC-  Sodium & Cholesterol Restricted  Entered: Oct 25 2023  8:56AM  
This patient has been assessed with a concern for Malnutrition and has been determined to have a diagnosis/diagnoses of Severe protein-calorie malnutrition.    This patient is being managed with:   Diet DASH/TLC-  Sodium & Cholesterol Restricted  Supplement Feeding Modality:  Oral  Ensure Plus High Protein Cans or Servings Per Day:  2       Frequency:  Two Times a day  Entered: Nov 1 2023  4:44PM  
This patient has been assessed with a concern for Malnutrition and has been determined to have a diagnosis/diagnoses of Severe protein-calorie malnutrition.    This patient is being managed with:   Diet DASH/TLC-  Sodium & Cholesterol Restricted  Supplement Feeding Modality:  Oral  Ensure Plus High Protein Cans or Servings Per Day:  2       Frequency:  Two Times a day  Entered: Nov 1 2023  4:44PM

## 2023-11-08 NOTE — PROGRESS NOTE ADULT - ASSESSMENT
74yo M presents w/ bleeding LLE venous stasis ulcer. Severe small vessel disease on left lower extremity with multiple revascularization interventions, now occluded fem-pop and fem-fem bypass. 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 Now POD 6 s/p right fem cutdown aortogram, b/l angiogram, right SFA stent placement for pseudoaneurysm, left axillary to AT bypass with PTFE, recovering as expected.    Plan:  - Continue eliquis and ASA  - F/u AM labs  - On regular diet  - no further antibiotics needed  - physical therapy  - SW for RAMA placement: pending insurance auth

## 2023-11-08 NOTE — PROGRESS NOTE ADULT - REASON FOR ADMISSION
pain and bleeding left foot

## 2023-11-08 NOTE — DISCHARGE NOTE NURSING/CASE MANAGEMENT/SOCIAL WORK - NSDCPEFALRISK_GEN_ALL_CORE
For information on Fall & Injury Prevention, visit: [email or web address] OR  [email or web address] OR  https://www.cdc.gov/steadi/patient.html

## 2023-11-08 NOTE — DISCHARGE NOTE NURSING/CASE MANAGEMENT/SOCIAL WORK - PATIENT PORTAL LINK FT
You can access the FollowMyHealth Patient Portal offered by [facility] by registering at the following website: [email or web address]. By joining [facility]’s FollowMyHealth portal, you will also be able to view your health information using other applications (apps) compatible with our system.

## 2023-11-08 NOTE — PROGRESS NOTE ADULT - PROVIDER SPECIALTY LIST ADULT
Critical Care
Hospitalist
Hospitalist
SICU
Vascular Surgery
Hospitalist
Hospitalist
Intervent Cardiology
Podiatry
Podiatry
Vascular Surgery
Cardiology
Critical Care
Hospitalist
Podiatry
SICU
Vascular Surgery
Cardiology
Cardiology
Critical Care
Hospitalist
Podiatry
Vascular Surgery
Podiatry
Critical Care

## 2023-11-08 NOTE — PROGRESS NOTE ADULT - SUBJECTIVE AND OBJECTIVE BOX
HPI: 72 y/o male with PMHX of HTN, severe PVD s/p multiple stents b/l legs, s/p fem/popliteal bypass, chronic left lower ankle ulceration, GERD, HLD who presented to  with CC of left LE pain and bleeding.  Patient notes he was at his family's house earlier today.  He was sitting on the floor with his grandson when the family noticed significant bleeding in his left leg.  His wife tried to get him up and into the bathroom to stop the bleeding.  She got him to sit on the side of the bathtub and was trying to hold pressure over the bleeding when he seemed to lose consciousness.  He didn't fall over but as per wife, turned white and wasn't responding to her.  They called 911.  He wasn't responding for a few minutes and then came to.  She denies hx of syncope/ near syncope in the past.  In the ER, bleeding had mostly stopped.  Patient was c/o severe pain left foot.  Left foot discoloration and vascular surgery called for consult.  Labs with ARF with Cr of 2.2-- unclear baseline.  Patient has been taking advil at home for pain in foot. RR called for acute hypoxia and agitation. SpO2 75%. /90. Placed on NRB, given morphine 2mg IVP x2 POCUS showed diffuse anterior b lines. Placed on BIPAP transferred to ICU. cxr CHF vs. right sided infiltrate, diuresis and started on zosyn. Pt with acute and chronic diastolic CHF and elevated trop. Was taken to the cath lab for ischemic evaluation. Experienced hyperactive delirium from hypoxia, downgraded to medicine 10/28. After recovery from the acute episode, he was taken to the OR for an aortic angiogram on 10/31/2023 and a left axillary to anterior tibial bypass with PTFE graft on 11/1/2023.     Subjective: Patient seen today, feels well, no complaints to offer.     REVIEW OF SYSTEMS:    CONSTITUTIONAL: No weakness, fevers or chills  EYES/ENT: No visual changes;  No vertigo or throat pain   NECK: No pain or stiffness  RESPIRATORY: No cough, wheezing, hemoptysis; No shortness of breath  CARDIOVASCULAR: No chest pain or palpitations  GASTROINTESTINAL: No abdominal or epigastric pain. No nausea, vomiting, or hematemesis; No diarrhea or constipation. No melena or hematochezia.  GENITOURINARY: No dysuria, frequency or hematuria  NEUROLOGICAL: No numbness or weakness  SKIN: No itching, rashes      Vital Signs Last 24 Hrs  T(C): 36.8 (08 Nov 2023 07:43), Max: 36.8 (08 Nov 2023 07:43)  T(F): 98.2 (08 Nov 2023 07:43), Max: 98.2 (08 Nov 2023 07:43)  HR: 61 (08 Nov 2023 07:43) (61 - 73)  BP: 113/67 (08 Nov 2023 07:43) (106/61 - 113/67)  RR: 19 (08 Nov 2023 07:43) (18 - 19)  SpO2: 94% (08 Nov 2023 07:43) (94% - 95%)    Parameters below as of 08 Nov 2023 07:43  Patient On (Oxygen Delivery Method): room air    PHYSICAL EXAM:  GENERAL: NAD, lying in bed comfortably  HEAD:  Atraumatic, Normocephalic  EYES: conjunctiva and sclera clear  ENT: Moist mucous membranes  NECK: Supple, No JVD  CHEST/LUNG: Clear to auscultation bilaterally; No rales, rhonchi, wheezing. Unlabored respirations  HEART: Regular rate and rhythm; No murmurs  ABDOMEN: Bowel sounds present; Soft, Nontender, Nondistended.   EXTREMITIES:  2+ Peripheral Pulses, brisk capillary refill. No clubbing, cyanosis, or edema  NERVOUS SYSTEM:  Alert & Oriented X3, speech clear. No deficits   MSK: FROM all 4 extremities, full and equal strength    MEDICATIONS  (STANDING):  apixaban 5 milliGRAM(s) Oral every 12 hours  aspirin enteric coated 81 milliGRAM(s) Oral daily  atorvastatin 80 milliGRAM(s) Oral at bedtime  chlorhexidine 2% Cloths 1 Application(s) Topical <User Schedule>  gabapentin 200 milliGRAM(s) Oral every 8 hours  influenza  Vaccine (HIGH DOSE) 0.7 milliLiter(s) IntraMuscular once  levothyroxine 88 MICROGram(s) Oral daily  losartan 25 milliGRAM(s) Oral daily  nebivolol 2.5 milliGRAM(s) Oral daily  polyethylene glycol 3350 17 Gram(s) Oral daily  silver sulfADIAZINE 1% Cream 1 Application(s) Topical daily  thiamine 100 milliGRAM(s) Oral daily    MEDICATIONS  (PRN):  acetaminophen     Tablet .. 650 milliGRAM(s) Oral every 6 hours PRN Mild Pain (1 - 3)  albuterol    90 MICROgram(s) HFA Inhaler 2 Puff(s) Inhalation every 6 hours PRN Shortness of Breath and/or Wheezing  aluminum hydroxide/magnesium hydroxide/simethicone Suspension 30 milliLiter(s) Oral every 4 hours PRN Dyspepsia  HYDROmorphone  Injectable 0.5 milliGRAM(s) IV Push every 10 minutes PRN Severe Pain (7 - 10)  melatonin 3 milliGRAM(s) Oral at bedtime PRN Insomnia  ondansetron Injectable 4 milliGRAM(s) IV Push every 6 hours PRN Nausea and/or Vomiting      LABS:                          7.9    11.09 )-----------( 381      ( 08 Nov 2023 07:09 )             25.3     08 Nov 2023 07:09    141    |  110    |  14     ----------------------------<  96     4.8     |  26     |  0.86     Ca    7.9        08 Nov 2023 07:09  Phos  3.5       08 Nov 2023 07:09  Mg     2.2       08 Nov 2023 07:09    TPro  4.1    /  Alb  1.6    /  TBili  0.8    /  DBili  x      /  AST  43     /  ALT  29     /  AlkPhos  112    07 Nov 2023 07:14    LIVER FUNCTIONS - ( 07 Nov 2023 07:14 )  Alb: 1.6 g/dL / Pro: 4.1 gm/dL / ALK PHOS: 112 U/L / ALT: 29 U/L / AST: 43 U/L / GGT: x             CAPILLARY BLOOD GLUCOSE            Urinalysis Basic - ( 08 Nov 2023 07:09 )    Color: x / Appearance: x / SG: x / pH: x  Gluc: 96 mg/dL / Ketone: x  / Bili: x / Urobili: x   Blood: x / Protein: x / Nitrite: x   Leuk Esterase: x / RBC: x / WBC x   Sq Epi: x / Non Sq Epi: x / Bacteria: x        RADIOLOGY:

## 2023-11-08 NOTE — PROGRESS NOTE ADULT - ASSESSMENT
Assesment: 73y old male seen for the following:   - Full thickness wound to the left foot, chronic   - Pain to left foot  - Difficulty ambulation    Plan:   Chart reviewed and Patient evaluated;  Discussed diagnosis and treatment with patient. Discussed importance of daily foot examinations, proper shoe gear, importance of tight glycemic control.   X-rays reviewed : on wet read showing no soft tissue gas, no acute bony findings, mild edema noted to the left ankle posteriorly  There is a full thickness wound to the left medial mal, etiology of wound most likely due to arterial problem, wound looks chronic and stable without acute SOI  Wound flush with normal saline   WC: silvadene and DSD  WBAT using surgical shoe  Offloading to bilateral heels while bedbound.   Continue with antibiotics as per ID  All additional care per Med appreciated  Patient demonstrated verbal understanding of all interventions and tolerated interventions well without any complications.   Patient to follow up in wound care center [facility] 1 week after discharge w Dr Cade Joseph  Case D/W attending Dr. Joseph    Wound care instructions for Left LE to be changed every other day:  - Gently remove dressings.  - Clean the wound with saline and dry it thoroughly with dry gauze  - Apply silvadene, gauze, kerlix and secure it with tape

## 2023-11-08 NOTE — PROGRESS NOTE ADULT - SUBJECTIVE AND OBJECTIVE BOX
SURGERY DAILY PROGRESS NOTE:     Subjective:  Patient seen and examined at bedside during am rounds. On DOAC. Continues to do well.    AVSS. Denies any fevers, chills, n/v/d, chest pain or shortness of breath    Objective:    Vital Signs Last 24 Hrs  T(C): 36.6 (07 Nov 2023 21:35), Max: 36.8 (07 Nov 2023 07:38)  T(F): 97.9 (07 Nov 2023 21:35), Max: 98.2 (07 Nov 2023 07:38)  HR: 73 (07 Nov 2023 21:35) (65 - 73)  BP: 106/61 (07 Nov 2023 21:35) (106/61 - 123/51)  BP(mean): --  RR: 18 (07 Nov 2023 21:35) (18 - 18)  SpO2: 95% (07 Nov 2023 21:35) (92% - 95%)    Parameters below as of 07 Nov 2023 21:35  Patient On (Oxygen Delivery Method): room air      PHYSICAL EXAM   GENERAL: NAD  HEAD: Atraumatic, normocephalic  EYES: EOMI, PERRLA, conjunctiva and sclera clear  ENT: moist mucous membrane  NECK: supple, No JVD, midline trachea  CHEST/LUNG: No increased WOB, symmetric excursions  Heart: RRR ppp, no peripheral edema  ABDOMEN: Round, nondistended, soft, nontender  EXTREMITIES: Left foot mildly warmer than right, eschar around left medial malleolus precludes PT doppler exam, palpable left DP. Right DP still palpable.   NERVOUS SYSTEM: AOx4, speech clear, no neuro-deficits  MSK: full ROM, no deformities, L arm ecchymosis postop  SKIN: warm to touch, no rash or lesions aside from aforementioned left ankle, right groin and left lower leg dressings c/d/i w/ stable strikethrough      I&O's Detail    06 Nov 2023 07:01  -  07 Nov 2023 07:00  --------------------------------------------------------  IN:  Total IN: 0 mL    OUT:    Voided (mL): 900 mL  Total OUT: 900 mL    Total NET: -900 mL                              7.3    11.04 )-----------( 355      ( 07 Nov 2023 07:14 )             22.8     11-07    141  |  111<H>  |  15  ----------------------------<  107<H>  4.4   |  26  |  0.74    Ca    7.4<L>      07 Nov 2023 07:14  Phos  3.2     11-07  Mg     2.3     11-07    TPro  4.1<L>  /  Alb  1.6<L>  /  TBili  0.8  /  DBili  x   /  AST  43<H>  /  ALT  29  /  AlkPhos  112  11-07

## 2023-11-14 DIAGNOSIS — E43 UNSPECIFIED SEVERE PROTEIN-CALORIE MALNUTRITION: ICD-10-CM

## 2023-11-14 DIAGNOSIS — T45.0X5A ADVERSE EFFECT OF ANTIALLERGIC AND ANTIEMETIC DRUGS, INITIAL ENCOUNTER: ICD-10-CM

## 2023-11-14 DIAGNOSIS — L97.429 NON-PRESSURE CHRONIC ULCER OF LEFT HEEL AND MIDFOOT WITH UNSPECIFIED SEVERITY: ICD-10-CM

## 2023-11-14 DIAGNOSIS — D62 ACUTE POSTHEMORRHAGIC ANEMIA: ICD-10-CM

## 2023-11-14 DIAGNOSIS — Z79.890 HORMONE REPLACEMENT THERAPY: ICD-10-CM

## 2023-11-14 DIAGNOSIS — K21.9 GASTRO-ESOPHAGEAL REFLUX DISEASE WITHOUT ESOPHAGITIS: ICD-10-CM

## 2023-11-14 DIAGNOSIS — E86.1 HYPOVOLEMIA: ICD-10-CM

## 2023-11-14 DIAGNOSIS — J18.9 PNEUMONIA, UNSPECIFIED ORGANISM: ICD-10-CM

## 2023-11-14 DIAGNOSIS — Z79.82 LONG TERM (CURRENT) USE OF ASPIRIN: ICD-10-CM

## 2023-11-14 DIAGNOSIS — F41.9 ANXIETY DISORDER, UNSPECIFIED: ICD-10-CM

## 2023-11-14 DIAGNOSIS — N17.9 ACUTE KIDNEY FAILURE, UNSPECIFIED: ICD-10-CM

## 2023-11-14 DIAGNOSIS — E78.5 HYPERLIPIDEMIA, UNSPECIFIED: ICD-10-CM

## 2023-11-14 DIAGNOSIS — Z79.1 LONG TERM (CURRENT) USE OF NON-STEROIDAL ANTI-INFLAMMATORIES (NSAID): ICD-10-CM

## 2023-11-14 DIAGNOSIS — I70.243 ATHEROSCLEROSIS OF NATIVE ARTERIES OF LEFT LEG WITH ULCERATION OF ANKLE: ICD-10-CM

## 2023-11-14 DIAGNOSIS — G62.9 POLYNEUROPATHY, UNSPECIFIED: ICD-10-CM

## 2023-11-14 DIAGNOSIS — I50.33 ACUTE ON CHRONIC DIASTOLIC (CONGESTIVE) HEART FAILURE: ICD-10-CM

## 2023-11-14 DIAGNOSIS — R55 SYNCOPE AND COLLAPSE: ICD-10-CM

## 2023-11-14 DIAGNOSIS — Z88.8 ALLERGY STATUS TO OTHER DRUGS, MEDICAMENTS AND BIOLOGICAL SUBSTANCES: ICD-10-CM

## 2023-11-14 DIAGNOSIS — R73.9 HYPERGLYCEMIA, UNSPECIFIED: ICD-10-CM

## 2023-11-14 DIAGNOSIS — I16.1 HYPERTENSIVE EMERGENCY: ICD-10-CM

## 2023-11-14 DIAGNOSIS — J81.0 ACUTE PULMONARY EDEMA: ICD-10-CM

## 2023-11-14 DIAGNOSIS — I83.023 VARICOSE VEINS OF LEFT LOWER EXTREMITY WITH ULCER OF ANKLE: ICD-10-CM

## 2023-11-14 DIAGNOSIS — L97.329 NON-PRESSURE CHRONIC ULCER OF LEFT ANKLE WITH UNSPECIFIED SEVERITY: ICD-10-CM

## 2023-11-14 DIAGNOSIS — L30.9 DERMATITIS, UNSPECIFIED: ICD-10-CM

## 2023-11-14 DIAGNOSIS — G92.8 OTHER TOXIC ENCEPHALOPATHY: ICD-10-CM

## 2023-11-14 DIAGNOSIS — R45.1 RESTLESSNESS AND AGITATION: ICD-10-CM

## 2023-11-14 DIAGNOSIS — T38.0X5A ADVERSE EFFECT OF GLUCOCORTICOIDS AND SYNTHETIC ANALOGUES, INITIAL ENCOUNTER: ICD-10-CM

## 2023-11-14 DIAGNOSIS — J96.01 ACUTE RESPIRATORY FAILURE WITH HYPOXIA: ICD-10-CM
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
## 2023-11-14 DIAGNOSIS — N18.9 CHRONIC KIDNEY DISEASE, UNSPECIFIED: ICD-10-CM

## 2023-11-14 DIAGNOSIS — T82.858A STENOSIS OF OTHER VASCULAR PROSTHETIC DEVICES, IMPLANTS AND GRAFTS, INITIAL ENCOUNTER: ICD-10-CM

## 2023-11-14 DIAGNOSIS — Z79.02 LONG TERM (CURRENT) USE OF ANTITHROMBOTICS/ANTIPLATELETS: ICD-10-CM

## 2023-11-14 DIAGNOSIS — I13.0 HYPERTENSIVE HEART AND CHRONIC KIDNEY DISEASE WITH HEART FAILURE AND STAGE 1 THROUGH STAGE 4 CHRONIC KIDNEY DISEASE, OR UNSPECIFIED CHRONIC KIDNEY DISEASE: ICD-10-CM

## 2023-11-14 DIAGNOSIS — Z87.891 PERSONAL HISTORY OF NICOTINE DEPENDENCE: ICD-10-CM

## 2023-11-14 DIAGNOSIS — I25.10 ATHEROSCLEROTIC HEART DISEASE OF NATIVE CORONARY ARTERY WITHOUT ANGINA PECTORIS: ICD-10-CM

## 2023-11-14 DIAGNOSIS — I72.4 ANEURYSM OF ARTERY OF LOWER EXTREMITY: ICD-10-CM
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
## 2023-11-14 DIAGNOSIS — R41.0 DISORIENTATION, UNSPECIFIED: ICD-10-CM
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
## 2023-11-14 DIAGNOSIS — J44.9 CHRONIC OBSTRUCTIVE PULMONARY DISEASE, UNSPECIFIED: ICD-10-CM

## 2023-11-14 DIAGNOSIS — E03.9 HYPOTHYROIDISM, UNSPECIFIED: ICD-10-CM

## 2023-11-17 ENCOUNTER — APPOINTMENT (OUTPATIENT)
Dept: VASCULAR SURGERY | Facility: CLINIC | Age: 73
End: 2023-11-17

## 2023-11-17 ENCOUNTER — APPOINTMENT (OUTPATIENT)
Dept: VASCULAR SURGERY | Facility: CLINIC | Age: 73
End: 2023-11-17
Payer: MEDICARE

## 2023-11-17 VITALS — DIASTOLIC BLOOD PRESSURE: 63 MMHG | HEART RATE: 85 BPM | SYSTOLIC BLOOD PRESSURE: 115 MMHG | OXYGEN SATURATION: 99 %

## 2023-11-17 PROCEDURE — 93926 LOWER EXTREMITY STUDY: CPT

## 2023-12-01 ENCOUNTER — OUTPATIENT (OUTPATIENT)
Dept: OUTPATIENT SERVICES | Facility: HOSPITAL | Age: 73
LOS: 1 days | End: 2023-12-01
Payer: MEDICARE

## 2023-12-01 DIAGNOSIS — J44.9 CHRONIC OBSTRUCTIVE PULMONARY DISEASE, UNSPECIFIED: ICD-10-CM

## 2023-12-01 DIAGNOSIS — I50.9 HEART FAILURE, UNSPECIFIED: ICD-10-CM

## 2023-12-01 DIAGNOSIS — S91.309A UNSPECIFIED OPEN WOUND, UNSPECIFIED FOOT, INITIAL ENCOUNTER: ICD-10-CM

## 2023-12-01 DIAGNOSIS — Z87.891 PERSONAL HISTORY OF NICOTINE DEPENDENCE: ICD-10-CM

## 2023-12-01 DIAGNOSIS — L97.525 NON-PRESSURE CHRONIC ULCER OF OTHER PART OF LEFT FOOT WITH MUSCLE INVOLVEMENT WITHOUT EVIDENCE OF NECROSIS: ICD-10-CM

## 2023-12-01 DIAGNOSIS — I11.0 HYPERTENSIVE HEART DISEASE WITH HEART FAILURE: ICD-10-CM

## 2023-12-01 DIAGNOSIS — Z91.81 HISTORY OF FALLING: ICD-10-CM

## 2023-12-01 DIAGNOSIS — I73.9 PERIPHERAL VASCULAR DISEASE, UNSPECIFIED: Chronic | ICD-10-CM

## 2023-12-01 DIAGNOSIS — I73.9 PERIPHERAL VASCULAR DISEASE, UNSPECIFIED: ICD-10-CM

## 2023-12-01 DIAGNOSIS — I74.3 EMBOLISM AND THROMBOSIS OF ARTERIES OF THE LOWER EXTREMITIES: Chronic | ICD-10-CM

## 2023-12-01 PROCEDURE — 99203 OFFICE O/P NEW LOW 30 MIN: CPT | Mod: 25

## 2023-12-01 PROCEDURE — 11043 DBRDMT MUSC&/FSCA 1ST 20/<: CPT | Mod: 59

## 2023-12-08 ENCOUNTER — INPATIENT (INPATIENT)
Facility: HOSPITAL | Age: 73
LOS: 13 days | Discharge: NURSING, REHAB OR LONG-TERM CARE | DRG: 856 | End: 2023-12-22
Attending: HOSPITALIST | Admitting: STUDENT IN AN ORGANIZED HEALTH CARE EDUCATION/TRAINING PROGRAM
Payer: MEDICARE

## 2023-12-08 VITALS
WEIGHT: 169.98 LBS | HEART RATE: 64 BPM | TEMPERATURE: 99 F | OXYGEN SATURATION: 100 % | DIASTOLIC BLOOD PRESSURE: 65 MMHG | SYSTOLIC BLOOD PRESSURE: 119 MMHG | RESPIRATION RATE: 18 BRPM

## 2023-12-08 DIAGNOSIS — I73.9 PERIPHERAL VASCULAR DISEASE, UNSPECIFIED: Chronic | ICD-10-CM

## 2023-12-08 DIAGNOSIS — I74.3 EMBOLISM AND THROMBOSIS OF ARTERIES OF THE LOWER EXTREMITIES: Chronic | ICD-10-CM

## 2023-12-08 PROCEDURE — 99285 EMERGENCY DEPT VISIT HI MDM: CPT

## 2023-12-08 RX ORDER — SODIUM CHLORIDE 9 MG/ML
2400 INJECTION, SOLUTION INTRAVENOUS ONCE
Refills: 0 | Status: COMPLETED | OUTPATIENT
Start: 2023-12-08 | End: 2023-12-08

## 2023-12-08 RX ORDER — ACETAMINOPHEN 500 MG
650 TABLET ORAL ONCE
Refills: 0 | Status: COMPLETED | OUTPATIENT
Start: 2023-12-08 | End: 2023-12-08

## 2023-12-08 NOTE — ED ADULT TRIAGE NOTE - CHIEF COMPLAINT QUOTE
patient states he had a procedure on his right groin approx. 1 month ago at .  patient is unsure the specific procedure.  patient sent to ED from rehab for possible surgical wound infection. patient covid +

## 2023-12-09 DIAGNOSIS — L03.90 CELLULITIS, UNSPECIFIED: ICD-10-CM

## 2023-12-09 LAB
A1C WITH ESTIMATED AVERAGE GLUCOSE RESULT: 5.3 % — SIGNIFICANT CHANGE UP (ref 4–5.6)
A1C WITH ESTIMATED AVERAGE GLUCOSE RESULT: 5.3 % — SIGNIFICANT CHANGE UP (ref 4–5.6)
ALBUMIN SERPL ELPH-MCNC: 2.4 G/DL — LOW (ref 3.3–5)
ALBUMIN SERPL ELPH-MCNC: 2.4 G/DL — LOW (ref 3.3–5)
ALP SERPL-CCNC: 151 U/L — HIGH (ref 40–120)
ALP SERPL-CCNC: 151 U/L — HIGH (ref 40–120)
ALT FLD-CCNC: 28 U/L — SIGNIFICANT CHANGE UP (ref 12–78)
ALT FLD-CCNC: 28 U/L — SIGNIFICANT CHANGE UP (ref 12–78)
ANION GAP SERPL CALC-SCNC: 5 MMOL/L — SIGNIFICANT CHANGE UP (ref 5–17)
ANION GAP SERPL CALC-SCNC: 5 MMOL/L — SIGNIFICANT CHANGE UP (ref 5–17)
ANION GAP SERPL CALC-SCNC: 7 MMOL/L — SIGNIFICANT CHANGE UP (ref 5–17)
ANION GAP SERPL CALC-SCNC: 7 MMOL/L — SIGNIFICANT CHANGE UP (ref 5–17)
ANION GAP SERPL CALC-SCNC: 8 MMOL/L — SIGNIFICANT CHANGE UP (ref 5–17)
ANION GAP SERPL CALC-SCNC: 8 MMOL/L — SIGNIFICANT CHANGE UP (ref 5–17)
APPEARANCE UR: CLEAR — SIGNIFICANT CHANGE UP
APPEARANCE UR: CLEAR — SIGNIFICANT CHANGE UP
APTT BLD: 34.4 SEC — SIGNIFICANT CHANGE UP (ref 24.5–35.6)
APTT BLD: 34.4 SEC — SIGNIFICANT CHANGE UP (ref 24.5–35.6)
AST SERPL-CCNC: 46 U/L — HIGH (ref 15–37)
AST SERPL-CCNC: 46 U/L — HIGH (ref 15–37)
BACTERIA # UR AUTO: NEGATIVE /HPF — SIGNIFICANT CHANGE UP
BACTERIA # UR AUTO: NEGATIVE /HPF — SIGNIFICANT CHANGE UP
BASOPHILS # BLD AUTO: 0.02 K/UL — SIGNIFICANT CHANGE UP (ref 0–0.2)
BASOPHILS # BLD AUTO: 0.02 K/UL — SIGNIFICANT CHANGE UP (ref 0–0.2)
BASOPHILS NFR BLD AUTO: 0.2 % — SIGNIFICANT CHANGE UP (ref 0–2)
BASOPHILS NFR BLD AUTO: 0.2 % — SIGNIFICANT CHANGE UP (ref 0–2)
BILIRUB SERPL-MCNC: 0.9 MG/DL — SIGNIFICANT CHANGE UP (ref 0.2–1.2)
BILIRUB SERPL-MCNC: 0.9 MG/DL — SIGNIFICANT CHANGE UP (ref 0.2–1.2)
BILIRUB UR-MCNC: NEGATIVE — SIGNIFICANT CHANGE UP
BILIRUB UR-MCNC: NEGATIVE — SIGNIFICANT CHANGE UP
BUN SERPL-MCNC: 36 MG/DL — HIGH (ref 7–23)
BUN SERPL-MCNC: 36 MG/DL — HIGH (ref 7–23)
BUN SERPL-MCNC: 37 MG/DL — HIGH (ref 7–23)
BUN SERPL-MCNC: 37 MG/DL — HIGH (ref 7–23)
BUN SERPL-MCNC: 39 MG/DL — HIGH (ref 7–23)
BUN SERPL-MCNC: 39 MG/DL — HIGH (ref 7–23)
CALCIUM SERPL-MCNC: 7.9 MG/DL — LOW (ref 8.5–10.1)
CALCIUM SERPL-MCNC: 7.9 MG/DL — LOW (ref 8.5–10.1)
CALCIUM SERPL-MCNC: 8 MG/DL — LOW (ref 8.5–10.1)
CALCIUM SERPL-MCNC: 8 MG/DL — LOW (ref 8.5–10.1)
CALCIUM SERPL-MCNC: 8.3 MG/DL — LOW (ref 8.5–10.1)
CALCIUM SERPL-MCNC: 8.3 MG/DL — LOW (ref 8.5–10.1)
CAST: 3 /LPF — SIGNIFICANT CHANGE UP (ref 0–4)
CAST: 3 /LPF — SIGNIFICANT CHANGE UP (ref 0–4)
CHLORIDE SERPL-SCNC: 100 MMOL/L — SIGNIFICANT CHANGE UP (ref 96–108)
CHLORIDE SERPL-SCNC: 100 MMOL/L — SIGNIFICANT CHANGE UP (ref 96–108)
CHLORIDE SERPL-SCNC: 98 MMOL/L — SIGNIFICANT CHANGE UP (ref 96–108)
CO2 SERPL-SCNC: 30 MMOL/L — SIGNIFICANT CHANGE UP (ref 22–31)
COLOR SPEC: YELLOW — SIGNIFICANT CHANGE UP
COLOR SPEC: YELLOW — SIGNIFICANT CHANGE UP
CREAT SERPL-MCNC: 1.74 MG/DL — HIGH (ref 0.5–1.3)
CREAT SERPL-MCNC: 1.74 MG/DL — HIGH (ref 0.5–1.3)
CREAT SERPL-MCNC: 1.76 MG/DL — HIGH (ref 0.5–1.3)
CREAT SERPL-MCNC: 1.76 MG/DL — HIGH (ref 0.5–1.3)
CREAT SERPL-MCNC: 1.77 MG/DL — HIGH (ref 0.5–1.3)
CREAT SERPL-MCNC: 1.77 MG/DL — HIGH (ref 0.5–1.3)
DIFF PNL FLD: NEGATIVE — SIGNIFICANT CHANGE UP
DIFF PNL FLD: NEGATIVE — SIGNIFICANT CHANGE UP
EGFR: 41 ML/MIN/1.73M2 — LOW
EOSINOPHIL # BLD AUTO: 0.03 K/UL — SIGNIFICANT CHANGE UP (ref 0–0.5)
EOSINOPHIL # BLD AUTO: 0.03 K/UL — SIGNIFICANT CHANGE UP (ref 0–0.5)
EOSINOPHIL NFR BLD AUTO: 0.2 % — SIGNIFICANT CHANGE UP (ref 0–6)
EOSINOPHIL NFR BLD AUTO: 0.2 % — SIGNIFICANT CHANGE UP (ref 0–6)
ESTIMATED AVERAGE GLUCOSE: 105 MG/DL — SIGNIFICANT CHANGE UP (ref 68–114)
ESTIMATED AVERAGE GLUCOSE: 105 MG/DL — SIGNIFICANT CHANGE UP (ref 68–114)
GLUCOSE SERPL-MCNC: 104 MG/DL — HIGH (ref 70–99)
GLUCOSE SERPL-MCNC: 104 MG/DL — HIGH (ref 70–99)
GLUCOSE SERPL-MCNC: 109 MG/DL — HIGH (ref 70–99)
GLUCOSE SERPL-MCNC: 109 MG/DL — HIGH (ref 70–99)
GLUCOSE SERPL-MCNC: 131 MG/DL — HIGH (ref 70–99)
GLUCOSE SERPL-MCNC: 131 MG/DL — HIGH (ref 70–99)
GLUCOSE UR QL: NEGATIVE MG/DL — SIGNIFICANT CHANGE UP
GLUCOSE UR QL: NEGATIVE MG/DL — SIGNIFICANT CHANGE UP
HCT VFR BLD CALC: 29.5 % — LOW (ref 39–50)
HCT VFR BLD CALC: 29.5 % — LOW (ref 39–50)
HCT VFR BLD CALC: 30.3 % — LOW (ref 39–50)
HCT VFR BLD CALC: 30.3 % — LOW (ref 39–50)
HGB BLD-MCNC: 10.1 G/DL — LOW (ref 13–17)
HGB BLD-MCNC: 10.1 G/DL — LOW (ref 13–17)
HGB BLD-MCNC: 9.6 G/DL — LOW (ref 13–17)
HGB BLD-MCNC: 9.6 G/DL — LOW (ref 13–17)
IMM GRANULOCYTES NFR BLD AUTO: 0.4 % — SIGNIFICANT CHANGE UP (ref 0–0.9)
IMM GRANULOCYTES NFR BLD AUTO: 0.4 % — SIGNIFICANT CHANGE UP (ref 0–0.9)
INR BLD: 2.39 RATIO — HIGH (ref 0.85–1.18)
INR BLD: 2.39 RATIO — HIGH (ref 0.85–1.18)
KETONES UR-MCNC: NEGATIVE MG/DL — SIGNIFICANT CHANGE UP
KETONES UR-MCNC: NEGATIVE MG/DL — SIGNIFICANT CHANGE UP
LACTATE SERPL-SCNC: 1.2 MMOL/L — SIGNIFICANT CHANGE UP (ref 0.7–2)
LACTATE SERPL-SCNC: 1.2 MMOL/L — SIGNIFICANT CHANGE UP (ref 0.7–2)
LEUKOCYTE ESTERASE UR-ACNC: ABNORMAL
LEUKOCYTE ESTERASE UR-ACNC: ABNORMAL
LYMPHOCYTES # BLD AUTO: 1.2 K/UL — SIGNIFICANT CHANGE UP (ref 1–3.3)
LYMPHOCYTES # BLD AUTO: 1.2 K/UL — SIGNIFICANT CHANGE UP (ref 1–3.3)
LYMPHOCYTES # BLD AUTO: 9.7 % — LOW (ref 13–44)
LYMPHOCYTES # BLD AUTO: 9.7 % — LOW (ref 13–44)
MCHC RBC-ENTMCNC: 29.2 PG — SIGNIFICANT CHANGE UP (ref 27–34)
MCHC RBC-ENTMCNC: 29.2 PG — SIGNIFICANT CHANGE UP (ref 27–34)
MCHC RBC-ENTMCNC: 29.3 PG — SIGNIFICANT CHANGE UP (ref 27–34)
MCHC RBC-ENTMCNC: 29.3 PG — SIGNIFICANT CHANGE UP (ref 27–34)
MCHC RBC-ENTMCNC: 32.5 GM/DL — SIGNIFICANT CHANGE UP (ref 32–36)
MCHC RBC-ENTMCNC: 32.5 GM/DL — SIGNIFICANT CHANGE UP (ref 32–36)
MCHC RBC-ENTMCNC: 33.3 GM/DL — SIGNIFICANT CHANGE UP (ref 32–36)
MCHC RBC-ENTMCNC: 33.3 GM/DL — SIGNIFICANT CHANGE UP (ref 32–36)
MCV RBC AUTO: 87.8 FL — SIGNIFICANT CHANGE UP (ref 80–100)
MCV RBC AUTO: 87.8 FL — SIGNIFICANT CHANGE UP (ref 80–100)
MCV RBC AUTO: 89.7 FL — SIGNIFICANT CHANGE UP (ref 80–100)
MCV RBC AUTO: 89.7 FL — SIGNIFICANT CHANGE UP (ref 80–100)
MONOCYTES # BLD AUTO: 0.93 K/UL — HIGH (ref 0–0.9)
MONOCYTES # BLD AUTO: 0.93 K/UL — HIGH (ref 0–0.9)
MONOCYTES NFR BLD AUTO: 7.5 % — SIGNIFICANT CHANGE UP (ref 2–14)
MONOCYTES NFR BLD AUTO: 7.5 % — SIGNIFICANT CHANGE UP (ref 2–14)
NEUTROPHILS # BLD AUTO: 10.12 K/UL — HIGH (ref 1.8–7.4)
NEUTROPHILS # BLD AUTO: 10.12 K/UL — HIGH (ref 1.8–7.4)
NEUTROPHILS NFR BLD AUTO: 82 % — HIGH (ref 43–77)
NEUTROPHILS NFR BLD AUTO: 82 % — HIGH (ref 43–77)
NITRITE UR-MCNC: NEGATIVE — SIGNIFICANT CHANGE UP
NITRITE UR-MCNC: NEGATIVE — SIGNIFICANT CHANGE UP
PH UR: 5 — SIGNIFICANT CHANGE UP (ref 5–8)
PH UR: 5 — SIGNIFICANT CHANGE UP (ref 5–8)
PLATELET # BLD AUTO: 270 K/UL — SIGNIFICANT CHANGE UP (ref 150–400)
PLATELET # BLD AUTO: 270 K/UL — SIGNIFICANT CHANGE UP (ref 150–400)
PLATELET # BLD AUTO: 273 K/UL — SIGNIFICANT CHANGE UP (ref 150–400)
PLATELET # BLD AUTO: 273 K/UL — SIGNIFICANT CHANGE UP (ref 150–400)
POTASSIUM SERPL-MCNC: 2.7 MMOL/L — CRITICAL LOW (ref 3.5–5.3)
POTASSIUM SERPL-MCNC: 2.7 MMOL/L — CRITICAL LOW (ref 3.5–5.3)
POTASSIUM SERPL-MCNC: 3 MMOL/L — LOW (ref 3.5–5.3)
POTASSIUM SERPL-MCNC: 3 MMOL/L — LOW (ref 3.5–5.3)
POTASSIUM SERPL-MCNC: 3.1 MMOL/L — LOW (ref 3.5–5.3)
POTASSIUM SERPL-MCNC: 3.1 MMOL/L — LOW (ref 3.5–5.3)
POTASSIUM SERPL-SCNC: 2.7 MMOL/L — CRITICAL LOW (ref 3.5–5.3)
POTASSIUM SERPL-SCNC: 2.7 MMOL/L — CRITICAL LOW (ref 3.5–5.3)
POTASSIUM SERPL-SCNC: 3 MMOL/L — LOW (ref 3.5–5.3)
POTASSIUM SERPL-SCNC: 3 MMOL/L — LOW (ref 3.5–5.3)
POTASSIUM SERPL-SCNC: 3.1 MMOL/L — LOW (ref 3.5–5.3)
POTASSIUM SERPL-SCNC: 3.1 MMOL/L — LOW (ref 3.5–5.3)
PROT SERPL-MCNC: 6.4 GM/DL — SIGNIFICANT CHANGE UP (ref 6–8.3)
PROT SERPL-MCNC: 6.4 GM/DL — SIGNIFICANT CHANGE UP (ref 6–8.3)
PROT UR-MCNC: NEGATIVE MG/DL — SIGNIFICANT CHANGE UP
PROT UR-MCNC: NEGATIVE MG/DL — SIGNIFICANT CHANGE UP
PROTHROM AB SERPL-ACNC: 26.3 SEC — HIGH (ref 9.5–13)
PROTHROM AB SERPL-ACNC: 26.3 SEC — HIGH (ref 9.5–13)
RAPID RVP RESULT: DETECTED
RAPID RVP RESULT: DETECTED
RBC # BLD: 3.29 M/UL — LOW (ref 4.2–5.8)
RBC # BLD: 3.29 M/UL — LOW (ref 4.2–5.8)
RBC # BLD: 3.45 M/UL — LOW (ref 4.2–5.8)
RBC # BLD: 3.45 M/UL — LOW (ref 4.2–5.8)
RBC # FLD: 14 % — SIGNIFICANT CHANGE UP (ref 10.3–14.5)
RBC # FLD: 14 % — SIGNIFICANT CHANGE UP (ref 10.3–14.5)
RBC # FLD: 14.2 % — SIGNIFICANT CHANGE UP (ref 10.3–14.5)
RBC # FLD: 14.2 % — SIGNIFICANT CHANGE UP (ref 10.3–14.5)
RBC CASTS # UR COMP ASSIST: 2 /HPF — SIGNIFICANT CHANGE UP (ref 0–4)
RBC CASTS # UR COMP ASSIST: 2 /HPF — SIGNIFICANT CHANGE UP (ref 0–4)
SARS-COV-2 RNA SPEC QL NAA+PROBE: DETECTED
SARS-COV-2 RNA SPEC QL NAA+PROBE: DETECTED
SODIUM SERPL-SCNC: 135 MMOL/L — SIGNIFICANT CHANGE UP (ref 135–145)
SODIUM SERPL-SCNC: 136 MMOL/L — SIGNIFICANT CHANGE UP (ref 135–145)
SODIUM SERPL-SCNC: 136 MMOL/L — SIGNIFICANT CHANGE UP (ref 135–145)
SP GR SPEC: 1.01 — SIGNIFICANT CHANGE UP (ref 1–1.03)
SP GR SPEC: 1.01 — SIGNIFICANT CHANGE UP (ref 1–1.03)
SQUAMOUS # UR AUTO: 2 /HPF — SIGNIFICANT CHANGE UP (ref 0–5)
SQUAMOUS # UR AUTO: 2 /HPF — SIGNIFICANT CHANGE UP (ref 0–5)
TROPONIN I, HIGH SENSITIVITY RESULT: 33.79 NG/L — SIGNIFICANT CHANGE UP
TROPONIN I, HIGH SENSITIVITY RESULT: 33.79 NG/L — SIGNIFICANT CHANGE UP
UROBILINOGEN FLD QL: 1 MG/DL — SIGNIFICANT CHANGE UP (ref 0.2–1)
UROBILINOGEN FLD QL: 1 MG/DL — SIGNIFICANT CHANGE UP (ref 0.2–1)
WBC # BLD: 12.35 K/UL — HIGH (ref 3.8–10.5)
WBC # BLD: 12.35 K/UL — HIGH (ref 3.8–10.5)
WBC # BLD: 9 K/UL — SIGNIFICANT CHANGE UP (ref 3.8–10.5)
WBC # BLD: 9 K/UL — SIGNIFICANT CHANGE UP (ref 3.8–10.5)
WBC # FLD AUTO: 12.35 K/UL — HIGH (ref 3.8–10.5)
WBC # FLD AUTO: 12.35 K/UL — HIGH (ref 3.8–10.5)
WBC # FLD AUTO: 9 K/UL — SIGNIFICANT CHANGE UP (ref 3.8–10.5)
WBC # FLD AUTO: 9 K/UL — SIGNIFICANT CHANGE UP (ref 3.8–10.5)
WBC UR QL: 1 /HPF — SIGNIFICANT CHANGE UP (ref 0–5)
WBC UR QL: 1 /HPF — SIGNIFICANT CHANGE UP (ref 0–5)

## 2023-12-09 PROCEDURE — 74176 CT ABD & PELVIS W/O CONTRAST: CPT | Mod: 26,MD

## 2023-12-09 PROCEDURE — 87070 CULTURE OTHR SPECIMN AEROBIC: CPT

## 2023-12-09 PROCEDURE — 97530 THERAPEUTIC ACTIVITIES: CPT | Mod: GP

## 2023-12-09 PROCEDURE — 97116 GAIT TRAINING THERAPY: CPT | Mod: GP

## 2023-12-09 PROCEDURE — 93010 ELECTROCARDIOGRAM REPORT: CPT | Mod: 76

## 2023-12-09 PROCEDURE — 86922 COMPATIBILITY TEST ANTIGLOB: CPT

## 2023-12-09 PROCEDURE — 97164 PT RE-EVAL EST PLAN CARE: CPT | Mod: GP

## 2023-12-09 PROCEDURE — 85025 COMPLETE CBC W/AUTO DIFF WBC: CPT

## 2023-12-09 PROCEDURE — 86900 BLOOD TYPING SEROLOGIC ABO: CPT

## 2023-12-09 PROCEDURE — 99223 1ST HOSP IP/OBS HIGH 75: CPT

## 2023-12-09 PROCEDURE — 83735 ASSAY OF MAGNESIUM: CPT

## 2023-12-09 PROCEDURE — 36430 TRANSFUSION BLD/BLD COMPNT: CPT

## 2023-12-09 PROCEDURE — 87077 CULTURE AEROBIC IDENTIFY: CPT

## 2023-12-09 PROCEDURE — 85610 PROTHROMBIN TIME: CPT

## 2023-12-09 PROCEDURE — 85730 THROMBOPLASTIN TIME PARTIAL: CPT

## 2023-12-09 PROCEDURE — 80048 BASIC METABOLIC PNL TOTAL CA: CPT

## 2023-12-09 PROCEDURE — C1889: CPT

## 2023-12-09 PROCEDURE — 76770 US EXAM ABDO BACK WALL COMP: CPT | Mod: 26

## 2023-12-09 PROCEDURE — 93926 LOWER EXTREMITY STUDY: CPT | Mod: 26,RT

## 2023-12-09 PROCEDURE — 93970 EXTREMITY STUDY: CPT | Mod: 26

## 2023-12-09 PROCEDURE — 85027 COMPLETE CBC AUTOMATED: CPT

## 2023-12-09 PROCEDURE — C1751: CPT

## 2023-12-09 PROCEDURE — 36569 INSJ PICC 5 YR+ W/O IMAGING: CPT

## 2023-12-09 PROCEDURE — 71045 X-RAY EXAM CHEST 1 VIEW: CPT

## 2023-12-09 PROCEDURE — 84100 ASSAY OF PHOSPHORUS: CPT

## 2023-12-09 PROCEDURE — 86901 BLOOD TYPING SEROLOGIC RH(D): CPT

## 2023-12-09 PROCEDURE — 86921 COMPATIBILITY TEST INCUBATE: CPT

## 2023-12-09 PROCEDURE — 75635 CT ANGIO ABDOMINAL ARTERIES: CPT

## 2023-12-09 PROCEDURE — 71045 X-RAY EXAM CHEST 1 VIEW: CPT | Mod: 26

## 2023-12-09 PROCEDURE — 86850 RBC ANTIBODY SCREEN: CPT

## 2023-12-09 PROCEDURE — 36415 COLL VENOUS BLD VENIPUNCTURE: CPT

## 2023-12-09 PROCEDURE — 81001 URINALYSIS AUTO W/SCOPE: CPT

## 2023-12-09 PROCEDURE — 86920 COMPATIBILITY TEST SPIN: CPT

## 2023-12-09 PROCEDURE — 83036 HEMOGLOBIN GLYCOSYLATED A1C: CPT

## 2023-12-09 PROCEDURE — 87186 SC STD MICRODIL/AGAR DIL: CPT

## 2023-12-09 PROCEDURE — C9399: CPT

## 2023-12-09 PROCEDURE — 76770 US EXAM ABDO BACK WALL COMP: CPT

## 2023-12-09 PROCEDURE — 93005 ELECTROCARDIOGRAM TRACING: CPT

## 2023-12-09 PROCEDURE — P9016: CPT

## 2023-12-09 RX ORDER — SODIUM HYPOCHLORITE 0.125 %
1 SOLUTION, NON-ORAL MISCELLANEOUS DAILY
Refills: 0 | Status: DISCONTINUED | OUTPATIENT
Start: 2023-12-09 | End: 2023-12-22

## 2023-12-09 RX ORDER — ACETAMINOPHEN 500 MG
650 TABLET ORAL EVERY 6 HOURS
Refills: 0 | Status: DISCONTINUED | OUTPATIENT
Start: 2023-12-09 | End: 2023-12-22

## 2023-12-09 RX ORDER — LISINOPRIL 2.5 MG/1
40 TABLET ORAL
Refills: 0 | Status: DISCONTINUED | OUTPATIENT
Start: 2023-12-09 | End: 2023-12-09

## 2023-12-09 RX ORDER — CARVEDILOL PHOSPHATE 80 MG/1
3.12 CAPSULE, EXTENDED RELEASE ORAL EVERY 12 HOURS
Refills: 0 | Status: DISCONTINUED | OUTPATIENT
Start: 2023-12-09 | End: 2023-12-11

## 2023-12-09 RX ORDER — POTASSIUM CHLORIDE 20 MEQ
40 PACKET (EA) ORAL EVERY 4 HOURS
Refills: 0 | Status: COMPLETED | OUTPATIENT
Start: 2023-12-09 | End: 2023-12-10

## 2023-12-09 RX ORDER — APIXABAN 2.5 MG/1
5 TABLET, FILM COATED ORAL EVERY 12 HOURS
Refills: 0 | Status: DISCONTINUED | OUTPATIENT
Start: 2023-12-09 | End: 2023-12-16

## 2023-12-09 RX ORDER — VANCOMYCIN HCL 1 G
750 VIAL (EA) INTRAVENOUS EVERY 24 HOURS
Refills: 0 | Status: DISCONTINUED | OUTPATIENT
Start: 2023-12-10 | End: 2023-12-09

## 2023-12-09 RX ORDER — SODIUM CHLORIDE 9 MG/ML
1000 INJECTION INTRAMUSCULAR; INTRAVENOUS; SUBCUTANEOUS
Refills: 0 | Status: DISCONTINUED | OUTPATIENT
Start: 2023-12-09 | End: 2023-12-10

## 2023-12-09 RX ORDER — VANCOMYCIN HCL 1 G
1000 VIAL (EA) INTRAVENOUS ONCE
Refills: 0 | Status: COMPLETED | OUTPATIENT
Start: 2023-12-09 | End: 2023-12-09

## 2023-12-09 RX ORDER — CEFEPIME 1 G/1
1000 INJECTION, POWDER, FOR SOLUTION INTRAMUSCULAR; INTRAVENOUS EVERY 12 HOURS
Refills: 0 | Status: DISCONTINUED | OUTPATIENT
Start: 2023-12-09 | End: 2023-12-09

## 2023-12-09 RX ORDER — ASPIRIN/CALCIUM CARB/MAGNESIUM 324 MG
81 TABLET ORAL DAILY
Refills: 0 | Status: DISCONTINUED | OUTPATIENT
Start: 2023-12-09 | End: 2023-12-22

## 2023-12-09 RX ORDER — POTASSIUM CHLORIDE 20 MEQ
10 PACKET (EA) ORAL ONCE
Refills: 0 | Status: COMPLETED | OUTPATIENT
Start: 2023-12-09 | End: 2023-12-09

## 2023-12-09 RX ORDER — SPIRONOLACTONE 25 MG/1
25 TABLET, FILM COATED ORAL DAILY
Refills: 0 | Status: DISCONTINUED | OUTPATIENT
Start: 2023-12-09 | End: 2023-12-09

## 2023-12-09 RX ORDER — NEBIVOLOL HYDROCHLORIDE 5 MG/1
1 TABLET ORAL
Refills: 0 | DISCHARGE

## 2023-12-09 RX ORDER — ATORVASTATIN CALCIUM 80 MG/1
80 TABLET, FILM COATED ORAL AT BEDTIME
Refills: 0 | Status: DISCONTINUED | OUTPATIENT
Start: 2023-12-09 | End: 2023-12-22

## 2023-12-09 RX ORDER — CEFTRIAXONE 500 MG/1
1000 INJECTION, POWDER, FOR SOLUTION INTRAMUSCULAR; INTRAVENOUS EVERY 24 HOURS
Refills: 0 | Status: DISCONTINUED | OUTPATIENT
Start: 2023-12-09 | End: 2023-12-15

## 2023-12-09 RX ORDER — INFLUENZA VIRUS VACCINE 15; 15; 15; 15 UG/.5ML; UG/.5ML; UG/.5ML; UG/.5ML
0.7 SUSPENSION INTRAMUSCULAR ONCE
Refills: 0 | Status: DISCONTINUED | OUTPATIENT
Start: 2023-12-09 | End: 2023-12-22

## 2023-12-09 RX ORDER — ONDANSETRON 8 MG/1
4 TABLET, FILM COATED ORAL EVERY 8 HOURS
Refills: 0 | Status: DISCONTINUED | OUTPATIENT
Start: 2023-12-09 | End: 2023-12-22

## 2023-12-09 RX ORDER — POTASSIUM CHLORIDE 20 MEQ
40 PACKET (EA) ORAL ONCE
Refills: 0 | Status: COMPLETED | OUTPATIENT
Start: 2023-12-09 | End: 2023-12-09

## 2023-12-09 RX ORDER — LEVOTHYROXINE SODIUM 125 MCG
88 TABLET ORAL DAILY
Refills: 0 | Status: DISCONTINUED | OUTPATIENT
Start: 2023-12-09 | End: 2023-12-11

## 2023-12-09 RX ORDER — LANOLIN ALCOHOL/MO/W.PET/CERES
3 CREAM (GRAM) TOPICAL AT BEDTIME
Refills: 0 | Status: DISCONTINUED | OUTPATIENT
Start: 2023-12-09 | End: 2023-12-22

## 2023-12-09 RX ORDER — PIPERACILLIN AND TAZOBACTAM 4; .5 G/20ML; G/20ML
3.38 INJECTION, POWDER, LYOPHILIZED, FOR SOLUTION INTRAVENOUS ONCE
Refills: 0 | Status: COMPLETED | OUTPATIENT
Start: 2023-12-09 | End: 2023-12-09

## 2023-12-09 RX ORDER — LEVOTHYROXINE SODIUM 125 MCG
1 TABLET ORAL
Refills: 0 | DISCHARGE

## 2023-12-09 RX ADMIN — PIPERACILLIN AND TAZOBACTAM 200 GRAM(S): 4; .5 INJECTION, POWDER, LYOPHILIZED, FOR SOLUTION INTRAVENOUS at 05:53

## 2023-12-09 RX ADMIN — Medication 100 MILLIEQUIVALENT(S): at 10:36

## 2023-12-09 RX ADMIN — SODIUM CHLORIDE 75 MILLILITER(S): 9 INJECTION INTRAMUSCULAR; INTRAVENOUS; SUBCUTANEOUS at 18:29

## 2023-12-09 RX ADMIN — CARVEDILOL PHOSPHATE 3.12 MILLIGRAM(S): 80 CAPSULE, EXTENDED RELEASE ORAL at 22:47

## 2023-12-09 RX ADMIN — APIXABAN 5 MILLIGRAM(S): 2.5 TABLET, FILM COATED ORAL at 22:47

## 2023-12-09 RX ADMIN — APIXABAN 5 MILLIGRAM(S): 2.5 TABLET, FILM COATED ORAL at 09:19

## 2023-12-09 RX ADMIN — SODIUM CHLORIDE 800 MILLILITER(S): 9 INJECTION, SOLUTION INTRAVENOUS at 00:57

## 2023-12-09 RX ADMIN — Medication 1 APPLICATION(S): at 11:53

## 2023-12-09 RX ADMIN — Medication 40 MILLIEQUIVALENT(S): at 22:47

## 2023-12-09 RX ADMIN — Medication 650 MILLIGRAM(S): at 00:55

## 2023-12-09 RX ADMIN — ATORVASTATIN CALCIUM 80 MILLIGRAM(S): 80 TABLET, FILM COATED ORAL at 22:47

## 2023-12-09 RX ADMIN — Medication 3 MILLIGRAM(S): at 22:47

## 2023-12-09 RX ADMIN — Medication 100 MILLIGRAM(S): at 22:47

## 2023-12-09 RX ADMIN — Medication 40 MILLIEQUIVALENT(S): at 10:35

## 2023-12-09 RX ADMIN — CEFEPIME 1000 MILLIGRAM(S): 1 INJECTION, POWDER, FOR SOLUTION INTRAMUSCULAR; INTRAVENOUS at 09:19

## 2023-12-09 RX ADMIN — Medication 88 MICROGRAM(S): at 07:50

## 2023-12-09 RX ADMIN — Medication 250 MILLIGRAM(S): at 06:35

## 2023-12-09 RX ADMIN — Medication 81 MILLIGRAM(S): at 09:19

## 2023-12-09 NOTE — H&P ADULT - HISTORY OF PRESENT ILLNESS
72 y/o male with PMHX of HTN, GERD,  severe PVD s/p multiple stents b/l legs, s/p L  fem/popliteal bypass, chronic left lower ankle ulceration present to ED with worsening right groin redness. Patient reports recent procedure with Dr. Sosa. Was discharge to rehab where his PCP noted increasing fever and redness of right groin. Patient also reports some mild cough and sore throat. No chest pain or sob.     In ED patient found to have right groin cellulitis and + Covid.  74 y/o male with PMHX of HTN, GERD,  severe PVD s/p multiple stents b/l legs, s/p L  fem/popliteal bypass, chronic left lower ankle ulceration present to ED with worsening right groin redness. Patient reports recent procedure with Dr. Sosa. Was discharge to rehab where his PCP noted increasing fever and redness of right groin. Patient also reports some mild cough and sore throat. No chest pain or sob.     In ED patient found to have right groin cellulitis and + Covid.

## 2023-12-09 NOTE — CONSULT NOTE ADULT - ASSESSMENT
74 y/o male with h/o HTN, GERD,  severe PVD s/p multiple stents b/l legs, s/p L  fem/popliteal bypass, chronic left lower ankle ulceration was admitted on 12/9 for worsening right groin redness. Patient reports a recent vascular procedure with Dr. Sosa and a right inguinal vascular access was used. He was discharge to rehab where he was noted with increasing fever and redness of right groin. Patient also reports cough, sore throat and mild SOB. In ER he tested COVID-19 PCR detected. He received vancomycin IV and cefepime.     1. Right inguinal cellulitis s/p recent vascular surgery. Likely postsurgical infection. COVID-19 viral syndrome. ARF. PVD s/p vascular bypass.   -low grade fever  -obtain BC x 2   -start ceftriaxone 1 gm IV qd and doxycycline 100 mg PO q12h  -reason for abx use and side effects reviewed with patient; monitor BMP   -local wound care  -consider vascular surgery evaluation  -old chart reviewed to assess prior cultures  -monitor temps  -f/u CBC  -supportive care  2. Other issues:   -care per medicine

## 2023-12-09 NOTE — CONSULT NOTE ADULT - CONSULT REASON
pt of Dr Lopez, fem fem and fem pop bypas, with cellulitis and small abscess at the site, seen by surgical resident team, to admit to medicine, please follow up with inpatient. Dr. Kelsey Sosa patient with cellulitis and small abscess at the site, seen by surgical resident team, to admit to medicine, please follow up with inpatient.

## 2023-12-09 NOTE — H&P ADULT - NSHPLABSRESULTS_GEN_ALL_CORE
LABS:                          10.1   12.35 )-----------( 270      ( 09 Dec 2023 00:23 )             30.3     12-09    136  |  98  |  39<H>  ----------------------------<  109<H>  3.0<L>   |  30  |  1.77<H>    Ca    8.3<L>      09 Dec 2023 00:23    TPro  6.4  /  Alb  2.4<L>  /  TBili  0.9  /  DBili  x   /  AST  46<H>  /  ALT  28  /  AlkPhos  151<H>  12-09        LIVER FUNCTIONS - ( 09 Dec 2023 00:23 )  Alb: 2.4 g/dL / Pro: 6.4 gm/dL / ALK PHOS: 151 U/L / ALT: 28 U/L / AST: 46 U/L / GGT: x           PT/INR - ( 09 Dec 2023 00:23 )   PT: 26.3 sec;   INR: 2.39 ratio    PTT - ( 09 Dec 2023 00:23 )  PTT:34.4 sec    Urinalysis Basic - ( 09 Dec 2023 00:23 )  Color: x / Appearance: x / SG: x / pH: x  Gluc: 109 mg/dL / Ketone: x  / Bili: x / Urobili: x   Blood: x / Protein: x / Nitrite: x   Leuk Esterase: x / RBC: x / WBC x   Sq Epi: x / Non Sq Epi: x / Bacteria: x    Troponin 33  lactate 1.2    < from: CT Abdomen and Pelvis No Cont (12.09.23 @ 02:26) >    IMPRESSION:  Postsurgical changes of the right groin with surrounding fat stranding.   No definite evidence of drainable fluid collection, although evaluation   is limited due to lack of intravenous contrast.    Prominent right inguinal lymph nodes, likely reactive.    Multiple stents and bypass grafts as above, the patency of which cannot   be evaluated.           US Duplex Arterial Lower Ext Ltd, Right (12.09.23 @ 02:27) >    IMPRESSION:  1.   Right lower extremity arteries are patent. No significant stenosis,  thrombosis, or occlusion.  2.   At the surgical site there is edema and an approximately 4 cm fluid  collection with internal echoes which could be resolving hematoma, seroma   with  blood products, or abscess.    < end of copied text >    : US Duplex Venous Lower Ext Complete, Bilateral (12.09.23 @ 02:23) >      IMPRESSION:  No evidence of deep venous thrombosis in either lower extremity.      < end of copied text >

## 2023-12-09 NOTE — ED ADULT NURSE NOTE - NSFALLCONCLUSION_ED_ALL_ED
----- Message from Georgie Billings MA sent at 2/9/2017 11:07 AM CST -----  NEED REFILL, November NOTE SAID REFILLS WERE FOR 6 MONTHS, BUT PHARMACY SAID SHE IS OUT OF REFILLS.    HYDROXYCHLOROQUIENE 200MG    INDOMETHACIN 25MG    LISINOPRIL 20 MG    SIMVASTATIN 20MG    VENLAFAXINE  75MG    [facility]     Fall Risk

## 2023-12-09 NOTE — ED PROVIDER NOTE - DIFFERENTIAL DIAGNOSIS
Differential Diagnosis Patient with hx of vascular repair, here with cellulitis of the Rt groin, labs, imaging, abx tx. Patient's cellulitis, location and extend concerning for failure with outpatient treatment so IV abx, admission. S/p abx, seen by vascular team, no acute intervention, could not give IV contrast due to allergy noted in chart, medical admission vascular team to follow up with. Also incidental dx of COVID-19. Spoke with Dr. Sommer Amezcua, hospitalist admission appreciated. MS, CLEMENTE.

## 2023-12-09 NOTE — ED PROVIDER NOTE - CLINICAL SUMMARY MEDICAL DECISION MAKING FREE TEXT BOX
Patient with hx of vascular repair, here with cellulitis of the Rt groin, labs, imaging, abx tx. Patient's cellulitis, location and extend concerning for failure with outpatient treatment so IV abx, admission. S/p abx, seen by vascular team, no acute intervention, could not give IV contrast due to allergy noted in chart, medical admission vascular team to follow up with. Also incidental dx of COVID-19. Spoke with Dr. Sommer Amezcua, hospitalist admission appreciated. MS, CLEMENTE.

## 2023-12-09 NOTE — CHART NOTE - NSCHARTNOTEFT_GEN_A_CORE
H+P reviewed from this AM.    Patient with right groin infected s/p recent fem/pop bypass with vascular.    Started on IV antibiotics with improvement of wbc    Monitor closely    Follow up blood cultures    Incidental COVID + but asymptomatic at this time.

## 2023-12-09 NOTE — ED PROVIDER NOTE - PROGRESS NOTE DETAILS
Corby BRAGA: S/p abx, seen by vascular team, no acute intervention, could not give IV contrast due to allergy noted in chart, medical admission vascular team to follow up with. Also incidental dx of COVID-19. Spoke with Dr. Sommer Amezcua, hospitalist admission appreciated. MS, CLEMENTE. Yunior Shipman for attending Dr. Ascencio:  Nurse brought to my attention regarding critical potassium, pt already admitted, potassium is low, will supplement as unable to get hold of hospitalist.

## 2023-12-09 NOTE — PATIENT PROFILE ADULT - FUNCTIONAL ASSESSMENT - BASIC MOBILITY 3.
Pre-procedure checklist reviewed and pre-sedation note complete.    MD aware of maximum contrast dose of 29.6 mL.  3 = A little assistance

## 2023-12-09 NOTE — PATIENT PROFILE ADULT - FALL HARM RISK - HARM RISK INTERVENTIONS
Assistance with ambulation/Assistance OOB with selected safe patient handling equipment/Communicate Risk of Fall with Harm to all staff/Discuss with provider need for PT consult/Monitor gait and stability/Provide patient with walking aids - walker, cane, crutches/Reinforce activity limits and safety measures with patient and family/Tailored Fall Risk Interventions/Visual Cue: Yellow wristband and red socks/Bed in lowest position, wheels locked, appropriate side rails in place/Call bell, personal items and telephone in reach/Instruct patient to call for assistance before getting out of bed or chair/Non-slip footwear when patient is out of bed/[place] to call system/Physically safe environment - no spills, clutter or unnecessary equipment/Purposeful Proactive Rounding/Room/bathroom lighting operational, light cord in reach Assistance with ambulation/Assistance OOB with selected safe patient handling equipment/Communicate Risk of Fall with Harm to all staff/Discuss with provider need for PT consult/Monitor gait and stability/Provide patient with walking aids - walker, cane, crutches/Reinforce activity limits and safety measures with patient and family/Tailored Fall Risk Interventions/Visual Cue: Yellow wristband and red socks/Bed in lowest position, wheels locked, appropriate side rails in place/Call bell, personal items and telephone in reach/Instruct patient to call for assistance before getting out of bed or chair/Non-slip footwear when patient is out of bed/[place] to call system/Physically safe environment - no spills, clutter or unnecessary equipment/Purposeful Proactive Rounding/Room/bathroom lighting operational, light cord in reach

## 2023-12-09 NOTE — PHARMACOTHERAPY INTERVENTION NOTE - COMMENTS
Medication history complete. Medications and allergies reviewed with list provided by Enma Emerson of [facility] and confirmed with .  Medication history complete. Medications and allergies reviewed with list provided by Enma Emerson of [facility] and confirmed with .

## 2023-12-09 NOTE — CONSULT NOTE ADULT - SUBJECTIVE AND OBJECTIVE BOX
Patient is a 73y old  Male who presents with a chief complaint of Wound check     HPI:  72 y/o male with h/o HTN, GERD, severe PVD s/p multiple stents b/l legs, s/p L  fem/popliteal bypass, chronic left lower ankle ulceration was admitted on 12/9 for worsening right groin redness. Patient reports a recent vascular procedure with Dr. Sosa and a right inguinal vascular access was used. He was discharge to rehab where he was noted with increasing fever and redness of right groin. Patient also reports cough, sore throat and mild SOB. In ER he tested COVID-19 PCR detected. He received vancomycin IV and cefepime.     PMH: as above  PSH: as above  Meds: per reconciliation sheet, noted below  MEDICATIONS  (STANDING):  apixaban 5 milliGRAM(s) Oral every 12 hours  aspirin enteric coated 81 milliGRAM(s) Oral daily  atorvastatin 80 milliGRAM(s) Oral at bedtime  carvedilol 3.125 milliGRAM(s) Oral every 12 hours  cefTRIAXone Injectable. 1000 milliGRAM(s) IV Push every 24 hours  Dakins Solution - 1/4 Strength 1 Application(s) Topical daily  doxycycline monohydrate Capsule 100 milliGRAM(s) Oral every 12 hours  levothyroxine 88 MICROGram(s) Oral daily  sodium chloride 0.9%. 1000 milliLiter(s) (75 mL/Hr) IV Continuous <Continuous>    MEDICATIONS  (PRN):  acetaminophen     Tablet .. 650 milliGRAM(s) Oral every 6 hours PRN Temp greater or equal to 38C (100.4F), Mild Pain (1 - 3)  aluminum hydroxide/magnesium hydroxide/simethicone Suspension 30 milliLiter(s) Oral every 4 hours PRN Dyspepsia  melatonin 3 milliGRAM(s) Oral at bedtime PRN Insomnia  ondansetron Injectable 4 milliGRAM(s) IV Push every 8 hours PRN Nausea and/or Vomiting    Allergies    Betadine (Hives; Rash)  IV Contrast (Hives)    Intolerances      Social: no smoking, no alcohol, no illegal drugs; no recent travel, no exposure to TB  FAMILY HISTORY:    no history of premature cardiovascular disease in first degree relatives    ROS: the patient denies fever, no chills, no HA, no seizures, no dizziness, no sore throat, no nasal congestion, no blurry vision, no CP, no palpitations, no SOB, no cough, no abdominal pain, no diarrhea, no N/V, no dysuria, no leg pain, no claudication, has right inguinal rash, no joint aches, no rectal pain or bleeding, no night sweats  All other systems reviewed and are negative    Vital Signs Last 24 Hrs  T(C): 37.1 (09 Dec 2023 15:36), Max: 37.3 (08 Dec 2023 23:10)  T(F): 98.8 (09 Dec 2023 15:36), Max: 99.2 (08 Dec 2023 23:10)  HR: 59 (09 Dec 2023 15:36) (47 - 64)  BP: 120/60 (09 Dec 2023 15:36) (112/52 - 140/47)  BP(mean): 78 (09 Dec 2023 15:36) (68 - 83)  RR: 16 (09 Dec 2023 15:36) (15 - 18)  SpO2: 100% (09 Dec 2023 15:36) (96% - 100%)    Parameters below as of 09 Dec 2023 15:36  Patient On (Oxygen Delivery Method): room air    PE:    Constitutional:  No acute distress  HEENT: NC/AT, EOMI, PERRLA, conjunctivae clear; ears and nose atraumatic; pharynx benign  Neck: supple; thyroid not palpable  Back: no tenderness  Respiratory: respiratory effort normal; clear to auscultation  Cardiovascular: S1S2 regular, no murmurs  Abdomen: soft, not tender, not distended, positive BS; no liver or spleen organomegaly  Genitourinary: no suprapubic tenderness  Lymphatic: no LN palpable  Musculoskeletal: no muscle tenderness, no joint swelling or tenderness  Extremities: no pedal edema  Right inguinal erythema, edema, warmth and tenderness; postsurgical wound partially open, but no discharge   Neurological/ Psychiatric: AxOx3, judgement and insight normal; moving all extremities  Skin: no rashes; no palpable lesions    Labs: all available labs reviewed                        9.6    9.00  )-----------( 273      ( 09 Dec 2023 08:09 )             29.5     12-09    135  |  98  |  37<H>  ----------------------------<  104<H>  2.7<LL>   |  30  |  1.74<H>    Ca    7.9<L>      09 Dec 2023 08:09    TPro  6.4  /  Alb  2.4<L>  /  TBili  0.9  /  DBili  x   /  AST  46<H>  /  ALT  28  /  AlkPhos  151<H>  12-09     LIVER FUNCTIONS - ( 09 Dec 2023 00:23 )  Alb: 2.4 g/dL / Pro: 6.4 gm/dL / ALK PHOS: 151 U/L / ALT: 28 U/L / AST: 46 U/L / GGT: x           Urinalysis (12-09 @ 06:16)  Urine Appearance: Clear  Protein, Urine: Negative mg/dL  Urine Microscopic-Add On (NC) (12-09 @ 06:16)  White Blood Cell - Urine: 1 /HPF  Red Blood Cell - Urine: 2 /HPF            (12-09 @ 01:00)  Detected      Radiology: all available radiological tests reviewed    Advanced directives addressed: full resuscitation

## 2023-12-09 NOTE — ED ADULT NURSE NOTE - OBJECTIVE STATEMENT
pt presents to er c/o wound site swelling and drainage. pt reports he had surgery around a month ago (unsure name) where there was an incision made next to his R. groin. pt reports he noticed an "explosion" this morning of "reddish yellow" drainage from the wound. pt denies pain, itching, or known fever. wound noted to be red and warm to touch with purulent drainage.

## 2023-12-09 NOTE — H&P ADULT - ASSESSMENT
74 y/o male with PMHX of HTN, GERD,  severe PVD s/p multiple stents b/l legs, s/p L  fem/popliteal bypass, chronic left lower ankle ulceration present to ED with worsening right groin redness. Patient reports recent right bypass procedure with Dr. Sosa.  Was discharge to rehab where his PCP noted increasing fever and redness of right groin. Patient also reports some mild cough and sore throat. No chest pain or sob.     # Right Groin Surgical Site Cellulitis  - Admit to medicine  - Cefepime and Vanco  - FU blood cultures  - Vascular consult Dr. Sosa  - ID consult  - Tylenol for fever    # JENIFER on CKD3b  - IVF  - Trend creatinine  - Hold ACE, Aldactone and Torsemide  for now  - Renal bladder US    # Anemia  - Likely from vascular surgery  - Monitor    # Left Ankle chronic ulcer  - Wound care eval    # PAD  - Stable  - Continue Elquis  - Continue Aspirin  - Continue Atoravastatin    # HTN  - Stable  - Bystolic switched to Coreg  - Monitor    # Hypothyroid  - Continue levothyroxine    # DVT prophylaxis  - Eliquis 72 y/o male with PMHX of HTN, GERD,  severe PVD s/p multiple stents b/l legs, s/p L  fem/popliteal bypass, chronic left lower ankle ulceration present to ED with worsening right groin redness. Patient reports recent right bypass procedure with Dr. Sosa.  Was discharge to rehab where his PCP noted increasing fever and redness of right groin. Patient also reports some mild cough and sore throat. No chest pain or sob.     # Right Groin Surgical Site Cellulitis  - Admit to medicine  - Cefepime and Vanco  - FU blood cultures  - Vascular consult Dr. Sosa  - ID consult  - Tylenol for fever    # JENIFER on CKD3b  - IVF  - Trend creatinine  - Hold ACE, Aldactone and Torsemide  for now  - Renal bladder US    # Anemia  - Likely from vascular surgery  - Monitor    # Left Ankle chronic ulcer  - Wound care eval    # PAD  - Stable  - Continue Elquis  - Continue Aspirin  - Continue Atoravastatin    # HTN  - Stable  - Bystolic switched to Coreg  - Monitor    # Hypothyroid  - Continue levothyroxine    # DVT prophylaxis  - Eliquis 72 y/o male with PMHX of HTN, GERD,  severe PVD s/p multiple stents b/l legs, s/p L  fem/popliteal bypass, chronic left lower ankle ulceration present to ED with worsening right groin redness. Patient reports recent right bypass procedure with Dr. Sosa.  Was discharge to rehab where his PCP noted increasing fever and redness of right groin. Patient also reports some mild cough and sore throat. No chest pain or sob.     # Right Groin Surgical Site Cellulitis  - Admit to medicine  - Cefepime and Vanco  - FU blood cultures  - Vascular consult Dr. Sosa  - ID consult  - Tylenol for fever    # JENIFER on CKD3b  - IVF  - Trend creatinine  - Hold ACE, Aldactone and Torsemide  for now  - Renal bladder US    # COVID +  - Not hypoxic and currently asymptomatic  - Monitor    # Anemia  - Likely from vascular surgery  - Monitor    # Left Ankle chronic ulcer  - Wound care eval    # PAD  - Stable  - Continue Elquis  - Continue Aspirin  - Continue Atoravastatin    # HTN  - Stable  - Bystolic switched to Coreg  - Monitor    # Hypothyroid  - Continue levothyroxine    # DVT prophylaxis  - Eliquis

## 2023-12-09 NOTE — ED ADULT NURSE NOTE - NSFALLRISKINTERV_ED_ALL_ED
Assistance OOB with selected safe patient handling equipment if applicable/Assistance with ambulation/Communicate fall risk and risk factors to all staff, patient, and family/Monitor gait and stability/Provide patient with walking aids/Provide visual cue: yellow wristband, yellow gown, etc/Reinforce activity limits and safety measures with patient and family/Call bell, personal items and telephone in reach/Instruct patient to call for assistance before getting out of bed/chair/stretcher/Non-slip footwear applied when patient is off stretcher/[place] to call system/Physically safe environment - no spills, clutter or unnecessary equipment/Purposeful Proactive Rounding/Room/bathroom lighting operational, light cord in reach Assistance OOB with selected safe patient handling equipment if applicable/Assistance with ambulation/Communicate fall risk and risk factors to all staff, patient, and family/Monitor gait and stability/Provide patient with walking aids/Provide visual cue: yellow wristband, yellow gown, etc/Reinforce activity limits and safety measures with patient and family/Call bell, personal items and telephone in reach/Instruct patient to call for assistance before getting out of bed/chair/stretcher/Non-slip footwear applied when patient is off stretcher/[place] to call system/Physically safe environment - no spills, clutter or unnecessary equipment/Purposeful Proactive Rounding/Room/bathroom lighting operational, light cord in reach

## 2023-12-09 NOTE — ED PROVIDER NOTE - OBJECTIVE STATEMENT
73 year old male with PMH of fem fem and fem pop bypas, HTN, HLD, DM, CHF, presents with cc of Rt groin cellulitis and drainage of puss sent in by his rehab physician for eval. Had hx of recent bypass surgery ands stent placement at . Has sensation of fever. No chills. Noted to have a fever in the ER. No abdominal pain. No cp, no shortness of breath or palpitation. No recent trauma.

## 2023-12-09 NOTE — ED PROVIDER NOTE - MUSCULOSKELETAL, MLM
Spine appears normal, range of motion is not limited, no muscle or joint tenderness. 5/5 strength on flexion and extension of all limbs. No nuchal rigidity. No saddle anestehsia.

## 2023-12-09 NOTE — PATIENT PROFILE ADULT - FUNCTIONAL ASSESSMENT - BASIC MOBILITY 6.
2-calculated by average/Not able to assess (calculate score using [facility] averaging method) 2-calculated by average/Not able to assess (calculate score using [facility] averaging method)

## 2023-12-09 NOTE — H&P ADULT - NSHPPHYSICALEXAM_GEN_ALL_CORE
Vital Signs Last 24 Hrs  T(C): 37.3 (08 Dec 2023 23:10), Max: 37.3 (08 Dec 2023 23:10)  T(F): 99.2 (08 Dec 2023 23:10), Max: 99.2 (08 Dec 2023 23:10)  HR: 52 (09 Dec 2023 04:14) (51 - 64)  BP: 112/62 (09 Dec 2023 04:14) (112/62 - 132/62)  BP(mean): 71 (09 Dec 2023 04:14) (71 - 83)  RR: 17 (09 Dec 2023 04:14) (17 - 18)  SpO2: 96% (09 Dec 2023 04:14) (96% - 100%)    GEN: NAD  HEENT:  pupils equal and reactive, EOMI, no oropharyngeal lesions, erythema, exudates, oral thrush  NECK:   supple, no carotid bruits, no palpable lymph nodes, no thyromegaly  CV:  +S1, +S2, regular, no murmurs or rubs  RESP:   lungs clear to auscultation bilaterally, no wheezing, rales, rhonchi, good air entry bilaterally  GI:  abdomen soft, non-tender, non-distended, normal BS, no bruits, no abdominal masses, no palpable masses  EXT:  no clubbing, no cyanosis, no edema, Left medial ankle ulcer. Right groin red, incision with DC.   NEURO:  AAOX3, no focal neurological deficits, follows all commands, able to move extremities spontaneously  SKIN:  no ulcers, lesions or rashes

## 2023-12-09 NOTE — CONSULT NOTE ADULT - ASSESSMENT
73 year old male with extensive vascular surgery hx, most recent being a right femoral cutdown with aortogram and B/L LE angiogram , SFA covered stent placed in SFA, left axillary to anterior tibial bypass with PTFE graft 11/1/23. He has a seroma at the right femoral site, actively draining. Pulses are palpable, LEs are well perfused.    Plan:  Daily Dakin's solution to right femoral site  IV abx, including Vancomycin, renal dose  Plan for fem-fem graft explantation, wound washout, possible debridement on this admission  Obtain cardiac and medical risk stratification  Wound care consult for LLE ulcer  Rest of management as per primary team    Plan discussed with Dr. Mancini   73 year old male with extensive vascular surgery hx, most recent being a right femoral cutdown with aortogram and B/L LE angiogram , SFA covered stent placed in SFA, left axillary to anterior tibial bypass with PTFE graft 11/1/23. He has a seroma at the right femoral site, actively draining. Pulses are palpable, LEs are well perfused.    Plan:  Daily Dakin's solution and packing to right femoral site  Monitor for bleeding from right groin   Monitor cellulitis of skin  IV abx, including Vancomycin, renal dose  Send right groin wound culture and blood cultures  Plan for fem-fem graft partial explantation, CFA repair, wound washout, possible debridement and muscle flap on this admission  Obtain cardiac and medical risk stratification  Wound care consult for left ankle ulcer  Rest of management as per primary team    Plan discussed with Dr. Mancini

## 2023-12-09 NOTE — ED PROVIDER NOTE - NS_ ATTENDINGSCRIBEDETAILS _ED_A_ED_FT
I Eusebio Tavarez MD saw and examined the patient. Scribe documented for me and under my supervision. I have modified the scribe's documentation where necessary to reflect my history, physical exam and other relevant documentations pertinent to the care of the patient.

## 2023-12-09 NOTE — CONSULT NOTE ADULT - SUBJECTIVE AND OBJECTIVE BOX
74 yo M presents with right groin bleeding and purulent drainage. He states that he was at the rehab when the nurses noticed bleeding and pus from the right groin. He reports pain at the site. He denies fever or chills, no trouble with moving the legs.     ROS neg except as above.    PMH:  COPD    Eczema     Essential hypertension     Gastroesophageal reflux disease, esophagitis presence not specified     Hyperlipidemia    Hypothyroidism     Medial meniscus tear left knee    PVD (peripheral vascular disease).     PAST SURGICAL HISTORY:  Femoral popliteal artery thrombus h/o Fem pop bypass 1997  L ax AT with PTFE, right femoral cutdown, SFA stent 11/1/23  failed aortogram 10/31/23    PVD (peripheral vascular disease) s/p peripheral stent insertion bilateral lower extremities x 10.  Allergies: as per chart  Meds: as per chart  Sohx: former smoker and etoh use, no illicit drug use    Physical Exam:  General: AOx3, Well developed, NAD  HEENT: NC/AT, normal pinnae and tragi  Chest: Normal respiratory effort, equal chest rise, left chest wall palpable graft   Heart: RRR  Abdomen: Soft, NTND  Inguinal: b/l inguinal scar. Open skin at distal aspect of scar, draining serous fluid, mild erythema and induration. Nontender. 2+ femoral pulses  Neuro/Psych: No localized deficits. Normal speech, normal tone, normal affect  Skin: Approximately 3x4 cm ulcer just superior to the left medial malleolus with some fibrinous tissue, no bleeding, no surround induration or erythema  Extremities: Warm, well perfused, no edema. Popliteal pulses 2+ b/l, DP and PT 2+ on right, 2+ DP, nonpalpable DP on left    Vitals:  T(C): 36.8 (12-09 @ 06:10), Max: 37.3 (12-08 @ 23:10)  HR: 49 (12-09 @ 09:25) (47 - 64)  BP: 140/47 (12-09 @ 09:25) (112/52 - 140/47)  RR: 15 (12-09 @ 06:10) (15 - 18)  SpO2: 98% (12-09 @ 06:10) (96% - 100%)      12-09 @ 08:09                    9.6  CBC: 9.00>)-------(<273                     29.5                 135 | 98 | 37    CMP:  ----------------------< 104               2.7 | 30 | 1.74                      Ca:7.9  Phos:-  Mg:-               -|      |-        LFTs:  ------|-|-----             -|      |-  12-09 @ 00:23                    10.1  CBC: 12.35>)-------(<270                     30.3                 136 | 98 | 39    CMP:  ----------------------< 109               3.0 | 30 | 1.77                      Ca:8.3  Phos:-  Mg:-               0.9|      |46        LFTs:  ------|151|-----             -|      |-         72 yo M presents with right groin redness and drainage, reportedly purulent. He states that he was at the rehab when the nurses noticed dark red drainage and small amount of pus from the right groin. He reports pain at the site. He denies fever or chills, no trouble with moving the legs.     ROS neg except as above.    PMH:  COPD    Eczema     Essential hypertension     Gastroesophageal reflux disease, esophagitis presence not specified     Hyperlipidemia    Hypothyroidism     Medial meniscus tear left knee    PVD (peripheral vascular disease).     PAST SURGICAL HISTORY:  Femoral popliteal artery thrombus h/o Fem pop bypass 1997  L axillary to AT with PTFE, right femoral cutdown for aortogram and angiogram with placement of, R SFA stent 11/1/23; known thrombosed fem-fem bypass graft in place      PVD (peripheral vascular disease) s/p peripheral stent insertion bilateral lower extremities x 10.  Allergies: as per chart  Meds: as per chart  Sohx: former smoker and etoh use, no illicit drug use    Physical Exam:  General: AOx3, Well developed, NAD  HEENT: NC/AT, normal pinnae and tragi  Chest: Normal respiratory effort, equal chest rise, left chest wall palpable graft   Heart: RRR  Abdomen: Soft, NTND  Inguinal: b/l inguinal scar. Open skin at distal aspect of scar, draining serous fluid, mild erythema and induration. Nontender. 2+ femoral pulses  Neuro/Psych: No localized deficits. Normal speech, normal tone, normal affect  Skin: Approximately 3x4 cm ulcer just superior to the left medial malleolus with some fibrinous tissue, no bleeding, no surround induration or erythema  Extremities: Warm, well perfused, no edema. Popliteal pulses 2+ b/l, DP and PT 2+ on right, 2+ DP, nonpalpable DP on left    Vitals:  T(C): 36.8 (12-09 @ 06:10), Max: 37.3 (12-08 @ 23:10)  HR: 49 (12-09 @ 09:25) (47 - 64)  BP: 140/47 (12-09 @ 09:25) (112/52 - 140/47)  RR: 15 (12-09 @ 06:10) (15 - 18)  SpO2: 98% (12-09 @ 06:10) (96% - 100%)      12-09 @ 08:09                    9.6  CBC: 9.00>)-------(<273                     29.5                 135 | 98 | 37    CMP:  ----------------------< 104               2.7 | 30 | 1.74                      Ca:7.9  Phos:-  Mg:-               -|      |-        LFTs:  ------|-|-----             -|      |-  12-09 @ 00:23                    10.1  CBC: 12.35>)-------(<270                     30.3                 136 | 98 | 39    CMP:  ----------------------< 109               3.0 | 30 | 1.77                      Ca:8.3  Phos:-  Mg:-               0.9|      |46        LFTs:  ------|151|-----             -|      |-         74 yo M presents with right groin redness and drainage, reportedly purulent. He states that he was at the rehab when the nurses noticed dark red drainage and small amount of pus from the right groin. He reports pain at the site. He denies fever or chills, no trouble with moving the legs.     ROS neg except as above.    PMH:  COPD    Eczema     Essential hypertension     Gastroesophageal reflux disease, esophagitis presence not specified     Hyperlipidemia    Hypothyroidism     Medial meniscus tear left knee    PVD (peripheral vascular disease).     PAST SURGICAL HISTORY:  Femoral popliteal artery thrombus h/o Fem pop bypass 1997  L axillary to AT with PTFE, right femoral cutdown for aortogram and angiogram with placement of, R SFA stent 11/1/23; known thrombosed fem-fem bypass graft in place      PVD (peripheral vascular disease) s/p peripheral stent insertion bilateral lower extremities x 10.  Allergies: as per chart  Meds: as per chart  Sohx: former smoker and etoh use, no illicit drug use    Physical Exam:  General: AOx3, Well developed, NAD  HEENT: NC/AT, normal pinnae and tragi  Chest: Normal respiratory effort, equal chest rise, left chest wall palpable graft   Heart: RRR  Abdomen: Soft, NTND  Inguinal: b/l inguinal scar. Open skin at distal aspect of scar, draining serous fluid, mild erythema and induration. Nontender. 2+ femoral pulses  Neuro/Psych: No localized deficits. Normal speech, normal tone, normal affect  Skin: Approximately 3x4 cm ulcer just superior to the left medial malleolus with some fibrinous tissue, no bleeding, no surround induration or erythema  Extremities: Warm, well perfused, no edema. Popliteal pulses 2+ b/l, DP and PT 2+ on right, 2+ DP, nonpalpable DP on left    Vitals:  T(C): 36.8 (12-09 @ 06:10), Max: 37.3 (12-08 @ 23:10)  HR: 49 (12-09 @ 09:25) (47 - 64)  BP: 140/47 (12-09 @ 09:25) (112/52 - 140/47)  RR: 15 (12-09 @ 06:10) (15 - 18)  SpO2: 98% (12-09 @ 06:10) (96% - 100%)      12-09 @ 08:09                    9.6  CBC: 9.00>)-------(<273                     29.5                 135 | 98 | 37    CMP:  ----------------------< 104               2.7 | 30 | 1.74                      Ca:7.9  Phos:-  Mg:-               -|      |-        LFTs:  ------|-|-----             -|      |-  12-09 @ 00:23                    10.1  CBC: 12.35>)-------(<270                     30.3                 136 | 98 | 39    CMP:  ----------------------< 109               3.0 | 30 | 1.77                      Ca:8.3  Phos:-  Mg:-               0.9|      |46        LFTs:  ------|151|-----             -|      |-

## 2023-12-10 LAB
ANION GAP SERPL CALC-SCNC: 4 MMOL/L — LOW (ref 5–17)
ANION GAP SERPL CALC-SCNC: 4 MMOL/L — LOW (ref 5–17)
BUN SERPL-MCNC: 24 MG/DL — HIGH (ref 7–23)
BUN SERPL-MCNC: 24 MG/DL — HIGH (ref 7–23)
CALCIUM SERPL-MCNC: 7.9 MG/DL — LOW (ref 8.5–10.1)
CALCIUM SERPL-MCNC: 7.9 MG/DL — LOW (ref 8.5–10.1)
CHLORIDE SERPL-SCNC: 111 MMOL/L — HIGH (ref 96–108)
CHLORIDE SERPL-SCNC: 111 MMOL/L — HIGH (ref 96–108)
CO2 SERPL-SCNC: 29 MMOL/L — SIGNIFICANT CHANGE UP (ref 22–31)
CO2 SERPL-SCNC: 29 MMOL/L — SIGNIFICANT CHANGE UP (ref 22–31)
CREAT SERPL-MCNC: 1.23 MG/DL — SIGNIFICANT CHANGE UP (ref 0.5–1.3)
CREAT SERPL-MCNC: 1.23 MG/DL — SIGNIFICANT CHANGE UP (ref 0.5–1.3)
EGFR: 63 ML/MIN/1.73M2 — SIGNIFICANT CHANGE UP
EGFR: 63 ML/MIN/1.73M2 — SIGNIFICANT CHANGE UP
GLUCOSE SERPL-MCNC: 99 MG/DL — SIGNIFICANT CHANGE UP (ref 70–99)
GLUCOSE SERPL-MCNC: 99 MG/DL — SIGNIFICANT CHANGE UP (ref 70–99)
HCT VFR BLD CALC: 28.7 % — LOW (ref 39–50)
HCT VFR BLD CALC: 28.7 % — LOW (ref 39–50)
HGB BLD-MCNC: 9.2 G/DL — LOW (ref 13–17)
HGB BLD-MCNC: 9.2 G/DL — LOW (ref 13–17)
MCHC RBC-ENTMCNC: 28.7 PG — SIGNIFICANT CHANGE UP (ref 27–34)
MCHC RBC-ENTMCNC: 28.7 PG — SIGNIFICANT CHANGE UP (ref 27–34)
MCHC RBC-ENTMCNC: 32.1 GM/DL — SIGNIFICANT CHANGE UP (ref 32–36)
MCHC RBC-ENTMCNC: 32.1 GM/DL — SIGNIFICANT CHANGE UP (ref 32–36)
MCV RBC AUTO: 89.4 FL — SIGNIFICANT CHANGE UP (ref 80–100)
MCV RBC AUTO: 89.4 FL — SIGNIFICANT CHANGE UP (ref 80–100)
PLATELET # BLD AUTO: 295 K/UL — SIGNIFICANT CHANGE UP (ref 150–400)
PLATELET # BLD AUTO: 295 K/UL — SIGNIFICANT CHANGE UP (ref 150–400)
POTASSIUM SERPL-MCNC: 4.5 MMOL/L — SIGNIFICANT CHANGE UP (ref 3.5–5.3)
POTASSIUM SERPL-MCNC: 4.5 MMOL/L — SIGNIFICANT CHANGE UP (ref 3.5–5.3)
POTASSIUM SERPL-SCNC: 4.5 MMOL/L — SIGNIFICANT CHANGE UP (ref 3.5–5.3)
POTASSIUM SERPL-SCNC: 4.5 MMOL/L — SIGNIFICANT CHANGE UP (ref 3.5–5.3)
RBC # BLD: 3.21 M/UL — LOW (ref 4.2–5.8)
RBC # BLD: 3.21 M/UL — LOW (ref 4.2–5.8)
RBC # FLD: 14.2 % — SIGNIFICANT CHANGE UP (ref 10.3–14.5)
RBC # FLD: 14.2 % — SIGNIFICANT CHANGE UP (ref 10.3–14.5)
SODIUM SERPL-SCNC: 144 MMOL/L — SIGNIFICANT CHANGE UP (ref 135–145)
SODIUM SERPL-SCNC: 144 MMOL/L — SIGNIFICANT CHANGE UP (ref 135–145)
WBC # BLD: 6.05 K/UL — SIGNIFICANT CHANGE UP (ref 3.8–10.5)
WBC # BLD: 6.05 K/UL — SIGNIFICANT CHANGE UP (ref 3.8–10.5)
WBC # FLD AUTO: 6.05 K/UL — SIGNIFICANT CHANGE UP (ref 3.8–10.5)
WBC # FLD AUTO: 6.05 K/UL — SIGNIFICANT CHANGE UP (ref 3.8–10.5)

## 2023-12-10 PROCEDURE — 99232 SBSQ HOSP IP/OBS MODERATE 35: CPT

## 2023-12-10 RX ADMIN — CEFTRIAXONE 1000 MILLIGRAM(S): 500 INJECTION, POWDER, FOR SOLUTION INTRAMUSCULAR; INTRAVENOUS at 16:37

## 2023-12-10 RX ADMIN — APIXABAN 5 MILLIGRAM(S): 2.5 TABLET, FILM COATED ORAL at 10:35

## 2023-12-10 RX ADMIN — Medication 88 MICROGRAM(S): at 05:52

## 2023-12-10 RX ADMIN — Medication 3 MILLIGRAM(S): at 22:42

## 2023-12-10 RX ADMIN — Medication 100 MILLIGRAM(S): at 10:36

## 2023-12-10 RX ADMIN — SODIUM CHLORIDE 75 MILLILITER(S): 9 INJECTION INTRAMUSCULAR; INTRAVENOUS; SUBCUTANEOUS at 05:52

## 2023-12-10 RX ADMIN — Medication 40 MILLIEQUIVALENT(S): at 05:52

## 2023-12-10 RX ADMIN — Medication 81 MILLIGRAM(S): at 10:35

## 2023-12-10 RX ADMIN — APIXABAN 5 MILLIGRAM(S): 2.5 TABLET, FILM COATED ORAL at 22:42

## 2023-12-10 RX ADMIN — ATORVASTATIN CALCIUM 80 MILLIGRAM(S): 80 TABLET, FILM COATED ORAL at 22:42

## 2023-12-10 RX ADMIN — Medication 1 APPLICATION(S): at 09:30

## 2023-12-10 RX ADMIN — CARVEDILOL PHOSPHATE 3.12 MILLIGRAM(S): 80 CAPSULE, EXTENDED RELEASE ORAL at 22:42

## 2023-12-10 RX ADMIN — CARVEDILOL PHOSPHATE 3.12 MILLIGRAM(S): 80 CAPSULE, EXTENDED RELEASE ORAL at 10:35

## 2023-12-10 RX ADMIN — Medication 100 MILLIGRAM(S): at 22:42

## 2023-12-10 NOTE — PROGRESS NOTE ADULT - ASSESSMENT
72 y/o male with h/o HTN, GERD,  severe PVD s/p multiple stents b/l legs, s/p L  fem/popliteal bypass, chronic left lower ankle ulceration was admitted on 12/9 for worsening right groin redness. Patient reports a recent vascular procedure with Dr. Sosa and a right inguinal vascular access was used. He was discharge to rehab where he was noted with increasing fever and redness of right groin. Patient also reports cough, sore throat and mild SOB. In ER he tested COVID-19 PCR detected. He received vancomycin IV and cefepime.     1. Right inguinal cellulitis s/p recent vascular surgery. Likely postsurgical infection. COVID-19 viral syndrome. ARF. PVD s/p vascular bypass.   -low grade fever  -BC x 2 noted  -on ceftriaxone 1 gm IV qd and doxycycline 100 mg PO q12h # 2  -tolerating abx well so far; no side effects noted  -local wound care  -consider vascular surgery evaluation  -continue abx coverage  -f/u cultures  -monitor temps  -f/u CBC  -supportive care  2. Other issues:   -care per medicine

## 2023-12-10 NOTE — PROGRESS NOTE ADULT - SUBJECTIVE AND OBJECTIVE BOX
Sacha Ceja MD  [facility]  Contact via Teams or text/call at [phone]    Patient is a 73y old  Male who presents with a chief complaint of Wound check (09 Dec 2023 16:37)    Feels okay.  No acute issues.  Denies chest pain, sob, nausea, vomiting.     Patient seen and examined at bedside. No overnight events reported.     ALLERGIES:  Betadine (Hives; Rash)  IV Contrast (Hives)    MEDICATIONS  (STANDING):  apixaban 5 milliGRAM(s) Oral every 12 hours  aspirin enteric coated 81 milliGRAM(s) Oral daily  atorvastatin 80 milliGRAM(s) Oral at bedtime  carvedilol 3.125 milliGRAM(s) Oral every 12 hours  cefTRIAXone Injectable. 1000 milliGRAM(s) IV Push every 24 hours  Dakins Solution - 1/4 Strength 1 Application(s) Topical daily  doxycycline monohydrate Capsule 100 milliGRAM(s) Oral every 12 hours  influenza  Vaccine (HIGH DOSE) 0.7 milliLiter(s) IntraMuscular once  levothyroxine 88 MICROGram(s) Oral daily  sodium chloride 0.9%. 1000 milliLiter(s) (75 mL/Hr) IV Continuous <Continuous>    MEDICATIONS  (PRN):  acetaminophen     Tablet .. 650 milliGRAM(s) Oral every 6 hours PRN Temp greater or equal to 38C (100.4F), Mild Pain (1 - 3)  aluminum hydroxide/magnesium hydroxide/simethicone Suspension 30 milliLiter(s) Oral every 4 hours PRN Dyspepsia  melatonin 3 milliGRAM(s) Oral at bedtime PRN Insomnia  ondansetron Injectable 4 milliGRAM(s) IV Push every 8 hours PRN Nausea and/or Vomiting    Vital Signs Last 24 Hrs  T(F): 98.5 (10 Dec 2023 07:53), Max: 98.8 (09 Dec 2023 15:36)  HR: 63 (10 Dec 2023 10:34) (57 - 72)  BP: 121/71 (10 Dec 2023 10:34) (120/57 - 136/43)  RR: 18 (10 Dec 2023 10:34) (16 - 18)  SpO2: 99% (10 Dec 2023 10:34) (96% - 100%)  I&O's Summary    09 Dec 2023 07:01  -  10 Dec 2023 07:00  --------------------------------------------------------  IN: 721 mL / OUT: 0 mL / NET: 721 mL      PHYSICAL EXAM:  General: NAD, A/O x 3  ENT: No gross hearing impairment, Moist mucous membranes, no thrush  Neck: Supple, No JVD  Lungs: Clear to auscultation bilaterally, good air entry, non-labored breathing  Cardio: RRR, S1/S2, No murmur  Abdomen: Soft, Nontender, Nondistended; Bowel sounds present  Extremities: No calf tenderness, No cyanosis, No pitting edema  Psych: Appropriate mood and affect    LABS:             9.2    6.05  )-----------( 295      ( 10 Dec 2023 10:29 )             28.7     12-10  144  |  111  |  24  ----------------------------<  99  4.5   |  29  |  1.23    Ca    7.9      10 Dec 2023 10:29    TPro  6.4  /  Alb  2.4  /  TBili  0.9  /  DBili  x   /  AST  46  /  ALT  28  /  AlkPhos  151  12-09    PT/INR - ( 09 Dec 2023 00:23 )   PT: 26.3 sec;   INR: 2.39 ratio         PTT - ( 09 Dec 2023 00:23 )  PTT:34.4 sec  Lactate, Blood: 1.2 mmol/L (12-09 @ 00:23)      CARDIAC MARKERS ( 09 Dec 2023 00:23 )  x     / 33.79 ng/L / x     / x     / x        Urinalysis Basic - ( 10 Dec 2023 10:29 )    Color: x / Appearance: x / SG: x / pH: x  Gluc: 99 mg/dL / Ketone: x  / Bili: x / Urobili: x   Blood: x / Protein: x / Nitrite: x   Leuk Esterase: x / RBC: x / WBC x   Sq Epi: x / Non Sq Epi: x / Bacteria: x    Culture - Blood (collected 09 Dec 2023 01:08)  Source: .Blood None  Preliminary Report (10 Dec 2023 07:02):    No growth at 24 hours    Culture - Blood (collected 09 Dec 2023 00:23)  Source: .Blood None  Preliminary Report (10 Dec 2023 07:02):    No growth at 24 hours        RADIOLOGY & ADDITIONAL TESTS:    Care Discussed with Consultants/Other Providers:    Sacha Ceja MD  [facility]  Contact via Teams or text/call at [phone]    Patient is a 73y old  Male who presents with a chief complaint of Wound check (09 Dec 2023 16:37)    Feels okay.  No acute issues.  Denies chest pain, sob, nausea, vomiting.     Patient seen and examined at bedside. No overnight events reported.     ALLERGIES:  Betadine (Hives; Rash)  IV Contrast (Hives)    MEDICATIONS  (STANDING):  apixaban 5 milliGRAM(s) Oral every 12 hours  aspirin enteric coated 81 milliGRAM(s) Oral daily  atorvastatin 80 milliGRAM(s) Oral at bedtime  carvedilol 3.125 milliGRAM(s) Oral every 12 hours  cefTRIAXone Injectable. 1000 milliGRAM(s) IV Push every 24 hours  Dakins Solution - 1/4 Strength 1 Application(s) Topical daily  doxycycline monohydrate Capsule 100 milliGRAM(s) Oral every 12 hours  influenza  Vaccine (HIGH DOSE) 0.7 milliLiter(s) IntraMuscular once  levothyroxine 88 MICROGram(s) Oral daily  sodium chloride 0.9%. 1000 milliLiter(s) (75 mL/Hr) IV Continuous <Continuous>    MEDICATIONS  (PRN):  acetaminophen     Tablet .. 650 milliGRAM(s) Oral every 6 hours PRN Temp greater or equal to 38C (100.4F), Mild Pain (1 - 3)  aluminum hydroxide/magnesium hydroxide/simethicone Suspension 30 milliLiter(s) Oral every 4 hours PRN Dyspepsia  melatonin 3 milliGRAM(s) Oral at bedtime PRN Insomnia  ondansetron Injectable 4 milliGRAM(s) IV Push every 8 hours PRN Nausea and/or Vomiting    Vital Signs Last 24 Hrs  T(F): 98.5 (10 Dec 2023 07:53), Max: 98.8 (09 Dec 2023 15:36)  HR: 63 (10 Dec 2023 10:34) (57 - 72)  BP: 121/71 (10 Dec 2023 10:34) (120/57 - 136/43)  RR: 18 (10 Dec 2023 10:34) (16 - 18)  SpO2: 99% (10 Dec 2023 10:34) (96% - 100%)  I&O's Summary    09 Dec 2023 07:01  -  10 Dec 2023 07:00  --------------------------------------------------------  IN: 721 mL / OUT: 0 mL / NET: 721 mL      PHYSICAL EXAM:  General: NAD, A/O x 3  ENT: No gross hearing impairment, Moist mucous membranes, no thrush  Neck: Supple, No JVD  Lungs: Clear to auscultation bilaterally, good air entry, non-labored breathing  Cardio: RRR, S1/S2, No murmur  Abdomen: Soft, Nontender, Nondistended; Bowel sounds present  Extremities: No calf tenderness, No cyanosis, No pitting edema  Psych: Appropriate mood and affect    LABS:             9.2    6.05  )-----------( 295      ( 10 Dec 2023 10:29 )             28.7     12-10  144  |  111  |  24  ----------------------------<  99  4.5   |  29  |  1.23    Ca    7.9      10 Dec 2023 10:29    TPro  6.4  /  Alb  2.4  /  TBili  0.9  /  DBili  x   /  AST  46  /  ALT  28  /  AlkPhos  151  12-09    PT/INR - ( 09 Dec 2023 00:23 )   PT: 26.3 sec;   INR: 2.39 ratio         PTT - ( 09 Dec 2023 00:23 )  PTT:34.4 sec  Lactate, Blood: 1.2 mmol/L (12-09 @ 00:23)      CARDIAC MARKERS ( 09 Dec 2023 00:23 )  x     / 33.79 ng/L / x     / x     / x        Urinalysis Basic - ( 10 Dec 2023 10:29 )    Color: x / Appearance: x / SG: x / pH: x  Gluc: 99 mg/dL / Ketone: x  / Bili: x / Urobili: x   Blood: x / Protein: x / Nitrite: x   Leuk Esterase: x / RBC: x / WBC x   Sq Epi: x / Non Sq Epi: x / Bacteria: x    Culture - Blood (collected 09 Dec 2023 01:08)  Source: .Blood None  Preliminary Report (10 Dec 2023 07:02):    No growth at 24 hours    Culture - Blood (collected 09 Dec 2023 00:23)  Source: .Blood None  Preliminary Report (10 Dec 2023 07:02):    No growth at 24 hours        RADIOLOGY & ADDITIONAL TESTS:    Care Discussed with Consultants/Other Providers:

## 2023-12-10 NOTE — PROGRESS NOTE ADULT - ASSESSMENT
72 y/o male with PMHX of HTN, GERD,  severe PVD s/p multiple stents b/l legs, s/p L  fem/popliteal bypass, chronic left lower ankle ulceration present to ED with worsening right groin redness. Patient reports recent right bypass procedure with Dr. Sosa.  Was discharge to rehab where his PCP noted increasing fever and redness of right groin. Patient also reports some mild cough and sore throat. No chest pain or sob.     Right Groin Surgical Site Cellulitis  - ID following  - cont doxycycline/ceftriaxone  - FU blood culture  - vascular Dr. Sosa following     JENIFER on CKD3b  Hypokalemia -  resolved  - improved to 1.28, stop IV fluids  - trend bmp   - potassium WNL now     COVID +  - Not hypoxic and currently asymptomatic  - Monitor    Left Ankle chronic ulcer  - Wound care eval    PAD  - Stable  - Continue Eliquis  - Continue Aspirin  - Continue Atorvastatin    HTN  - Stable  - Bystolic switched to Coreg  - Monitor    Hypothyroid  - Continue levothyroxine    DVT prophylaxis  - Eliquis 74 y/o male with PMHX of HTN, GERD,  severe PVD s/p multiple stents b/l legs, s/p L  fem/popliteal bypass, chronic left lower ankle ulceration present to ED with worsening right groin redness. Patient reports recent right bypass procedure with Dr. Sosa.  Was discharge to rehab where his PCP noted increasing fever and redness of right groin. Patient also reports some mild cough and sore throat. No chest pain or sob.     Right Groin Surgical Site Cellulitis  - ID following  - cont doxycycline/ceftriaxone  - FU blood culture  - vascular Dr. Sosa following     JENIFER on CKD3b  Hypokalemia -  resolved  - improved to 1.28, stop IV fluids  - trend bmp   - potassium WNL now     COVID +  - Not hypoxic and currently asymptomatic  - Monitor    Left Ankle chronic ulcer  - Wound care eval    PAD  - Stable  - Continue Eliquis  - Continue Aspirin  - Continue Atorvastatin    HTN  - Stable  - Bystolic switched to Coreg  - Monitor    Hypothyroid  - Continue levothyroxine    DVT prophylaxis  - Eliquis

## 2023-12-10 NOTE — PROGRESS NOTE ADULT - SUBJECTIVE AND OBJECTIVE BOX
Date of service: 12-10-23 @ 15:24    Lying in bed in NAD  Has right inguinal open wound with scant discharge - serous  Has local erythema     ROS: no fever or chills; denies dizziness, no HA, no SOB or cough, no abdominal pain, no diarrhea or constipation; no dysuria, no legs pain, no rashes    MEDICATIONS  (STANDING):  apixaban 5 milliGRAM(s) Oral every 12 hours  aspirin enteric coated 81 milliGRAM(s) Oral daily  atorvastatin 80 milliGRAM(s) Oral at bedtime  carvedilol 3.125 milliGRAM(s) Oral every 12 hours  cefTRIAXone Injectable. 1000 milliGRAM(s) IV Push every 24 hours  Dakins Solution - 1/4 Strength 1 Application(s) Topical daily  doxycycline monohydrate Capsule 100 milliGRAM(s) Oral every 12 hours  influenza  Vaccine (HIGH DOSE) 0.7 milliLiter(s) IntraMuscular once  levothyroxine 88 MICROGram(s) Oral daily  sodium chloride 0.9%. 1000 milliLiter(s) (75 mL/Hr) IV Continuous <Continuous>    Vital Signs Last 24 Hrs  T(C): 36.9 (10 Dec 2023 07:53), Max: 37.1 (09 Dec 2023 15:36)  T(F): 98.5 (10 Dec 2023 07:53), Max: 98.8 (09 Dec 2023 15:36)  HR: 63 (10 Dec 2023 10:34) (57 - 72)  BP: 121/71 (10 Dec 2023 10:34) (120/57 - 136/43)  BP(mean): 80 (09 Dec 2023 22:44) (70 - 80)  RR: 18 (10 Dec 2023 10:34) (16 - 18)  SpO2: 99% (10 Dec 2023 10:34) (96% - 100%)    Parameters below as of 10 Dec 2023 10:34  Patient On (Oxygen Delivery Method): room air     Physical exam:    Constitutional:  No acute distress  HEENT: NC/AT, EOMI, PERRLA, conjunctivae clear; ears and nose atraumatic; pharynx benign  Neck: supple; thyroid not palpable  Back: no tenderness  Respiratory: respiratory effort normal; clear to auscultation  Cardiovascular: S1S2 regular, no murmurs  Abdomen: soft, not tender, not distended, positive BS; no liver or spleen organomegaly  Genitourinary: no suprapubic tenderness  Lymphatic: no LN palpable  Musculoskeletal: no muscle tenderness, no joint swelling or tenderness  Extremities: no pedal edema  Right inguinal erythema, edema, warmth and tenderness; postsurgical wound partially open, but no discharge - erythema is improving  Neurological/ Psychiatric: AxOx3, judgement and insight normal; moving all extremities  Skin: no rashes; no palpable lesions    Labs: all available labs reviewed                        9.6    9.00  )-----------( 273      ( 09 Dec 2023 08:09 )             29.5     12-09    135  |  98  |  37<H>  ----------------------------<  104<H>  2.7<LL>   |  30  |  1.74<H>    Ca    7.9<L>      09 Dec 2023 08:09    TPro  6.4  /  Alb  2.4<L>  /  TBili  0.9  /  DBili  x   /  AST  46<H>  /  ALT  28  /  AlkPhos  151<H>  12-09     LIVER FUNCTIONS - ( 09 Dec 2023 00:23 )  Alb: 2.4 g/dL / Pro: 6.4 gm/dL / ALK PHOS: 151 U/L / ALT: 28 U/L / AST: 46 U/L / GGT: x           Urinalysis (12-09 @ 06:16)  Urine Appearance: Clear  Protein, Urine: Negative mg/dL  Urine Microscopic-Add On (NC) (12-09 @ 06:16)  White Blood Cell - Urine: 1 /HPF  Red Blood Cell - Urine: 2 /HPF    COVID (12-09 @ 01:00)  Detected    Culture - Blood (collected 09 Dec 2023 01:08)  Source: .Blood None  Preliminary Report (10 Dec 2023 07:02):    No growth at 24 hours    Culture - Blood (collected 09 Dec 2023 00:23)  Source: .Blood None  Preliminary Report (10 Dec 2023 07:02):    No growth at 24 hours    Radiology: all available radiological tests reviewed  < from: Xray Chest 1 View-PORTABLE IMMEDIATE (12.09.23 @ 00:02) >  IMPRESSION: No evidence of acute cardiopulmonary disease.    < end of copied text >  < from: CT Abdomen and Pelvis No Cont (12.09.23 @ 02:26) >  Postsurgical changes of the right groin with surrounding fat stranding.   No definite evidence of drainable fluid collection, although evaluation   is limited due to lack of intravenous contrast.  Prominent right inguinal lymph nodes, likely reactive.  Multiple stents and bypass grafts as above, the patency of which cannot be evaluated.  < end of copied text >    Advanced directives addressed: full resuscitation

## 2023-12-11 LAB
ANION GAP SERPL CALC-SCNC: 3 MMOL/L — LOW (ref 5–17)
ANION GAP SERPL CALC-SCNC: 3 MMOL/L — LOW (ref 5–17)
BUN SERPL-MCNC: 15 MG/DL — SIGNIFICANT CHANGE UP (ref 7–23)
BUN SERPL-MCNC: 15 MG/DL — SIGNIFICANT CHANGE UP (ref 7–23)
CALCIUM SERPL-MCNC: 8 MG/DL — LOW (ref 8.5–10.1)
CALCIUM SERPL-MCNC: 8 MG/DL — LOW (ref 8.5–10.1)
CHLORIDE SERPL-SCNC: 111 MMOL/L — HIGH (ref 96–108)
CHLORIDE SERPL-SCNC: 111 MMOL/L — HIGH (ref 96–108)
CO2 SERPL-SCNC: 29 MMOL/L — SIGNIFICANT CHANGE UP (ref 22–31)
CO2 SERPL-SCNC: 29 MMOL/L — SIGNIFICANT CHANGE UP (ref 22–31)
CREAT SERPL-MCNC: 1.15 MG/DL — SIGNIFICANT CHANGE UP (ref 0.5–1.3)
CREAT SERPL-MCNC: 1.15 MG/DL — SIGNIFICANT CHANGE UP (ref 0.5–1.3)
EGFR: 68 ML/MIN/1.73M2 — SIGNIFICANT CHANGE UP
EGFR: 68 ML/MIN/1.73M2 — SIGNIFICANT CHANGE UP
GLUCOSE SERPL-MCNC: 96 MG/DL — SIGNIFICANT CHANGE UP (ref 70–99)
GLUCOSE SERPL-MCNC: 96 MG/DL — SIGNIFICANT CHANGE UP (ref 70–99)
GRAM STN FLD: ABNORMAL
GRAM STN FLD: ABNORMAL
GRAM STN FLD: SIGNIFICANT CHANGE UP
GRAM STN FLD: SIGNIFICANT CHANGE UP
HCT VFR BLD CALC: 26.8 % — LOW (ref 39–50)
HCT VFR BLD CALC: 26.8 % — LOW (ref 39–50)
HGB BLD-MCNC: 8.6 G/DL — LOW (ref 13–17)
HGB BLD-MCNC: 8.6 G/DL — LOW (ref 13–17)
MCHC RBC-ENTMCNC: 29.2 PG — SIGNIFICANT CHANGE UP (ref 27–34)
MCHC RBC-ENTMCNC: 29.2 PG — SIGNIFICANT CHANGE UP (ref 27–34)
MCHC RBC-ENTMCNC: 32.1 GM/DL — SIGNIFICANT CHANGE UP (ref 32–36)
MCHC RBC-ENTMCNC: 32.1 GM/DL — SIGNIFICANT CHANGE UP (ref 32–36)
MCV RBC AUTO: 90.8 FL — SIGNIFICANT CHANGE UP (ref 80–100)
MCV RBC AUTO: 90.8 FL — SIGNIFICANT CHANGE UP (ref 80–100)
PLATELET # BLD AUTO: 310 K/UL — SIGNIFICANT CHANGE UP (ref 150–400)
PLATELET # BLD AUTO: 310 K/UL — SIGNIFICANT CHANGE UP (ref 150–400)
POTASSIUM SERPL-MCNC: 4.4 MMOL/L — SIGNIFICANT CHANGE UP (ref 3.5–5.3)
POTASSIUM SERPL-MCNC: 4.4 MMOL/L — SIGNIFICANT CHANGE UP (ref 3.5–5.3)
POTASSIUM SERPL-SCNC: 4.4 MMOL/L — SIGNIFICANT CHANGE UP (ref 3.5–5.3)
POTASSIUM SERPL-SCNC: 4.4 MMOL/L — SIGNIFICANT CHANGE UP (ref 3.5–5.3)
RBC # BLD: 2.95 M/UL — LOW (ref 4.2–5.8)
RBC # BLD: 2.95 M/UL — LOW (ref 4.2–5.8)
RBC # FLD: 14.4 % — SIGNIFICANT CHANGE UP (ref 10.3–14.5)
RBC # FLD: 14.4 % — SIGNIFICANT CHANGE UP (ref 10.3–14.5)
SODIUM SERPL-SCNC: 143 MMOL/L — SIGNIFICANT CHANGE UP (ref 135–145)
SODIUM SERPL-SCNC: 143 MMOL/L — SIGNIFICANT CHANGE UP (ref 135–145)
SPECIMEN SOURCE: SIGNIFICANT CHANGE UP
SPECIMEN SOURCE: SIGNIFICANT CHANGE UP
WBC # BLD: 5.29 K/UL — SIGNIFICANT CHANGE UP (ref 3.8–10.5)
WBC # BLD: 5.29 K/UL — SIGNIFICANT CHANGE UP (ref 3.8–10.5)
WBC # FLD AUTO: 5.29 K/UL — SIGNIFICANT CHANGE UP (ref 3.8–10.5)
WBC # FLD AUTO: 5.29 K/UL — SIGNIFICANT CHANGE UP (ref 3.8–10.5)

## 2023-12-11 PROCEDURE — 99232 SBSQ HOSP IP/OBS MODERATE 35: CPT

## 2023-12-11 PROCEDURE — 99024 POSTOP FOLLOW-UP VISIT: CPT

## 2023-12-11 RX ORDER — FERROUS SULFATE 325(65) MG
325 TABLET ORAL DAILY
Refills: 0 | Status: DISCONTINUED | OUTPATIENT
Start: 2023-12-11 | End: 2023-12-22

## 2023-12-11 RX ORDER — LEVOTHYROXINE SODIUM 125 MCG
100 TABLET ORAL DAILY
Refills: 0 | Status: DISCONTINUED | OUTPATIENT
Start: 2023-12-12 | End: 2023-12-22

## 2023-12-11 RX ORDER — METOPROLOL TARTRATE 50 MG
25 TABLET ORAL DAILY
Refills: 0 | Status: DISCONTINUED | OUTPATIENT
Start: 2023-12-11 | End: 2023-12-22

## 2023-12-11 RX ORDER — COLLAGENASE CLOSTRIDIUM HIST. 250 UNIT/G
1 OINTMENT (GRAM) TOPICAL DAILY
Refills: 0 | Status: COMPLETED | OUTPATIENT
Start: 2023-12-11 | End: 2023-12-14

## 2023-12-11 RX ADMIN — Medication 1 TABLET(S): at 16:33

## 2023-12-11 RX ADMIN — Medication 81 MILLIGRAM(S): at 11:06

## 2023-12-11 RX ADMIN — CARVEDILOL PHOSPHATE 3.12 MILLIGRAM(S): 80 CAPSULE, EXTENDED RELEASE ORAL at 11:06

## 2023-12-11 RX ADMIN — Medication 100 MILLIGRAM(S): at 23:07

## 2023-12-11 RX ADMIN — CEFTRIAXONE 1000 MILLIGRAM(S): 500 INJECTION, POWDER, FOR SOLUTION INTRAMUSCULAR; INTRAVENOUS at 16:33

## 2023-12-11 RX ADMIN — Medication 1 APPLICATION(S): at 16:34

## 2023-12-11 RX ADMIN — APIXABAN 5 MILLIGRAM(S): 2.5 TABLET, FILM COATED ORAL at 11:06

## 2023-12-11 RX ADMIN — Medication 100 MILLIGRAM(S): at 11:06

## 2023-12-11 RX ADMIN — Medication 88 MICROGRAM(S): at 05:35

## 2023-12-11 RX ADMIN — Medication 325 MILLIGRAM(S): at 16:33

## 2023-12-11 RX ADMIN — Medication 1 APPLICATION(S): at 10:00

## 2023-12-11 RX ADMIN — APIXABAN 5 MILLIGRAM(S): 2.5 TABLET, FILM COATED ORAL at 23:07

## 2023-12-11 RX ADMIN — ATORVASTATIN CALCIUM 80 MILLIGRAM(S): 80 TABLET, FILM COATED ORAL at 23:07

## 2023-12-11 NOTE — PROGRESS NOTE ADULT - ASSESSMENT
73 year old male with extensive vascular surgery hx, most recent being a right femoral cutdown with aortogram and B/L LE angiogram , SFA covered stent placed in SFA, left axillary to anterior tibial bypass with PTFE graft 11/1/23. He has a seroma at the right femoral site, actively draining. Pulses are palpable, LEs are well perfused. No leukocytosis, HH down slightly.     Plan:  Daily Dakin's solution and packing to right femoral site  Monitor for bleeding from right groin   Monitor cellulitis of skin  IV abx  Groin wound culture culture pending, blood cultures negative after 48 hrs  Plan for fem-fem graft partial explantation, CFA repair, wound washout, possible debridement and muscle flap on this admission  Continue Eliquis, ASA  Obtain cardiac and medical risk stratification  Wound care for left ankle ulcer  Rest of management as per primary team    Plan discussed with Dr. Sosa.

## 2023-12-11 NOTE — PROGRESS NOTE ADULT - SUBJECTIVE AND OBJECTIVE BOX
Pt. seen and examined at bedside this morning. Patient reports right groin pain, better. He denies fever, chest pain, SOB, nausea, vomiting.     Physical Exam:  General: AOx3, Well developed, NAD  HEENT: NC/AT, normal pinnae and tragi  Chest: Normal respiratory effort, equal chest rise, left chest wall palpable graft, non tender, no erythema  Heart: RRR  Abdomen: Soft, NTND  Inguinal: b/l inguinal scar. Open skin at distal aspect of scar, draining serous fluid, mild erythema and induration. Nontender. 2+ femoral pulses  Neuro/Psych: No localized deficits. Normal speech, normal tone, normal affect  Skin: Approximately 3x4 cm ulcer just superior to the left medial malleolus with some fibrinous tissue, no bleeding, no surround induration or erythema  Extremities: Warm, well perfused, no edema. Popliteal pulses 2+ b/l, DP and PT 2+ on right, 2+ DP, nonpalpable DP on left    Vitals:  T(C): 36.8 (12-11 @ 08:20), Max: 37.2 (12-10 @ 22:40)  HR: 64 (12-11 @ 08:20) (56 - 64)  BP: 142/56 (12-11 @ 08:20) (110/63 - 142/56)  RR: 18 (12-11 @ 08:20) (18 - 18)  SpO2: 93% (12-11 @ 08:20) (93% - 97%)    12-10 @ 07:01  -  12-11 @ 07:00  --------------------------------------------------------  IN:    Oral Fluid: 340 mL  Total IN: 340 mL    OUT:    Voided (mL): 750 mL  Total OUT: 750 mL    Total NET: -410 mL          12-11 @ 06:35                    8.6  CBC: 5.29>)-------(<310                     26.8                 143 | 111 | 15    CMP:  ----------------------< 96               4.4 | 29 | 1.15                      Ca:8.0  Phos:-  Mg:-               -|      |-        LFTs:  ------|-|-----             -|      |-  12-10 @ 10:29                    9.2  CBC: 6.05>)-------(<295                     28.7                 144 | 111 | 24    CMP:  ----------------------< 99               4.5 | 29 | 1.23                      Ca:7.9  Phos:-  Mg:-               -|      |-        LFTs:  ------|-|-----             -|      |-      Culture - Abscess with Gram Stain (collected 12-10-23 @ 17:45)  Source: .Abscess Hip - Right  Gram Stain (12-11-23 @ 03:26):    No polymorphonuclear cells seen per low power field    No organisms seen per oil power field    Culture - Blood (collected 12-09-23 @ 01:08)  Source: .Blood None  Preliminary Report (12-11-23 @ 07:02):    No growth at 48 Hours    Culture - Blood (collected 12-09-23 @ 00:23)  Source: .Blood None  Preliminary Report (12-11-23 @ 07:02):    No growth at 48 Hours

## 2023-12-11 NOTE — CONSULT NOTE ADULT - ASSESSMENT
Assesment: 73y old male seen for the following:   - Full thickness wound to the left foot, chronic   - Pain to left foot  - Difficulty ambulation    Plan:   Chart reviewed and Patient evaluated;  Discussed diagnosis and treatment with patient. Discussed importance of daily foot examinations, proper shoe gear, importance of tight glycemic control.   X-rays reviewed : on wet read showing no soft tissue gas, no acute bony findings, mild edema noted to the left ankle posteriorly  There is a full thickness wound to the left medial mal, etiology of wound most likely due to arterial problem, wound looks chronic and stable without acute SOI  Wound flush with normal saline   WC: Santyl and DSD  WBAT using surgical shoe  Offloading to bilateral heels while bedbound.   Continue with antibiotics as per ID  All additional care per Med appreciated  Patient demonstrated verbal understanding of all interventions and tolerated interventions well without any complications.   Patient to follow up in wound care center [facility] 1 week after discharge w Dr Cade Joseph  Case D/W attending Dr. Joseph    Wound care instructions for Left LE to be changed every other day:  - Gently remove dressings.  - Clean the wound with saline and dry it thoroughly with dry gauze  - Apply silvadene, gauze, kerlix and secure it with tape   Assesment: 73y old male seen for the following:   - Full thickness wound to the left foot, chronic   - Pain to left foot  - Difficulty ambulation    Plan:   Chart reviewed and Patient evaluated;  Discussed diagnosis and treatment with patient. Discussed importance of daily foot examinations, proper shoe gear, importance of tight glycemic control.   X-rays reviewed : on wet read showing no soft tissue gas, no acute bony findings, mild edema noted to the left ankle posteriorly  There is a full thickness wound to the left medial mal, etiology of wound most likely due to arterial problem, wound looks chronic and stable without acute SOI  Wound flush with normal saline   WC: Santyl and DSD  WBAT using surgical shoe  Offloading to bilateral heels while bedbound.   Continue with antibiotics as per ID  All additional care per Med appreciated  Patient demonstrated verbal understanding of all interventions and tolerated interventions well without any complications.   Patient to follow up in wound care center [facility] 1 week after discharge w Dr Cade Joseph  Case D/W attending Dr. Joseph    Wound care instructions for Left LE to be changed every other day:  - Gently remove dressings.  - Clean the wound with saline and dry it thoroughly with dry gauze  - Apply silvadene, gauze, kerlix and secure it with tape

## 2023-12-11 NOTE — PROGRESS NOTE ADULT - ATTENDING COMMENTS
Drainage from small opening at inferior portion of groin incision, culture with no growth to date  Will benefit from wound care and eventual washout with assistance of Dr. Perkins from plastic surgery, tentatively planned for this Monday  Continue woundcare and abx Drainage from small opening at inferior portion of groin incision, culture with no growth to date  Will benefit from wound care and eventual washout with assistance of Dr. Perkins from plastic surgery, tentatively planned for this Monday  Continue woundcare and abx  Will need CT Angiogram of pelvis and lower extremity

## 2023-12-11 NOTE — CONSULT NOTE ADULT - SUBJECTIVE AND OBJECTIVE BOX
Date of Consult: 12/11/23  72 y/o male with PMHX of HTN, GERD,  severe PVD s/p multiple stents b/l legs, s/p L  fem/popliteal bypass, chronic left lower ankle ulceration present to ED with worsening right groin redness. Patient reports recent procedure with Dr. Sosa. Was discharge to rehab where his PCP noted increasing fever and redness of right groin. Patient also reports some mild cough and sore throat. No chest pain or sob.   In ED patient found to have right groin cellulitis and + Covid.  (09 Dec 2023 05:36)  Podiatry was consulted for left foot ulcer. Pt resting comfortably bedside today. Pt tender to wound site.   12/11/23: Patient seen by podiatry team w attending present.         REVIEW OF SYSTEMS:  CONSTITUTIONAL: No weakness, fevers or chills  EYES/ENT: No visual changes;  No vertigo or throat pain   NECK: No pain or stiffness  RESPIRATORY: No cough, wheezing, hemoptysis; No shortness of breath  CARDIOVASCULAR: No chest pain or palpitations  GASTROINTESTINAL: No abdominal or epigastric pain. No nausea, vomiting, or hematemesis; No diarrhea or constipation. No melena or hematochezia.  GENITOURINARY: No dysuria, frequency or hematuria  NEUROLOGICAL: No numbness or weakness  SKIN: See physical examination.  All other review of systems is negative unless indicated above    PMH: Essential hypertension    PVD (peripheral vascular disease)    Gastroesophageal reflux disease, esophagitis presence not specified    Hypothyroidism    Chronic obstructive pulmonary disease, unspecified COPD type    Eczema    Hyperlipidemia, unspecified hyperlipidemia type    Medial meniscus tear      PSH:Femoral popliteal artery thrombus    PVD (peripheral vascular disease)        Allergies:Betadine (Hives; Rash)  IV Contrast (Hives)      Vitals  T(F): 98.2 (12-11-23 @ 08:20), Max: 99 (12-10-23 @ 22:40)  HR: 64 (12-11-23 @ 08:20) (56 - 64)  BP: 142/56 (12-11-23 @ 08:20) (110/63 - 142/56)  RR: 18 (12-11-23 @ 08:20) (18 - 18)  SpO2: 93% (12-11-23 @ 08:20) (93% - 97%)  Wt(kg): --    MEDICATIONS  (STANDING):  apixaban 5 milliGRAM(s) Oral every 12 hours  aspirin enteric coated 81 milliGRAM(s) Oral daily  atorvastatin 80 milliGRAM(s) Oral at bedtime  cefTRIAXone Injectable. 1000 milliGRAM(s) IV Push every 24 hours  Dakins Solution - 1/4 Strength 1 Application(s) Topical daily  doxycycline monohydrate Capsule 100 milliGRAM(s) Oral every 12 hours  ferrous    sulfate 325 milliGRAM(s) Oral daily  influenza  Vaccine (HIGH DOSE) 0.7 milliLiter(s) IntraMuscular once  levothyroxine 100 MICROGram(s) Oral daily  metoprolol succinate ER 25 milliGRAM(s) Oral daily  multivitamin 1 Tablet(s) Oral daily    MEDICATIONS  (PRN):  acetaminophen     Tablet .. 650 milliGRAM(s) Oral every 6 hours PRN Temp greater or equal to 38C (100.4F), Mild Pain (1 - 3)  aluminum hydroxide/magnesium hydroxide/simethicone Suspension 30 milliLiter(s) Oral every 4 hours PRN Dyspepsia  melatonin 3 milliGRAM(s) Oral at bedtime PRN Insomnia  ondansetron Injectable 4 milliGRAM(s) IV Push every 8 hours PRN Nausea and/or Vomiting      Physical Exam:   Constitutiona: NAD, alert;  Lower Extremity Focus  Derm:  Skin warm, dry and supple bilateral.    Ulceration to the left medial heel, wound base is mostly granular, wound size is 3cm x 3cm, no periwound edema or erythema noted. no purulence or pus noted, no tracking/tunneling noted, no PTB, no malodor  Vascular: Dorsalis Pedis and Posterior Tibial pulses non palpable.  Capillary re-fill time more then 3 seconds digits 1-5 bilateral.    Neuro: Protective sensation diminished to the level of the digits bilateral.  MSK: Muscle strength 5/5 all major muscle groups bilateral. TTP to the wound site on the left medial mal        Labs:                          8.6    5.29  )-----------( 310      ( 11 Dec 2023 06:35 )             26.8     WBC Trend  5.29 Date (12-11 @ 06:35)  6.05 Date (12-10 @ 10:29)  9.00 Date (12-09 @ 08:09)  12.35<H> Date (12-09 @ 00:23)      Chem  12-11    143  |  111<H>  |  15  ----------------------------<  96  4.4   |  29  |  1.15    Ca    8.0<L>      11 Dec 2023 06:35      Date of Consult: 12/11/23  74 y/o male with PMHX of HTN, GERD,  severe PVD s/p multiple stents b/l legs, s/p L  fem/popliteal bypass, chronic left lower ankle ulceration present to ED with worsening right groin redness. Patient reports recent procedure with Dr. Sosa. Was discharge to rehab where his PCP noted increasing fever and redness of right groin. Patient also reports some mild cough and sore throat. No chest pain or sob.   In ED patient found to have right groin cellulitis and + Covid.  (09 Dec 2023 05:36)  Podiatry was consulted for left foot ulcer. Pt resting comfortably bedside today. Pt tender to wound site.   12/11/23: Patient seen by podiatry team w attending present.         REVIEW OF SYSTEMS:  CONSTITUTIONAL: No weakness, fevers or chills  EYES/ENT: No visual changes;  No vertigo or throat pain   NECK: No pain or stiffness  RESPIRATORY: No cough, wheezing, hemoptysis; No shortness of breath  CARDIOVASCULAR: No chest pain or palpitations  GASTROINTESTINAL: No abdominal or epigastric pain. No nausea, vomiting, or hematemesis; No diarrhea or constipation. No melena or hematochezia.  GENITOURINARY: No dysuria, frequency or hematuria  NEUROLOGICAL: No numbness or weakness  SKIN: See physical examination.  All other review of systems is negative unless indicated above    PMH: Essential hypertension    PVD (peripheral vascular disease)    Gastroesophageal reflux disease, esophagitis presence not specified    Hypothyroidism    Chronic obstructive pulmonary disease, unspecified COPD type    Eczema    Hyperlipidemia, unspecified hyperlipidemia type    Medial meniscus tear      PSH:Femoral popliteal artery thrombus    PVD (peripheral vascular disease)        Allergies:Betadine (Hives; Rash)  IV Contrast (Hives)      Vitals  T(F): 98.2 (12-11-23 @ 08:20), Max: 99 (12-10-23 @ 22:40)  HR: 64 (12-11-23 @ 08:20) (56 - 64)  BP: 142/56 (12-11-23 @ 08:20) (110/63 - 142/56)  RR: 18 (12-11-23 @ 08:20) (18 - 18)  SpO2: 93% (12-11-23 @ 08:20) (93% - 97%)  Wt(kg): --    MEDICATIONS  (STANDING):  apixaban 5 milliGRAM(s) Oral every 12 hours  aspirin enteric coated 81 milliGRAM(s) Oral daily  atorvastatin 80 milliGRAM(s) Oral at bedtime  cefTRIAXone Injectable. 1000 milliGRAM(s) IV Push every 24 hours  Dakins Solution - 1/4 Strength 1 Application(s) Topical daily  doxycycline monohydrate Capsule 100 milliGRAM(s) Oral every 12 hours  ferrous    sulfate 325 milliGRAM(s) Oral daily  influenza  Vaccine (HIGH DOSE) 0.7 milliLiter(s) IntraMuscular once  levothyroxine 100 MICROGram(s) Oral daily  metoprolol succinate ER 25 milliGRAM(s) Oral daily  multivitamin 1 Tablet(s) Oral daily    MEDICATIONS  (PRN):  acetaminophen     Tablet .. 650 milliGRAM(s) Oral every 6 hours PRN Temp greater or equal to 38C (100.4F), Mild Pain (1 - 3)  aluminum hydroxide/magnesium hydroxide/simethicone Suspension 30 milliLiter(s) Oral every 4 hours PRN Dyspepsia  melatonin 3 milliGRAM(s) Oral at bedtime PRN Insomnia  ondansetron Injectable 4 milliGRAM(s) IV Push every 8 hours PRN Nausea and/or Vomiting      Physical Exam:   Constitutiona: NAD, alert;  Lower Extremity Focus  Derm:  Skin warm, dry and supple bilateral.    Ulceration to the left medial heel, wound base is mostly granular, wound size is 3cm x 3cm, no periwound edema or erythema noted. no purulence or pus noted, no tracking/tunneling noted, no PTB, no malodor  Vascular: Dorsalis Pedis and Posterior Tibial pulses non palpable.  Capillary re-fill time more then 3 seconds digits 1-5 bilateral.    Neuro: Protective sensation diminished to the level of the digits bilateral.  MSK: Muscle strength 5/5 all major muscle groups bilateral. TTP to the wound site on the left medial mal        Labs:                          8.6    5.29  )-----------( 310      ( 11 Dec 2023 06:35 )             26.8     WBC Trend  5.29 Date (12-11 @ 06:35)  6.05 Date (12-10 @ 10:29)  9.00 Date (12-09 @ 08:09)  12.35<H> Date (12-09 @ 00:23)      Chem  12-11    143  |  111<H>  |  15  ----------------------------<  96  4.4   |  29  |  1.15    Ca    8.0<L>      11 Dec 2023 06:35

## 2023-12-11 NOTE — PROGRESS NOTE ADULT - SUBJECTIVE AND OBJECTIVE BOX
74 y/o male with PMHX of HTN, GERD,  severe PVD s/p multiple stents b/l legs, s/p L  fem/popliteal bypass, chronic left lower ankle ulceration present to ED with worsening right groin redness. Patient reports recent right bypass procedure with Dr. Sosa.  Was discharge to rehab where his PCP noted increasing fever and redness of right groin. Patient also reports some mild cough and sore throat. Patient was admitted with right groin cellulitis  and was started on IV antibiotics with ID consult    12/11- patient was seen and examined- no acute overnight events- complaining of right groin soreness. VSS.       Vital Signs Last 24 Hrs  T(C): 36.8 (11 Dec 2023 08:20), Max: 37.2 (10 Dec 2023 22:40)  T(F): 98.2 (11 Dec 2023 08:20), Max: 99 (10 Dec 2023 22:40)  HR: 64 (11 Dec 2023 08:20) (56 - 64)  BP: 142/56 (11 Dec 2023 08:20) (110/63 - 142/56)  BP(mean): 76 (10 Dec 2023 22:40) (76 - 76)  RR: 18 (11 Dec 2023 08:20) (18 - 18)  SpO2: 93% (11 Dec 2023 08:20) (93% - 97%)    Parameters below as of 11 Dec 2023 08:20  Patient On (Oxygen Delivery Method): room air    ROS:   All 10 systems reviewed and found to be negative with the exception of what has been described above.     PE:  Constitutional: NAD, laying in bed  HEENT: NC/AT  Back: no tenderness  Respiratory: respirations even and non labored, LCTA  Cardiovascular: S1S2 regular, no murmurs  Abdomen: soft, not tender, not distended, positive BS  Genitourinary: voiding- Right groin packing intact. Erythema around area noted.   Musculoskeletal: no muscle tenderness, no joint swelling or tenderness  Extremities: no pedal edema   Neurological: no focal deficits    MEDICATIONS  (STANDING):  apixaban 5 milliGRAM(s) Oral every 12 hours  aspirin enteric coated 81 milliGRAM(s) Oral daily  atorvastatin 80 milliGRAM(s) Oral at bedtime  cefTRIAXone Injectable. 1000 milliGRAM(s) IV Push every 24 hours  Dakins Solution - 1/4 Strength 1 Application(s) Topical daily  doxycycline monohydrate Capsule 100 milliGRAM(s) Oral every 12 hours  ferrous    sulfate 325 milliGRAM(s) Oral daily  influenza  Vaccine (HIGH DOSE) 0.7 milliLiter(s) IntraMuscular once  levothyroxine 100 MICROGram(s) Oral daily  metoprolol succinate ER 25 milliGRAM(s) Oral daily  multivitamin 1 Tablet(s) Oral daily    MEDICATIONS  (PRN):  acetaminophen     Tablet .. 650 milliGRAM(s) Oral every 6 hours PRN Temp greater or equal to 38C (100.4F), Mild Pain (1 - 3)  aluminum hydroxide/magnesium hydroxide/simethicone Suspension 30 milliLiter(s) Oral every 4 hours PRN Dyspepsia  melatonin 3 milliGRAM(s) Oral at bedtime PRN Insomnia  ondansetron Injectable 4 milliGRAM(s) IV Push every 8 hours PRN Nausea and/or Vomiting      12-11    143  |  111<H>  |  15  ----------------------------<  96  4.4   |  29  |  1.15    Ca    8.0<L>      11 Dec 2023 06:35            CBC Full  -  ( 11 Dec 2023 06:35 )  WBC Count : 5.29 K/uL  RBC Count : 2.95 M/uL  Hemoglobin : 8.6 g/dL  Hematocrit : 26.8 %  Platelet Count - Automated : 310 K/uL  Mean Cell Volume : 90.8 fl  Mean Cell Hemoglobin : 29.2 pg  Mean Cell Hemoglobin Concentration : 32.1 gm/dL            ACC: [id number] EXAM:  CT ABDOMEN AND PELVIS     PROCEDURE DATE:  12/09/2023    INTERPRETATION:  CLINICAL INFORMATION: Right wound infection  COMPARISON: CTA abdomen 10/23/2023  CONTRAST/COMPLICATIONS:  IV Contrast: Omnipaque 350  0 cc administered   0 cc discarded  Oral Contrast: NONE  Complications: None reported at time of study completion  PROCEDURE:  CT of the Abdomen and Pelvis was performed.  Sagittal and coronal reformats were performed.  FINDINGS:  Limited evaluation of solid organs and vascular structures due to lack of   intravenous contrast.  LOWER CHEST: Coronary artery, aortic valve leaflet, and mitral annular   calcifications.  LIVER: Within normal limits.  BILE DUCTS: Normal caliber.  GALLBLADDER: Within normal limits.  SPLEEN: Within normal limits.  PANCREAS: Within normal limits.  ADRENALS: Within normal limits.  KIDNEYS/URETERS: Left renal atrophy. No hydronephrosis.  BLADDER: Within normal limits.  REPRODUCTIVE ORGANS: Prostate within normal limits.  BOWEL: No bowel obstruction. Appendix is normal.  PERITONEUM: No ascites.  VESSELS: Atherosclerotic changes. 3.2 cm infrarenal abdominal aortic   aneurysm status post aortobifemoral stenting. Status post femorofemoral   bypass, right femoral stents, and left-sided bypass graft.  RETROPERITONEUM/LYMPH NODES: Prominent right inguinal lymph nodes.  ABDOMINAL WALL: Postsurgical changes of the right groin with surrounding   fat stranding. Limited evaluation for abscess.No definite drainable   fluid collection.  BONES: Degenerative changes. Left femoral ORIF.  IMPRESSION:  Postsurgical changes of the right groin with surrounding fat stranding.   No definite evidence of drainable fluid collection, although evaluation   is limited due to lack of intravenous contrast.    Prominent right inguinal lymph nodes, likely reactive.    Multiple stents and bypass grafts as above, the patency of which cannot   be evaluated.    ACC: [id number] EXAM:  US KIDNEYS AND BLADDER   ORDERED BY: IRMA YOUNGBLOOD     PROCEDURE DATE:  12/09/2023          INTERPRETATION:  CLINICAL INFORMATION: Acute renal insufficiency.    COMPARISON: Renal ultrasound dated 10/22/2023 and abdominal CT dated   12/09/2023    TECHNIQUE: Sonography of the kidneys and bladder.    FINDINGS:  Right kidney: 11.6 cm. No renal mass, hydronephrosis or calculi. Mild   increased cortical echogenicity.    Left kidney: 8.1 cm. No renal mass, hydronephrosis or calculi. Atrophic   and the pressure decreased echogenicity.    Urinary bladder: Within normal limits.    IMPRESSION:  No hydronephrosis.    Mildly atrophic left kidney.    Bilateral increased renal echogenicity suggestive of underlying medical   renal disease.     72 y/o male with PMHX of HTN, GERD,  severe PVD s/p multiple stents b/l legs, s/p L  fem/popliteal bypass, chronic left lower ankle ulceration present to ED with worsening right groin redness. Patient reports recent right bypass procedure with Dr. Sosa.  Was discharge to rehab where his PCP noted increasing fever and redness of right groin. Patient also reports some mild cough and sore throat. Patient was admitted with right groin cellulitis  and was started on IV antibiotics with ID consult    12/11- patient was seen and examined- no acute overnight events- complaining of right groin soreness. VSS.       Vital Signs Last 24 Hrs  T(C): 36.8 (11 Dec 2023 08:20), Max: 37.2 (10 Dec 2023 22:40)  T(F): 98.2 (11 Dec 2023 08:20), Max: 99 (10 Dec 2023 22:40)  HR: 64 (11 Dec 2023 08:20) (56 - 64)  BP: 142/56 (11 Dec 2023 08:20) (110/63 - 142/56)  BP(mean): 76 (10 Dec 2023 22:40) (76 - 76)  RR: 18 (11 Dec 2023 08:20) (18 - 18)  SpO2: 93% (11 Dec 2023 08:20) (93% - 97%)    Parameters below as of 11 Dec 2023 08:20  Patient On (Oxygen Delivery Method): room air    ROS:   All 10 systems reviewed and found to be negative with the exception of what has been described above.     PE:  Constitutional: NAD, laying in bed  HEENT: NC/AT  Back: no tenderness  Respiratory: respirations even and non labored, LCTA  Cardiovascular: S1S2 regular, no murmurs  Abdomen: soft, not tender, not distended, positive BS  Genitourinary: voiding- Right groin packing intact. Erythema around area noted.   Musculoskeletal: no muscle tenderness, no joint swelling or tenderness  Extremities: no pedal edema   Neurological: no focal deficits    MEDICATIONS  (STANDING):  apixaban 5 milliGRAM(s) Oral every 12 hours  aspirin enteric coated 81 milliGRAM(s) Oral daily  atorvastatin 80 milliGRAM(s) Oral at bedtime  cefTRIAXone Injectable. 1000 milliGRAM(s) IV Push every 24 hours  Dakins Solution - 1/4 Strength 1 Application(s) Topical daily  doxycycline monohydrate Capsule 100 milliGRAM(s) Oral every 12 hours  ferrous    sulfate 325 milliGRAM(s) Oral daily  influenza  Vaccine (HIGH DOSE) 0.7 milliLiter(s) IntraMuscular once  levothyroxine 100 MICROGram(s) Oral daily  metoprolol succinate ER 25 milliGRAM(s) Oral daily  multivitamin 1 Tablet(s) Oral daily    MEDICATIONS  (PRN):  acetaminophen     Tablet .. 650 milliGRAM(s) Oral every 6 hours PRN Temp greater or equal to 38C (100.4F), Mild Pain (1 - 3)  aluminum hydroxide/magnesium hydroxide/simethicone Suspension 30 milliLiter(s) Oral every 4 hours PRN Dyspepsia  melatonin 3 milliGRAM(s) Oral at bedtime PRN Insomnia  ondansetron Injectable 4 milliGRAM(s) IV Push every 8 hours PRN Nausea and/or Vomiting      12-11    143  |  111<H>  |  15  ----------------------------<  96  4.4   |  29  |  1.15    Ca    8.0<L>      11 Dec 2023 06:35            CBC Full  -  ( 11 Dec 2023 06:35 )  WBC Count : 5.29 K/uL  RBC Count : 2.95 M/uL  Hemoglobin : 8.6 g/dL  Hematocrit : 26.8 %  Platelet Count - Automated : 310 K/uL  Mean Cell Volume : 90.8 fl  Mean Cell Hemoglobin : 29.2 pg  Mean Cell Hemoglobin Concentration : 32.1 gm/dL            ACC: [id number] EXAM:  CT ABDOMEN AND PELVIS     PROCEDURE DATE:  12/09/2023    INTERPRETATION:  CLINICAL INFORMATION: Right wound infection  COMPARISON: CTA abdomen 10/23/2023  CONTRAST/COMPLICATIONS:  IV Contrast: Omnipaque 350  0 cc administered   0 cc discarded  Oral Contrast: NONE  Complications: None reported at time of study completion  PROCEDURE:  CT of the Abdomen and Pelvis was performed.  Sagittal and coronal reformats were performed.  FINDINGS:  Limited evaluation of solid organs and vascular structures due to lack of   intravenous contrast.  LOWER CHEST: Coronary artery, aortic valve leaflet, and mitral annular   calcifications.  LIVER: Within normal limits.  BILE DUCTS: Normal caliber.  GALLBLADDER: Within normal limits.  SPLEEN: Within normal limits.  PANCREAS: Within normal limits.  ADRENALS: Within normal limits.  KIDNEYS/URETERS: Left renal atrophy. No hydronephrosis.  BLADDER: Within normal limits.  REPRODUCTIVE ORGANS: Prostate within normal limits.  BOWEL: No bowel obstruction. Appendix is normal.  PERITONEUM: No ascites.  VESSELS: Atherosclerotic changes. 3.2 cm infrarenal abdominal aortic   aneurysm status post aortobifemoral stenting. Status post femorofemoral   bypass, right femoral stents, and left-sided bypass graft.  RETROPERITONEUM/LYMPH NODES: Prominent right inguinal lymph nodes.  ABDOMINAL WALL: Postsurgical changes of the right groin with surrounding   fat stranding. Limited evaluation for abscess.No definite drainable   fluid collection.  BONES: Degenerative changes. Left femoral ORIF.  IMPRESSION:  Postsurgical changes of the right groin with surrounding fat stranding.   No definite evidence of drainable fluid collection, although evaluation   is limited due to lack of intravenous contrast.    Prominent right inguinal lymph nodes, likely reactive.    Multiple stents and bypass grafts as above, the patency of which cannot   be evaluated.    ACC: [id number] EXAM:  US KIDNEYS AND BLADDER   ORDERED BY: IRMA YOUNGBLOOD     PROCEDURE DATE:  12/09/2023          INTERPRETATION:  CLINICAL INFORMATION: Acute renal insufficiency.    COMPARISON: Renal ultrasound dated 10/22/2023 and abdominal CT dated   12/09/2023    TECHNIQUE: Sonography of the kidneys and bladder.    FINDINGS:  Right kidney: 11.6 cm. No renal mass, hydronephrosis or calculi. Mild   increased cortical echogenicity.    Left kidney: 8.1 cm. No renal mass, hydronephrosis or calculi. Atrophic   and the pressure decreased echogenicity.    Urinary bladder: Within normal limits.    IMPRESSION:  No hydronephrosis.    Mildly atrophic left kidney.    Bilateral increased renal echogenicity suggestive of underlying medical   renal disease.

## 2023-12-11 NOTE — PROGRESS NOTE ADULT - ASSESSMENT
72 y/o male with PMHX of HTN, GERD,  severe PVD s/p multiple stents b/l legs, s/p L  fem/popliteal bypass, chronic left lower ankle ulceration present to ED with worsening right groin redness. Patient reports recent right bypass procedure with Dr. Sosa.  Was discharge to rehab where his PCP noted increasing fever and redness of right groin. Patient also reports some mild cough and sore throat. No chest pain or sob.     Right Groin Surgical Site Cellulitis  - ID following  - cont doxycycline/ceftriaxone  - FU blood culture  - vascualr consulted- plan for   - Daily Dakin's solution and packing to right femoral site  - Monitor for bleeding from right groin   - Plan for fem-fem graft partial explantation, CFA repair, wound washout, possible debridement and muscle flap on this admission- d/w Vascular- timing of procedure pending   - Cardiology consulted for cardiac clearance       JENIFER on CKD  Hypokalemia -  resolved  - improved to 1.28, stop IV fluids  - trend bmp   - potassium WNL now     COVID +  - Not hypoxic and currently asymptomatic  - Monitor    Left Ankle chronic ulcer  - Wound care eval    PAD  - Stable  - Continue Eliquis  - Continue Aspirin  - Continue Atorvastatin    HTN  - Stable  - Bystolic switched to Coreg  - Monitor    Hypothyroid  - Continue levothyroxine    DVT prophylaxis  - Eliquis    Case d/w team on IDR.

## 2023-12-12 LAB
-  AMPICILLIN/SULBACTAM: SIGNIFICANT CHANGE UP
-  AMPICILLIN/SULBACTAM: SIGNIFICANT CHANGE UP
-  CEFAZOLIN: SIGNIFICANT CHANGE UP
-  CEFAZOLIN: SIGNIFICANT CHANGE UP
-  CLINDAMYCIN: SIGNIFICANT CHANGE UP
-  CLINDAMYCIN: SIGNIFICANT CHANGE UP
-  ERYTHROMYCIN: SIGNIFICANT CHANGE UP
-  ERYTHROMYCIN: SIGNIFICANT CHANGE UP
-  GENTAMICIN: SIGNIFICANT CHANGE UP
-  GENTAMICIN: SIGNIFICANT CHANGE UP
-  OXACILLIN: SIGNIFICANT CHANGE UP
-  OXACILLIN: SIGNIFICANT CHANGE UP
-  PENICILLIN: SIGNIFICANT CHANGE UP
-  PENICILLIN: SIGNIFICANT CHANGE UP
-  RIFAMPIN: SIGNIFICANT CHANGE UP
-  RIFAMPIN: SIGNIFICANT CHANGE UP
-  TETRACYCLINE: SIGNIFICANT CHANGE UP
-  TETRACYCLINE: SIGNIFICANT CHANGE UP
-  TRIMETHOPRIM/SULFAMETHOXAZOLE: SIGNIFICANT CHANGE UP
-  TRIMETHOPRIM/SULFAMETHOXAZOLE: SIGNIFICANT CHANGE UP
-  VANCOMYCIN: SIGNIFICANT CHANGE UP
-  VANCOMYCIN: SIGNIFICANT CHANGE UP
ANION GAP SERPL CALC-SCNC: 2 MMOL/L — LOW (ref 5–17)
ANION GAP SERPL CALC-SCNC: 2 MMOL/L — LOW (ref 5–17)
BUN SERPL-MCNC: 14 MG/DL — SIGNIFICANT CHANGE UP (ref 7–23)
BUN SERPL-MCNC: 14 MG/DL — SIGNIFICANT CHANGE UP (ref 7–23)
CALCIUM SERPL-MCNC: 8.5 MG/DL — SIGNIFICANT CHANGE UP (ref 8.5–10.1)
CALCIUM SERPL-MCNC: 8.5 MG/DL — SIGNIFICANT CHANGE UP (ref 8.5–10.1)
CHLORIDE SERPL-SCNC: 112 MMOL/L — HIGH (ref 96–108)
CHLORIDE SERPL-SCNC: 112 MMOL/L — HIGH (ref 96–108)
CO2 SERPL-SCNC: 29 MMOL/L — SIGNIFICANT CHANGE UP (ref 22–31)
CO2 SERPL-SCNC: 29 MMOL/L — SIGNIFICANT CHANGE UP (ref 22–31)
CREAT SERPL-MCNC: 1.1 MG/DL — SIGNIFICANT CHANGE UP (ref 0.5–1.3)
CREAT SERPL-MCNC: 1.1 MG/DL — SIGNIFICANT CHANGE UP (ref 0.5–1.3)
EGFR: 72 ML/MIN/1.73M2 — SIGNIFICANT CHANGE UP
EGFR: 72 ML/MIN/1.73M2 — SIGNIFICANT CHANGE UP
GLUCOSE SERPL-MCNC: 100 MG/DL — HIGH (ref 70–99)
GLUCOSE SERPL-MCNC: 100 MG/DL — HIGH (ref 70–99)
HCT VFR BLD CALC: 29.3 % — LOW (ref 39–50)
HCT VFR BLD CALC: 29.3 % — LOW (ref 39–50)
HGB BLD-MCNC: 9.3 G/DL — LOW (ref 13–17)
HGB BLD-MCNC: 9.3 G/DL — LOW (ref 13–17)
MCHC RBC-ENTMCNC: 29 PG — SIGNIFICANT CHANGE UP (ref 27–34)
MCHC RBC-ENTMCNC: 29 PG — SIGNIFICANT CHANGE UP (ref 27–34)
MCHC RBC-ENTMCNC: 31.7 GM/DL — LOW (ref 32–36)
MCHC RBC-ENTMCNC: 31.7 GM/DL — LOW (ref 32–36)
MCV RBC AUTO: 91.3 FL — SIGNIFICANT CHANGE UP (ref 80–100)
MCV RBC AUTO: 91.3 FL — SIGNIFICANT CHANGE UP (ref 80–100)
METHOD TYPE: SIGNIFICANT CHANGE UP
METHOD TYPE: SIGNIFICANT CHANGE UP
PLATELET # BLD AUTO: 394 K/UL — SIGNIFICANT CHANGE UP (ref 150–400)
PLATELET # BLD AUTO: 394 K/UL — SIGNIFICANT CHANGE UP (ref 150–400)
POTASSIUM SERPL-MCNC: 5.4 MMOL/L — HIGH (ref 3.5–5.3)
POTASSIUM SERPL-MCNC: 5.4 MMOL/L — HIGH (ref 3.5–5.3)
POTASSIUM SERPL-SCNC: 5.4 MMOL/L — HIGH (ref 3.5–5.3)
POTASSIUM SERPL-SCNC: 5.4 MMOL/L — HIGH (ref 3.5–5.3)
RBC # BLD: 3.21 M/UL — LOW (ref 4.2–5.8)
RBC # BLD: 3.21 M/UL — LOW (ref 4.2–5.8)
RBC # FLD: 14.1 % — SIGNIFICANT CHANGE UP (ref 10.3–14.5)
RBC # FLD: 14.1 % — SIGNIFICANT CHANGE UP (ref 10.3–14.5)
SODIUM SERPL-SCNC: 143 MMOL/L — SIGNIFICANT CHANGE UP (ref 135–145)
SODIUM SERPL-SCNC: 143 MMOL/L — SIGNIFICANT CHANGE UP (ref 135–145)
WBC # BLD: 6.63 K/UL — SIGNIFICANT CHANGE UP (ref 3.8–10.5)
WBC # BLD: 6.63 K/UL — SIGNIFICANT CHANGE UP (ref 3.8–10.5)
WBC # FLD AUTO: 6.63 K/UL — SIGNIFICANT CHANGE UP (ref 3.8–10.5)
WBC # FLD AUTO: 6.63 K/UL — SIGNIFICANT CHANGE UP (ref 3.8–10.5)

## 2023-12-12 PROCEDURE — 75635 CT ANGIO ABDOMINAL ARTERIES: CPT | Mod: 26

## 2023-12-12 PROCEDURE — 99232 SBSQ HOSP IP/OBS MODERATE 35: CPT

## 2023-12-12 RX ORDER — LOSARTAN POTASSIUM 100 MG/1
50 TABLET, FILM COATED ORAL DAILY
Refills: 0 | Status: DISCONTINUED | OUTPATIENT
Start: 2023-12-12 | End: 2023-12-22

## 2023-12-12 RX ORDER — SODIUM CHLORIDE 9 MG/ML
1000 INJECTION INTRAMUSCULAR; INTRAVENOUS; SUBCUTANEOUS
Refills: 0 | Status: DISCONTINUED | OUTPATIENT
Start: 2023-12-12 | End: 2023-12-18

## 2023-12-12 RX ORDER — DIPHENHYDRAMINE HCL 50 MG
50 CAPSULE ORAL ONCE
Refills: 0 | Status: COMPLETED | OUTPATIENT
Start: 2023-12-12 | End: 2023-12-12

## 2023-12-12 RX ADMIN — SODIUM CHLORIDE 75 MILLILITER(S): 9 INJECTION INTRAMUSCULAR; INTRAVENOUS; SUBCUTANEOUS at 11:30

## 2023-12-12 RX ADMIN — Medication 81 MILLIGRAM(S): at 09:36

## 2023-12-12 RX ADMIN — ATORVASTATIN CALCIUM 80 MILLIGRAM(S): 80 TABLET, FILM COATED ORAL at 22:01

## 2023-12-12 RX ADMIN — Medication 32 MILLIGRAM(S): at 06:57

## 2023-12-12 RX ADMIN — Medication 50 MILLIGRAM(S): at 18:08

## 2023-12-12 RX ADMIN — Medication 100 MICROGRAM(S): at 05:11

## 2023-12-12 RX ADMIN — Medication 32 MILLIGRAM(S): at 17:14

## 2023-12-12 RX ADMIN — Medication 100 MILLIGRAM(S): at 09:36

## 2023-12-12 RX ADMIN — Medication 325 MILLIGRAM(S): at 09:36

## 2023-12-12 RX ADMIN — LOSARTAN POTASSIUM 25 MILLIGRAM(S): 100 TABLET, FILM COATED ORAL at 18:10

## 2023-12-12 RX ADMIN — SODIUM CHLORIDE 100 MILLILITER(S): 9 INJECTION INTRAMUSCULAR; INTRAVENOUS; SUBCUTANEOUS at 06:58

## 2023-12-12 RX ADMIN — Medication 25 MILLIGRAM(S): at 09:36

## 2023-12-12 RX ADMIN — CEFTRIAXONE 1000 MILLIGRAM(S): 500 INJECTION, POWDER, FOR SOLUTION INTRAMUSCULAR; INTRAVENOUS at 17:15

## 2023-12-12 RX ADMIN — Medication 1 TABLET(S): at 09:36

## 2023-12-12 RX ADMIN — APIXABAN 5 MILLIGRAM(S): 2.5 TABLET, FILM COATED ORAL at 22:02

## 2023-12-12 RX ADMIN — Medication 100 MILLIGRAM(S): at 22:01

## 2023-12-12 RX ADMIN — APIXABAN 5 MILLIGRAM(S): 2.5 TABLET, FILM COATED ORAL at 09:36

## 2023-12-12 NOTE — CONSULT NOTE ADULT - SUBJECTIVE AND OBJECTIVE BOX
Pt is a 73y Male w/ PMHX of HTN, GERD,  severe PVD s/p multiple stents b/l legs, s/p L  fem/popliteal bypass, chronic left lower ankle ulceration present to ED with worsening right groin redness. with extensive vascular surgery hx, most recent being a right femoral cutdown with aortogram and B/L LE angiogram , SFA covered stent placed in SFA, left axillary to anterior tibial bypass with PTFE graft 11/1/23. He has a seroma at the right femoral site, actively draining.     PMHx-  HTN, GERD,  severe PVD s/p multiple stents b/l legs, s/p L  fem/popliteal bypass, chronic left lower ankle ulceration present to ED with worsening right groin redness.  PSHx- Femoral popliteal artery thrombus h/o Fem pop bypass 1997  L axillary to AT with PTFE, right femoral cutdown for aortogram and angiogram with placement of, R SFA stent 11/1/23; known thrombosed fem-fem bypass graft in place  PVD (peripheral vascular disease) s/p peripheral stent insertion bilateral lower extremities x 10.Meds- see chart    Allergies- Betadine/ IV contrast  SocHx- former smoker, + EtOH socially  Family Hx- non contributory        ROS:  - CONSTITUTIONAL: Denies weight loss, fever and chills.  - HEENT: Denies changes in vision and hearing.  - RESPIRATORY: Denies SOB and cough.  - CV: Denies palpitations and CP.  - GI: Denies abdominal pain, nausea, vomiting and diarrhea.  - : Denies dysuria and urinary frequency.  - MSK: Denies myalgia and joint pain.  - SKIN: Denies rash and pruritus.  - NEUROLOGICAL: Denies headache and syncope.  - PSYCHIATRIC: Denies recent changes in mood. Denies anxiety and depression.    T(C): 36.9 (12-12-23 @ 08:21), Max: 36.9 (12-12-23 @ 08:21)  HR: 57 (12-12-23 @ 08:21) (57 - 63)  BP: 166/46 (12-12-23 @ 08:21) (142/54 - 166/46)  RR: 18 (12-12-23 @ 08:21) (18 - 18)  SpO2: 96% (12-12-23 @ 08:21) (96% - 100%)      PE: Gen- NAD  HEENT- EOMI  CVS- RRR  Chest- Equal Chest Expansion B/L  Abd- Soft NT, ND  Ext- FROMx4         Focused: Right groin wound- 1x1.5cm, + serous drainage, no surrounding erythema,   CN2-12 intact                            9.3    6.63  )-----------( 394      ( 12 Dec 2023 07:36 )             29.3     12-12    143  |  112<H>  |  14  ----------------------------<  100<H>  5.4<H>   |  29  |  1.10    Ca    8.5      12 Dec 2023 07:36

## 2023-12-12 NOTE — PROGRESS NOTE ADULT - ASSESSMENT
72 y/o male with h/o HTN, GERD,  severe PVD s/p multiple stents b/l legs, s/p L  fem/popliteal bypass, chronic left lower ankle ulceration was admitted on 12/9 for worsening right groin redness. Patient reports a recent vascular procedure with Dr. Sosa and a right inguinal vascular access was used. He was discharge to rehab where he was noted with increasing fever and redness of right groin. Patient also reports cough, sore throat and mild SOB. In ER he tested COVID-19 PCR detected. He received vancomycin IV and cefepime.     1. Right inguinal cellulitis s/p recent vascular surgery. Likely postsurgical infection. COVID-19 viral syndrome. ARF. PVD s/p vascular bypass.   -low grade fever  -BC x 2 noted  -on ceftriaxone 1 gm IV qd and doxycycline 100 mg PO q12h # 3  -tolerating abx well so far; no side effects noted  -local wound care  -vascular surgery evaluation appreciated  -may need further surgery  -continue abx coverage  -f/u cultures  -monitor temps  -f/u CBC  -supportive care  2. Other issues:   -care per medicine

## 2023-12-12 NOTE — PROGRESS NOTE ADULT - ASSESSMENT
73 year old male with extensive vascular surgery hx, most recent being a right femoral cutdown with aortogram and B/L LE angiogram , SFA covered stent placed in SFA, left axillary to anterior tibial bypass with PTFE graft 11/1/23. He has a seroma at the right femoral site, actively draining. Pulses are palpable, LEs are well perfused. No leukocytosis, HH down slightly.     Plan:  - Plan for fem-fem graft partial explantation, CFA repair, wound washout, possible debridement and muscle flap on Monday,      - Cleared by cardiology      - Please obtain medical clearance by Sunday.  - Daily Dakin's solution and packing to right femoral site  - Monitor for bleeding from right groin   - Monitor cellulitis of skin  - IV abx  - Groin wound culture culture pending, blood cultures negative after 48 hrs  - Continue Eliquis, ASA  - Obtain cardiac and medical risk stratification  - Wound care for left ankle ulcer  - Rest of management as per primary team    Plan will be discussed with Vascular surgery attending, Dr. Sosa

## 2023-12-12 NOTE — PROGRESS NOTE ADULT - SUBJECTIVE AND OBJECTIVE BOX
Date of Consult: 12/12/23  72 y/o male with PMHX of HTN, GERD,  severe PVD s/p multiple stents b/l legs, s/p L  fem/popliteal bypass, chronic left lower ankle ulceration present to ED with worsening right groin redness. Patient reports recent procedure with Dr. Sosa. Was discharge to rehab where his PCP noted increasing fever and redness of right groin. Patient also reports some mild cough and sore throat. No chest pain or sob.   In ED patient found to have right groin cellulitis and + Covid.  (09 Dec 2023 05:36)  Podiatry was consulted for left foot ulcer. Pt resting comfortably bedside today. Pt tender to wound site.     12/12/23: Patient seen by podiatry team. Pt resting at bedside, NAD.         REVIEW OF SYSTEMS:  CONSTITUTIONAL: No weakness, fevers or chills  EYES/ENT: No visual changes;  No vertigo or throat pain   NECK: No pain or stiffness  RESPIRATORY: No cough, wheezing, hemoptysis; No shortness of breath  CARDIOVASCULAR: No chest pain or palpitations  GASTROINTESTINAL: No abdominal or epigastric pain. No nausea, vomiting, or hematemesis; No diarrhea or constipation. No melena or hematochezia.  GENITOURINARY: No dysuria, frequency or hematuria  NEUROLOGICAL: No numbness or weakness  SKIN: See physical examination.  All other review of systems is negative unless indicated above    PMH: Essential hypertension    PVD (peripheral vascular disease)    Gastroesophageal reflux disease, esophagitis presence not specified    Hypothyroidism    Chronic obstructive pulmonary disease, unspecified COPD type    Eczema    Hyperlipidemia, unspecified hyperlipidemia type    Medial meniscus tear      PSH:Femoral popliteal artery thrombus    PVD (peripheral vascular disease)        Allergies:Betadine (Hives; Rash)  IV Contrast (Hives)                            9.3    6.63  )-----------( 394      ( 12 Dec 2023 07:36 )             29.3     12-12    143  |  112<H>  |  14  ----------------------------<  100<H>  5.4<H>   |  29  |  1.10    Ca    8.5      12 Dec 2023 07:36      Vital Signs Last 24 Hrs  T(C): 36.9 (12 Dec 2023 08:21), Max: 36.9 (12 Dec 2023 08:21)  T(F): 98.4 (12 Dec 2023 08:21), Max: 98.4 (12 Dec 2023 08:21)  HR: 57 (12 Dec 2023 08:21) (57 - 63)  BP: 166/46 (12 Dec 2023 08:21) (142/54 - 166/46)  BP(mean): --  RR: 18 (12 Dec 2023 08:21) (18 - 18)  SpO2: 96% (12 Dec 2023 08:21) (96% - 100%)    Parameters below as of 12 Dec 2023 08:21  Patient On (Oxygen Delivery Method): room air        MEDICATIONS  (STANDING):  apixaban 5 milliGRAM(s) Oral every 12 hours  aspirin enteric coated 81 milliGRAM(s) Oral daily  atorvastatin 80 milliGRAM(s) Oral at bedtime  cefTRIAXone Injectable. 1000 milliGRAM(s) IV Push every 24 hours  Dakins Solution - 1/4 Strength 1 Application(s) Topical daily  doxycycline monohydrate Capsule 100 milliGRAM(s) Oral every 12 hours  ferrous    sulfate 325 milliGRAM(s) Oral daily  influenza  Vaccine (HIGH DOSE) 0.7 milliLiter(s) IntraMuscular once  levothyroxine 100 MICROGram(s) Oral daily  metoprolol succinate ER 25 milliGRAM(s) Oral daily  multivitamin 1 Tablet(s) Oral daily    MEDICATIONS  (PRN):  acetaminophen     Tablet .. 650 milliGRAM(s) Oral every 6 hours PRN Temp greater or equal to 38C (100.4F), Mild Pain (1 - 3)  aluminum hydroxide/magnesium hydroxide/simethicone Suspension 30 milliLiter(s) Oral every 4 hours PRN Dyspepsia  melatonin 3 milliGRAM(s) Oral at bedtime PRN Insomnia  ondansetron Injectable 4 milliGRAM(s) IV Push every 8 hours PRN Nausea and/or Vomiting      Physical Exam:   Constitutiona: NAD, alert;  Lower Extremity Focus  Derm:  Skin warm, dry and supple bilateral.    Ulceration to the left medial heel, wound base is mostly granular, wound size is 3cm x 3cm, no periwound edema or erythema noted. no purulence or pus noted, no tracking/tunneling noted, no PTB, no malodor  Vascular: Dorsalis Pedis and Posterior Tibial pulses non palpable.  Capillary re-fill time more then 3 seconds digits 1-5 bilateral.    Neuro: Protective sensation diminished to the level of the digits bilateral.  MSK: Muscle strength 5/5 all major muscle groups bilateral. TTP to the wound site on the left medial mal            Date of Consult: 12/12/23  74 y/o male with PMHX of HTN, GERD,  severe PVD s/p multiple stents b/l legs, s/p L  fem/popliteal bypass, chronic left lower ankle ulceration present to ED with worsening right groin redness. Patient reports recent procedure with Dr. Sosa. Was discharge to rehab where his PCP noted increasing fever and redness of right groin. Patient also reports some mild cough and sore throat. No chest pain or sob.   In ED patient found to have right groin cellulitis and + Covid.  (09 Dec 2023 05:36)  Podiatry was consulted for left foot ulcer. Pt resting comfortably bedside today. Pt tender to wound site.     12/12/23: Patient seen by podiatry team. Pt resting at bedside, NAD.         REVIEW OF SYSTEMS:  CONSTITUTIONAL: No weakness, fevers or chills  EYES/ENT: No visual changes;  No vertigo or throat pain   NECK: No pain or stiffness  RESPIRATORY: No cough, wheezing, hemoptysis; No shortness of breath  CARDIOVASCULAR: No chest pain or palpitations  GASTROINTESTINAL: No abdominal or epigastric pain. No nausea, vomiting, or hematemesis; No diarrhea or constipation. No melena or hematochezia.  GENITOURINARY: No dysuria, frequency or hematuria  NEUROLOGICAL: No numbness or weakness  SKIN: See physical examination.  All other review of systems is negative unless indicated above    PMH: Essential hypertension    PVD (peripheral vascular disease)    Gastroesophageal reflux disease, esophagitis presence not specified    Hypothyroidism    Chronic obstructive pulmonary disease, unspecified COPD type    Eczema    Hyperlipidemia, unspecified hyperlipidemia type    Medial meniscus tear      PSH:Femoral popliteal artery thrombus    PVD (peripheral vascular disease)        Allergies:Betadine (Hives; Rash)  IV Contrast (Hives)                            9.3    6.63  )-----------( 394      ( 12 Dec 2023 07:36 )             29.3     12-12    143  |  112<H>  |  14  ----------------------------<  100<H>  5.4<H>   |  29  |  1.10    Ca    8.5      12 Dec 2023 07:36      Vital Signs Last 24 Hrs  T(C): 36.9 (12 Dec 2023 08:21), Max: 36.9 (12 Dec 2023 08:21)  T(F): 98.4 (12 Dec 2023 08:21), Max: 98.4 (12 Dec 2023 08:21)  HR: 57 (12 Dec 2023 08:21) (57 - 63)  BP: 166/46 (12 Dec 2023 08:21) (142/54 - 166/46)  BP(mean): --  RR: 18 (12 Dec 2023 08:21) (18 - 18)  SpO2: 96% (12 Dec 2023 08:21) (96% - 100%)    Parameters below as of 12 Dec 2023 08:21  Patient On (Oxygen Delivery Method): room air        MEDICATIONS  (STANDING):  apixaban 5 milliGRAM(s) Oral every 12 hours  aspirin enteric coated 81 milliGRAM(s) Oral daily  atorvastatin 80 milliGRAM(s) Oral at bedtime  cefTRIAXone Injectable. 1000 milliGRAM(s) IV Push every 24 hours  Dakins Solution - 1/4 Strength 1 Application(s) Topical daily  doxycycline monohydrate Capsule 100 milliGRAM(s) Oral every 12 hours  ferrous    sulfate 325 milliGRAM(s) Oral daily  influenza  Vaccine (HIGH DOSE) 0.7 milliLiter(s) IntraMuscular once  levothyroxine 100 MICROGram(s) Oral daily  metoprolol succinate ER 25 milliGRAM(s) Oral daily  multivitamin 1 Tablet(s) Oral daily    MEDICATIONS  (PRN):  acetaminophen     Tablet .. 650 milliGRAM(s) Oral every 6 hours PRN Temp greater or equal to 38C (100.4F), Mild Pain (1 - 3)  aluminum hydroxide/magnesium hydroxide/simethicone Suspension 30 milliLiter(s) Oral every 4 hours PRN Dyspepsia  melatonin 3 milliGRAM(s) Oral at bedtime PRN Insomnia  ondansetron Injectable 4 milliGRAM(s) IV Push every 8 hours PRN Nausea and/or Vomiting      Physical Exam:   Constitutiona: NAD, alert;  Lower Extremity Focus  Derm:  Skin warm, dry and supple bilateral.    Ulceration to the left medial heel, wound base is mostly granular, wound size is 3cm x 3cm, no periwound edema or erythema noted. no purulence or pus noted, no tracking/tunneling noted, no PTB, no malodor  Vascular: Dorsalis Pedis and Posterior Tibial pulses non palpable.  Capillary re-fill time more then 3 seconds digits 1-5 bilateral.    Neuro: Protective sensation diminished to the level of the digits bilateral.  MSK: Muscle strength 5/5 all major muscle groups bilateral. TTP to the wound site on the left medial mal            Date of Service: 12/12/23  74 y/o male with PMHX of HTN, GERD,  severe PVD s/p multiple stents b/l legs, s/p L  fem/popliteal bypass, chronic left lower ankle ulceration present to ED with worsening right groin redness. Patient reports recent procedure with Dr. Sosa. Was discharge to rehab where his PCP noted increasing fever and redness of right groin. Patient also reports some mild cough and sore throat. No chest pain or sob.   In ED patient found to have right groin cellulitis and + Covid.  (09 Dec 2023 05:36)  Podiatry was consulted for left foot ulcer. Pt resting comfortably bedside today. Pt tender to wound site.     12/12/23: Patient seen by podiatry team. Pt resting at bedside, NAD.         REVIEW OF SYSTEMS:  CONSTITUTIONAL: No weakness, fevers or chills  EYES/ENT: No visual changes;  No vertigo or throat pain   NECK: No pain or stiffness  RESPIRATORY: No cough, wheezing, hemoptysis; No shortness of breath  CARDIOVASCULAR: No chest pain or palpitations  GASTROINTESTINAL: No abdominal or epigastric pain. No nausea, vomiting, or hematemesis; No diarrhea or constipation. No melena or hematochezia.  GENITOURINARY: No dysuria, frequency or hematuria  NEUROLOGICAL: No numbness or weakness  SKIN: See physical examination.  All other review of systems is negative unless indicated above    PMH: Essential hypertension    PVD (peripheral vascular disease)    Gastroesophageal reflux disease, esophagitis presence not specified    Hypothyroidism    Chronic obstructive pulmonary disease, unspecified COPD type    Eczema    Hyperlipidemia, unspecified hyperlipidemia type    Medial meniscus tear      PSH:Femoral popliteal artery thrombus    PVD (peripheral vascular disease)        Allergies:Betadine (Hives; Rash)  IV Contrast (Hives)                            9.3    6.63  )-----------( 394      ( 12 Dec 2023 07:36 )             29.3     12-12    143  |  112<H>  |  14  ----------------------------<  100<H>  5.4<H>   |  29  |  1.10    Ca    8.5      12 Dec 2023 07:36      Vital Signs Last 24 Hrs  T(C): 36.9 (12 Dec 2023 08:21), Max: 36.9 (12 Dec 2023 08:21)  T(F): 98.4 (12 Dec 2023 08:21), Max: 98.4 (12 Dec 2023 08:21)  HR: 57 (12 Dec 2023 08:21) (57 - 63)  BP: 166/46 (12 Dec 2023 08:21) (142/54 - 166/46)  BP(mean): --  RR: 18 (12 Dec 2023 08:21) (18 - 18)  SpO2: 96% (12 Dec 2023 08:21) (96% - 100%)    Parameters below as of 12 Dec 2023 08:21  Patient On (Oxygen Delivery Method): room air        MEDICATIONS  (STANDING):  apixaban 5 milliGRAM(s) Oral every 12 hours  aspirin enteric coated 81 milliGRAM(s) Oral daily  atorvastatin 80 milliGRAM(s) Oral at bedtime  cefTRIAXone Injectable. 1000 milliGRAM(s) IV Push every 24 hours  Dakins Solution - 1/4 Strength 1 Application(s) Topical daily  doxycycline monohydrate Capsule 100 milliGRAM(s) Oral every 12 hours  ferrous    sulfate 325 milliGRAM(s) Oral daily  influenza  Vaccine (HIGH DOSE) 0.7 milliLiter(s) IntraMuscular once  levothyroxine 100 MICROGram(s) Oral daily  metoprolol succinate ER 25 milliGRAM(s) Oral daily  multivitamin 1 Tablet(s) Oral daily    MEDICATIONS  (PRN):  acetaminophen     Tablet .. 650 milliGRAM(s) Oral every 6 hours PRN Temp greater or equal to 38C (100.4F), Mild Pain (1 - 3)  aluminum hydroxide/magnesium hydroxide/simethicone Suspension 30 milliLiter(s) Oral every 4 hours PRN Dyspepsia  melatonin 3 milliGRAM(s) Oral at bedtime PRN Insomnia  ondansetron Injectable 4 milliGRAM(s) IV Push every 8 hours PRN Nausea and/or Vomiting      Physical Exam:   Constitutiona: NAD, alert;  Lower Extremity Focus  Derm:  Skin warm, dry and supple bilateral.    Ulceration to the left medial heel, wound base is mostly granular, wound size is 3cm x 3cm, no periwound edema or erythema noted. no purulence or pus noted, no tracking/tunneling noted, no PTB, no malodor  Vascular: Dorsalis Pedis and Posterior Tibial pulses non palpable.  Capillary re-fill time more then 3 seconds digits 1-5 bilateral.    Neuro: Protective sensation diminished to the level of the digits bilateral.  MSK: Muscle strength 5/5 all major muscle groups bilateral. TTP to the wound site on the left medial mal            Date of Service: 12/12/23  72 y/o male with PMHX of HTN, GERD,  severe PVD s/p multiple stents b/l legs, s/p L  fem/popliteal bypass, chronic left lower ankle ulceration present to ED with worsening right groin redness. Patient reports recent procedure with Dr. Sosa. Was discharge to rehab where his PCP noted increasing fever and redness of right groin. Patient also reports some mild cough and sore throat. No chest pain or sob.   In ED patient found to have right groin cellulitis and + Covid.  (09 Dec 2023 05:36)  Podiatry was consulted for left foot ulcer. Pt resting comfortably bedside today. Pt tender to wound site.     12/12/23: Patient seen by podiatry team. Pt resting at bedside, NAD.         REVIEW OF SYSTEMS:  CONSTITUTIONAL: No weakness, fevers or chills  EYES/ENT: No visual changes;  No vertigo or throat pain   NECK: No pain or stiffness  RESPIRATORY: No cough, wheezing, hemoptysis; No shortness of breath  CARDIOVASCULAR: No chest pain or palpitations  GASTROINTESTINAL: No abdominal or epigastric pain. No nausea, vomiting, or hematemesis; No diarrhea or constipation. No melena or hematochezia.  GENITOURINARY: No dysuria, frequency or hematuria  NEUROLOGICAL: No numbness or weakness  SKIN: See physical examination.  All other review of systems is negative unless indicated above    PMH: Essential hypertension    PVD (peripheral vascular disease)    Gastroesophageal reflux disease, esophagitis presence not specified    Hypothyroidism    Chronic obstructive pulmonary disease, unspecified COPD type    Eczema    Hyperlipidemia, unspecified hyperlipidemia type    Medial meniscus tear      PSH:Femoral popliteal artery thrombus    PVD (peripheral vascular disease)        Allergies:Betadine (Hives; Rash)  IV Contrast (Hives)                            9.3    6.63  )-----------( 394      ( 12 Dec 2023 07:36 )             29.3     12-12    143  |  112<H>  |  14  ----------------------------<  100<H>  5.4<H>   |  29  |  1.10    Ca    8.5      12 Dec 2023 07:36      Vital Signs Last 24 Hrs  T(C): 36.9 (12 Dec 2023 08:21), Max: 36.9 (12 Dec 2023 08:21)  T(F): 98.4 (12 Dec 2023 08:21), Max: 98.4 (12 Dec 2023 08:21)  HR: 57 (12 Dec 2023 08:21) (57 - 63)  BP: 166/46 (12 Dec 2023 08:21) (142/54 - 166/46)  BP(mean): --  RR: 18 (12 Dec 2023 08:21) (18 - 18)  SpO2: 96% (12 Dec 2023 08:21) (96% - 100%)    Parameters below as of 12 Dec 2023 08:21  Patient On (Oxygen Delivery Method): room air        MEDICATIONS  (STANDING):  apixaban 5 milliGRAM(s) Oral every 12 hours  aspirin enteric coated 81 milliGRAM(s) Oral daily  atorvastatin 80 milliGRAM(s) Oral at bedtime  cefTRIAXone Injectable. 1000 milliGRAM(s) IV Push every 24 hours  Dakins Solution - 1/4 Strength 1 Application(s) Topical daily  doxycycline monohydrate Capsule 100 milliGRAM(s) Oral every 12 hours  ferrous    sulfate 325 milliGRAM(s) Oral daily  influenza  Vaccine (HIGH DOSE) 0.7 milliLiter(s) IntraMuscular once  levothyroxine 100 MICROGram(s) Oral daily  metoprolol succinate ER 25 milliGRAM(s) Oral daily  multivitamin 1 Tablet(s) Oral daily    MEDICATIONS  (PRN):  acetaminophen     Tablet .. 650 milliGRAM(s) Oral every 6 hours PRN Temp greater or equal to 38C (100.4F), Mild Pain (1 - 3)  aluminum hydroxide/magnesium hydroxide/simethicone Suspension 30 milliLiter(s) Oral every 4 hours PRN Dyspepsia  melatonin 3 milliGRAM(s) Oral at bedtime PRN Insomnia  ondansetron Injectable 4 milliGRAM(s) IV Push every 8 hours PRN Nausea and/or Vomiting      Physical Exam:   Constitutiona: NAD, alert;  Lower Extremity Focus  Derm:  Skin warm, dry and supple bilateral.    Ulceration to the left medial heel, wound base is mostly granular, wound size is 3cm x 3cm, no periwound edema or erythema noted. no purulence or pus noted, no tracking/tunneling noted, no PTB, no malodor  Vascular: Dorsalis Pedis and Posterior Tibial pulses non palpable.  Capillary re-fill time more then 3 seconds digits 1-5 bilateral.    Neuro: Protective sensation diminished to the level of the digits bilateral.  MSK: Muscle strength 5/5 all major muscle groups bilateral. TTP to the wound site on the left medial mal

## 2023-12-12 NOTE — PROGRESS NOTE ADULT - SUBJECTIVE AND OBJECTIVE BOX
Pt. seen and examined at bedside this morning.   Patient reports right groin pain but better this AM.   He denies fever, chest pain, SOB, nausea, vomiting.       Physical Exam:  General: AOx3, Well developed, NAD  HEENT: NC/AT, normal pinnae and tragi  Chest: Normal respiratory effort, equal chest rise, left chest wall palpable graft, non tender, no erythema  Heart: RRR  Abdomen: Soft, NTND  Inguinal: b/l inguinal scar. Open skin at distal aspect of scar, draining serous fluid, mild erythema and induration. Nontender. 2+ femoral pulses  Neuro/Psych: No localized deficits. Normal speech, normal tone, normal affect  Skin: Approximately 3x4 cm ulcer just superior to the left medial malleolus with some fibrinous tissue, no bleeding, no surround induration or erythema  Extremities: Warm, well perfused, no edema. Popliteal pulses 2+ b/l, DP and PT 2+ on right, 2+ DP, nonpalpable DP on left        Chief complaint:      PMHx:  Essential hypertension    PVD (peripheral vascular disease)    Gastroesophageal reflux disease, esophagitis presence not specified    Hypothyroidism    Chronic obstructive pulmonary disease, unspecified COPD type    Eczema    Hyperlipidemia, unspecified hyperlipidemia type    Medial meniscus tear        PSHx:  Femoral popliteal artery thrombus    PVD (peripheral vascular disease)        FHx:      Vitals:  T(C): 36.9 (12-12 @ 16:31), Max: 36.9 (12-12 @ 08:21)  HR: 52 (12-12 @ 16:31) (52 - 63)  BP: 162/52 (12-12 @ 16:31) (146/67 - 166/46)  RR: 18 (12-12 @ 16:31) (18 - 18)  SpO2: 100% (12-12 @ 16:31) (96% - 100%)      I&Os    .    Labs:  12-12 @ 07:36                    9.3  CBC: 6.63>)-------(<394                     29.3                 143 | 112 | 14    CMP:  ----------------------< 100               5.4 | 29 | 1.10                      Ca:8.5  Phos:-  Mg:-               -|      |-        LFTs:  ------|-|-----             -|      |-        Cultures:    Culture - Abscess with Gram Stain (collected 12-10-23 @ 17:45)  Source: .Abscess Hip - Right  Gram Stain (12-11-23 @ 20:21):    No polymorphonuclear cells seen per low power field    No organisms seen per oil power field  Preliminary Report (12-11-23 @ 20:21):    Moderate Staphylococcus aureus  Organism: Staphylococcus aureus (12-12-23 @ 17:40)  Organism: Staphylococcus aureus (12-12-23 @ 17:40)      Method Type: MARISA      -  Ampicillin/Sulbactam: S <=8/4      -  Cefazolin: S <=4      -  Clindamycin: R <=0.25 This isolate is presumed to be clindamycin resistant based on detection of inducible resistance. Clindamycin may still be effective in some patients.      -  Erythromycin: R >4      -  Gentamicin: S <=1 Should not be used as monotherapy      -  Oxacillin: S <=0.25 Oxacillin predicts susceptibility for dicloxacillin, methicillin, and nafcillin      -  Penicillin: R >8      -  Rifampin: S <=1 Should not be used as monotherapy      -  Tetracycline: S <=1      -  Trimethoprim/Sulfamethoxazole: S <=0.5/9.5      -  Vancomycin: S 2    Culture - Blood (collected 12-09-23 @ 01:08)  Source: .Blood None  Preliminary Report (12-12-23 @ 07:01):    No growth at 72 Hours    Culture - Blood (collected 12-09-23 @ 00:23)  Source: .Blood None  Preliminary Report (12-12-23 @ 07:01):    No growth at 72 Hours          Current Inpatient Medications:  acetaminophen     Tablet .. 650 milliGRAM(s) Oral every 6 hours PRN  aluminum hydroxide/magnesium hydroxide/simethicone Suspension 30 milliLiter(s) Oral every 4 hours PRN  apixaban 5 milliGRAM(s) Oral every 12 hours  aspirin enteric coated 81 milliGRAM(s) Oral daily  atorvastatin 80 milliGRAM(s) Oral at bedtime  Breztri Aerosphere: Budesonide 160 mcg, glycopyrrolate 9 mcg, and formoterol fumarate 4.8 mcg per actuation 2 Puff(s) 2 Inhalation Inhalation two times a day  cefTRIAXone Injectable. 1000 milliGRAM(s) IV Push every 24 hours  collagenase Ointment 1 Application(s) Topical daily  Dakins Solution - 1/4 Strength 1 Application(s) Topical daily  doxycycline monohydrate Capsule 100 milliGRAM(s) Oral every 12 hours  ferrous    sulfate 325 milliGRAM(s) Oral daily  influenza  Vaccine (HIGH DOSE) 0.7 milliLiter(s) IntraMuscular once  levothyroxine 100 MICROGram(s) Oral daily  losartan 25 milliGRAM(s) Oral daily  melatonin 3 milliGRAM(s) Oral at bedtime PRN  metoprolol succinate ER 25 milliGRAM(s) Oral daily  multivitamin 1 Tablet(s) Oral daily  ondansetron Injectable 4 milliGRAM(s) IV Push every 8 hours PRN  sodium chloride 0.9%. 1000 milliLiter(s) (75 mL/Hr) IV Continuous <Continuous>    < from: US Duplex Arterial Lower Ext Ltd, Right (12.09.23 @ 02:27) >  IMPRESSION: Limited study. The bypass grafts were not evaluated.  1. the visualized segments of the right lower extremity arteries are   patent. No significant stenosis, thrombosis, or occlusion.  2.   At the surgical site, there is edema and an approximately 4 cm fluid   collection with internal echoes which could be resolving hematoma, seroma   with  blood products, or abscess.    < end of copied text >  < from: US Duplex Venous Lower Ext Complete, Bilateral (12.09.23 @ 02:23) >  IMPRESSION:  No evidence of deep venous thrombosis in either lower extremity.        < end of copied text >

## 2023-12-12 NOTE — PROGRESS NOTE ADULT - ASSESSMENT
74 y/o male with PMHX of HTN, GERD,  severe PVD s/p multiple stents b/l legs, s/p L  fem/popliteal bypass, chronic left lower ankle ulceration present to ED with worsening right groin redness. Patient reports recent right bypass procedure with Dr. Sosa.  Was discharge to rehab where his PCP noted increasing fever and redness of right groin. Patient also reports some mild cough and sore throat. No chest pain or sob.     Right Groin Surgical Site Cellulitis  - ID following  - cont doxycycline/ceftriaxone  - FU blood culture Culture - Abscess with Gram Stain (12.10.23 @ 17:45)   No polymorphonuclear cells seen per low power field   No organisms seen per oil power field  Specimen Source: .Abscess Hip - Right  Moderate Staphylococcus aureus  - vascular consulted- plan for fem-fem graft partial explantation, CFA repair, wound washout, possible debridement and muscle flap on this admission  - Daily Dakin's solution and packing to right femoral site  - Monitor for bleeding from right groin   - Cardiology consulted for cardiac clearance       JENIFER on CKD  Hypokalemia -  resolved  - improved to 1.28, stop IV fluids  - trend bmp     COVID +  - Not hypoxic and currently asymptomatic  - Monitor    Left Ankle chronic ulcer  - Wound care eval    PAD  - Stable  - Continue Eliquis  - Continue Aspirin  - Continue Atorvastatin    HTN  - Stable  - Bystolic switched to Coreg  - Monitor    Hypothyroid  - Continue levothyroxine    DVT prophylaxis  - Marquis    Case d/w team on IDR.

## 2023-12-12 NOTE — PROGRESS NOTE ADULT - SUBJECTIVE AND OBJECTIVE BOX
Date of service: 12-12-23 @ 14:29    Has right inguinal area erythema is improving  Has small opening with scant discharge     ROS: no fever or chills; denies dizziness, no HA, no SOB or cough, no abdominal pain, no diarrhea or constipation; no dysuria, no legs pain, no rashes    MEDICATIONS  (STANDING):  apixaban 5 milliGRAM(s) Oral every 12 hours  aspirin enteric coated 81 milliGRAM(s) Oral daily  atorvastatin 80 milliGRAM(s) Oral at bedtime  Breztri Aerosphere: Budesonide 160 mcg, glycopyrrolate 9 mcg, and formoterol fumarate 4.8 mcg per actuation 2 Puff(s) 2 Inhalation Inhalation two times a day  cefTRIAXone Injectable. 1000 milliGRAM(s) IV Push every 24 hours  collagenase Ointment 1 Application(s) Topical daily  Dakins Solution - 1/4 Strength 1 Application(s) Topical daily  diphenhydrAMINE Injectable 50 milliGRAM(s) IV Push once  doxycycline monohydrate Capsule 100 milliGRAM(s) Oral every 12 hours  ferrous    sulfate 325 milliGRAM(s) Oral daily  influenza  Vaccine (HIGH DOSE) 0.7 milliLiter(s) IntraMuscular once  levothyroxine 100 MICROGram(s) Oral daily  methylPREDNISolone 32 milliGRAM(s) Oral once  metoprolol succinate ER 25 milliGRAM(s) Oral daily  multivitamin 1 Tablet(s) Oral daily  sodium chloride 0.9%. 1000 milliLiter(s) (75 mL/Hr) IV Continuous <Continuous>    Vital Signs Last 24 Hrs  T(C): 36.9 (12 Dec 2023 08:21), Max: 36.9 (12 Dec 2023 08:21)  T(F): 98.4 (12 Dec 2023 08:21), Max: 98.4 (12 Dec 2023 08:21)  HR: 57 (12 Dec 2023 08:21) (57 - 63)  BP: 166/46 (12 Dec 2023 08:21) (142/54 - 166/46)  BP(mean): --  RR: 18 (12 Dec 2023 08:21) (18 - 18)  SpO2: 96% (12 Dec 2023 08:21) (96% - 100%)    Parameters below as of 12 Dec 2023 08:21  Patient On (Oxygen Delivery Method): room air     Physical exam:    Constitutional:  No acute distress  HEENT: NC/AT, EOMI, PERRLA, conjunctivae clear; ears and nose atraumatic; pharynx benign  Neck: supple; thyroid not palpable  Back: no tenderness  Respiratory: respiratory effort normal; clear to auscultation  Cardiovascular: S1S2 regular, no murmurs  Abdomen: soft, not tender, not distended, positive BS; no liver or spleen organomegaly  Genitourinary: no suprapubic tenderness  Lymphatic: no LN palpable  Musculoskeletal: no muscle tenderness, no joint swelling or tenderness  Extremities: no pedal edema  Right inguinal erythema, edema, warmth and tenderness; postsurgical wound partially open, but no discharge - erythema is improving  Neurological/ Psychiatric: AxOx3, judgement and insight normal; moving all extremities  Skin: no rashes; no palpable lesions    Labs: reviewed                        9.3    6.63  )-----------( 394      ( 12 Dec 2023 07:36 )             29.3     12-12    143  |  112<H>  |  14  ----------------------------<  100<H>  5.4<H>   |  29  |  1.10    Ca    8.5      12 Dec 2023 07:36                        9.6    9.00  )-----------( 273      ( 09 Dec 2023 08:09 )             29.5     12-09    135  |  98  |  37<H>  ----------------------------<  104<H>  2.7<LL>   |  30  |  1.74<H>    Ca    7.9<L>      09 Dec 2023 08:09    TPro  6.4  /  Alb  2.4<L>  /  TBili  0.9  /  DBili  x   /  AST  46<H>  /  ALT  28  /  AlkPhos  151<H>  12-09     LIVER FUNCTIONS - ( 09 Dec 2023 00:23 )  Alb: 2.4 g/dL / Pro: 6.4 gm/dL / ALK PHOS: 151 U/L / ALT: 28 U/L / AST: 46 U/L / GGT: x           Urinalysis (12-09 @ 06:16)  Urine Appearance: Clear  Protein, Urine: Negative mg/dL  Urine Microscopic-Add On (NC) (12-09 @ 06:16)  White Blood Cell - Urine: 1 /HPF  Red Blood Cell - Urine: 2 /HPF    COVID (12-09 @ 01:00)  Detected    Culture - Blood (collected 09 Dec 2023 01:08)  Source: .Blood None  Preliminary Report (10 Dec 2023 07:02):    No growth at 24 hours    Culture - Blood (collected 09 Dec 2023 00:23)  Source: .Blood None  Preliminary Report (10 Dec 2023 07:02):    No growth at 24 hours    Radiology: all available radiological tests reviewed  < from: Xray Chest 1 View-PORTABLE IMMEDIATE (12.09.23 @ 00:02) >  IMPRESSION: No evidence of acute cardiopulmonary disease.    < end of copied text >  < from: CT Abdomen and Pelvis No Cont (12.09.23 @ 02:26) >  Postsurgical changes of the right groin with surrounding fat stranding.   No definite evidence of drainable fluid collection, although evaluation   is limited due to lack of intravenous contrast.  Prominent right inguinal lymph nodes, likely reactive.  Multiple stents and bypass grafts as above, the patency of which cannot be evaluated.  < end of copied text >    Advanced directives addressed: full resuscitation

## 2023-12-12 NOTE — PROGRESS NOTE ADULT - SUBJECTIVE AND OBJECTIVE BOX
74 y/o male with PMHX of HTN, GERD,  severe PVD s/p multiple stents b/l legs, s/p L  fem/popliteal bypass, chronic left lower ankle ulceration present to ED with worsening right groin redness. Patient reports recent right bypass procedure with Dr. Sosa.  Was discharge to rehab where his PCP noted increasing fever and redness of right groin. Patient also reports some mild cough and sore throat. Patient was admitted with right groin cellulitis  and was started on IV antibiotics with ID consult    12/11- patient was seen and examined- no acute overnight events- complaining of right groin soreness. VSS.   12/12- no acute overnight events. VSS- complaining of right groin soreness.        Vital Signs Last 24 Hrs  T(C): 36.9 (12 Dec 2023 08:21), Max: 36.9 (12 Dec 2023 08:21)  T(F): 98.4 (12 Dec 2023 08:21), Max: 98.4 (12 Dec 2023 08:21)  HR: 57 (12 Dec 2023 08:21) (57 - 63)  BP: 166/46 (12 Dec 2023 08:21) (142/54 - 166/46)  BP(mean): --  RR: 18 (12 Dec 2023 08:21) (18 - 18)  SpO2: 96% (12 Dec 2023 08:21) (96% - 100%)    Parameters below as of 12 Dec 2023 08:21  Patient On (Oxygen Delivery Method): room air      ROS:   All 10 systems reviewed and found to be negative with the exception of what has been described above.     PE:  Constitutional: NAD, laying in bed  HEENT: NC/AT  Back: no tenderness  Respiratory: respirations even and non labored, LCTA  Cardiovascular: S1S2 regular, no murmurs  Abdomen: soft, not tender, not distended, positive BS  Genitourinary: voiding- Right groin packing intact. Erythema around area noted.   Musculoskeletal: no muscle tenderness, no joint swelling or tenderness  Extremities: no pedal edema   Neurological: no focal deficits    MEDICATIONS  (STANDING):  apixaban 5 milliGRAM(s) Oral every 12 hours  aspirin enteric coated 81 milliGRAM(s) Oral daily  atorvastatin 80 milliGRAM(s) Oral at bedtime  cefTRIAXone Injectable. 1000 milliGRAM(s) IV Push every 24 hours  Dakins Solution - 1/4 Strength 1 Application(s) Topical daily  doxycycline monohydrate Capsule 100 milliGRAM(s) Oral every 12 hours  ferrous    sulfate 325 milliGRAM(s) Oral daily  influenza  Vaccine (HIGH DOSE) 0.7 milliLiter(s) IntraMuscular once  levothyroxine 100 MICROGram(s) Oral daily  metoprolol succinate ER 25 milliGRAM(s) Oral daily  multivitamin 1 Tablet(s) Oral daily    MEDICATIONS  (PRN):  acetaminophen     Tablet .. 650 milliGRAM(s) Oral every 6 hours PRN Temp greater or equal to 38C (100.4F), Mild Pain (1 - 3)  aluminum hydroxide/magnesium hydroxide/simethicone Suspension 30 milliLiter(s) Oral every 4 hours PRN Dyspepsia  melatonin 3 milliGRAM(s) Oral at bedtime PRN Insomnia  ondansetron Injectable 4 milliGRAM(s) IV Push every 8 hours PRN Nausea and/or Vomiting      12-12    143  |  112<H>  |  14  ----------------------------<  100<H>  5.4<H>   |  29  |  1.10    Ca    8.5      12 Dec 2023 07:36                            9.3    6.63  )-----------( 394      ( 12 Dec 2023 07:36 )             29.3         ACC: [id number] EXAM:  CT ABDOMEN AND PELVIS     PROCEDURE DATE:  12/09/2023    INTERPRETATION:  CLINICAL INFORMATION: Right wound infection  COMPARISON: CTA abdomen 10/23/2023  CONTRAST/COMPLICATIONS:  IV Contrast: Omnipaque 350  0 cc administered   0 cc discarded  Oral Contrast: NONE  Complications: None reported at time of study completion  PROCEDURE:  CT of the Abdomen and Pelvis was performed.  Sagittal and coronal reformats were performed.  FINDINGS:  Limited evaluation of solid organs and vascular structures due to lack of   intravenous contrast.  LOWER CHEST: Coronary artery, aortic valve leaflet, and mitral annular   calcifications.  LIVER: Within normal limits.  BILE DUCTS: Normal caliber.  GALLBLADDER: Within normal limits.  SPLEEN: Within normal limits.  PANCREAS: Within normal limits.  ADRENALS: Within normal limits.  KIDNEYS/URETERS: Left renal atrophy. No hydronephrosis.  BLADDER: Within normal limits.  REPRODUCTIVE ORGANS: Prostate within normal limits.  BOWEL: No bowel obstruction. Appendix is normal.  PERITONEUM: No ascites.  VESSELS: Atherosclerotic changes. 3.2 cm infrarenal abdominal aortic   aneurysm status post aortobifemoral stenting. Status post femorofemoral   bypass, right femoral stents, and left-sided bypass graft.  RETROPERITONEUM/LYMPH NODES: Prominent right inguinal lymph nodes.  ABDOMINAL WALL: Postsurgical changes of the right groin with surrounding   fat stranding. Limited evaluation for abscess.No definite drainable   fluid collection.  BONES: Degenerative changes. Left femoral ORIF.  IMPRESSION:  Postsurgical changes of the right groin with surrounding fat stranding.   No definite evidence of drainable fluid collection, although evaluation   is limited due to lack of intravenous contrast.    Prominent right inguinal lymph nodes, likely reactive.    Multiple stents and bypass grafts as above, the patency of which cannot   be evaluated.    ACC: [id number] EXAM:  US KIDNEYS AND BLADDER   ORDERED BY: IRMA YOUNGBLOOD   PROCEDURE DATE:  12/09/2023    INTERPRETATION:  CLINICAL INFORMATION: Acute renal insufficiency.  COMPARISON: Renal ultrasound dated 10/22/2023 and abdominal CT dated   12/09/2023  TECHNIQUE: Sonography of the kidneys and bladder.  FINDINGS:  Right kidney: 11.6 cm. No renal mass, hydronephrosis or calculi. Mild   increased cortical echogenicity.  Left kidney: 8.1 cm. No renal mass, hydronephrosis or calculi. Atrophic   and the pressure decreased echogenicity.  Urinary bladder: Within normal limits.  IMPRESSION:  No hydronephrosis.  Mildly atrophic left kidney.  Bilateral increased renal echogenicity suggestive of underlying medical   renal disease.     72 y/o male with PMHX of HTN, GERD,  severe PVD s/p multiple stents b/l legs, s/p L  fem/popliteal bypass, chronic left lower ankle ulceration present to ED with worsening right groin redness. Patient reports recent right bypass procedure with Dr. Sosa.  Was discharge to rehab where his PCP noted increasing fever and redness of right groin. Patient also reports some mild cough and sore throat. Patient was admitted with right groin cellulitis  and was started on IV antibiotics with ID consult    12/11- patient was seen and examined- no acute overnight events- complaining of right groin soreness. VSS.   12/12- no acute overnight events. VSS- complaining of right groin soreness.        Vital Signs Last 24 Hrs  T(C): 36.9 (12 Dec 2023 08:21), Max: 36.9 (12 Dec 2023 08:21)  T(F): 98.4 (12 Dec 2023 08:21), Max: 98.4 (12 Dec 2023 08:21)  HR: 57 (12 Dec 2023 08:21) (57 - 63)  BP: 166/46 (12 Dec 2023 08:21) (142/54 - 166/46)  BP(mean): --  RR: 18 (12 Dec 2023 08:21) (18 - 18)  SpO2: 96% (12 Dec 2023 08:21) (96% - 100%)    Parameters below as of 12 Dec 2023 08:21  Patient On (Oxygen Delivery Method): room air      ROS:   All 10 systems reviewed and found to be negative with the exception of what has been described above.     PE:  Constitutional: NAD, laying in bed  HEENT: NC/AT  Back: no tenderness  Respiratory: respirations even and non labored, LCTA  Cardiovascular: S1S2 regular, no murmurs  Abdomen: soft, not tender, not distended, positive BS  Genitourinary: voiding- Right groin packing intact. Erythema around area noted.   Musculoskeletal: no muscle tenderness, no joint swelling or tenderness  Extremities: no pedal edema   Neurological: no focal deficits    MEDICATIONS  (STANDING):  apixaban 5 milliGRAM(s) Oral every 12 hours  aspirin enteric coated 81 milliGRAM(s) Oral daily  atorvastatin 80 milliGRAM(s) Oral at bedtime  cefTRIAXone Injectable. 1000 milliGRAM(s) IV Push every 24 hours  Dakins Solution - 1/4 Strength 1 Application(s) Topical daily  doxycycline monohydrate Capsule 100 milliGRAM(s) Oral every 12 hours  ferrous    sulfate 325 milliGRAM(s) Oral daily  influenza  Vaccine (HIGH DOSE) 0.7 milliLiter(s) IntraMuscular once  levothyroxine 100 MICROGram(s) Oral daily  metoprolol succinate ER 25 milliGRAM(s) Oral daily  multivitamin 1 Tablet(s) Oral daily    MEDICATIONS  (PRN):  acetaminophen     Tablet .. 650 milliGRAM(s) Oral every 6 hours PRN Temp greater or equal to 38C (100.4F), Mild Pain (1 - 3)  aluminum hydroxide/magnesium hydroxide/simethicone Suspension 30 milliLiter(s) Oral every 4 hours PRN Dyspepsia  melatonin 3 milliGRAM(s) Oral at bedtime PRN Insomnia  ondansetron Injectable 4 milliGRAM(s) IV Push every 8 hours PRN Nausea and/or Vomiting      12-12    143  |  112<H>  |  14  ----------------------------<  100<H>  5.4<H>   |  29  |  1.10    Ca    8.5      12 Dec 2023 07:36                            9.3    6.63  )-----------( 394      ( 12 Dec 2023 07:36 )             29.3         ACC: [id number] EXAM:  CT ABDOMEN AND PELVIS     PROCEDURE DATE:  12/09/2023    INTERPRETATION:  CLINICAL INFORMATION: Right wound infection  COMPARISON: CTA abdomen 10/23/2023  CONTRAST/COMPLICATIONS:  IV Contrast: Omnipaque 350  0 cc administered   0 cc discarded  Oral Contrast: NONE  Complications: None reported at time of study completion  PROCEDURE:  CT of the Abdomen and Pelvis was performed.  Sagittal and coronal reformats were performed.  FINDINGS:  Limited evaluation of solid organs and vascular structures due to lack of   intravenous contrast.  LOWER CHEST: Coronary artery, aortic valve leaflet, and mitral annular   calcifications.  LIVER: Within normal limits.  BILE DUCTS: Normal caliber.  GALLBLADDER: Within normal limits.  SPLEEN: Within normal limits.  PANCREAS: Within normal limits.  ADRENALS: Within normal limits.  KIDNEYS/URETERS: Left renal atrophy. No hydronephrosis.  BLADDER: Within normal limits.  REPRODUCTIVE ORGANS: Prostate within normal limits.  BOWEL: No bowel obstruction. Appendix is normal.  PERITONEUM: No ascites.  VESSELS: Atherosclerotic changes. 3.2 cm infrarenal abdominal aortic   aneurysm status post aortobifemoral stenting. Status post femorofemoral   bypass, right femoral stents, and left-sided bypass graft.  RETROPERITONEUM/LYMPH NODES: Prominent right inguinal lymph nodes.  ABDOMINAL WALL: Postsurgical changes of the right groin with surrounding   fat stranding. Limited evaluation for abscess.No definite drainable   fluid collection.  BONES: Degenerative changes. Left femoral ORIF.  IMPRESSION:  Postsurgical changes of the right groin with surrounding fat stranding.   No definite evidence of drainable fluid collection, although evaluation   is limited due to lack of intravenous contrast.    Prominent right inguinal lymph nodes, likely reactive.    Multiple stents and bypass grafts as above, the patency of which cannot   be evaluated.    ACC: [id number] EXAM:  US KIDNEYS AND BLADDER   ORDERED BY: IRMA YOUNGBLOOD   PROCEDURE DATE:  12/09/2023    INTERPRETATION:  CLINICAL INFORMATION: Acute renal insufficiency.  COMPARISON: Renal ultrasound dated 10/22/2023 and abdominal CT dated   12/09/2023  TECHNIQUE: Sonography of the kidneys and bladder.  FINDINGS:  Right kidney: 11.6 cm. No renal mass, hydronephrosis or calculi. Mild   increased cortical echogenicity.  Left kidney: 8.1 cm. No renal mass, hydronephrosis or calculi. Atrophic   and the pressure decreased echogenicity.  Urinary bladder: Within normal limits.  IMPRESSION:  No hydronephrosis.  Mildly atrophic left kidney.  Bilateral increased renal echogenicity suggestive of underlying medical   renal disease.

## 2023-12-12 NOTE — CONSULT NOTE ADULT - SUBJECTIVE AND OBJECTIVE BOX
Patient is a 73y old  Male who presents with a chief complaint of Wound check (12 Dec 2023 15:36)    ________________________________  BRANDON SALDIVAR is a 73y year old Male with a past medical history of nonobstructive coronary disease on coronary angiogram 10/27/2023, prior tobacco abuse with abdominal aortic aneurysm status postrepair in 2011, peripheral arterial disease status post multiple interventions, including right to left femorofemoral bypass, multiple endovascular revascularization including stent to left subclavian, hypertension, hyperlipidemia, hypothyroidism, history of brain cancer status post coiling, COPD, anemia and moderate aortic stenosis (2023).      He now presents for worsening right groin redness/erythema with cellulitis.  He will need a debridement and cardiology was consulted given his extensive vascular history for risk optimization.  He denies any chest pain or shortness of breath.  He denies any dizziness or lightheadedness.  He denies any syncope.    PREVIOUS CARDIAC WORKUP:    Echocardiogram 09/14/23  --There is mild left atrial dilatation (LA volume index 38 ml/m²).  --There is no left ventricular hypertrophy.  --The left ventricle has normal end-diastolic diameter.  --LV global wall motion is normal.  --LV ejection fraction (58 %) is normal.  --There is no right atrial dilatation. Right atrial volume is 40.9 ml. The right ventricle is   normal in size. The right ventricle has normal wall motion.  --The aortic root is normal in size (2.90 cm).  --The aortic valve is mildly calcified. There is mild aortic stenosis. The average aortic valve   area (MARYLU) is 1.51 cm². The aortic valve area index is 0.8 cm²/m². There is mild aortic   regurgitation.    Cardiac Catheterization 10/27/2023  LM   Distal left main: There is a 30 % stenosis.      LAD   Left anterior descending artery: The segment is large. Angiography  shows mild atherosclerosis.    CX   Circumflex: The segment is large, dominant and mildly calcified.  Angiography shows mild atherosclerosis. Mid circumflex:  There is a 30 % stenosis.      RCA   Right coronary artery: The segment is small, non-dominant. Angiography  shows no disease.    Left Heart Cath   Left ventricular function was assessed and demonstrates abnormal LV  wall motion. The anterobasal, anterolateral, apical,  diaphragmatic and posterobasal wall segments are hypokinetic. Global  left ventricular function is mildly depressed. Ejection  fraction was visually estimated by estimation with a value of 45%. LV  to AO pullback was performed. The left ventricular end  diastolic pressure was 30 mmHg. LHC performed: Aortic valve crossed  and left ventricular pressure obtained.    ________________________________  Review of systems: A 10 point review of system has been performed, and is negative except for what has been mentioned in the above history of present illness.     PAST MEDICAL & SURGICAL HISTORY:  Essential hypertension      PVD (peripheral vascular disease)      Gastroesophageal reflux disease, esophagitis presence not specified      Hypothyroidism      Chronic obstructive pulmonary disease, unspecified COPD type      Eczema      Hyperlipidemia, unspecified hyperlipidemia type      Medial meniscus tear  left knee      Femoral popliteal artery thrombus  h/o Fem pop bypass 1997      PVD (peripheral vascular disease)  s/p peripheral stent insertion bilateral lower extemities x 10        FAMILY HISTORY:       SOCIAL HISTORY: The patient denies any tobacco abuse, alcohol abuse or illicit drug use.    ALLERGIES:  Betadine (Hives; Rash)  IV Contrast (Hives)    Home Medications:  apixaban 5 mg oral tablet: 1 tab(s) orally every 12 hours (09 Dec 2023 10:52)  docusate sodium 100 mg oral capsule: 2 cap(s) orally once a day (at bedtime) (09 Dec 2023 11:02)  ferrous sulfate 325 mg (65 mg elemental iron) oral tablet: 1 tab(s) orally once a day (09 Dec 2023 10:58)  levothyroxine 100 mcg (0.1 mg) oral tablet: 1 tab(s) orally once a day (09 Dec 2023 10:59)  lisinopril 40 mg oral tablet: 1 tab(s) orally 2 times a day (09 Dec 2023 10:52)  metoprolol succinate 25 mg oral tablet, extended release: 1 tab(s) orally once a day (09 Dec 2023 10:59)  Multiple Vitamins oral tablet: 1 tab(s) orally once a day (09 Dec 2023 10:52)  rosuvastatin 20 mg oral tablet: 1 tab(s) orally once a day (at bedtime) (09 Dec 2023 10:52)  silver sulfADIAZINE 1% topical cream: Apply topically to affected area every other day (09 Dec 2023 10:59)  torsemide 20 mg oral tablet: 3 tab(s) orally once a day (09 Dec 2023 10:52)  Tylenol 325 mg oral tablet: 2 tab(s) orally every 6 hours as needed for (09 Dec 2023 10:59)  Vitamin D3 1000 intl units oral capsule: 1 cap(s) orally once a day (09 Dec 2023 10:52)    MEDICATIONS  (STANDING):  apixaban 5 milliGRAM(s) Oral every 12 hours  aspirin enteric coated 81 milliGRAM(s) Oral daily  atorvastatin 80 milliGRAM(s) Oral at bedtime  Breztri Aerosphere: Budesonide 160 mcg, glycopyrrolate 9 mcg, and formoterol fumarate 4.8 mcg per actuation 2 Puff(s) 2 Inhalation Inhalation two times a day  cefTRIAXone Injectable. 1000 milliGRAM(s) IV Push every 24 hours  collagenase Ointment 1 Application(s) Topical daily  Dakins Solution - 1/4 Strength 1 Application(s) Topical daily  diphenhydrAMINE Injectable 50 milliGRAM(s) IV Push once  doxycycline monohydrate Capsule 100 milliGRAM(s) Oral every 12 hours  ferrous    sulfate 325 milliGRAM(s) Oral daily  influenza  Vaccine (HIGH DOSE) 0.7 milliLiter(s) IntraMuscular once  levothyroxine 100 MICROGram(s) Oral daily  metoprolol succinate ER 25 milliGRAM(s) Oral daily  multivitamin 1 Tablet(s) Oral daily  sodium chloride 0.9%. 1000 milliLiter(s) (75 mL/Hr) IV Continuous <Continuous>    MEDICATIONS  (PRN):  acetaminophen     Tablet .. 650 milliGRAM(s) Oral every 6 hours PRN Temp greater or equal to 38C (100.4F), Mild Pain (1 - 3)  aluminum hydroxide/magnesium hydroxide/simethicone Suspension 30 milliLiter(s) Oral every 4 hours PRN Dyspepsia  melatonin 3 milliGRAM(s) Oral at bedtime PRN Insomnia  ondansetron Injectable 4 milliGRAM(s) IV Push every 8 hours PRN Nausea and/or Vomiting    Vital Signs Last 24 Hrs  T(C): 36.9 (12 Dec 2023 16:31), Max: 36.9 (12 Dec 2023 08:21)  T(F): 98.4 (12 Dec 2023 16:31), Max: 98.4 (12 Dec 2023 08:21)  HR: 52 (12 Dec 2023 16:31) (52 - 63)  BP: 162/52 (12 Dec 2023 16:31) (146/67 - 166/46)  BP(mean): --  RR: 18 (12 Dec 2023 16:31) (18 - 18)  SpO2: 100% (12 Dec 2023 16:31) (96% - 100%)    Parameters below as of 12 Dec 2023 16:31  Patient On (Oxygen Delivery Method): room air      I&O's Summary    ________________________________  GENERAL APPEARANCE:  No acute distress  HEAD: normocephalic, atraumatic  NECK: supple, no jugular venous distention, no carotid bruit    HEART: Regular rate and rhythm, S1, S2 normal, 2/6 systolic murmur    CHEST:  No anterior chest wall tenderness    LUNGS:  Clear to auscultation, without any wheezing, rhonchi or rales    ABDOMEN: soft, nontender, nondistended, with positive bowel sounds appreciated  EXTREMITIES: no edema.   NEURO: Alert and oriented x3  PSYC:  Normal affect  SKIN:  Dry  ________________________________   ECG: Sinus bradycardia with top normal QRS duration.  Nonspecific ST-T wave changes.  Isolated fusion complex.  LABS:                        9.3    6.63  )-----------( 394      ( 12 Dec 2023 07:36 )             29.3             12-12    143  |  112<H>  |  14  ----------------------------<  100<H>  5.4<H>   |  29  |  1.10    Ca    8.5      12 Dec 2023 07:36          Urinalysis Basic - ( 12 Dec 2023 07:36 )    Color: x / Appearance: x / SG: x / pH: x  Gluc: 100 mg/dL / Ketone: x  / Bili: x / Urobili: x   Blood: x / Protein: x / Nitrite: x   Leuk Esterase: x / RBC: x / WBC x   Sq Epi: x / Non Sq Epi: x / Bacteria: x          Urinalysis Basic - ( 12 Dec 2023 07:36 )    Color: x / Appearance: x / SG: x / pH: x  Gluc: 100 mg/dL / Ketone: x  / Bili: x / Urobili: x   Blood: x / Protein: x / Nitrite: x   Leuk Esterase: x / RBC: x / WBC x   Sq Epi: x / Non Sq Epi: x / Bacteria: x         ________________________________    RADIOLOGY & ADDITIONAL STUDIES:   IMPRESSION: Limited study. The bypass grafts were not evaluated.  1. the visualized segments of the right lower extremity arteries are   patent. No significant stenosis, thrombosis, or occlusion.  2.   At the surgical site, there is edema and an approximately 4 cm fluid   collection with internal echoes which could be resolving hematoma, seroma   with  blood products, or abscess.  IMPRESSION:  Postsurgical changes of the right groin with surrounding fat stranding.   No definite evidence of drainable fluid collection, although evaluation   is limited due to lack of intravenous contrast.    Prominent right inguinal lymph nodes, likely reactive.    Multiple stents and bypass grafts as above, the patency of which cannot   be evaluated.    ________________________________    ASSESSMENT:    Nonobstructive coronary artery disease  Moderate restenosis  Mild systolic dysfunction with a EF of 45% on cath  Extensive peripheral arterial disease history status post multiple surgeries and endovascular intervention  Abdominal aortic aneurysm status postrepair  Hypertension  Hyperlipidemia  History of cerebral aneurysm status post intervention  COPD  JENIFER-improved  COVID+, asymptomatic from a pulmonary/cardiac standpoint  Groin/wound cellulitis    PLAN:  In summary, this is a 73y Male with a extensive vascular history, nonobstructive coronary disease, moderate AV stenosis, who will nee surgery for d groin wound.  He denies any anginal symptoms.  Recent coronary history of show nonobstructive coronary disease.  Continue antiplatelet therapy.  Continue statin.  Cont beta-blocker for mild systolic dysfunction and add low dose ARB.  From a cardiac standpoint, can proceed with surgery at increased but not prohibitive cardiac risk.  No cardiac contraindication.        ____________________________________________  (Dragon Dictation software used). Thank you for allowing me to participate in the care of your patient. Please contact me should any questions arise.    HARESH Chua DO, [facility]  Office: [phone]      Patient is a 73y old  Male who presents with a chief complaint of Wound check (12 Dec 2023 15:36)    ________________________________  BRANDON SALDIVAR is a 73y year old Male with a past medical history of nonobstructive coronary disease on coronary angiogram 10/27/2023, prior tobacco abuse with abdominal aortic aneurysm status postrepair in 2011, peripheral arterial disease status post multiple interventions, including right to left femorofemoral bypass, multiple endovascular revascularization including stent to left subclavian, hypertension, hyperlipidemia, hypothyroidism, history of brain cancer status post coiling, COPD, anemia and moderate aortic stenosis (2023).      He now presents for worsening right groin redness/erythema with cellulitis.  He will need a debridement and cardiology was consulted given his extensive vascular history for risk optimization.  He denies any chest pain or shortness of breath.  He denies any dizziness or lightheadedness.  He denies any syncope.    PREVIOUS CARDIAC WORKUP:    Echocardiogram 09/14/23  --There is mild left atrial dilatation (LA volume index 38 ml/m²).  --There is no left ventricular hypertrophy.  --The left ventricle has normal end-diastolic diameter.  --LV global wall motion is normal.  --LV ejection fraction (58 %) is normal.  --There is no right atrial dilatation. Right atrial volume is 40.9 ml. The right ventricle is   normal in size. The right ventricle has normal wall motion.  --The aortic root is normal in size (2.90 cm).  --The aortic valve is mildly calcified. There is mild aortic stenosis. The average aortic valve   area (MARYLU) is 1.51 cm². The aortic valve area index is 0.8 cm²/m². There is mild aortic   regurgitation.    Cardiac Catheterization 10/27/2023  LM   Distal left main: There is a 30 % stenosis.      LAD   Left anterior descending artery: The segment is large. Angiography  shows mild atherosclerosis.    CX   Circumflex: The segment is large, dominant and mildly calcified.  Angiography shows mild atherosclerosis. Mid circumflex:  There is a 30 % stenosis.      RCA   Right coronary artery: The segment is small, non-dominant. Angiography  shows no disease.    Left Heart Cath   Left ventricular function was assessed and demonstrates abnormal LV  wall motion. The anterobasal, anterolateral, apical,  diaphragmatic and posterobasal wall segments are hypokinetic. Global  left ventricular function is mildly depressed. Ejection  fraction was visually estimated by estimation with a value of 45%. LV  to AO pullback was performed. The left ventricular end  diastolic pressure was 30 mmHg. LHC performed: Aortic valve crossed  and left ventricular pressure obtained.    ________________________________  Review of systems: A 10 point review of system has been performed, and is negative except for what has been mentioned in the above history of present illness.     PAST MEDICAL & SURGICAL HISTORY:  Essential hypertension      PVD (peripheral vascular disease)      Gastroesophageal reflux disease, esophagitis presence not specified      Hypothyroidism      Chronic obstructive pulmonary disease, unspecified COPD type      Eczema      Hyperlipidemia, unspecified hyperlipidemia type      Medial meniscus tear  left knee      Femoral popliteal artery thrombus  h/o Fem pop bypass 1997      PVD (peripheral vascular disease)  s/p peripheral stent insertion bilateral lower extemities x 10        FAMILY HISTORY:       SOCIAL HISTORY: The patient denies any tobacco abuse, alcohol abuse or illicit drug use.    ALLERGIES:  Betadine (Hives; Rash)  IV Contrast (Hives)    Home Medications:  apixaban 5 mg oral tablet: 1 tab(s) orally every 12 hours (09 Dec 2023 10:52)  docusate sodium 100 mg oral capsule: 2 cap(s) orally once a day (at bedtime) (09 Dec 2023 11:02)  ferrous sulfate 325 mg (65 mg elemental iron) oral tablet: 1 tab(s) orally once a day (09 Dec 2023 10:58)  levothyroxine 100 mcg (0.1 mg) oral tablet: 1 tab(s) orally once a day (09 Dec 2023 10:59)  lisinopril 40 mg oral tablet: 1 tab(s) orally 2 times a day (09 Dec 2023 10:52)  metoprolol succinate 25 mg oral tablet, extended release: 1 tab(s) orally once a day (09 Dec 2023 10:59)  Multiple Vitamins oral tablet: 1 tab(s) orally once a day (09 Dec 2023 10:52)  rosuvastatin 20 mg oral tablet: 1 tab(s) orally once a day (at bedtime) (09 Dec 2023 10:52)  silver sulfADIAZINE 1% topical cream: Apply topically to affected area every other day (09 Dec 2023 10:59)  torsemide 20 mg oral tablet: 3 tab(s) orally once a day (09 Dec 2023 10:52)  Tylenol 325 mg oral tablet: 2 tab(s) orally every 6 hours as needed for (09 Dec 2023 10:59)  Vitamin D3 1000 intl units oral capsule: 1 cap(s) orally once a day (09 Dec 2023 10:52)    MEDICATIONS  (STANDING):  apixaban 5 milliGRAM(s) Oral every 12 hours  aspirin enteric coated 81 milliGRAM(s) Oral daily  atorvastatin 80 milliGRAM(s) Oral at bedtime  Breztri Aerosphere: Budesonide 160 mcg, glycopyrrolate 9 mcg, and formoterol fumarate 4.8 mcg per actuation 2 Puff(s) 2 Inhalation Inhalation two times a day  cefTRIAXone Injectable. 1000 milliGRAM(s) IV Push every 24 hours  collagenase Ointment 1 Application(s) Topical daily  Dakins Solution - 1/4 Strength 1 Application(s) Topical daily  diphenhydrAMINE Injectable 50 milliGRAM(s) IV Push once  doxycycline monohydrate Capsule 100 milliGRAM(s) Oral every 12 hours  ferrous    sulfate 325 milliGRAM(s) Oral daily  influenza  Vaccine (HIGH DOSE) 0.7 milliLiter(s) IntraMuscular once  levothyroxine 100 MICROGram(s) Oral daily  metoprolol succinate ER 25 milliGRAM(s) Oral daily  multivitamin 1 Tablet(s) Oral daily  sodium chloride 0.9%. 1000 milliLiter(s) (75 mL/Hr) IV Continuous <Continuous>    MEDICATIONS  (PRN):  acetaminophen     Tablet .. 650 milliGRAM(s) Oral every 6 hours PRN Temp greater or equal to 38C (100.4F), Mild Pain (1 - 3)  aluminum hydroxide/magnesium hydroxide/simethicone Suspension 30 milliLiter(s) Oral every 4 hours PRN Dyspepsia  melatonin 3 milliGRAM(s) Oral at bedtime PRN Insomnia  ondansetron Injectable 4 milliGRAM(s) IV Push every 8 hours PRN Nausea and/or Vomiting    Vital Signs Last 24 Hrs  T(C): 36.9 (12 Dec 2023 16:31), Max: 36.9 (12 Dec 2023 08:21)  T(F): 98.4 (12 Dec 2023 16:31), Max: 98.4 (12 Dec 2023 08:21)  HR: 52 (12 Dec 2023 16:31) (52 - 63)  BP: 162/52 (12 Dec 2023 16:31) (146/67 - 166/46)  BP(mean): --  RR: 18 (12 Dec 2023 16:31) (18 - 18)  SpO2: 100% (12 Dec 2023 16:31) (96% - 100%)    Parameters below as of 12 Dec 2023 16:31  Patient On (Oxygen Delivery Method): room air      I&O's Summary    ________________________________  GENERAL APPEARANCE:  No acute distress  HEAD: normocephalic, atraumatic  NECK: supple, no jugular venous distention, no carotid bruit    HEART: Regular rate and rhythm, S1, S2 normal, 2/6 systolic murmur    CHEST:  No anterior chest wall tenderness    LUNGS:  Clear to auscultation, without any wheezing, rhonchi or rales    ABDOMEN: soft, nontender, nondistended, with positive bowel sounds appreciated  EXTREMITIES: no edema.   NEURO: Alert and oriented x3  PSYC:  Normal affect  SKIN:  Dry  ________________________________   ECG: Sinus bradycardia with top normal QRS duration.  Nonspecific ST-T wave changes.  Isolated fusion complex.  LABS:                        9.3    6.63  )-----------( 394      ( 12 Dec 2023 07:36 )             29.3             12-12    143  |  112<H>  |  14  ----------------------------<  100<H>  5.4<H>   |  29  |  1.10    Ca    8.5      12 Dec 2023 07:36          Urinalysis Basic - ( 12 Dec 2023 07:36 )    Color: x / Appearance: x / SG: x / pH: x  Gluc: 100 mg/dL / Ketone: x  / Bili: x / Urobili: x   Blood: x / Protein: x / Nitrite: x   Leuk Esterase: x / RBC: x / WBC x   Sq Epi: x / Non Sq Epi: x / Bacteria: x          Urinalysis Basic - ( 12 Dec 2023 07:36 )    Color: x / Appearance: x / SG: x / pH: x  Gluc: 100 mg/dL / Ketone: x  / Bili: x / Urobili: x   Blood: x / Protein: x / Nitrite: x   Leuk Esterase: x / RBC: x / WBC x   Sq Epi: x / Non Sq Epi: x / Bacteria: x         ________________________________    RADIOLOGY & ADDITIONAL STUDIES:   IMPRESSION: Limited study. The bypass grafts were not evaluated.  1. the visualized segments of the right lower extremity arteries are   patent. No significant stenosis, thrombosis, or occlusion.  2.   At the surgical site, there is edema and an approximately 4 cm fluid   collection with internal echoes which could be resolving hematoma, seroma   with  blood products, or abscess.  IMPRESSION:  Postsurgical changes of the right groin with surrounding fat stranding.   No definite evidence of drainable fluid collection, although evaluation   is limited due to lack of intravenous contrast.    Prominent right inguinal lymph nodes, likely reactive.    Multiple stents and bypass grafts as above, the patency of which cannot   be evaluated.    ________________________________    ASSESSMENT:    Nonobstructive coronary artery disease  Moderate restenosis  Mild systolic dysfunction with a EF of 45% on cath  Extensive peripheral arterial disease history status post multiple surgeries and endovascular intervention  Abdominal aortic aneurysm status postrepair  Hypertension  Hyperlipidemia  History of cerebral aneurysm status post intervention  COPD  JENIFER-improved  COVID+, asymptomatic from a pulmonary/cardiac standpoint  Groin/wound cellulitis    PLAN:  In summary, this is a 73y Male with a extensive vascular history, nonobstructive coronary disease, moderate AV stenosis, who will nee surgery for d groin wound.  He denies any anginal symptoms.  Recent coronary history of show nonobstructive coronary disease.  Continue antiplatelet therapy.  Continue statin.  Cont beta-blocker for mild systolic dysfunction and add low dose ARB.  From a cardiac standpoint, can proceed with surgery at increased but not prohibitive cardiac risk.  No cardiac contraindication.        ____________________________________________  (Dragon Dictation software used). Thank you for allowing me to participate in the care of your patient. Please contact me should any questions arise.    HARESH Chua DO, [facility]  Office: [phone]

## 2023-12-12 NOTE — PROGRESS NOTE ADULT - ASSESSMENT
Assessment: 73y old male seen for the following:   - Full thickness wound to the left foot, chronic   - Pain to left foot  - Difficulty ambulation    Plan:   Chart reviewed and Patient evaluated;  Discussed diagnosis and treatment with patient. Discussed importance of daily foot examinations, proper shoe gear, importance of tight glycemic control.   X-rays reviewed : on wet read showing no soft tissue gas, no acute bony findings, mild edema noted to the left ankle posteriorly  There is a full thickness wound to the left medial mal, etiology of wound most likely due to arterial problem, wound looks chronic and stable without acute SOI  Wound flush with normal saline   WC: Santyl and DSD  WBAT using surgical shoe  Offloading to bilateral heels while bedbound.   Continue with antibiotics as per ID  All additional care per Med appreciated  Patient demonstrated verbal understanding of all interventions and tolerated interventions well without any complications.   Patient to follow up in wound care center [facility] 1 week after discharge w Dr Cade Joseph    Case D/W attending Dr. Joseph    Wound care instructions for Left LE to be changed every other day:  - Gently remove dressings.  - Clean the wound with saline and dry it thoroughly with dry gauze  - Apply silvadene, gauze, kerlix and secure it with tape   Assessment: 73y old male seen for the following:   - Full thickness wound to the left foot, chronic   - Pain to left foot  - Difficulty ambulation    Plan:   Chart reviewed and Patient evaluated;  Discussed diagnosis and treatment with patient. Discussed importance of daily foot examinations, proper shoe gear, importance of tight glycemic control.   X-rays reviewed : on wet read showing no soft tissue gas, no acute bony findings, mild edema noted to the left ankle posteriorly  There is a full thickness wound to the left medial mal, etiology of wound most likely due to arterial problem, wound looks chronic and stable without acute SOI  Wound flush with normal saline   WC: Santyl and DSD  WBAT using surgical shoe  Offloading to bilateral heels while bedbound.   Continue with antibiotics as per ID  All additional care per Med appreciated  Patient demonstrated verbal understanding of all interventions and tolerated interventions well without any complications.   Patient to follow up in wound care center [facility] 1 week after discharge w Dr Cade Joseph    Case D/W attending Dr. Joseph    Wound care instructions for Left LE to be changed every other day:  - Gently remove dressings.  - Clean the wound with saline and dry it thoroughly with dry gauze  - Apply silvadene, gauze, kerlix and secure it with tape

## 2023-12-12 NOTE — CONSULT NOTE ADULT - ASSESSMENT
73y Male with extensive vascular surgery hx, most recent being a right femoral cutdown with aortogram and B/L LE angiogram , SFA covered stent placed in SFA, left axillary to anterior tibial bypass with PTFE graft 11/1/23. He has a seroma at the right femoral site, actively draining.     - will plan for OR possible monday for gracilis muscle flap  -will co-ordinate with Dr Sosa and Vascular team  -optimize for surgery

## 2023-12-13 LAB
ANION GAP SERPL CALC-SCNC: 4 MMOL/L — LOW (ref 5–17)
ANION GAP SERPL CALC-SCNC: 4 MMOL/L — LOW (ref 5–17)
BUN SERPL-MCNC: 15 MG/DL — SIGNIFICANT CHANGE UP (ref 7–23)
BUN SERPL-MCNC: 15 MG/DL — SIGNIFICANT CHANGE UP (ref 7–23)
CALCIUM SERPL-MCNC: 8.4 MG/DL — LOW (ref 8.5–10.1)
CALCIUM SERPL-MCNC: 8.4 MG/DL — LOW (ref 8.5–10.1)
CHLORIDE SERPL-SCNC: 110 MMOL/L — HIGH (ref 96–108)
CHLORIDE SERPL-SCNC: 110 MMOL/L — HIGH (ref 96–108)
CO2 SERPL-SCNC: 25 MMOL/L — SIGNIFICANT CHANGE UP (ref 22–31)
CO2 SERPL-SCNC: 25 MMOL/L — SIGNIFICANT CHANGE UP (ref 22–31)
CREAT SERPL-MCNC: 1.12 MG/DL — SIGNIFICANT CHANGE UP (ref 0.5–1.3)
CREAT SERPL-MCNC: 1.12 MG/DL — SIGNIFICANT CHANGE UP (ref 0.5–1.3)
EGFR: 70 ML/MIN/1.73M2 — SIGNIFICANT CHANGE UP
EGFR: 70 ML/MIN/1.73M2 — SIGNIFICANT CHANGE UP
GLUCOSE SERPL-MCNC: 114 MG/DL — HIGH (ref 70–99)
GLUCOSE SERPL-MCNC: 114 MG/DL — HIGH (ref 70–99)
HCT VFR BLD CALC: 26.5 % — LOW (ref 39–50)
HCT VFR BLD CALC: 26.5 % — LOW (ref 39–50)
HGB BLD-MCNC: 8.4 G/DL — LOW (ref 13–17)
HGB BLD-MCNC: 8.4 G/DL — LOW (ref 13–17)
MCHC RBC-ENTMCNC: 28.9 PG — SIGNIFICANT CHANGE UP (ref 27–34)
MCHC RBC-ENTMCNC: 28.9 PG — SIGNIFICANT CHANGE UP (ref 27–34)
MCHC RBC-ENTMCNC: 31.7 GM/DL — LOW (ref 32–36)
MCHC RBC-ENTMCNC: 31.7 GM/DL — LOW (ref 32–36)
MCV RBC AUTO: 91.1 FL — SIGNIFICANT CHANGE UP (ref 80–100)
MCV RBC AUTO: 91.1 FL — SIGNIFICANT CHANGE UP (ref 80–100)
PLATELET # BLD AUTO: 413 K/UL — HIGH (ref 150–400)
PLATELET # BLD AUTO: 413 K/UL — HIGH (ref 150–400)
POTASSIUM SERPL-MCNC: 4.3 MMOL/L — SIGNIFICANT CHANGE UP (ref 3.5–5.3)
POTASSIUM SERPL-MCNC: 4.3 MMOL/L — SIGNIFICANT CHANGE UP (ref 3.5–5.3)
POTASSIUM SERPL-SCNC: 4.3 MMOL/L — SIGNIFICANT CHANGE UP (ref 3.5–5.3)
POTASSIUM SERPL-SCNC: 4.3 MMOL/L — SIGNIFICANT CHANGE UP (ref 3.5–5.3)
RBC # BLD: 2.91 M/UL — LOW (ref 4.2–5.8)
RBC # BLD: 2.91 M/UL — LOW (ref 4.2–5.8)
RBC # FLD: 14.1 % — SIGNIFICANT CHANGE UP (ref 10.3–14.5)
RBC # FLD: 14.1 % — SIGNIFICANT CHANGE UP (ref 10.3–14.5)
SODIUM SERPL-SCNC: 139 MMOL/L — SIGNIFICANT CHANGE UP (ref 135–145)
SODIUM SERPL-SCNC: 139 MMOL/L — SIGNIFICANT CHANGE UP (ref 135–145)
WBC # BLD: 13.45 K/UL — HIGH (ref 3.8–10.5)
WBC # BLD: 13.45 K/UL — HIGH (ref 3.8–10.5)
WBC # FLD AUTO: 13.45 K/UL — HIGH (ref 3.8–10.5)
WBC # FLD AUTO: 13.45 K/UL — HIGH (ref 3.8–10.5)

## 2023-12-13 PROCEDURE — 99232 SBSQ HOSP IP/OBS MODERATE 35: CPT

## 2023-12-13 RX ADMIN — ATORVASTATIN CALCIUM 80 MILLIGRAM(S): 80 TABLET, FILM COATED ORAL at 22:19

## 2023-12-13 RX ADMIN — Medication 100 MILLIGRAM(S): at 22:19

## 2023-12-13 RX ADMIN — Medication 3 MILLIGRAM(S): at 22:19

## 2023-12-13 RX ADMIN — Medication 100 MICROGRAM(S): at 05:39

## 2023-12-13 RX ADMIN — APIXABAN 5 MILLIGRAM(S): 2.5 TABLET, FILM COATED ORAL at 10:32

## 2023-12-13 RX ADMIN — Medication 81 MILLIGRAM(S): at 10:32

## 2023-12-13 RX ADMIN — APIXABAN 5 MILLIGRAM(S): 2.5 TABLET, FILM COATED ORAL at 22:19

## 2023-12-13 RX ADMIN — CEFTRIAXONE 1000 MILLIGRAM(S): 500 INJECTION, POWDER, FOR SOLUTION INTRAMUSCULAR; INTRAVENOUS at 16:38

## 2023-12-13 RX ADMIN — SODIUM CHLORIDE 75 MILLILITER(S): 9 INJECTION INTRAMUSCULAR; INTRAVENOUS; SUBCUTANEOUS at 00:44

## 2023-12-13 RX ADMIN — Medication 325 MILLIGRAM(S): at 10:31

## 2023-12-13 RX ADMIN — Medication 25 MILLIGRAM(S): at 10:32

## 2023-12-13 RX ADMIN — Medication 100 MILLIGRAM(S): at 10:31

## 2023-12-13 RX ADMIN — Medication 1 TABLET(S): at 10:32

## 2023-12-13 RX ADMIN — LOSARTAN POTASSIUM 25 MILLIGRAM(S): 100 TABLET, FILM COATED ORAL at 10:31

## 2023-12-13 NOTE — PROGRESS NOTE ADULT - ASSESSMENT
74 y/o male with PMHX of HTN, GERD,  severe PVD s/p multiple stents b/l legs, s/p L  fem/popliteal bypass, chronic left lower ankle ulceration present to ED with worsening right groin redness. Patient reports recent right bypass procedure with Dr. Sosa.  Was discharge to rehab where his PCP noted increasing fever and redness of right groin. Patient also reports some mild cough and sore throat. No chest pain or sob.     Right Groin Surgical Site Cellulitis  - ID following  - cont doxycycline/ceftriaxone  - FU blood culture Culture - Abscess with Gram Stain (12.10.23 @ 17:45)   No polymorphonuclear cells seen per low power field   No organisms seen per oil power field  Specimen Source: .Abscess Hip - Right  Moderate Staphylococcus aureus  - vascular consulted- plan for fem-fem graft partial explantation, CFA repair, wound washout, possible debridement and muscle flap on this admission  - Daily Dakin's solution and packing to right femoral site  - Monitor for bleeding from right groin   - Cardiology consulted for cardiac clearance       JENIFER on CKD  Hypokalemia -  resolved  - trend bmp     COVID +  - Not hypoxic and currently asymptomatic  - Monitor    Left Ankle chronic ulcer  - Wound care eval    PAD  - Stable  - Continue Eliquis  - Continue Aspirin  - Continue Atorvastatin    HTN  - Stable  - Bystolic switched to Coreg  - Monitor    Hypothyroid  - Continue levothyroxine    DVT prophylaxis  - Marquis    Case d/w team on IDR.  72 y/o male with PMHX of HTN, GERD,  severe PVD s/p multiple stents b/l legs, s/p L  fem/popliteal bypass, chronic left lower ankle ulceration present to ED with worsening right groin redness. Patient reports recent right bypass procedure with Dr. Sosa.  Was discharge to rehab where his PCP noted increasing fever and redness of right groin. Patient also reports some mild cough and sore throat. No chest pain or sob.     Right Groin Surgical Site Cellulitis  - ID following  - cont doxycycline/ceftriaxone  - FU blood culture Culture - Abscess with Gram Stain (12.10.23 @ 17:45)   No polymorphonuclear cells seen per low power field   No organisms seen per oil power field  Specimen Source: .Abscess Hip - Right  Moderate Staphylococcus aureus  - vascular consulted- plan for fem-fem graft partial explantation, CFA repair, wound washout, possible debridement and muscle flap on this admission  - Daily Dakin's solution and packing to right femoral site  - Monitor for bleeding from right groin   - Cardiology consulted for cardiac clearance - please see cardiology note for clearance  - RCRI Class 3 risk - 2 pts- 10.1 % 30 days risk of death, MI or cardiac arrest- patient is medically cleared for planned vascular procedure.      *leukocytosis- received IV steroid overnight in leiu of IV contrast allergy       JENIFER on CKD  Hypokalemia -  resolved  - trend bmp     COVID +  - Not hypoxic and currently asymptomatic  - Monitor    Left Ankle chronic ulcer  - Wound care eval    PAD  - Stable  - Continue Eliquis  - Continue Aspirin  - Continue Atorvastatin    HTN  - Stable  - Bystolic switched to Coreg  - Monitor    Hypothyroid  - Continue levothyroxine    DVT prophylaxis  - Marquis    Case d/w team on IDR.

## 2023-12-13 NOTE — PROGRESS NOTE ADULT - ASSESSMENT
Assessment: 73y old male seen for the following:   - Full thickness wound to the left foot, chronic   - Pain to left foot  - Difficulty ambulation    Plan:   Chart reviewed and Patient evaluated;  Discussed diagnosis and treatment with patient. Discussed importance of daily foot examinations, proper shoe gear, importance of tight glycemic control.   X-rays reviewed : on wet read showing no soft tissue gas, no acute bony findings, mild edema noted to the left ankle posteriorly  There is a full thickness wound to the left medial mal, etiology of wound most likely due to arterial problem, wound looks chronic and stable without acute SOI  Wound flush with normal saline   WC: Santyl and DSD  WBAT using surgical shoe  Offloading to bilateral heels while bedbound.   Continue with antibiotics as per ID  All additional care per Med appreciated  Patient demonstrated verbal understanding of all interventions and tolerated interventions well without any complications.   Patient to follow up in wound care center [facility] 1 week after discharge w Dr Cade Joseph    Case D/W attending Dr. Joseph    Wound care instructions for Left Lower Extremity to be changed every other day:  - Gently remove dressings.  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 Discussed importance of daily foot examinations, proper shoe gear, importance of tight glycemic control.   X-rays reviewed : on wet read showing no soft tissue gas, no acute bony findings, mild edema noted to the left ankle posteriorly  There is a full thickness wound to the left medial mal, etiology of wound most likely due to arterial problem, wound looks chronic and stable without acute SOI  Wound flush with normal saline   WC: Santyl and DSD  WBAT using surgical shoe  Offloading to bilateral heels while bedbound.   Continue with antibiotics as per ID  All additional care per Med appreciated  Patient demonstrated verbal understanding of all interventions and tolerated interventions well without any complications.   Patient to follow up in wound care center [facility] 1 week after discharge w Dr Cade Joseph    Case D/W attending Dr. Joseph    Wound care instructions for Left Lower Extremity to be changed every other day:  - Gently remove dressings.  - Clean the wound with saline and dry it thoroughly with dry gauze  - Apply santyl, gauze, kerlix and secure it with tape

## 2023-12-13 NOTE — PHYSICAL THERAPY INITIAL EVALUATION ADULT - PERTINENT HX OF CURRENT PROBLEM, REHAB EVAL
Pt is a 73y Male w/ PMHX of HTN, GERD,  severe PVD s/p multiple stents b/l legs, s/p L  fem/popliteal bypass, chronic left lower ankle ulceration present to ED with worsening right groin redness. with extensive vascular surgery hx, most recent being a right femoral cutdown with aortogram and B/L LE angiogram , SFA covered stent placed in SFA, left axillary to anterior tibial bypass with PTFE graft 11/1/23. He has a seroma at the right femoral site, actively draining.     plan for OR possible monday (12/18) for gracilis muscle flap. Pt is a 73y Male w/ PMHX of HTN, GERD,  severe PVD s/p multiple stents b/l legs, s/p L  fem/popliteal bypass, chronic left lower ankle ulceration present to ED with worsening right groin redness. with extensive vascular surgery hx, most recent being a right femoral cutdown with aortogram and B/L LE angiogram , SFA covered stent placed in SFA, left axillary to anterior tibial bypass with PTFE graft 11/1/23. He has a seroma at the right femoral site, actively draining.     plan for OR possible Monday (12/18) for gracilis muscle flap.

## 2023-12-13 NOTE — PROGRESS NOTE ADULT - SUBJECTIVE AND OBJECTIVE BOX
72 y/o male with PMHX of HTN, GERD,  severe PVD s/p multiple stents b/l legs, s/p L  fem/popliteal bypass, chronic left lower ankle ulceration present to ED with worsening right groin redness. Patient reports recent right bypass procedure with Dr. Sosa.  Was discharge to rehab where his PCP noted increasing fever and redness of right groin. Patient also reports some mild cough and sore throat. Patient was admitted with right groin cellulitis  and was started on IV antibiotics with ID consult    12/13- patient was seen and examined- no acute overnight events. no new complaints. VSS.      Vital Signs Last 24 Hrs  T(C): 36.5 (13 Dec 2023 08:19), Max: 36.9 (12 Dec 2023 16:31)  T(F): 97.7 (13 Dec 2023 08:19), Max: 98.4 (12 Dec 2023 16:31)  HR: 67 (13 Dec 2023 08:19) (52 - 67)  BP: 155/68 (13 Dec 2023 08:19) (145/51 - 162/52)  BP(mean): --  RR: 18 (13 Dec 2023 08:19) (18 - 18)  SpO2: 97% (13 Dec 2023 08:19) (97% - 100%)    Parameters below as of 13 Dec 2023 08:19  Patient On (Oxygen Delivery Method): room air          ROS:   All 10 systems reviewed and found to be negative with the exception of what has been described above.     PE:  Constitutional: NAD, laying in bed  HEENT: NC/AT  Back: no tenderness  Respiratory: respirations even and non labored, LCTA  Cardiovascular: S1S2 regular, no murmurs  Abdomen: soft, not tender, not distended, positive BS  Genitourinary: voiding- Right groin dressing intact- small amount of purulent drain. Erythema around area noted- improved   Musculoskeletal: no muscle tenderness, no joint swelling or tenderness  Extremities: no pedal edema   Neurological: no focal deficits    MEDICATIONS  (STANDING):  apixaban 5 milliGRAM(s) Oral every 12 hours  aspirin enteric coated 81 milliGRAM(s) Oral daily  atorvastatin 80 milliGRAM(s) Oral at bedtime  cefTRIAXone Injectable. 1000 milliGRAM(s) IV Push every 24 hours  Dakins Solution - 1/4 Strength 1 Application(s) Topical daily  doxycycline monohydrate Capsule 100 milliGRAM(s) Oral every 12 hours  ferrous    sulfate 325 milliGRAM(s) Oral daily  influenza  Vaccine (HIGH DOSE) 0.7 milliLiter(s) IntraMuscular once  levothyroxine 100 MICROGram(s) Oral daily  metoprolol succinate ER 25 milliGRAM(s) Oral daily  multivitamin 1 Tablet(s) Oral daily    MEDICATIONS  (PRN):  acetaminophen     Tablet .. 650 milliGRAM(s) Oral every 6 hours PRN Temp greater or equal to 38C (100.4F), Mild Pain (1 - 3)  aluminum hydroxide/magnesium hydroxide/simethicone Suspension 30 milliLiter(s) Oral every 4 hours PRN Dyspepsia  melatonin 3 milliGRAM(s) Oral at bedtime PRN Insomnia  ondansetron Injectable 4 milliGRAM(s) IV Push every 8 hours PRN Nausea and/or Vomiting    12-13    139  |  110<H>  |  15  ----------------------------<  114<H>  4.3   |  25  |  1.12    Ca    8.4<L>      13 Dec 2023 08:41                            8.4    13.45 )-----------( 413      ( 13 Dec 2023 08:41 )             26.5               ACC: [id number] EXAM:  CT ABDOMEN AND PELVIS     PROCEDURE DATE:  12/09/2023    INTERPRETATION:  CLINICAL INFORMATION: Right wound infection  COMPARISON: CTA abdomen 10/23/2023  CONTRAST/COMPLICATIONS:  IV Contrast: Omnipaque 350  0 cc administered   0 cc discarded  Oral Contrast: NONE  Complications: None reported at time of study completion  PROCEDURE:  CT of the Abdomen and Pelvis was performed.  Sagittal and coronal reformats were performed.  FINDINGS:  Limited evaluation of solid organs and vascular structures due to lack of   intravenous contrast.  LOWER CHEST: Coronary artery, aortic valve leaflet, and mitral annular   calcifications.  LIVER: Within normal limits.  BILE DUCTS: Normal caliber.  GALLBLADDER: Within normal limits.  SPLEEN: Within normal limits.  PANCREAS: Within normal limits.  ADRENALS: Within normal limits.  KIDNEYS/URETERS: Left renal atrophy. No hydronephrosis.  BLADDER: Within normal limits.  REPRODUCTIVE ORGANS: Prostate within normal limits.  BOWEL: No bowel obstruction. Appendix is normal.  PERITONEUM: No ascites.  VESSELS: Atherosclerotic changes. 3.2 cm infrarenal abdominal aortic   aneurysm status post aortobifemoral stenting. Status post femorofemoral   bypass, right femoral stents, and left-sided bypass graft.  RETROPERITONEUM/LYMPH NODES: Prominent right inguinal lymph nodes.  ABDOMINAL WALL: Postsurgical changes of the right groin with surrounding   fat stranding. Limited evaluation for abscess.No definite drainable   fluid collection.  BONES: Degenerative changes. Left femoral ORIF.  IMPRESSION:  Postsurgical changes of the right groin with surrounding fat stranding.   No definite evidence of drainable fluid collection, although evaluation   is limited due to lack of intravenous contrast.    Prominent right inguinal lymph nodes, likely reactive.    Multiple stents and bypass grafts as above, the patency of which cannot   be evaluated.    ACC: [id number] EXAM:  US KIDNEYS AND BLADDER   ORDERED BY: IRMA YOUNGBLOOD   PROCEDURE DATE:  12/09/2023    INTERPRETATION:  CLINICAL INFORMATION: Acute renal insufficiency.  COMPARISON: Renal ultrasound dated 10/22/2023 and abdominal CT dated   12/09/2023  TECHNIQUE: Sonography of the kidneys and bladder.  FINDINGS:  Right kidney: 11.6 cm. No renal mass, hydronephrosis or calculi. Mild   increased cortical echogenicity.  Left kidney: 8.1 cm. No renal mass, hydronephrosis or calculi. Atrophic   and the pressure decreased echogenicity.  Urinary bladder: Within normal limits.  IMPRESSION:  No hydronephrosis.  Mildly atrophic left kidney.  Bilateral increased renal echogenicity suggestive of underlying medical   renal disease.     74 y/o male with PMHX of HTN, GERD,  severe PVD s/p multiple stents b/l legs, s/p L  fem/popliteal bypass, chronic left lower ankle ulceration present to ED with worsening right groin redness. Patient reports recent right bypass procedure with Dr. Sosa.  Was discharge to rehab where his PCP noted increasing fever and redness of right groin. Patient also reports some mild cough and sore throat. Patient was admitted with right groin cellulitis  and was started on IV antibiotics with ID consult    12/13- patient was seen and examined- no acute overnight events. no new complaints. VSS.      Vital Signs Last 24 Hrs  T(C): 36.5 (13 Dec 2023 08:19), Max: 36.9 (12 Dec 2023 16:31)  T(F): 97.7 (13 Dec 2023 08:19), Max: 98.4 (12 Dec 2023 16:31)  HR: 67 (13 Dec 2023 08:19) (52 - 67)  BP: 155/68 (13 Dec 2023 08:19) (145/51 - 162/52)  BP(mean): --  RR: 18 (13 Dec 2023 08:19) (18 - 18)  SpO2: 97% (13 Dec 2023 08:19) (97% - 100%)    Parameters below as of 13 Dec 2023 08:19  Patient On (Oxygen Delivery Method): room air          ROS:   All 10 systems reviewed and found to be negative with the exception of what has been described above.     PE:  Constitutional: NAD, laying in bed  HEENT: NC/AT  Back: no tenderness  Respiratory: respirations even and non labored, LCTA  Cardiovascular: S1S2 regular, no murmurs  Abdomen: soft, not tender, not distended, positive BS  Genitourinary: voiding- Right groin dressing intact- small amount of purulent drain. Erythema around area noted- improved   Musculoskeletal: no muscle tenderness, no joint swelling or tenderness  Extremities: no pedal edema   Neurological: no focal deficits    MEDICATIONS  (STANDING):  apixaban 5 milliGRAM(s) Oral every 12 hours  aspirin enteric coated 81 milliGRAM(s) Oral daily  atorvastatin 80 milliGRAM(s) Oral at bedtime  cefTRIAXone Injectable. 1000 milliGRAM(s) IV Push every 24 hours  Dakins Solution - 1/4 Strength 1 Application(s) Topical daily  doxycycline monohydrate Capsule 100 milliGRAM(s) Oral every 12 hours  ferrous    sulfate 325 milliGRAM(s) Oral daily  influenza  Vaccine (HIGH DOSE) 0.7 milliLiter(s) IntraMuscular once  levothyroxine 100 MICROGram(s) Oral daily  metoprolol succinate ER 25 milliGRAM(s) Oral daily  multivitamin 1 Tablet(s) Oral daily    MEDICATIONS  (PRN):  acetaminophen     Tablet .. 650 milliGRAM(s) Oral every 6 hours PRN Temp greater or equal to 38C (100.4F), Mild Pain (1 - 3)  aluminum hydroxide/magnesium hydroxide/simethicone Suspension 30 milliLiter(s) Oral every 4 hours PRN Dyspepsia  melatonin 3 milliGRAM(s) Oral at bedtime PRN Insomnia  ondansetron Injectable 4 milliGRAM(s) IV Push every 8 hours PRN Nausea and/or Vomiting    12-13    139  |  110<H>  |  15  ----------------------------<  114<H>  4.3   |  25  |  1.12    Ca    8.4<L>      13 Dec 2023 08:41                            8.4    13.45 )-----------( 413      ( 13 Dec 2023 08:41 )             26.5               ACC: [id number] EXAM:  CT ABDOMEN AND PELVIS     PROCEDURE DATE:  12/09/2023    INTERPRETATION:  CLINICAL INFORMATION: Right wound infection  COMPARISON: CTA abdomen 10/23/2023  CONTRAST/COMPLICATIONS:  IV Contrast: Omnipaque 350  0 cc administered   0 cc discarded  Oral Contrast: NONE  Complications: None reported at time of study completion  PROCEDURE:  CT of the Abdomen and Pelvis was performed.  Sagittal and coronal reformats were performed.  FINDINGS:  Limited evaluation of solid organs and vascular structures due to lack of   intravenous contrast.  LOWER CHEST: Coronary artery, aortic valve leaflet, and mitral annular   calcifications.  LIVER: Within normal limits.  BILE DUCTS: Normal caliber.  GALLBLADDER: Within normal limits.  SPLEEN: Within normal limits.  PANCREAS: Within normal limits.  ADRENALS: Within normal limits.  KIDNEYS/URETERS: Left renal atrophy. No hydronephrosis.  BLADDER: Within normal limits.  REPRODUCTIVE ORGANS: Prostate within normal limits.  BOWEL: No bowel obstruction. Appendix is normal.  PERITONEUM: No ascites.  VESSELS: Atherosclerotic changes. 3.2 cm infrarenal abdominal aortic   aneurysm status post aortobifemoral stenting. Status post femorofemoral   bypass, right femoral stents, and left-sided bypass graft.  RETROPERITONEUM/LYMPH NODES: Prominent right inguinal lymph nodes.  ABDOMINAL WALL: Postsurgical changes of the right groin with surrounding   fat stranding. Limited evaluation for abscess.No definite drainable   fluid collection.  BONES: Degenerative changes. Left femoral ORIF.  IMPRESSION:  Postsurgical changes of the right groin with surrounding fat stranding.   No definite evidence of drainable fluid collection, although evaluation   is limited due to lack of intravenous contrast.    Prominent right inguinal lymph nodes, likely reactive.    Multiple stents and bypass grafts as above, the patency of which cannot   be evaluated.    ACC: [id number] EXAM:  US KIDNEYS AND BLADDER   ORDERED BY: IRMA YOUNGBLOOD   PROCEDURE DATE:  12/09/2023    INTERPRETATION:  CLINICAL INFORMATION: Acute renal insufficiency.  COMPARISON: Renal ultrasound dated 10/22/2023 and abdominal CT dated   12/09/2023  TECHNIQUE: Sonography of the kidneys and bladder.  FINDINGS:  Right kidney: 11.6 cm. No renal mass, hydronephrosis or calculi. Mild   increased cortical echogenicity.  Left kidney: 8.1 cm. No renal mass, hydronephrosis or calculi. Atrophic   and the pressure decreased echogenicity.  Urinary bladder: Within normal limits.  IMPRESSION:  No hydronephrosis.  Mildly atrophic left kidney.  Bilateral increased renal echogenicity suggestive of underlying medical   renal disease.

## 2023-12-13 NOTE — PHYSICAL THERAPY INITIAL EVALUATION ADULT - GAIT TRAINING, PT EVAL
Patient will ambulate 300ft with straight cane and navigate up/down 13 steps with Supervision by discharge.

## 2023-12-13 NOTE — PHYSICAL THERAPY INITIAL EVALUATION ADULT - ADDITIONAL COMMENTS
4 NASREEN, 13 Steps in home 4 NASREEN, 13 Steps in home B HR support. Pt. was driving prior as well.    Pt. admitted from [facility] and was receiving physical therapy prior to admission. 4 NASREEN, 13 Steps in home B HR support. Pt. was driving prior as well.    Pt. admitted from [facility] and was receiving physical therapy prior to admission.

## 2023-12-13 NOTE — PROGRESS NOTE ADULT - SUBJECTIVE AND OBJECTIVE BOX
Date of Service: 12/13/23  74 y/o male with PMHX of HTN, GERD,  severe PVD s/p multiple stents b/l legs, s/p L  fem/popliteal bypass, chronic left lower ankle ulceration present to ED with worsening right groin redness. Patient reports recent procedure with Dr. Sosa. Was discharge to rehab where his PCP noted increasing fever and redness of right groin. Patient also reports some mild cough and sore throat. No chest pain or sob.   In ED patient found to have right groin cellulitis and + Covid.  (09 Dec 2023 05:36)  Podiatry was consulted for left foot ulcer. Pt resting comfortably bedside today. Pt tender to wound site.     12/13/23: Patient seen by podiatry team. Pt resting at bedside, NAD. No additional pedal complaints.          REVIEW OF SYSTEMS:  CONSTITUTIONAL: No weakness, fevers or chills  EYES/ENT: No visual changes;  No vertigo or throat pain   NECK: No pain or stiffness  RESPIRATORY: No cough, wheezing, hemoptysis; No shortness of breath  CARDIOVASCULAR: No chest pain or palpitations  GASTROINTESTINAL: No abdominal or epigastric pain. No nausea, vomiting, or hematemesis; No diarrhea or constipation. No melena or hematochezia.  GENITOURINARY: No dysuria, frequency or hematuria  NEUROLOGICAL: No numbness or weakness  SKIN: See physical examination.  All other review of systems is negative unless indicated above    PMH: Essential hypertension    PVD (peripheral vascular disease)    Gastroesophageal reflux disease, esophagitis presence not specified    Hypothyroidism    Chronic obstructive pulmonary disease, unspecified COPD type    Eczema    Hyperlipidemia, unspecified hyperlipidemia type    Medial meniscus tear      PSH:Femoral popliteal artery thrombus    PVD (peripheral vascular disease)        Allergies:Betadine (Hives; Rash)  IV Contrast (Hives)                                       8.4    13.45 )-----------( 413      ( 13 Dec 2023 08:41 )             26.5     12-13    139  |  110<H>  |  15  ----------------------------<  114<H>  4.3   |  25  |  1.12    Ca    8.4<L>      13 Dec 2023 08:41      Vital Signs Last 24 Hrs  T(C): 36.5 (13 Dec 2023 08:19), Max: 36.9 (12 Dec 2023 16:31)  T(F): 97.7 (13 Dec 2023 08:19), Max: 98.4 (12 Dec 2023 16:31)  HR: 67 (13 Dec 2023 08:19) (52 - 67)  BP: 155/68 (13 Dec 2023 08:19) (145/51 - 162/52)  BP(mean): --  RR: 18 (13 Dec 2023 08:19) (18 - 18)  SpO2: 97% (13 Dec 2023 08:19) (97% - 100%)    Parameters below as of 13 Dec 2023 08:19  Patient On (Oxygen Delivery Method): room air    MEDICATIONS  (STANDING):  apixaban 5 milliGRAM(s) Oral every 12 hours  aspirin enteric coated 81 milliGRAM(s) Oral daily  atorvastatin 80 milliGRAM(s) Oral at bedtime  Breztri Aerosphere: Budesonide 160 mcg, glycopyrrolate 9 mcg, and formoterol fumarate 4.8 mcg per actuation 2 Puff(s) 2 Inhalation Inhalation two times a day  cefTRIAXone Injectable. 1000 milliGRAM(s) IV Push every 24 hours  collagenase Ointment 1 Application(s) Topical daily  Dakins Solution - 1/4 Strength 1 Application(s) Topical daily  doxycycline monohydrate Capsule 100 milliGRAM(s) Oral every 12 hours  ferrous    sulfate 325 milliGRAM(s) Oral daily  influenza  Vaccine (HIGH DOSE) 0.7 milliLiter(s) IntraMuscular once  levothyroxine 100 MICROGram(s) Oral daily  losartan 25 milliGRAM(s) Oral daily  metoprolol succinate ER 25 milliGRAM(s) Oral daily  multivitamin 1 Tablet(s) Oral daily  sodium chloride 0.9%. 1000 milliLiter(s) (75 mL/Hr) IV Continuous <Continuous>    MEDICATIONS  (PRN):  acetaminophen     Tablet .. 650 milliGRAM(s) Oral every 6 hours PRN Temp greater or equal to 38C (100.4F), Mild Pain (1 - 3)  aluminum hydroxide/magnesium hydroxide/simethicone Suspension 30 milliLiter(s) Oral every 4 hours PRN Dyspepsia  melatonin 3 milliGRAM(s) Oral at bedtime PRN Insomnia  ondansetron Injectable 4 milliGRAM(s) IV Push every 8 hours PRN Nausea and/or Vomiting        Physical Exam:   Constitutiona: NAD, alert;  Lower Extremity Focus  Derm:  Skin warm, dry and supple bilateral.    Ulceration to the left medial heel, wound base is mostly granular, wound size is 3cm x 3cm, no periwound edema or erythema noted. no purulence or pus noted, no tracking/tunneling noted, no PTB, no malodor  Vascular: Dorsalis Pedis and Posterior Tibial pulses non palpable.  Capillary re-fill time more then 3 seconds digits 1-5 bilateral.    Neuro: Protective sensation diminished to the level of the digits bilateral.  MSK: Muscle strength 5/5 all major muscle groups bilateral. TTP to the wound site on the left medial mal            Date of Service: 12/13/23  72 y/o male with PMHX of HTN, GERD,  severe PVD s/p multiple stents b/l legs, s/p L  fem/popliteal bypass, chronic left lower ankle ulceration present to ED with worsening right groin redness. Patient reports recent procedure with Dr. Sosa. Was discharge to rehab where his PCP noted increasing fever and redness of right groin. Patient also reports some mild cough and sore throat. No chest pain or sob.   In ED patient found to have right groin cellulitis and + Covid.  (09 Dec 2023 05:36)  Podiatry was consulted for left foot ulcer. Pt resting comfortably bedside today. Pt tender to wound site.     12/13/23: Patient seen by podiatry team. Pt resting at bedside, NAD. No additional pedal complaints.          REVIEW OF SYSTEMS:  CONSTITUTIONAL: No weakness, fevers or chills  EYES/ENT: No visual changes;  No vertigo or throat pain   NECK: No pain or stiffness  RESPIRATORY: No cough, wheezing, hemoptysis; No shortness of breath  CARDIOVASCULAR: No chest pain or palpitations  GASTROINTESTINAL: No abdominal or epigastric pain. No nausea, vomiting, or hematemesis; No diarrhea or constipation. No melena or hematochezia.  GENITOURINARY: No dysuria, frequency or hematuria  NEUROLOGICAL: No numbness or weakness  SKIN: See physical examination.  All other review of systems is negative unless indicated above    PMH: Essential hypertension    PVD (peripheral vascular disease)    Gastroesophageal reflux disease, esophagitis presence not specified    Hypothyroidism    Chronic obstructive pulmonary disease, unspecified COPD type    Eczema    Hyperlipidemia, unspecified hyperlipidemia type    Medial meniscus tear      PSH:Femoral popliteal artery thrombus    PVD (peripheral vascular disease)        Allergies:Betadine (Hives; Rash)  IV Contrast (Hives)                                       8.4    13.45 )-----------( 413      ( 13 Dec 2023 08:41 )             26.5     12-13    139  |  110<H>  |  15  ----------------------------<  114<H>  4.3   |  25  |  1.12    Ca    8.4<L>      13 Dec 2023 08:41      Vital Signs Last 24 Hrs  T(C): 36.5 (13 Dec 2023 08:19), Max: 36.9 (12 Dec 2023 16:31)  T(F): 97.7 (13 Dec 2023 08:19), Max: 98.4 (12 Dec 2023 16:31)  HR: 67 (13 Dec 2023 08:19) (52 - 67)  BP: 155/68 (13 Dec 2023 08:19) (145/51 - 162/52)  BP(mean): --  RR: 18 (13 Dec 2023 08:19) (18 - 18)  SpO2: 97% (13 Dec 2023 08:19) (97% - 100%)    Parameters below as of 13 Dec 2023 08:19  Patient On (Oxygen Delivery Method): room air    MEDICATIONS  (STANDING):  apixaban 5 milliGRAM(s) Oral every 12 hours  aspirin enteric coated 81 milliGRAM(s) Oral daily  atorvastatin 80 milliGRAM(s) Oral at bedtime  Breztri Aerosphere: Budesonide 160 mcg, glycopyrrolate 9 mcg, and formoterol fumarate 4.8 mcg per actuation 2 Puff(s) 2 Inhalation Inhalation two times a day  cefTRIAXone Injectable. 1000 milliGRAM(s) IV Push every 24 hours  collagenase Ointment 1 Application(s) Topical daily  Dakins Solution - 1/4 Strength 1 Application(s) Topical daily  doxycycline monohydrate Capsule 100 milliGRAM(s) Oral every 12 hours  ferrous    sulfate 325 milliGRAM(s) Oral daily  influenza  Vaccine (HIGH DOSE) 0.7 milliLiter(s) IntraMuscular once  levothyroxine 100 MICROGram(s) Oral daily  losartan 25 milliGRAM(s) Oral daily  metoprolol succinate ER 25 milliGRAM(s) Oral daily  multivitamin 1 Tablet(s) Oral daily  sodium chloride 0.9%. 1000 milliLiter(s) (75 mL/Hr) IV Continuous <Continuous>    MEDICATIONS  (PRN):  acetaminophen     Tablet .. 650 milliGRAM(s) Oral every 6 hours PRN Temp greater or equal to 38C (100.4F), Mild Pain (1 - 3)  aluminum hydroxide/magnesium hydroxide/simethicone Suspension 30 milliLiter(s) Oral every 4 hours PRN Dyspepsia  melatonin 3 milliGRAM(s) Oral at bedtime PRN Insomnia  ondansetron Injectable 4 milliGRAM(s) IV Push every 8 hours PRN Nausea and/or Vomiting        Physical Exam:   Constitutiona: NAD, alert;  Lower Extremity Focus  Derm:  Skin warm, dry and supple bilateral.    Ulceration to the left medial heel, wound base is mostly granular, wound size is 3cm x 3cm, no periwound edema or erythema noted. no purulence or pus noted, no tracking/tunneling noted, no PTB, no malodor  Vascular: Dorsalis Pedis and Posterior Tibial pulses non palpable.  Capillary re-fill time more then 3 seconds digits 1-5 bilateral.    Neuro: Protective sensation diminished to the level of the digits bilateral.  MSK: Muscle strength 5/5 all major muscle groups bilateral. TTP to the wound site on the left medial mal

## 2023-12-13 NOTE — PHYSICAL THERAPY INITIAL EVALUATION ADULT - TRANSFER TRAINING, PT EVAL
Patient will perform all functional transfers with [place] by discharge. Patient will perform all functional transfers with [facility] by discharge.

## 2023-12-13 NOTE — PROGRESS NOTE ADULT - ASSESSMENT
73 year old male with extensive vascular surgery hx, most recent being a right femoral cutdown with aortogram and B/L LE angiogram , SFA covered stent placed in SFA, left axillary to anterior tibial bypass with PTFE graft 11/1/23. He has a seroma at the right femoral site, actively draining. Pulses are palpable, LEs are well perfused. No leukocytosis, HH down slightly.     Plan:  - Plan for fem-fem graft partial explantation, CFA repair, wound washout, possible debridement and muscle flap on Monday 12/18     - Cleared by cardiology      - Please obtain medical clearance by Sunday 12/17  - Daily Dakin's solution and packing to right femoral site  - Monitor for bleeding from right groin   - Monitor cellulitis of skin  - IV abx  - Groin wound culture culture pending, blood cultures negative after 48 hrs  - Continue Eliquis, ASA  - Wound care for left ankle ulcer  - Rest of management as per primary team    Plan will be discussed with Vascular surgery attending, Dr. Sosa 73 year old male with extensive vascular surgery hx, most recent being a right femoral cutdown with aortogram and B/L LE angiogram , SFA covered stent placed in SFA, left axillary to anterior tibial bypass with PTFE graft 11/1/23. He has a seroma at the right femoral site, actively draining. Pulses are palpable, LEs are well perfused. No leukocytosis, HH down slightly.     Plan:  - Plan for partial graft explantation of fem-fem, CFA repair, wound washout, possible debridement and muscle flap on Monday 12/18     - Cleared by cardiology      - Please obtain medical clearance by Sunday 12/17  - Daily Dakin's solution and packing to right femoral site  - Monitor for bleeding from right groin   - Monitor cellulitis of skin  - IV abx  - Groin wound culture culture pending, blood cultures negative after 48 hrs  - Continue Eliquis, ASA  - Wound care for left ankle ulcer  - Rest of management as per primary team    Plan will be discussed with Vascular surgery attending, Dr. Sosa

## 2023-12-13 NOTE — PROGRESS NOTE ADULT - SUBJECTIVE AND OBJECTIVE BOX
SURGERY DAILY PROGRESS NOTE:     Subjective:  Patient seen and examined this AM at bedside. No acute events overnight and patient resting comfortably. CTA performed, read pending. Denies fever/chills, shortness of breath, chest pain. VS reviewed    Objective:    MEDICATIONS  (STANDING):  apixaban 5 milliGRAM(s) Oral every 12 hours  aspirin enteric coated 81 milliGRAM(s) Oral daily  atorvastatin 80 milliGRAM(s) Oral at bedtime  Breztri Aerosphere: Budesonide 160 mcg, glycopyrrolate 9 mcg, and formoterol fumarate 4.8 mcg per actuation 2 Puff(s) 2 Inhalation Inhalation two times a day  cefTRIAXone Injectable. 1000 milliGRAM(s) IV Push every 24 hours  collagenase Ointment 1 Application(s) Topical daily  Dakins Solution - 1/4 Strength 1 Application(s) Topical daily  doxycycline monohydrate Capsule 100 milliGRAM(s) Oral every 12 hours  ferrous    sulfate 325 milliGRAM(s) Oral daily  influenza  Vaccine (HIGH DOSE) 0.7 milliLiter(s) IntraMuscular once  levothyroxine 100 MICROGram(s) Oral daily  losartan 25 milliGRAM(s) Oral daily  metoprolol succinate ER 25 milliGRAM(s) Oral daily  multivitamin 1 Tablet(s) Oral daily  sodium chloride 0.9%. 1000 milliLiter(s) (75 mL/Hr) IV Continuous <Continuous>    MEDICATIONS  (PRN):  acetaminophen     Tablet .. 650 milliGRAM(s) Oral every 6 hours PRN Temp greater or equal to 38C (100.4F), Mild Pain (1 - 3)  aluminum hydroxide/magnesium hydroxide/simethicone Suspension 30 milliLiter(s) Oral every 4 hours PRN Dyspepsia  melatonin 3 milliGRAM(s) Oral at bedtime PRN Insomnia  ondansetron Injectable 4 milliGRAM(s) IV Push every 8 hours PRN Nausea and/or Vomiting      Vital Signs Last 24 Hrs  T(C): 36.5 (13 Dec 2023 08:19), Max: 36.9 (12 Dec 2023 16:31)  T(F): 97.7 (13 Dec 2023 08:19), Max: 98.4 (12 Dec 2023 16:31)  HR: 67 (13 Dec 2023 08:19) (52 - 67)  BP: 155/68 (13 Dec 2023 08:19) (145/51 - 162/52)  BP(mean): --  RR: 18 (13 Dec 2023 08:19) (18 - 18)  SpO2: 97% (13 Dec 2023 08:19) (97% - 100%)    Parameters below as of 13 Dec 2023 08:19  Patient On (Oxygen Delivery Method): room air          PHYSICAL EXAM   GENERAL: NAD, well developed, well nourished  HEAD: Atraumatic, normocephalic  EYES: EOMI, PERRLA, conjunctiva and sclera clear  ENT: moist mucous membrane  NECK: supple, No JVD, midline trachea  CHEST/LUNG: No increased WOB, symmetric excursions, palpable graft  Heart: RRR ppp, no peripheral edema  ABDOMEN: Round, nondistended, soft, nontender. b/l inguinal scar with right side open at inferior pole, draining serous fluid, w/ mild erythema & induration  EXTREMITIES: Brisk cap refill. no clubbing or cyanosis. Palpable b/l popliteal and right DP & PT pulses. left DP nonpalpable  NERVOUS SYSTEM: AOx4, speech clear, no neuro-deficits  MSK: full ROM, no deformities  SKIN: warm to touch, no rash or lesions. wound just superior to left medial malleolus 4 x 3 cm w/ fibrinous exudate, no bleeding, no e/o infection        I&O's Detail    12 Dec 2023 07:01  -  13 Dec 2023 07:00  --------------------------------------------------------  IN:    Oral Fluid: 220 mL  Total IN: 220 mL    OUT:    Voided (mL): 300 mL  Total OUT: 300 mL    Total NET: -80 mL          Daily     Daily     LABS:                        9.3    6.63  )-----------( 394      ( 12 Dec 2023 07:36 )             29.3     12-12    143  |  112<H>  |  14  ----------------------------<  100<H>  5.4<H>   |  29  |  1.10    Ca    8.5      12 Dec 2023 07:36        Urinalysis Basic - ( 12 Dec 2023 07:36 )    Color: x / Appearance: x / SG: x / pH: x  Gluc: 100 mg/dL / Ketone: x  / Bili: x / Urobili: x   Blood: x / Protein: x / Nitrite: x   Leuk Esterase: x / RBC: x / WBC x   Sq Epi: x / Non Sq Epi: x / Bacteria: x        RADIOLOGY & ADDITIONAL STUDIES:

## 2023-12-13 NOTE — PROGRESS NOTE ADULT - ATTENDING COMMENTS
CTA reviewed, will plan for washout and flap placement in conjunction with plastic surgery, tentatively for monday  continue abx, appreciate ID recommendations

## 2023-12-14 LAB
ANION GAP SERPL CALC-SCNC: 6 MMOL/L — SIGNIFICANT CHANGE UP (ref 5–17)
ANION GAP SERPL CALC-SCNC: 6 MMOL/L — SIGNIFICANT CHANGE UP (ref 5–17)
BUN SERPL-MCNC: 14 MG/DL — SIGNIFICANT CHANGE UP (ref 7–23)
BUN SERPL-MCNC: 14 MG/DL — SIGNIFICANT CHANGE UP (ref 7–23)
CALCIUM SERPL-MCNC: 8.3 MG/DL — LOW (ref 8.5–10.1)
CALCIUM SERPL-MCNC: 8.3 MG/DL — LOW (ref 8.5–10.1)
CHLORIDE SERPL-SCNC: 111 MMOL/L — HIGH (ref 96–108)
CHLORIDE SERPL-SCNC: 111 MMOL/L — HIGH (ref 96–108)
CO2 SERPL-SCNC: 24 MMOL/L — SIGNIFICANT CHANGE UP (ref 22–31)
CO2 SERPL-SCNC: 24 MMOL/L — SIGNIFICANT CHANGE UP (ref 22–31)
CREAT SERPL-MCNC: 1.03 MG/DL — SIGNIFICANT CHANGE UP (ref 0.5–1.3)
CREAT SERPL-MCNC: 1.03 MG/DL — SIGNIFICANT CHANGE UP (ref 0.5–1.3)
CULTURE RESULTS: SIGNIFICANT CHANGE UP
EGFR: 78 ML/MIN/1.73M2 — SIGNIFICANT CHANGE UP
EGFR: 78 ML/MIN/1.73M2 — SIGNIFICANT CHANGE UP
GLUCOSE SERPL-MCNC: 90 MG/DL — SIGNIFICANT CHANGE UP (ref 70–99)
GLUCOSE SERPL-MCNC: 90 MG/DL — SIGNIFICANT CHANGE UP (ref 70–99)
HCT VFR BLD CALC: 29.8 % — LOW (ref 39–50)
HCT VFR BLD CALC: 29.8 % — LOW (ref 39–50)
HGB BLD-MCNC: 9.4 G/DL — LOW (ref 13–17)
HGB BLD-MCNC: 9.4 G/DL — LOW (ref 13–17)
MCHC RBC-ENTMCNC: 28.8 PG — SIGNIFICANT CHANGE UP (ref 27–34)
MCHC RBC-ENTMCNC: 28.8 PG — SIGNIFICANT CHANGE UP (ref 27–34)
MCHC RBC-ENTMCNC: 31.5 GM/DL — LOW (ref 32–36)
MCHC RBC-ENTMCNC: 31.5 GM/DL — LOW (ref 32–36)
MCV RBC AUTO: 91.4 FL — SIGNIFICANT CHANGE UP (ref 80–100)
MCV RBC AUTO: 91.4 FL — SIGNIFICANT CHANGE UP (ref 80–100)
PLATELET # BLD AUTO: 487 K/UL — HIGH (ref 150–400)
PLATELET # BLD AUTO: 487 K/UL — HIGH (ref 150–400)
POTASSIUM SERPL-MCNC: 4.3 MMOL/L — SIGNIFICANT CHANGE UP (ref 3.5–5.3)
POTASSIUM SERPL-MCNC: 4.3 MMOL/L — SIGNIFICANT CHANGE UP (ref 3.5–5.3)
POTASSIUM SERPL-SCNC: 4.3 MMOL/L — SIGNIFICANT CHANGE UP (ref 3.5–5.3)
POTASSIUM SERPL-SCNC: 4.3 MMOL/L — SIGNIFICANT CHANGE UP (ref 3.5–5.3)
RBC # BLD: 3.26 M/UL — LOW (ref 4.2–5.8)
RBC # BLD: 3.26 M/UL — LOW (ref 4.2–5.8)
RBC # FLD: 14.4 % — SIGNIFICANT CHANGE UP (ref 10.3–14.5)
RBC # FLD: 14.4 % — SIGNIFICANT CHANGE UP (ref 10.3–14.5)
SODIUM SERPL-SCNC: 141 MMOL/L — SIGNIFICANT CHANGE UP (ref 135–145)
SODIUM SERPL-SCNC: 141 MMOL/L — SIGNIFICANT CHANGE UP (ref 135–145)
SPECIMEN SOURCE: SIGNIFICANT CHANGE UP
WBC # BLD: 9.92 K/UL — SIGNIFICANT CHANGE UP (ref 3.8–10.5)
WBC # BLD: 9.92 K/UL — SIGNIFICANT CHANGE UP (ref 3.8–10.5)
WBC # FLD AUTO: 9.92 K/UL — SIGNIFICANT CHANGE UP (ref 3.8–10.5)
WBC # FLD AUTO: 9.92 K/UL — SIGNIFICANT CHANGE UP (ref 3.8–10.5)

## 2023-12-14 PROCEDURE — 99232 SBSQ HOSP IP/OBS MODERATE 35: CPT | Mod: 24,GC

## 2023-12-14 PROCEDURE — 99232 SBSQ HOSP IP/OBS MODERATE 35: CPT

## 2023-12-14 RX ORDER — LISINOPRIL 2.5 MG/1
20 TABLET ORAL DAILY
Refills: 0 | Status: DISCONTINUED | OUTPATIENT
Start: 2023-12-14 | End: 2023-12-14

## 2023-12-14 RX ADMIN — APIXABAN 5 MILLIGRAM(S): 2.5 TABLET, FILM COATED ORAL at 10:29

## 2023-12-14 RX ADMIN — Medication 81 MILLIGRAM(S): at 10:29

## 2023-12-14 RX ADMIN — Medication 25 MILLIGRAM(S): at 10:29

## 2023-12-14 RX ADMIN — Medication 1 APPLICATION(S): at 10:28

## 2023-12-14 RX ADMIN — APIXABAN 5 MILLIGRAM(S): 2.5 TABLET, FILM COATED ORAL at 22:35

## 2023-12-14 RX ADMIN — LOSARTAN POTASSIUM 50 MILLIGRAM(S): 100 TABLET, FILM COATED ORAL at 10:29

## 2023-12-14 RX ADMIN — Medication 100 MILLIGRAM(S): at 10:29

## 2023-12-14 RX ADMIN — Medication 100 MICROGRAM(S): at 06:00

## 2023-12-14 RX ADMIN — Medication 1 TABLET(S): at 10:29

## 2023-12-14 RX ADMIN — CEFTRIAXONE 1000 MILLIGRAM(S): 500 INJECTION, POWDER, FOR SOLUTION INTRAMUSCULAR; INTRAVENOUS at 15:54

## 2023-12-14 RX ADMIN — ATORVASTATIN CALCIUM 80 MILLIGRAM(S): 80 TABLET, FILM COATED ORAL at 22:46

## 2023-12-14 RX ADMIN — Medication 100 MILLIGRAM(S): at 22:45

## 2023-12-14 RX ADMIN — Medication 325 MILLIGRAM(S): at 10:29

## 2023-12-14 NOTE — PROGRESS NOTE ADULT - ASSESSMENT
73 year old male with extensive vascular surgery hx, most recent being a right femoral cutdown with aortogram and B/L LE angiogram , SFA covered stent placed in SFA, left axillary to anterior tibial bypass with PTFE graft 11/1/23. He has a seroma at the right femoral site, actively draining. Pulses are palpable, LEs are well perfused. No leukocytosis, HH down slightly.     Plan:  - Plan for partial graft explantation of fem-fem, CFA repair, wound washout, possible debridement and muscle flap on Monday 12/18     - Cleared by cardiology      - Please obtain medical clearance by Sunday 12/17  - Daily Dakin's solution and packing to right femoral site  - Monitor for bleeding from right groin   - Monitor cellulitis of skin  - IV abx, per ID  - Groin wound culture culture with MRSA, blood cultures negative after 48 hrs  - Continue Eliquis, ASA  - Wound care for left ankle ulcer  - Rest of management as per primary team    Plan will be discussed with Vascular surgery attending, Dr. Sosa

## 2023-12-14 NOTE — PROGRESS NOTE ADULT - SUBJECTIVE AND OBJECTIVE BOX
Date of Service: 12/14/23  74 y/o male with PMHX of HTN, GERD,  severe PVD s/p multiple stents b/l legs, s/p L  fem/popliteal bypass, chronic left lower ankle ulceration present to ED with worsening right groin redness. Patient reports recent procedure with Dr. Sosa. Was discharge to rehab where his PCP noted increasing fever and redness of right groin. Patient also reports some mild cough and sore throat. No chest pain or sob.   In ED patient found to have right groin cellulitis and + Covid.  (09 Dec 2023 05:36)  Podiatry was consulted for left foot ulcer. Pt resting comfortably bedside today. Pt tender to wound site.     12/14/23: Podiatry following for Lt foot ulcer. Pt resting at bedside, NAD. No additional pedal complaints.          REVIEW OF SYSTEMS:  CONSTITUTIONAL: No weakness, fevers or chills  EYES/ENT: No visual changes;  No vertigo or throat pain   NECK: No pain or stiffness  RESPIRATORY: No cough, wheezing, hemoptysis; No shortness of breath  CARDIOVASCULAR: No chest pain or palpitations  GASTROINTESTINAL: No abdominal or epigastric pain. No nausea, vomiting, or hematemesis; No diarrhea or constipation. No melena or hematochezia.  GENITOURINARY: No dysuria, frequency or hematuria  NEUROLOGICAL: No numbness or weakness  SKIN: See physical examination.  All other review of systems is negative unless indicated above    PMH: Essential hypertension    PVD (peripheral vascular disease)    Gastroesophageal reflux disease, esophagitis presence not specified    Hypothyroidism    Chronic obstructive pulmonary disease, unspecified COPD type    Eczema    Hyperlipidemia, unspecified hyperlipidemia type    Medial meniscus tear      PSH:Femoral popliteal artery thrombus    PVD (peripheral vascular disease)        Allergies:Betadine (Hives; Rash)  IV Contrast (Hives)                                       9.4    9.92  )-----------( 487      ( 14 Dec 2023 08:45 )             29.8     12-14    141  |  111<H>  |  14  ----------------------------<  90  4.3   |  24  |  1.03    Ca    8.3<L>      14 Dec 2023 08:45        MEDICATIONS  (STANDING):  apixaban 5 milliGRAM(s) Oral every 12 hours  aspirin enteric coated 81 milliGRAM(s) Oral daily  atorvastatin 80 milliGRAM(s) Oral at bedtime  Breztri Aerosphere: Budesonide 160 mcg, glycopyrrolate 9 mcg, and formoterol fumarate 4.8 mcg per actuation 2 Puff(s) 2 Inhalation Inhalation two times a day  cefTRIAXone Injectable. 1000 milliGRAM(s) IV Push every 24 hours  collagenase Ointment 1 Application(s) Topical daily  Dakins Solution - 1/4 Strength 1 Application(s) Topical daily  doxycycline monohydrate Capsule 100 milliGRAM(s) Oral every 12 hours  ferrous    sulfate 325 milliGRAM(s) Oral daily  influenza  Vaccine (HIGH DOSE) 0.7 milliLiter(s) IntraMuscular once  levothyroxine 100 MICROGram(s) Oral daily  losartan 25 milliGRAM(s) Oral daily  metoprolol succinate ER 25 milliGRAM(s) Oral daily  multivitamin 1 Tablet(s) Oral daily  sodium chloride 0.9%. 1000 milliLiter(s) (75 mL/Hr) IV Continuous <Continuous>    MEDICATIONS  (PRN):  acetaminophen     Tablet .. 650 milliGRAM(s) Oral every 6 hours PRN Temp greater or equal to 38C (100.4F), Mild Pain (1 - 3)  aluminum hydroxide/magnesium hydroxide/simethicone Suspension 30 milliLiter(s) Oral every 4 hours PRN Dyspepsia  melatonin 3 milliGRAM(s) Oral at bedtime PRN Insomnia  ondansetron Injectable 4 milliGRAM(s) IV Push every 8 hours PRN Nausea and/or Vomiting        Physical Exam:   Constitutional: NAD, alert;  Lower Extremity Focus  Derm:  Skin warm, dry and supple bilateral.    Ulceration to the left medial heel, wound base is mostly granular, wound size is 3cm x 3cm, no periwound edema or erythema noted. no purulence or pus noted, no tracking/tunneling noted, no PTB, no malodor  Vascular: Dorsalis Pedis and Posterior Tibial pulses non palpable.  Capillary re-fill time more then 3 seconds digits 1-5 bilateral.    Neuro: Protective sensation diminished to the level of the digits bilateral.  MSK: Muscle strength 5/5 all major muscle groups bilateral. TTP to the wound site on the left medial mal            Date of Service: 12/14/23  72 y/o male with PMHX of HTN, GERD,  severe PVD s/p multiple stents b/l legs, s/p L  fem/popliteal bypass, chronic left lower ankle ulceration present to ED with worsening right groin redness. Patient reports recent procedure with Dr. Sosa. Was discharge to rehab where his PCP noted increasing fever and redness of right groin. Patient also reports some mild cough and sore throat. No chest pain or sob.   In ED patient found to have right groin cellulitis and + Covid.  (09 Dec 2023 05:36)  Podiatry was consulted for left foot ulcer. Pt resting comfortably bedside today. Pt tender to wound site.     12/14/23: Podiatry following for Lt foot ulcer. Pt resting at bedside, NAD. No additional pedal complaints.          REVIEW OF SYSTEMS:  CONSTITUTIONAL: No weakness, fevers or chills  EYES/ENT: No visual changes;  No vertigo or throat pain   NECK: No pain or stiffness  RESPIRATORY: No cough, wheezing, hemoptysis; No shortness of breath  CARDIOVASCULAR: No chest pain or palpitations  GASTROINTESTINAL: No abdominal or epigastric pain. No nausea, vomiting, or hematemesis; No diarrhea or constipation. No melena or hematochezia.  GENITOURINARY: No dysuria, frequency or hematuria  NEUROLOGICAL: No numbness or weakness  SKIN: See physical examination.  All other review of systems is negative unless indicated above    PMH: Essential hypertension    PVD (peripheral vascular disease)    Gastroesophageal reflux disease, esophagitis presence not specified    Hypothyroidism    Chronic obstructive pulmonary disease, unspecified COPD type    Eczema    Hyperlipidemia, unspecified hyperlipidemia type    Medial meniscus tear      PSH:Femoral popliteal artery thrombus    PVD (peripheral vascular disease)        Allergies:Betadine (Hives; Rash)  IV Contrast (Hives)                                       9.4    9.92  )-----------( 487      ( 14 Dec 2023 08:45 )             29.8     12-14    141  |  111<H>  |  14  ----------------------------<  90  4.3   |  24  |  1.03    Ca    8.3<L>      14 Dec 2023 08:45        MEDICATIONS  (STANDING):  apixaban 5 milliGRAM(s) Oral every 12 hours  aspirin enteric coated 81 milliGRAM(s) Oral daily  atorvastatin 80 milliGRAM(s) Oral at bedtime  Breztri Aerosphere: Budesonide 160 mcg, glycopyrrolate 9 mcg, and formoterol fumarate 4.8 mcg per actuation 2 Puff(s) 2 Inhalation Inhalation two times a day  cefTRIAXone Injectable. 1000 milliGRAM(s) IV Push every 24 hours  collagenase Ointment 1 Application(s) Topical daily  Dakins Solution - 1/4 Strength 1 Application(s) Topical daily  doxycycline monohydrate Capsule 100 milliGRAM(s) Oral every 12 hours  ferrous    sulfate 325 milliGRAM(s) Oral daily  influenza  Vaccine (HIGH DOSE) 0.7 milliLiter(s) IntraMuscular once  levothyroxine 100 MICROGram(s) Oral daily  losartan 25 milliGRAM(s) Oral daily  metoprolol succinate ER 25 milliGRAM(s) Oral daily  multivitamin 1 Tablet(s) Oral daily  sodium chloride 0.9%. 1000 milliLiter(s) (75 mL/Hr) IV Continuous <Continuous>    MEDICATIONS  (PRN):  acetaminophen     Tablet .. 650 milliGRAM(s) Oral every 6 hours PRN Temp greater or equal to 38C (100.4F), Mild Pain (1 - 3)  aluminum hydroxide/magnesium hydroxide/simethicone Suspension 30 milliLiter(s) Oral every 4 hours PRN Dyspepsia  melatonin 3 milliGRAM(s) Oral at bedtime PRN Insomnia  ondansetron Injectable 4 milliGRAM(s) IV Push every 8 hours PRN Nausea and/or Vomiting        Physical Exam:   Constitutional: NAD, alert;  Lower Extremity Focus  Derm:  Skin warm, dry and supple bilateral.    Ulceration to the left medial heel, wound base is mostly granular, wound size is 3cm x 3cm, no periwound edema or erythema noted. no purulence or pus noted, no tracking/tunneling noted, no PTB, no malodor  Vascular: Dorsalis Pedis and Posterior Tibial pulses non palpable.  Capillary re-fill time more then 3 seconds digits 1-5 bilateral.    Neuro: Protective sensation diminished to the level of the digits bilateral.  MSK: Muscle strength 5/5 all major muscle groups bilateral. TTP to the wound site on the left medial mal

## 2023-12-14 NOTE — PROGRESS NOTE ADULT - SUBJECTIVE AND OBJECTIVE BOX
Date of service: 12-14-23 @ 16:16    Lying in bed in NAD  Has right inguinal small open ulcer  No fever  Has dry cough    ROS: no fever or chills; denies dizziness, no HA, no abdominal pain, no diarrhea or constipation; no dysuria, no legs pain, no rashes    MEDICATIONS  (STANDING):  apixaban 5 milliGRAM(s) Oral every 12 hours  aspirin enteric coated 81 milliGRAM(s) Oral daily  atorvastatin 80 milliGRAM(s) Oral at bedtime  Breztri Aerosphere: Budesonide 160 mcg, glycopyrrolate 9 mcg, and formoterol fumarate 4.8 mcg per actuation 2 Puff(s) 2 Inhalation Inhalation two times a day  cefTRIAXone Injectable. 1000 milliGRAM(s) IV Push every 24 hours  Dakins Solution - 1/4 Strength 1 Application(s) Topical daily  doxycycline monohydrate Capsule 100 milliGRAM(s) Oral every 12 hours  ferrous    sulfate 325 milliGRAM(s) Oral daily  influenza  Vaccine (HIGH DOSE) 0.7 milliLiter(s) IntraMuscular once  levothyroxine 100 MICROGram(s) Oral daily  losartan 50 milliGRAM(s) Oral daily  metoprolol succinate ER 25 milliGRAM(s) Oral daily  multivitamin 1 Tablet(s) Oral daily  sodium chloride 0.9%. 1000 milliLiter(s) (75 mL/Hr) IV Continuous <Continuous>    Vital Signs Last 24 Hrs  T(C): 36.7 (14 Dec 2023 08:39), Max: 36.7 (14 Dec 2023 08:39)  T(F): 98.1 (14 Dec 2023 08:39), Max: 98.1 (14 Dec 2023 08:39)  HR: 55 (14 Dec 2023 08:39) (55 - 59)  BP: 182/53 (14 Dec 2023 08:39) (123/86 - 182/53)  BP(mean): 91 (13 Dec 2023 22:15) (91 - 91)  RR: 16 (14 Dec 2023 08:39) (16 - 18)  SpO2: 99% (14 Dec 2023 08:39) (98% - 99%)    Parameters below as of 14 Dec 2023 08:39  Patient On (Oxygen Delivery Method): room air     Physical exam:    Constitutional:  No acute distress  HEENT: NC/AT, EOMI, PERRLA, conjunctivae clear; ears and nose atraumatic; pharynx benign  Neck: supple; thyroid not palpable  Back: no tenderness  Respiratory: respiratory effort normal; clear to auscultation  Cardiovascular: S1S2 regular, no murmurs  Abdomen: soft, not tender, not distended, positive BS; no liver or spleen organomegaly  Genitourinary: no suprapubic tenderness  Lymphatic: no LN palpable  Musculoskeletal: no muscle tenderness, no joint swelling or tenderness  Extremities: no pedal edema  Right inguinal erythema, edema, warmth and tenderness; postsurgical wound partially open, but no discharge - erythema is improving slowly  Neurological/ Psychiatric: AxOx3, judgement and insight normal; moving all extremities  Skin: no rashes; no palpable lesions    Labs: reviewed                        9.4    9.92  )-----------( 487      ( 14 Dec 2023 08:45 )             29.8     12-14    141  |  111<H>  |  14  ----------------------------<  90  4.3   |  24  |  1.03    Ca    8.3<L>      14 Dec 2023 08:45                        9.6    9.00  )-----------( 273      ( 09 Dec 2023 08:09 )             29.5     12-09    135  |  98  |  37<H>  ----------------------------<  104<H>  2.7<LL>   |  30  |  1.74<H>    Ca    7.9<L>      09 Dec 2023 08:09    TPro  6.4  /  Alb  2.4<L>  /  TBili  0.9  /  DBili  x   /  AST  46<H>  /  ALT  28  /  AlkPhos  151<H>  12-09     LIVER FUNCTIONS - ( 09 Dec 2023 00:23 )  Alb: 2.4 g/dL / Pro: 6.4 gm/dL / ALK PHOS: 151 U/L / ALT: 28 U/L / AST: 46 U/L / GGT: x           Urinalysis (12-09 @ 06:16)  Urine Appearance: Clear  Protein, Urine: Negative mg/dL  Urine Microscopic-Add On (NC) (12-09 @ 06:16)  White Blood Cell - Urine: 1 /HPF  Red Blood Cell - Urine: 2 /HPF    COVID (12-09 @ 01:00)  Detected    Culture - Blood (collected 09 Dec 2023 01:08)  Source: .Blood None  Preliminary Report (10 Dec 2023 07:02):    No growth at 24 hours    Culture - Blood (collected 09 Dec 2023 00:23)  Source: .Blood None  Preliminary Report (10 Dec 2023 07:02):    No growth at 24 hours    Radiology: all available radiological tests reviewed  < from: Xray Chest 1 View-PORTABLE IMMEDIATE (12.09.23 @ 00:02) >  IMPRESSION: No evidence of acute cardiopulmonary disease.    < end of copied text >  < from: CT Abdomen and Pelvis No Cont (12.09.23 @ 02:26) >  Postsurgical changes of the right groin with surrounding fat stranding.   No definite evidence of drainable fluid collection, although evaluation   is limited due to lack of intravenous contrast.  Prominent right inguinal lymph nodes, likely reactive.  Multiple stents and bypass grafts as above, the patency of which cannot be evaluated.  < end of copied text >    Advanced directives addressed: full resuscitation

## 2023-12-14 NOTE — PROGRESS NOTE ADULT - ASSESSMENT
72 y/o male with h/o HTN, GERD,  severe PVD s/p multiple stents b/l legs, s/p L  fem/popliteal bypass, chronic left lower ankle ulceration was admitted on 12/9 for worsening right groin redness. Patient reports a recent vascular procedure with Dr. Sosa and a right inguinal vascular access was used. He was discharge to rehab where he was noted with increasing fever and redness of right groin. Patient also reports cough, sore throat and mild SOB. In ER he tested COVID-19 PCR detected. He received vancomycin IV and cefepime.     1. Right inguinal cellulitis s/p recent vascular surgery. Likely postsurgical infection. COVID-19 viral syndrome. ARF resolving. PVD s/p vascular bypass.   -low grade fever  -BC x 2 noted  -on ceftriaxone 1 gm IV qd and doxycycline 100 mg PO q12h # 5  -tolerating abx well so far; no side effects noted  -local wound care  -vascular surgery evaluation appreciated  -plan for partial graft explantation of fem-fem, CFA repair, wound washout, possible debridement and muscle flap on Monday 12/18  -may need further surgery  -continue abx coverage  -f/u cultures  -monitor temps  -f/u CBC  -supportive care  2. Other issues:   -care per medicine       74 y/o male with h/o HTN, GERD,  severe PVD s/p multiple stents b/l legs, s/p L  fem/popliteal bypass, chronic left lower ankle ulceration was admitted on 12/9 for worsening right groin redness. Patient reports a recent vascular procedure with Dr. Sosa and a right inguinal vascular access was used. He was discharge to rehab where he was noted with increasing fever and redness of right groin. Patient also reports cough, sore throat and mild SOB. In ER he tested COVID-19 PCR detected. He received vancomycin IV and cefepime.     1. Right inguinal cellulitis s/p recent vascular surgery. Likely postsurgical infection. COVID-19 viral syndrome. ARF resolving. PVD s/p vascular bypass.   -low grade fever  -BC x 2 noted  -on ceftriaxone 1 gm IV qd and doxycycline 100 mg PO q12h # 5  -tolerating abx well so far; no side effects noted  -local wound care  -vascular surgery evaluation appreciated  -plan for partial graft explantation of fem-fem, CFA repair, wound washout, possible debridement and muscle flap on Monday 12/18  -may need further surgery  -continue abx coverage  -f/u cultures  -monitor temps  -f/u CBC  -supportive care  2. Other issues:   -care per medicine

## 2023-12-14 NOTE — PROGRESS NOTE ADULT - ASSESSMENT
Assessment: 73y old male seen for the following:   - Full thickness wound to the left foot, chronic   - Pain to left foot  - Difficulty ambulation    Plan:   Chart reviewed and Patient evaluated;  Discussed diagnosis and treatment with patient. Discussed importance of daily foot examinations, proper shoe gear, importance of tight glycemic control.   X-rays reviewed : on wet read showing no soft tissue gas, no acute bony findings, mild edema noted to the left ankle posteriorly  There is a full thickness wound to the left medial mal, etiology of wound most likely due to arterial problem, wound looks chronic and stable without acute SOI  WC: Santyl and DSD  WBAT using surgical shoe  Offloading to bilateral heels while bedbound.   Patient to follow up at [facility] 1 week after discharge w/ Dr Cade Joseph.  Continue with antibiotics as per ID  All additional care per Med appreciated  Patient demonstrated verbal understanding of all interventions and tolerated interventions well without any complications.     Case D/W attending Dr. Joseph    Wound care instructions for Left Lower Extremity to be changed every other day:  - Gently remove dressings.  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 Discussed importance of daily foot examinations, proper shoe gear, importance of tight glycemic control.   X-rays reviewed : on wet read showing no soft tissue gas, no acute bony findings, mild edema noted to the left ankle posteriorly  There is a full thickness wound to the left medial mal, etiology of wound most likely due to arterial problem, wound looks chronic and stable without acute SOI  WC: Santyl and DSD  WBAT using surgical shoe  Offloading to bilateral heels while bedbound.   Patient to follow up at [facility] 1 week after discharge w/ Dr Cade Joseph.  Continue with antibiotics as per ID  All additional care per Med appreciated  Patient demonstrated verbal understanding of all interventions and tolerated interventions well without any complications.     Case D/W attending Dr. Joseph    Wound care instructions for Left Lower Extremity to be changed every other day:  - Gently remove dressings.  - Clean the wound with saline and dry it thoroughly with dry gauze  - Apply santyl, gauze, kerlix and secure it with tape

## 2023-12-14 NOTE — PROGRESS NOTE ADULT - NUTRITIONAL ASSESSMENT
This patient has been assessed with a concern for Malnutrition and has been determined to have a diagnosis/diagnoses of Severe protein-calorie malnutrition.    This patient is being managed with:   Diet DASH/TLC-  Sodium & Cholesterol Restricted  Supplement Feeding Modality:  Oral  Ensure Plus High Protein Cans or Servings Per Day:  2       Frequency:  Two Times a day  Entered: Nov 1 2023  4:44PM  

## 2023-12-14 NOTE — PROGRESS NOTE ADULT - ASSESSMENT
72 y/o male with PMHX of HTN, GERD,  severe PVD s/p multiple stents b/l legs, s/p L  fem/popliteal bypass, chronic left lower ankle ulceration present to ED with worsening right groin redness. Patient reports recent right bypass procedure with Dr. Sosa.  Was discharge to rehab where his PCP noted increasing fever and redness of right groin. Patient also reports some mild cough and sore throat. No chest pain or sob.     Right Groin Surgical Site Cellulitis  - ID following  - cont doxycycline/ceftriaxone  - FU blood culture Culture - Abscess with Gram Stain (12.10.23 @ 17:45)   No polymorphonuclear cells seen per low power field   No organisms seen per oil power field  Specimen Source: .Abscess Hip - Right  Moderate Staphylococcus aureus  - vascular consulted- plan for fem-fem graft partial explantation, CFA repair, wound washout, possible debridement and muscle flap on this admission  - Daily Dakin's solution and packing to right femoral site  - Monitor for bleeding from right groin   - Cardiology consulted for cardiac clearance - please see cardiology note for clearance  - RCRI Class 3 risk - 2 pts- 10.1 % 30 days risk of death, MI or cardiac arrest- patient is medically cleared for planned vascular procedure.      *leukocytosis- received IV steroid overnight in leiu of IV contrast allergy       JENIFER on CKD  Hypokalemia -  resolved  - trend bmp     COVID +  - Not hypoxic and currently asymptomatic  - Monitor    Left Ankle chronic ulcer  - Wound care eval    PAD  - Stable  - Continue Eliquis  - Continue Aspirin  - Continue Atorvastatin    HTN  - Stable  - Bystolic switched to Coreg  - Monitor    Hypothyroid  - Continue levothyroxine    DVT prophylaxis  - Marquis    Case d/w team on IDR.

## 2023-12-14 NOTE — PROGRESS NOTE ADULT - SUBJECTIVE AND OBJECTIVE BOX
72 y/o male with PMHX of HTN, GERD,  severe PVD s/p multiple stents b/l legs, s/p L  fem/popliteal bypass, chronic left lower ankle ulceration present to ED with worsening right groin redness. Patient reports recent right bypass procedure with Dr. Sosa.  Was discharge to rehab where his PCP noted increasing fever and redness of right groin. Patient also reports some mild cough and sore throat. Patient was admitted with right groin cellulitis  and was started on IV antibiotics with ID consult    12/13- patient was seen and examined- no acute overnight events. no new complaints. VSS.  12/14- no complaints. no acute overnight events       Vital Signs Last 24 Hrs  T(C): 36.7 (14 Dec 2023 08:39), Max: 36.7 (14 Dec 2023 08:39)  T(F): 98.1 (14 Dec 2023 08:39), Max: 98.1 (14 Dec 2023 08:39)  HR: 55 (14 Dec 2023 08:39) (55 - 59)  BP: 182/53 (14 Dec 2023 08:39) (123/86 - 182/53)  BP(mean): 91 (13 Dec 2023 22:15) (91 - 91)  RR: 16 (14 Dec 2023 08:39) (16 - 18)  SpO2: 99% (14 Dec 2023 08:39) (98% - 99%)    Parameters below as of 14 Dec 2023 08:39  Patient On (Oxygen Delivery Method): room air              ROS:   All 10 systems reviewed and found to be negative with the exception of what has been described above.     PE:  Constitutional: NAD, laying in bed  HEENT: NC/AT  Back: no tenderness  Respiratory: respirations even and non labored, LCTA  Cardiovascular: S1S2 regular, no murmurs  Abdomen: soft, not tender, not distended, positive BS  Genitourinary: voiding- Right groin dressing intact- small amount of purulent drain. Erythema around area noted- improved   Musculoskeletal: no muscle tenderness, no joint swelling or tenderness  Extremities: no pedal edema   Neurological: no focal deficits    MEDICATIONS  (STANDING):  apixaban 5 milliGRAM(s) Oral every 12 hours  aspirin enteric coated 81 milliGRAM(s) Oral daily  atorvastatin 80 milliGRAM(s) Oral at bedtime  cefTRIAXone Injectable. 1000 milliGRAM(s) IV Push every 24 hours  Dakins Solution - 1/4 Strength 1 Application(s) Topical daily  doxycycline monohydrate Capsule 100 milliGRAM(s) Oral every 12 hours  ferrous    sulfate 325 milliGRAM(s) Oral daily  influenza  Vaccine (HIGH DOSE) 0.7 milliLiter(s) IntraMuscular once  levothyroxine 100 MICROGram(s) Oral daily  metoprolol succinate ER 25 milliGRAM(s) Oral daily  multivitamin 1 Tablet(s) Oral daily    MEDICATIONS  (PRN):  acetaminophen     Tablet .. 650 milliGRAM(s) Oral every 6 hours PRN Temp greater or equal to 38C (100.4F), Mild Pain (1 - 3)  aluminum hydroxide/magnesium hydroxide/simethicone Suspension 30 milliLiter(s) Oral every 4 hours PRN Dyspepsia  melatonin 3 milliGRAM(s) Oral at bedtime PRN Insomnia  ondansetron Injectable 4 milliGRAM(s) IV Push every 8 hours PRN Nausea and/or Vomiting    12-13    139  |  110<H>  |  15  ----------------------------<  114<H>  4.3   |  25  |  1.12    Ca    8.4<L>      13 Dec 2023 08:41                            8.4    13.45 )-----------( 413      ( 13 Dec 2023 08:41 )             26.5               ACC: [id number] EXAM:  CT ABDOMEN AND PELVIS     PROCEDURE DATE:  12/09/2023    INTERPRETATION:  CLINICAL INFORMATION: Right wound infection  COMPARISON: CTA abdomen 10/23/2023  CONTRAST/COMPLICATIONS:  IV Contrast: Omnipaque 350  0 cc administered   0 cc discarded  Oral Contrast: NONE  Complications: None reported at time of study completion  PROCEDURE:  CT of the Abdomen and Pelvis was performed.  Sagittal and coronal reformats were performed.  FINDINGS:  Limited evaluation of solid organs and vascular structures due to lack of   intravenous contrast.  LOWER CHEST: Coronary artery, aortic valve leaflet, and mitral annular   calcifications.  LIVER: Within normal limits.  BILE DUCTS: Normal caliber.  GALLBLADDER: Within normal limits.  SPLEEN: Within normal limits.  PANCREAS: Within normal limits.  ADRENALS: Within normal limits.  KIDNEYS/URETERS: Left renal atrophy. No hydronephrosis.  BLADDER: Within normal limits.  REPRODUCTIVE ORGANS: Prostate within normal limits.  BOWEL: No bowel obstruction. Appendix is normal.  PERITONEUM: No ascites.  VESSELS: Atherosclerotic changes. 3.2 cm infrarenal abdominal aortic   aneurysm status post aortobifemoral stenting. Status post femorofemoral   bypass, right femoral stents, and left-sided bypass graft.  RETROPERITONEUM/LYMPH NODES: Prominent right inguinal lymph nodes.  ABDOMINAL WALL: Postsurgical changes of the right groin with surrounding   fat stranding. Limited evaluation for abscess.No definite drainable   fluid collection.  BONES: Degenerative changes. Left femoral ORIF.  IMPRESSION:  Postsurgical changes of the right groin with surrounding fat stranding.   No definite evidence of drainable fluid collection, although evaluation   is limited due to lack of intravenous contrast.    Prominent right inguinal lymph nodes, likely reactive.    Multiple stents and bypass grafts as above, the patency of which cannot   be evaluated.    ACC: [id number] EXAM:  US KIDNEYS AND BLADDER   ORDERED BY: IRMA YOUNGBLOOD   PROCEDURE DATE:  12/09/2023    INTERPRETATION:  CLINICAL INFORMATION: Acute renal insufficiency.  COMPARISON: Renal ultrasound dated 10/22/2023 and abdominal CT dated   12/09/2023  TECHNIQUE: Sonography of the kidneys and bladder.  FINDINGS:  Right kidney: 11.6 cm. No renal mass, hydronephrosis or calculi. Mild   increased cortical echogenicity.  Left kidney: 8.1 cm. No renal mass, hydronephrosis or calculi. Atrophic   and the pressure decreased echogenicity.  Urinary bladder: Within normal limits.  IMPRESSION:  No hydronephrosis.  Mildly atrophic left kidney.  Bilateral increased renal echogenicity suggestive of underlying medical   renal disease.     72 y/o male with PMHX of HTN, GERD,  severe PVD s/p multiple stents b/l legs, s/p L  fem/popliteal bypass, chronic left lower ankle ulceration present to ED with worsening right groin redness. Patient reports recent right bypass procedure with Dr. Sosa.  Was discharge to rehab where his PCP noted increasing fever and redness of right groin. Patient also reports some mild cough and sore throat. Patient was admitted with right groin cellulitis  and was started on IV antibiotics with ID consult    12/13- patient was seen and examined- no acute overnight events. no new complaints. VSS.  12/14- no complaints. no acute overnight events       Vital Signs Last 24 Hrs  T(C): 36.7 (14 Dec 2023 08:39), Max: 36.7 (14 Dec 2023 08:39)  T(F): 98.1 (14 Dec 2023 08:39), Max: 98.1 (14 Dec 2023 08:39)  HR: 55 (14 Dec 2023 08:39) (55 - 59)  BP: 182/53 (14 Dec 2023 08:39) (123/86 - 182/53)  BP(mean): 91 (13 Dec 2023 22:15) (91 - 91)  RR: 16 (14 Dec 2023 08:39) (16 - 18)  SpO2: 99% (14 Dec 2023 08:39) (98% - 99%)    Parameters below as of 14 Dec 2023 08:39  Patient On (Oxygen Delivery Method): room air              ROS:   All 10 systems reviewed and found to be negative with the exception of what has been described above.     PE:  Constitutional: NAD, laying in bed  HEENT: NC/AT  Back: no tenderness  Respiratory: respirations even and non labored, LCTA  Cardiovascular: S1S2 regular, no murmurs  Abdomen: soft, not tender, not distended, positive BS  Genitourinary: voiding- Right groin dressing intact- small amount of purulent drain. Erythema around area noted- improved   Musculoskeletal: no muscle tenderness, no joint swelling or tenderness  Extremities: no pedal edema   Neurological: no focal deficits    MEDICATIONS  (STANDING):  apixaban 5 milliGRAM(s) Oral every 12 hours  aspirin enteric coated 81 milliGRAM(s) Oral daily  atorvastatin 80 milliGRAM(s) Oral at bedtime  cefTRIAXone Injectable. 1000 milliGRAM(s) IV Push every 24 hours  Dakins Solution - 1/4 Strength 1 Application(s) Topical daily  doxycycline monohydrate Capsule 100 milliGRAM(s) Oral every 12 hours  ferrous    sulfate 325 milliGRAM(s) Oral daily  influenza  Vaccine (HIGH DOSE) 0.7 milliLiter(s) IntraMuscular once  levothyroxine 100 MICROGram(s) Oral daily  metoprolol succinate ER 25 milliGRAM(s) Oral daily  multivitamin 1 Tablet(s) Oral daily    MEDICATIONS  (PRN):  acetaminophen     Tablet .. 650 milliGRAM(s) Oral every 6 hours PRN Temp greater or equal to 38C (100.4F), Mild Pain (1 - 3)  aluminum hydroxide/magnesium hydroxide/simethicone Suspension 30 milliLiter(s) Oral every 4 hours PRN Dyspepsia  melatonin 3 milliGRAM(s) Oral at bedtime PRN Insomnia  ondansetron Injectable 4 milliGRAM(s) IV Push every 8 hours PRN Nausea and/or Vomiting    12-13    139  |  110<H>  |  15  ----------------------------<  114<H>  4.3   |  25  |  1.12    Ca    8.4<L>      13 Dec 2023 08:41                            8.4    13.45 )-----------( 413      ( 13 Dec 2023 08:41 )             26.5               ACC: [id number] EXAM:  CT ABDOMEN AND PELVIS     PROCEDURE DATE:  12/09/2023    INTERPRETATION:  CLINICAL INFORMATION: Right wound infection  COMPARISON: CTA abdomen 10/23/2023  CONTRAST/COMPLICATIONS:  IV Contrast: Omnipaque 350  0 cc administered   0 cc discarded  Oral Contrast: NONE  Complications: None reported at time of study completion  PROCEDURE:  CT of the Abdomen and Pelvis was performed.  Sagittal and coronal reformats were performed.  FINDINGS:  Limited evaluation of solid organs and vascular structures due to lack of   intravenous contrast.  LOWER CHEST: Coronary artery, aortic valve leaflet, and mitral annular   calcifications.  LIVER: Within normal limits.  BILE DUCTS: Normal caliber.  GALLBLADDER: Within normal limits.  SPLEEN: Within normal limits.  PANCREAS: Within normal limits.  ADRENALS: Within normal limits.  KIDNEYS/URETERS: Left renal atrophy. No hydronephrosis.  BLADDER: Within normal limits.  REPRODUCTIVE ORGANS: Prostate within normal limits.  BOWEL: No bowel obstruction. Appendix is normal.  PERITONEUM: No ascites.  VESSELS: Atherosclerotic changes. 3.2 cm infrarenal abdominal aortic   aneurysm status post aortobifemoral stenting. Status post femorofemoral   bypass, right femoral stents, and left-sided bypass graft.  RETROPERITONEUM/LYMPH NODES: Prominent right inguinal lymph nodes.  ABDOMINAL WALL: Postsurgical changes of the right groin with surrounding   fat stranding. Limited evaluation for abscess.No definite drainable   fluid collection.  BONES: Degenerative changes. Left femoral ORIF.  IMPRESSION:  Postsurgical changes of the right groin with surrounding fat stranding.   No definite evidence of drainable fluid collection, although evaluation   is limited due to lack of intravenous contrast.    Prominent right inguinal lymph nodes, likely reactive.    Multiple stents and bypass grafts as above, the patency of which cannot   be evaluated.    ACC: [id number] EXAM:  US KIDNEYS AND BLADDER   ORDERED BY: IRMA YOUNGBLOOD   PROCEDURE DATE:  12/09/2023    INTERPRETATION:  CLINICAL INFORMATION: Acute renal insufficiency.  COMPARISON: Renal ultrasound dated 10/22/2023 and abdominal CT dated   12/09/2023  TECHNIQUE: Sonography of the kidneys and bladder.  FINDINGS:  Right kidney: 11.6 cm. No renal mass, hydronephrosis or calculi. Mild   increased cortical echogenicity.  Left kidney: 8.1 cm. No renal mass, hydronephrosis or calculi. Atrophic   and the pressure decreased echogenicity.  Urinary bladder: Within normal limits.  IMPRESSION:  No hydronephrosis.  Mildly atrophic left kidney.  Bilateral increased renal echogenicity suggestive of underlying medical   renal disease.

## 2023-12-14 NOTE — PROGRESS NOTE ADULT - SUBJECTIVE AND OBJECTIVE BOX
SURGERY DAILY PROGRESS NOTE:     Subjective:  Patient seen and examined this AM at bedside. No acute events overnight and patient resting comfortably. Denies fever/chills, shortness of breath, chest pain. VS reviewed    Objective:    MEDICATIONS  (STANDING):  apixaban 5 milliGRAM(s) Oral every 12 hours  aspirin enteric coated 81 milliGRAM(s) Oral daily  atorvastatin 80 milliGRAM(s) Oral at bedtime  Breztri Aerosphere: Budesonide 160 mcg, glycopyrrolate 9 mcg, and formoterol fumarate 4.8 mcg per actuation 2 Puff(s) 2 Inhalation Inhalation two times a day  cefTRIAXone Injectable. 1000 milliGRAM(s) IV Push every 24 hours  collagenase Ointment 1 Application(s) Topical daily  Dakins Solution - 1/4 Strength 1 Application(s) Topical daily  doxycycline monohydrate Capsule 100 milliGRAM(s) Oral every 12 hours  ferrous    sulfate 325 milliGRAM(s) Oral daily  influenza  Vaccine (HIGH DOSE) 0.7 milliLiter(s) IntraMuscular once  levothyroxine 100 MICROGram(s) Oral daily  losartan 25 milliGRAM(s) Oral daily  metoprolol succinate ER 25 milliGRAM(s) Oral daily  multivitamin 1 Tablet(s) Oral daily  sodium chloride 0.9%. 1000 milliLiter(s) (75 mL/Hr) IV Continuous <Continuous>    MEDICATIONS  (PRN):  acetaminophen     Tablet .. 650 milliGRAM(s) Oral every 6 hours PRN Temp greater or equal to 38C (100.4F), Mild Pain (1 - 3)  aluminum hydroxide/magnesium hydroxide/simethicone Suspension 30 milliLiter(s) Oral every 4 hours PRN Dyspepsia  melatonin 3 milliGRAM(s) Oral at bedtime PRN Insomnia  ondansetron Injectable 4 milliGRAM(s) IV Push every 8 hours PRN Nausea and/or Vomiting      Vital Signs Last 24 Hrs  T(C): 36.7 (14 Dec 2023 08:39), Max: 36.7 (14 Dec 2023 08:39)  T(F): 98.1 (14 Dec 2023 08:39), Max: 98.1 (14 Dec 2023 08:39)  HR: 55 (14 Dec 2023 08:39) (55 - 59)  BP: 182/53 (14 Dec 2023 08:39) (123/86 - 182/53)  BP(mean): 91 (13 Dec 2023 22:15) (91 - 91)  RR: 16 (14 Dec 2023 08:39) (16 - 18)  SpO2: 99% (14 Dec 2023 08:39) (98% - 99%)    Parameters below as of 14 Dec 2023 08:39  Patient On (Oxygen Delivery Method): room air          PHYSICAL EXAM   GENERAL: NAD, well developed, well nourished  HEAD: Atraumatic, normocephalic  EYES: EOMI, PERRLA, conjunctiva and sclera clear  ENT: moist mucous membrane  NECK: supple, No JVD, midline trachea  CHEST/LUNG: No increased WOB, symmetric excursions, palpable graft  Heart: RRR ppp, no peripheral edema  ABDOMEN: Round, nondistended, soft, nontender. b/l inguinal scar with right side open at inferior pole, draining serous fluid, w/ mild erythema & induration  EXTREMITIES: Brisk cap refill. no clubbing or cyanosis. Palpable b/l popliteal and right DP & PT pulses. left DP nonpalpable  NERVOUS SYSTEM: AOx4, speech clear, no neuro-deficits  MSK: full ROM, no deformities  SKIN: warm to touch, no rash or lesions. wound just superior to left medial malleolus 4 x 3 cm w/ fibrinous exudate, no bleeding, no e/o infection      I&O's Detail    13 Dec 2023 07:01  -  14 Dec 2023 07:00  --------------------------------------------------------  IN:    Oral Fluid: 240 mL  Total IN: 240 mL    OUT:    Voided (mL): 700 mL  Total OUT: 700 mL    Total NET: -460 mL          Daily     Daily     LABS:                        9.4    9.92  )-----------( 487      ( 14 Dec 2023 08:45 )             29.8     12-14    141  |  111<H>  |  14  ----------------------------<  90  4.3   |  24  |  1.03    Ca    8.3<L>      14 Dec 2023 08:45        Urinalysis Basic - ( 14 Dec 2023 08:45 )    Color: x / Appearance: x / SG: x / pH: x  Gluc: 90 mg/dL / Ketone: x  / Bili: x / Urobili: x   Blood: x / Protein: x / Nitrite: x   Leuk Esterase: x / RBC: x / WBC x   Sq Epi: x / Non Sq Epi: x / Bacteria: x        RADIOLOGY & ADDITIONAL STUDIES:    12/13 CTA A/P w/ runoff  IMPRESSION:  *  AORTOILIAC INFLOW: Chronically occluded left sided iliac system and   femorofemoral bypass graft. Right leg inflow is patent  *  RIGHT LEG: Stable mild stenosis of the right common femoral. In-stent   stenosis detected in the right SFA mid thigh. Patent popliteal stents.   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 Peroneal artery   is diffusely attenuated but patent. Otherwise, patent three-vessel runoff   to the foot.  *  LEFT LEG: Stable long segment femoral artery occlusion. Reconstituted   popliteal artery is diffusely diseased. Left axillary to anterior tibial   artery bypass graft is patent throughout without surrounding fluid   collection or gas to suggest graft infection. PT is diffusely attenuated   but patent. Otherwise, patent three-vessel runoff to the foot.

## 2023-12-15 LAB
CULTURE RESULTS: ABNORMAL
CULTURE RESULTS: ABNORMAL
ORGANISM # SPEC MICROSCOPIC CNT: ABNORMAL
ORGANISM # SPEC MICROSCOPIC CNT: ABNORMAL
ORGANISM # SPEC MICROSCOPIC CNT: SIGNIFICANT CHANGE UP
ORGANISM # SPEC MICROSCOPIC CNT: SIGNIFICANT CHANGE UP
SPECIMEN SOURCE: SIGNIFICANT CHANGE UP
SPECIMEN SOURCE: SIGNIFICANT CHANGE UP

## 2023-12-15 PROCEDURE — 99232 SBSQ HOSP IP/OBS MODERATE 35: CPT

## 2023-12-15 RX ORDER — CEFTRIAXONE 500 MG/1
1000 INJECTION, POWDER, FOR SOLUTION INTRAMUSCULAR; INTRAVENOUS EVERY 24 HOURS
Refills: 0 | Status: COMPLETED | OUTPATIENT
Start: 2023-12-15 | End: 2023-12-21

## 2023-12-15 RX ORDER — COLLAGENASE CLOSTRIDIUM HIST. 250 UNIT/G
1 OINTMENT (GRAM) TOPICAL ONCE
Refills: 0 | Status: COMPLETED | OUTPATIENT
Start: 2023-12-15 | End: 2023-12-15

## 2023-12-15 RX ADMIN — Medication 1 APPLICATION(S): at 09:00

## 2023-12-15 RX ADMIN — Medication 100 MICROGRAM(S): at 06:36

## 2023-12-15 RX ADMIN — CEFTRIAXONE 1000 MILLIGRAM(S): 500 INJECTION, POWDER, FOR SOLUTION INTRAMUSCULAR; INTRAVENOUS at 14:49

## 2023-12-15 RX ADMIN — Medication 25 MILLIGRAM(S): at 10:32

## 2023-12-15 RX ADMIN — LOSARTAN POTASSIUM 50 MILLIGRAM(S): 100 TABLET, FILM COATED ORAL at 10:32

## 2023-12-15 RX ADMIN — Medication 325 MILLIGRAM(S): at 10:32

## 2023-12-15 RX ADMIN — Medication 100 MILLIGRAM(S): at 22:13

## 2023-12-15 RX ADMIN — Medication 1 APPLICATION(S): at 22:17

## 2023-12-15 RX ADMIN — Medication 100 MILLIGRAM(S): at 10:33

## 2023-12-15 RX ADMIN — ATORVASTATIN CALCIUM 80 MILLIGRAM(S): 80 TABLET, FILM COATED ORAL at 22:13

## 2023-12-15 RX ADMIN — Medication 1 TABLET(S): at 10:32

## 2023-12-15 RX ADMIN — APIXABAN 5 MILLIGRAM(S): 2.5 TABLET, FILM COATED ORAL at 22:13

## 2023-12-15 RX ADMIN — APIXABAN 5 MILLIGRAM(S): 2.5 TABLET, FILM COATED ORAL at 10:33

## 2023-12-15 RX ADMIN — Medication 81 MILLIGRAM(S): at 10:33

## 2023-12-15 NOTE — PROGRESS NOTE ADULT - ASSESSMENT
Assessment: 73y old male seen for the following:   - Full thickness wound to the left foot, chronic   - Pain to left foot  - Difficulty ambulation    Plan:   Chart reviewed and Patient evaluated;  Discussed diagnosis and treatment with patient. Discussed importance of daily foot examinations, proper shoe gear, importance of tight glycemic control.   X-rays reviewed : on wet read showing no soft tissue gas, no acute bony findings, mild edema noted to the left ankle posteriorly  There is a full thickness wound to the left medial mal, etiology of wound most likely due to arterial problem, wound looks chronic and stable without acute SOI  WC: Santyl and DSD  WBAT using surgical shoe  Offloading to bilateral heels while bedbound.   Patient to follow up at [facility] 1 week after discharge w/ Dr Cade Joseph.  Continue with antibiotics as per ID  All additional care per Med appreciated  Patient demonstrated verbal understanding of all interventions and tolerated interventions well without any complications.     Case D/W attending Dr. Joseph    Wound care instructions for Left Lower Extremity to be changed every other day:  - Gently remove dressings.  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 Discussed importance of daily foot examinations, proper shoe gear, importance of tight glycemic control.   X-rays reviewed : on wet read showing no soft tissue gas, no acute bony findings, mild edema noted to the left ankle posteriorly  There is a full thickness wound to the left medial mal, etiology of wound most likely due to arterial problem, wound looks chronic and stable without acute SOI  WC: Santyl and DSD  WBAT using surgical shoe  Offloading to bilateral heels while bedbound.   Patient to follow up at [facility] 1 week after discharge w/ Dr Cade Joseph.  Continue with antibiotics as per ID  All additional care per Med appreciated  Patient demonstrated verbal understanding of all interventions and tolerated interventions well without any complications.     Case D/W attending Dr. Joseph    Wound care instructions for Left Lower Extremity to be changed every other day:  - Gently remove dressings.  - Clean the wound with saline and dry it thoroughly with dry gauze  - Apply santyl, gauze, kerlix and secure it with tape

## 2023-12-15 NOTE — PROGRESS NOTE ADULT - ASSESSMENT
73 year old male with extensive vascular surgery hx, most recent being a right femoral cutdown with aortogram and B/L LE angiogram , SFA covered stent placed in SFA, left axillary to anterior tibial bypass with PTFE graft 11/1/23. He has a seroma at the right femoral site, actively draining. Pulses are palpable, LEs are well perfused. No leukocytosis, HH stable.     Plan:  - Plan for partial graft explantation of fem-fem, CFA repair, wound washout, possible debridement and muscle flap on Monday 12/18     - Cleared by cardiology      - Please obtain medical clearance by Sunday 12/17  - Daily Dakin's solution and packing to right femoral site  - Monitor for bleeding from right groin   - Monitor cellulitis of skin  - IV abx, per ID  - Groin wound culture culture with MRSA, blood cultures negative after 48 hrs  - Continue Eliquis, ASA  - Wound care for left ankle ulcer  - Rest of management as per primary team    Plan will be discussed with Vascular surgery attending, Dr. Sosa 73 year old male with extensive vascular surgery hx, most recent being a right femoral cutdown with aortogram and B/L LE angiogram , SFA covered stent placed in SFA, left axillary to anterior tibial bypass with PTFE graft 11/1/23. He has a seroma at the right femoral site, actively draining. Pulses are palpable, LEs are well perfused. No leukocytosis, HH stable.     Plan:  - Plan for partial graft explantation of fem-fem, CFA repair, wound washout, possible debridement and muscle flap on Monday 12/18     - Cleared by Cardiology      - Cleared by Medicine  - Daily Dakin's solution and packing to right femoral site  - Monitor for bleeding from right groin   - Monitor cellulitis of skin  - IV abx, per ID  - Groin wound culture culture with MRSA, blood cultures negative after 48 hrs  - On Eliquis, ASA--will need to transition Eliquis to heparin gtt/Lovenox tomorrow, in preparation for OR Monday  - Wound care for left ankle ulcer  - Rest of management as per primary team    Plan will be discussed with Vascular surgery attending, Dr. Sosa

## 2023-12-15 NOTE — PROGRESS NOTE ADULT - SUBJECTIVE AND OBJECTIVE BOX
Date of Service: 12/15/23  74 y/o male with PMHX of HTN, GERD,  severe PVD s/p multiple stents b/l legs, s/p L  fem/popliteal bypass, chronic left lower ankle ulceration present to ED with worsening right groin redness. Patient reports recent procedure with Dr. Sosa. Was discharge to rehab where his PCP noted increasing fever and redness of right groin. Patient also reports some mild cough and sore throat. No chest pain or sob.   In ED patient found to have right groin cellulitis and + Covid.  (09 Dec 2023 05:36)  Podiatry was consulted for left foot ulcer. Pt resting comfortably bedside today. Pt tender to wound site.     12/15/23: Podiatry following for Lt foot ulcer. Pt resting at bedside, NAD. No additional pedal complaints.          REVIEW OF SYSTEMS:  CONSTITUTIONAL: No weakness, fevers or chills  EYES/ENT: No visual changes;  No vertigo or throat pain   NECK: No pain or stiffness  RESPIRATORY: No cough, wheezing, hemoptysis; No shortness of breath  CARDIOVASCULAR: No chest pain or palpitations  GASTROINTESTINAL: No abdominal or epigastric pain. No nausea, vomiting, or hematemesis; No diarrhea or constipation. No melena or hematochezia.  GENITOURINARY: No dysuria, frequency or hematuria  NEUROLOGICAL: No numbness or weakness  SKIN: See physical examination.  All other review of systems is negative unless indicated above    PMH: Essential hypertension    PVD (peripheral vascular disease)    Gastroesophageal reflux disease, esophagitis presence not specified    Hypothyroidism    Chronic obstructive pulmonary disease, unspecified COPD type    Eczema    Hyperlipidemia, unspecified hyperlipidemia type    Medial meniscus tear      PSH:Femoral popliteal artery thrombus    PVD (peripheral vascular disease)        Allergies:Betadine (Hives; Rash)  IV Contrast (Hives)                                       9.4    9.92  )-----------( 487      ( 14 Dec 2023 08:45 )             29.8   12-14    141  |  111<H>  |  14  ----------------------------<  90  4.3   |  24  |  1.03    Ca    8.3<L>      14 Dec 2023 08:45        MEDICATIONS  (STANDING):  apixaban 5 milliGRAM(s) Oral every 12 hours  aspirin enteric coated 81 milliGRAM(s) Oral daily  atorvastatin 80 milliGRAM(s) Oral at bedtime  Breztri Aerosphere: Budesonide 160 mcg, glycopyrrolate 9 mcg, and formoterol fumarate 4.8 mcg per actuation 2 Puff(s) 2 Inhalation Inhalation two times a day  cefTRIAXone Injectable. 1000 milliGRAM(s) IV Push every 24 hours  collagenase Ointment 1 Application(s) Topical daily  Dakins Solution - 1/4 Strength 1 Application(s) Topical daily  doxycycline monohydrate Capsule 100 milliGRAM(s) Oral every 12 hours  ferrous    sulfate 325 milliGRAM(s) Oral daily  influenza  Vaccine (HIGH DOSE) 0.7 milliLiter(s) IntraMuscular once  levothyroxine 100 MICROGram(s) Oral daily  losartan 25 milliGRAM(s) Oral daily  metoprolol succinate ER 25 milliGRAM(s) Oral daily  multivitamin 1 Tablet(s) Oral daily  sodium chloride 0.9%. 1000 milliLiter(s) (75 mL/Hr) IV Continuous <Continuous>    MEDICATIONS  (PRN):  acetaminophen     Tablet .. 650 milliGRAM(s) Oral every 6 hours PRN Temp greater or equal to 38C (100.4F), Mild Pain (1 - 3)  aluminum hydroxide/magnesium hydroxide/simethicone Suspension 30 milliLiter(s) Oral every 4 hours PRN Dyspepsia  melatonin 3 milliGRAM(s) Oral at bedtime PRN Insomnia  ondansetron Injectable 4 milliGRAM(s) IV Push every 8 hours PRN Nausea and/or Vomiting        Physical Exam:   Constitutional: NAD, alert;  Lower Extremity Focus  Derm:  Skin warm, dry and supple bilateral.    Ulceration to the left medial heel, wound base is mostly granular, wound size is 3cm x 3cm, no periwound edema or erythema noted. no purulence or pus noted, no tracking/tunneling noted, no PTB, no malodor  Vascular: Dorsalis Pedis and Posterior Tibial pulses non palpable.  Capillary re-fill time more then 3 seconds digits 1-5 bilateral.    Neuro: Protective sensation diminished to the level of the digits bilateral.  MSK: Muscle strength 5/5 all major muscle groups bilateral. TTP to the wound site on the left medial mal            Date of Service: 12/15/23  72 y/o male with PMHX of HTN, GERD,  severe PVD s/p multiple stents b/l legs, s/p L  fem/popliteal bypass, chronic left lower ankle ulceration present to ED with worsening right groin redness. Patient reports recent procedure with Dr. Sosa. Was discharge to rehab where his PCP noted increasing fever and redness of right groin. Patient also reports some mild cough and sore throat. No chest pain or sob.   In ED patient found to have right groin cellulitis and + Covid.  (09 Dec 2023 05:36)  Podiatry was consulted for left foot ulcer. Pt resting comfortably bedside today. Pt tender to wound site.     12/15/23: Podiatry following for Lt foot ulcer. Pt resting at bedside, NAD. No additional pedal complaints.          REVIEW OF SYSTEMS:  CONSTITUTIONAL: No weakness, fevers or chills  EYES/ENT: No visual changes;  No vertigo or throat pain   NECK: No pain or stiffness  RESPIRATORY: No cough, wheezing, hemoptysis; No shortness of breath  CARDIOVASCULAR: No chest pain or palpitations  GASTROINTESTINAL: No abdominal or epigastric pain. No nausea, vomiting, or hematemesis; No diarrhea or constipation. No melena or hematochezia.  GENITOURINARY: No dysuria, frequency or hematuria  NEUROLOGICAL: No numbness or weakness  SKIN: See physical examination.  All other review of systems is negative unless indicated above    PMH: Essential hypertension    PVD (peripheral vascular disease)    Gastroesophageal reflux disease, esophagitis presence not specified    Hypothyroidism    Chronic obstructive pulmonary disease, unspecified COPD type    Eczema    Hyperlipidemia, unspecified hyperlipidemia type    Medial meniscus tear      PSH:Femoral popliteal artery thrombus    PVD (peripheral vascular disease)        Allergies:Betadine (Hives; Rash)  IV Contrast (Hives)                                       9.4    9.92  )-----------( 487      ( 14 Dec 2023 08:45 )             29.8   12-14    141  |  111<H>  |  14  ----------------------------<  90  4.3   |  24  |  1.03    Ca    8.3<L>      14 Dec 2023 08:45        MEDICATIONS  (STANDING):  apixaban 5 milliGRAM(s) Oral every 12 hours  aspirin enteric coated 81 milliGRAM(s) Oral daily  atorvastatin 80 milliGRAM(s) Oral at bedtime  Breztri Aerosphere: Budesonide 160 mcg, glycopyrrolate 9 mcg, and formoterol fumarate 4.8 mcg per actuation 2 Puff(s) 2 Inhalation Inhalation two times a day  cefTRIAXone Injectable. 1000 milliGRAM(s) IV Push every 24 hours  collagenase Ointment 1 Application(s) Topical daily  Dakins Solution - 1/4 Strength 1 Application(s) Topical daily  doxycycline monohydrate Capsule 100 milliGRAM(s) Oral every 12 hours  ferrous    sulfate 325 milliGRAM(s) Oral daily  influenza  Vaccine (HIGH DOSE) 0.7 milliLiter(s) IntraMuscular once  levothyroxine 100 MICROGram(s) Oral daily  losartan 25 milliGRAM(s) Oral daily  metoprolol succinate ER 25 milliGRAM(s) Oral daily  multivitamin 1 Tablet(s) Oral daily  sodium chloride 0.9%. 1000 milliLiter(s) (75 mL/Hr) IV Continuous <Continuous>    MEDICATIONS  (PRN):  acetaminophen     Tablet .. 650 milliGRAM(s) Oral every 6 hours PRN Temp greater or equal to 38C (100.4F), Mild Pain (1 - 3)  aluminum hydroxide/magnesium hydroxide/simethicone Suspension 30 milliLiter(s) Oral every 4 hours PRN Dyspepsia  melatonin 3 milliGRAM(s) Oral at bedtime PRN Insomnia  ondansetron Injectable 4 milliGRAM(s) IV Push every 8 hours PRN Nausea and/or Vomiting        Physical Exam:   Constitutional: NAD, alert;  Lower Extremity Focus  Derm:  Skin warm, dry and supple bilateral.    Ulceration to the left medial heel, wound base is mostly granular, wound size is 3cm x 3cm, no periwound edema or erythema noted. no purulence or pus noted, no tracking/tunneling noted, no PTB, no malodor  Vascular: Dorsalis Pedis and Posterior Tibial pulses non palpable.  Capillary re-fill time more then 3 seconds digits 1-5 bilateral.    Neuro: Protective sensation diminished to the level of the digits bilateral.  MSK: Muscle strength 5/5 all major muscle groups bilateral. TTP to the wound site on the left medial mal

## 2023-12-15 NOTE — PROGRESS NOTE ADULT - ASSESSMENT
74 y/o male with PMHX of HTN, GERD,  severe PVD s/p multiple stents b/l legs, s/p L  fem/popliteal bypass, chronic left lower ankle ulceration present to ED with worsening right groin redness. Patient reports recent right bypass procedure with Dr. Sosa.  Was discharge to rehab where his PCP noted increasing fever and redness of right groin. Patient also reports some mild cough and sore throat. No chest pain or sob.     Right Groin Surgical Site Cellulitis  - ID following  - cont doxycycline/ceftriaxone  - blood culture Culture - Abscess with Gram Stain (12.10.23 @ 17:45)   No polymorphonuclear cells seen per low power field   No organisms seen per oil power field  Specimen Source: .Abscess Hip - Right  Moderate Staphylococcus aureus  - vascular consulted- plan for fem-fem graft partial explantation, CFA repair, wound washout, possible debridement and muscle flap next week   - Daily Dakin's solution and packing to right femoral site  - Monitor for bleeding from right groin   - Cardiology consulted for cardiac clearance - please see cardiology note for clearance  - RCRI Class 3 risk - 2 pts- 10.1 % 30 days risk of death, MI or cardiac arrest- patient is medically cleared for planned vascular procedure.      leukocytosis - resolved   - received IV steroid in leiu of IV contrast allergy     JENIFER on CKD  Hypokalemia -  resolved  - trend bmp     COVID +  - Not hypoxic and currently asymptomatic  - Monitor    Left Ankle chronic ulcer  - Wound care eval    PAD  - Stable  - Continue Eliquis - will switch to lovenox/heparin gtt tomorrow   - Continue Aspirin  - Continue Atorvastatin    HTN  - Stable  - Bystolic switched to Coreg  - Monitor    Hypothyroid  - Continue levothyroxine    DVT prophylaxis  - Eliquis

## 2023-12-15 NOTE — PROGRESS NOTE ADULT - SUBJECTIVE AND OBJECTIVE BOX
SURGERY DAILY PROGRESS NOTE:     Subjective:  Patient seen and examined this AM at bedside. No acute events overnight and patient resting comfortably. No complaints. Denies fever/chills, shortness of breath, chest pain. VS reviewed    Objective:    MEDICATIONS  (STANDING):  apixaban 5 milliGRAM(s) Oral every 12 hours  aspirin enteric coated 81 milliGRAM(s) Oral daily  atorvastatin 80 milliGRAM(s) Oral at bedtime  Breztri Aerosphere: Budesonide 160 mcg, glycopyrrolate 9 mcg, and formoterol fumarate 4.8 mcg per actuation 2 Puff(s) 2 Inhalation Inhalation two times a day  cefTRIAXone Injectable. 1000 milliGRAM(s) IV Push every 24 hours  Dakins Solution - 1/4 Strength 1 Application(s) Topical daily  doxycycline monohydrate Capsule 100 milliGRAM(s) Oral every 12 hours  ferrous    sulfate 325 milliGRAM(s) Oral daily  influenza  Vaccine (HIGH DOSE) 0.7 milliLiter(s) IntraMuscular once  levothyroxine 100 MICROGram(s) Oral daily  losartan 50 milliGRAM(s) Oral daily  metoprolol succinate ER 25 milliGRAM(s) Oral daily  multivitamin 1 Tablet(s) Oral daily  sodium chloride 0.9%. 1000 milliLiter(s) (75 mL/Hr) IV Continuous <Continuous>    MEDICATIONS  (PRN):  acetaminophen     Tablet .. 650 milliGRAM(s) Oral every 6 hours PRN Temp greater or equal to 38C (100.4F), Mild Pain (1 - 3)  aluminum hydroxide/magnesium hydroxide/simethicone Suspension 30 milliLiter(s) Oral every 4 hours PRN Dyspepsia  melatonin 3 milliGRAM(s) Oral at bedtime PRN Insomnia  ondansetron Injectable 4 milliGRAM(s) IV Push every 8 hours PRN Nausea and/or Vomiting      Vital Signs Last 24 Hrs  T(C): 36.9 (14 Dec 2023 21:33), Max: 36.9 (14 Dec 2023 21:33)  T(F): 98.4 (14 Dec 2023 21:33), Max: 98.4 (14 Dec 2023 21:33)  HR: 55 (14 Dec 2023 21:33) (50 - 55)  BP: 146/63 (14 Dec 2023 21:33) (137/53 - 182/53)  BP(mean): --  RR: 18 (14 Dec 2023 21:33) (16 - 18)  SpO2: 96% (14 Dec 2023 21:33) (96% - 100%)    Parameters below as of 14 Dec 2023 21:33  Patient On (Oxygen Delivery Method): room air          PHYSICAL EXAM   GENERAL: NAD, well developed, well nourished  HEAD: Atraumatic, normocephalic  EYES: EOMI, PERRLA, conjunctiva and sclera clear  ENT: moist mucous membrane  NECK: supple, No JVD, midline trachea  CHEST/LUNG: No increased WOB, symmetric excursions, palpable graft  Heart: RRR ppp, no peripheral edema  ABDOMEN: Round, nondistended, soft, nontender. b/l inguinal scar with right side open at inferior pole, draining serous fluid, w/ mild erythema & induration  EXTREMITIES: Brisk cap refill. no clubbing or cyanosis. Palpable b/l popliteal and right DP & PT pulses. left DP nonpalpable  NERVOUS SYSTEM: AOx4, speech clear, no neuro-deficits  MSK: full ROM, no deformities  SKIN: warm to touch, no rash or lesions. wound just superior to left medial malleolus 4 x 3 cm w/ fibrinous exudate, no bleeding, no e/o infection    I&O's Detail    14 Dec 2023 07:01  -  15 Dec 2023 07:00  --------------------------------------------------------  IN:    Oral Fluid: 450 mL  Total IN: 450 mL    OUT:    Voided (mL): 400 mL  Total OUT: 400 mL    Total NET: 50 mL          Daily     Daily     LABS:                        9.4    9.92  )-----------( 487      ( 14 Dec 2023 08:45 )             29.8     12-14    141  |  111<H>  |  14  ----------------------------<  90  4.3   |  24  |  1.03    Ca    8.3<L>      14 Dec 2023 08:45        Urinalysis Basic - ( 14 Dec 2023 08:45 )    Color: x / Appearance: x / SG: x / pH: x  Gluc: 90 mg/dL / Ketone: x  / Bili: x / Urobili: x   Blood: x / Protein: x / Nitrite: x   Leuk Esterase: x / RBC: x / WBC x   Sq Epi: x / Non Sq Epi: x / Bacteria: x        RADIOLOGY & ADDITIONAL STUDIES:

## 2023-12-15 NOTE — PROGRESS NOTE ADULT - SUBJECTIVE AND OBJECTIVE BOX
74 y/o male with PMHX of HTN, GERD,  severe PVD s/p multiple stents b/l legs, s/p L  fem/popliteal bypass, chronic left lower ankle ulceration present to ED with worsening right groin redness. Patient reports recent right bypass procedure with Dr. Sosa.  Was discharge to rehab where his PCP noted increasing fever and redness of right groin. Patient also reports some mild cough and sore throat. Patient was admitted with right groin cellulitis  and was started on IV antibiotics with ID consult    Subjective: Patient seen today, feels well, no complaints to offer.     REVIEW OF SYSTEMS:    CONSTITUTIONAL: No weakness, fevers or chills  EYES/ENT: No visual changes;  No vertigo or throat pain   NECK: No pain or stiffness  RESPIRATORY: No cough, wheezing, hemoptysis; No shortness of breath  CARDIOVASCULAR: No chest pain or palpitations  GASTROINTESTINAL: No abdominal or epigastric pain. No nausea, vomiting, or hematemesis; No diarrhea or constipation. No melena or hematochezia.  GENITOURINARY: No dysuria, frequency or hematuria  NEUROLOGICAL: No numbness or weakness  SKIN: No itching, rashes        MEDICATIONS  (STANDING):  apixaban 5 milliGRAM(s) Oral every 12 hours  aspirin enteric coated 81 milliGRAM(s) Oral daily  atorvastatin 80 milliGRAM(s) Oral at bedtime  Breztri Aerosphere: Budesonide 160 mcg, glycopyrrolate 9 mcg, and formoterol fumarate 4.8 mcg per actuation 2 Puff(s) 2 Inhalation Inhalation two times a day  cefTRIAXone Injectable. 1000 milliGRAM(s) IV Push every 24 hours  collagenase Ointment 1 Application(s) Topical once  Dakins Solution - 1/4 Strength 1 Application(s) Topical daily  doxycycline monohydrate Capsule 100 milliGRAM(s) Oral every 12 hours  ferrous    sulfate 325 milliGRAM(s) Oral daily  influenza  Vaccine (HIGH DOSE) 0.7 milliLiter(s) IntraMuscular once  levothyroxine 100 MICROGram(s) Oral daily  losartan 50 milliGRAM(s) Oral daily  metoprolol succinate ER 25 milliGRAM(s) Oral daily  multivitamin 1 Tablet(s) Oral daily  sodium chloride 0.9%. 1000 milliLiter(s) (75 mL/Hr) IV Continuous <Continuous>    MEDICATIONS  (PRN):  acetaminophen     Tablet .. 650 milliGRAM(s) Oral every 6 hours PRN Temp greater or equal to 38C (100.4F), Mild Pain (1 - 3)  aluminum hydroxide/magnesium hydroxide/simethicone Suspension 30 milliLiter(s) Oral every 4 hours PRN Dyspepsia  melatonin 3 milliGRAM(s) Oral at bedtime PRN Insomnia  ondansetron Injectable 4 milliGRAM(s) IV Push every 8 hours PRN Nausea and/or Vomiting      Vital Signs Last 24 Hrs  T(C): 36.7 (15 Dec 2023 16:25), Max: 36.9 (14 Dec 2023 21:33)  T(F): 98.1 (15 Dec 2023 16:25), Max: 98.4 (14 Dec 2023 21:33)  HR: 55 (15 Dec 2023 16:25) (55 - 64)  BP: 132/47 (15 Dec 2023 16:25) (131/66 - 146/63)  RR: 18 (15 Dec 2023 16:25) (18 - 18)  SpO2: 98% (15 Dec 2023 16:25) (96% - 98%)    Parameters below as of 15 Dec 2023 16:25  Patient On (Oxygen Delivery Method): room air    PHYSICAL EXAM:  GENERAL: NAD, lying in bed comfortably  HEAD:  Atraumatic, Normocephalic  EYES: conjunctiva and sclera clear  ENT: Moist mucous membranes  NECK: Supple, No JVD  CHEST/LUNG: Clear to auscultation bilaterally; No rales, rhonchi, wheezing. Unlabored respirations  HEART: Regular rate and rhythm; No murmurs  ABDOMEN: Bowel sounds present; Soft, Nontender, Nondistended.   EXTREMITIES:  2+ Peripheral Pulses, brisk capillary refill. No clubbing, cyanosis, or edema  NERVOUS SYSTEM:  Alert & Oriented X3, speech clear. No deficits   MSK: FROM all 4 extremities, full and equal strength  SKIN: R groin skin opening, clean, minimal drainage on dressing, no surrounding erythema, no pain       LABS:                          9.4    9.92  )-----------( 487      ( 14 Dec 2023 08:45 )             29.8     14 Dec 2023 08:45    141    |  111    |  14     ----------------------------<  90     4.3     |  24     |  1.03     Ca    8.3        14 Dec 2023 08:45          CAPILLARY BLOOD GLUCOSE            Urinalysis Basic - ( 14 Dec 2023 08:45 )    Color: x / Appearance: x / SG: x / pH: x  Gluc: 90 mg/dL / Ketone: x  / Bili: x / Urobili: x   Blood: x / Protein: x / Nitrite: x   Leuk Esterase: x / RBC: x / WBC x   Sq Epi: x / Non Sq Epi: x / Bacteria: x        RADIOLOGY:         72 y/o male with PMHX of HTN, GERD,  severe PVD s/p multiple stents b/l legs, s/p L  fem/popliteal bypass, chronic left lower ankle ulceration present to ED with worsening right groin redness. Patient reports recent right bypass procedure with Dr. Sosa.  Was discharge to rehab where his PCP noted increasing fever and redness of right groin. Patient also reports some mild cough and sore throat. Patient was admitted with right groin cellulitis  and was started on IV antibiotics with ID consult    Subjective: Patient seen today, feels well, no complaints to offer.     REVIEW OF SYSTEMS:    CONSTITUTIONAL: No weakness, fevers or chills  EYES/ENT: No visual changes;  No vertigo or throat pain   NECK: No pain or stiffness  RESPIRATORY: No cough, wheezing, hemoptysis; No shortness of breath  CARDIOVASCULAR: No chest pain or palpitations  GASTROINTESTINAL: No abdominal or epigastric pain. No nausea, vomiting, or hematemesis; No diarrhea or constipation. No melena or hematochezia.  GENITOURINARY: No dysuria, frequency or hematuria  NEUROLOGICAL: No numbness or weakness  SKIN: No itching, rashes        MEDICATIONS  (STANDING):  apixaban 5 milliGRAM(s) Oral every 12 hours  aspirin enteric coated 81 milliGRAM(s) Oral daily  atorvastatin 80 milliGRAM(s) Oral at bedtime  Breztri Aerosphere: Budesonide 160 mcg, glycopyrrolate 9 mcg, and formoterol fumarate 4.8 mcg per actuation 2 Puff(s) 2 Inhalation Inhalation two times a day  cefTRIAXone Injectable. 1000 milliGRAM(s) IV Push every 24 hours  collagenase Ointment 1 Application(s) Topical once  Dakins Solution - 1/4 Strength 1 Application(s) Topical daily  doxycycline monohydrate Capsule 100 milliGRAM(s) Oral every 12 hours  ferrous    sulfate 325 milliGRAM(s) Oral daily  influenza  Vaccine (HIGH DOSE) 0.7 milliLiter(s) IntraMuscular once  levothyroxine 100 MICROGram(s) Oral daily  losartan 50 milliGRAM(s) Oral daily  metoprolol succinate ER 25 milliGRAM(s) Oral daily  multivitamin 1 Tablet(s) Oral daily  sodium chloride 0.9%. 1000 milliLiter(s) (75 mL/Hr) IV Continuous <Continuous>    MEDICATIONS  (PRN):  acetaminophen     Tablet .. 650 milliGRAM(s) Oral every 6 hours PRN Temp greater or equal to 38C (100.4F), Mild Pain (1 - 3)  aluminum hydroxide/magnesium hydroxide/simethicone Suspension 30 milliLiter(s) Oral every 4 hours PRN Dyspepsia  melatonin 3 milliGRAM(s) Oral at bedtime PRN Insomnia  ondansetron Injectable 4 milliGRAM(s) IV Push every 8 hours PRN Nausea and/or Vomiting      Vital Signs Last 24 Hrs  T(C): 36.7 (15 Dec 2023 16:25), Max: 36.9 (14 Dec 2023 21:33)  T(F): 98.1 (15 Dec 2023 16:25), Max: 98.4 (14 Dec 2023 21:33)  HR: 55 (15 Dec 2023 16:25) (55 - 64)  BP: 132/47 (15 Dec 2023 16:25) (131/66 - 146/63)  RR: 18 (15 Dec 2023 16:25) (18 - 18)  SpO2: 98% (15 Dec 2023 16:25) (96% - 98%)    Parameters below as of 15 Dec 2023 16:25  Patient On (Oxygen Delivery Method): room air    PHYSICAL EXAM:  GENERAL: NAD, lying in bed comfortably  HEAD:  Atraumatic, Normocephalic  EYES: conjunctiva and sclera clear  ENT: Moist mucous membranes  NECK: Supple, No JVD  CHEST/LUNG: Clear to auscultation bilaterally; No rales, rhonchi, wheezing. Unlabored respirations  HEART: Regular rate and rhythm; No murmurs  ABDOMEN: Bowel sounds present; Soft, Nontender, Nondistended.   EXTREMITIES:  2+ Peripheral Pulses, brisk capillary refill. No clubbing, cyanosis, or edema  NERVOUS SYSTEM:  Alert & Oriented X3, speech clear. No deficits   MSK: FROM all 4 extremities, full and equal strength  SKIN: R groin skin opening, clean, minimal drainage on dressing, no surrounding erythema, no pain       LABS:                          9.4    9.92  )-----------( 487      ( 14 Dec 2023 08:45 )             29.8     14 Dec 2023 08:45    141    |  111    |  14     ----------------------------<  90     4.3     |  24     |  1.03     Ca    8.3        14 Dec 2023 08:45          CAPILLARY BLOOD GLUCOSE            Urinalysis Basic - ( 14 Dec 2023 08:45 )    Color: x / Appearance: x / SG: x / pH: x  Gluc: 90 mg/dL / Ketone: x  / Bili: x / Urobili: x   Blood: x / Protein: x / Nitrite: x   Leuk Esterase: x / RBC: x / WBC x   Sq Epi: x / Non Sq Epi: x / Bacteria: x        RADIOLOGY:

## 2023-12-16 PROCEDURE — 99232 SBSQ HOSP IP/OBS MODERATE 35: CPT

## 2023-12-16 RX ORDER — ENOXAPARIN SODIUM 100 MG/ML
60 INJECTION SUBCUTANEOUS EVERY 12 HOURS
Refills: 0 | Status: DISCONTINUED | OUTPATIENT
Start: 2023-12-16 | End: 2023-12-16

## 2023-12-16 RX ORDER — ENOXAPARIN SODIUM 100 MG/ML
65 INJECTION SUBCUTANEOUS EVERY 12 HOURS
Refills: 0 | Status: DISCONTINUED | OUTPATIENT
Start: 2023-12-16 | End: 2023-12-17

## 2023-12-16 RX ADMIN — Medication 100 MICROGRAM(S): at 05:19

## 2023-12-16 RX ADMIN — ENOXAPARIN SODIUM 65 MILLIGRAM(S): 100 INJECTION SUBCUTANEOUS at 11:56

## 2023-12-16 RX ADMIN — Medication 1 TABLET(S): at 10:07

## 2023-12-16 RX ADMIN — LOSARTAN POTASSIUM 50 MILLIGRAM(S): 100 TABLET, FILM COATED ORAL at 10:10

## 2023-12-16 RX ADMIN — Medication 25 MILLIGRAM(S): at 10:08

## 2023-12-16 RX ADMIN — ENOXAPARIN SODIUM 65 MILLIGRAM(S): 100 INJECTION SUBCUTANEOUS at 21:26

## 2023-12-16 RX ADMIN — Medication 100 MILLIGRAM(S): at 21:27

## 2023-12-16 RX ADMIN — CEFTRIAXONE 1000 MILLIGRAM(S): 500 INJECTION, POWDER, FOR SOLUTION INTRAMUSCULAR; INTRAVENOUS at 15:44

## 2023-12-16 RX ADMIN — Medication 81 MILLIGRAM(S): at 10:07

## 2023-12-16 RX ADMIN — ATORVASTATIN CALCIUM 80 MILLIGRAM(S): 80 TABLET, FILM COATED ORAL at 21:26

## 2023-12-16 RX ADMIN — Medication 100 MILLIGRAM(S): at 10:07

## 2023-12-16 RX ADMIN — Medication 325 MILLIGRAM(S): at 10:08

## 2023-12-16 NOTE — PROGRESS NOTE ADULT - ASSESSMENT
72 y/o male with PMHX of HTN, GERD,  severe PVD s/p multiple stents b/l legs, s/p L  fem/popliteal bypass, chronic left lower ankle ulceration present to ED with worsening right groin redness. Patient reports recent right bypass procedure with Dr. Sosa.  Was discharge to rehab where his PCP noted increasing fever and redness of right groin. Patient also reports some mild cough and sore throat. No chest pain or sob.     Right Groin Surgical Site Cellulitis  - ID following  - cont doxycycline/ceftriaxone  - blood culture Culture - Abscess with Gram Stain (12.10.23 @ 17:45)   No polymorphonuclear cells seen per low power field   No organisms seen per oil power field  Specimen Source: .Abscess Hip - Right  Moderate Staphylococcus aureus  - vascular consulted- plan for fem-fem graft partial explantation, CFA repair, wound washout, possible debridement and muscle flap next week   - Daily Dakin's solution and packing to right femoral site  - Monitor for bleeding from right groin   - Cardiology consulted for cardiac clearance - please see cardiology note for clearance  - RCRI Class 3 risk - 2 pts- 10.1 % 30 days risk of death, MI or cardiac arrest- patient is medically cleared for planned vascular procedure.      leukocytosis - resolved   - received IV steroid in leiu of IV contrast allergy     JENIFER on CKD  Hypokalemia -  resolved  - trend bmp     COVID +  - Not hypoxic and currently asymptomatic  - Monitor    Left Ankle chronic ulcer  - Wound care eval    PAD  - Stable  - Hold Eliquis - will switched to Lovenox  - Continue Aspirin  - Continue Atorvastatin    HTN  - Stable  - Bystolic switched to Coreg  - Monitor    Hypothyroid  - Continue levothyroxine    DVT prophylaxis  - On lovenox    74 y/o male with PMHX of HTN, GERD,  severe PVD s/p multiple stents b/l legs, s/p L  fem/popliteal bypass, chronic left lower ankle ulceration present to ED with worsening right groin redness. Patient reports recent right bypass procedure with Dr. Sosa.  Was discharge to rehab where his PCP noted increasing fever and redness of right groin. Patient also reports some mild cough and sore throat. No chest pain or sob.     Right Groin Surgical Site Cellulitis  - ID following  - cont doxycycline/ceftriaxone  - blood culture Culture - Abscess with Gram Stain (12.10.23 @ 17:45)   No polymorphonuclear cells seen per low power field   No organisms seen per oil power field  Specimen Source: .Abscess Hip - Right  Moderate Staphylococcus aureus  - vascular consulted- plan for fem-fem graft partial explantation, CFA repair, wound washout, possible debridement and muscle flap next week   - Daily Dakin's solution and packing to right femoral site  - Monitor for bleeding from right groin   - Cardiology consulted for cardiac clearance - please see cardiology note for clearance  - RCRI Class 3 risk - 2 pts- 10.1 % 30 days risk of death, MI or cardiac arrest- patient is medically cleared for planned vascular procedure.      leukocytosis - resolved   - received IV steroid in leiu of IV contrast allergy     JENIFER on CKD  Hypokalemia -  resolved  - trend bmp     COVID +  - Not hypoxic and currently asymptomatic  - Monitor    Left Ankle chronic ulcer  - Wound care eval    PAD  - Stable  - Hold Eliquis - will switched to Lovenox  - Continue Aspirin  - Continue Atorvastatin    HTN  - Stable  - Bystolic switched to Coreg  - Monitor    Hypothyroid  - Continue levothyroxine    DVT prophylaxis  - On lovenox

## 2023-12-16 NOTE — PROGRESS NOTE ADULT - SUBJECTIVE AND OBJECTIVE BOX
SURGERY DAILY PROGRESS NOTE:     Subjective:  Patient seen and examined this AM at bedside. No acute events overnight and patient resting comfortably. No complaints. Denies fever/chills, shortness of breath, chest pain. VS reviewed    Objective:    MEDICATIONS  (STANDING):  apixaban 5 milliGRAM(s) Oral every 12 hours  aspirin enteric coated 81 milliGRAM(s) Oral daily  atorvastatin 80 milliGRAM(s) Oral at bedtime  Breztri Aerosphere: Budesonide 160 mcg, glycopyrrolate 9 mcg, and formoterol fumarate 4.8 mcg per actuation 2 Puff(s) 2 Inhalation Inhalation two times a day  cefTRIAXone Injectable. 1000 milliGRAM(s) IV Push every 24 hours  Dakins Solution - 1/4 Strength 1 Application(s) Topical daily  doxycycline monohydrate Capsule 100 milliGRAM(s) Oral every 12 hours  ferrous    sulfate 325 milliGRAM(s) Oral daily  influenza  Vaccine (HIGH DOSE) 0.7 milliLiter(s) IntraMuscular once  levothyroxine 100 MICROGram(s) Oral daily  losartan 50 milliGRAM(s) Oral daily  metoprolol succinate ER 25 milliGRAM(s) Oral daily  multivitamin 1 Tablet(s) Oral daily  sodium chloride 0.9%. 1000 milliLiter(s) (75 mL/Hr) IV Continuous <Continuous>    MEDICATIONS  (PRN):  acetaminophen     Tablet .. 650 milliGRAM(s) Oral every 6 hours PRN Temp greater or equal to 38C (100.4F), Mild Pain (1 - 3)  aluminum hydroxide/magnesium hydroxide/simethicone Suspension 30 milliLiter(s) Oral every 4 hours PRN Dyspepsia  melatonin 3 milliGRAM(s) Oral at bedtime PRN Insomnia  ondansetron Injectable 4 milliGRAM(s) IV Push every 8 hours PRN Nausea and/or Vomiting      Vital Signs Last 24 Hrs  T(C): 36.5 (15 Dec 2023 22:18), Max: 36.7 (15 Dec 2023 16:25)  T(F): 97.7 (15 Dec 2023 22:18), Max: 98.1 (15 Dec 2023 16:25)  HR: 59 (16 Dec 2023 05:15) (55 - 64)  BP: 140/34 (16 Dec 2023 05:15) (131/66 - 160/54)  BP(mean): --  RR: 18 (15 Dec 2023 22:18) (18 - 18)  SpO2: 96% (15 Dec 2023 22:18) (96% - 98%)    Parameters below as of 15 Dec 2023 22:18  Patient On (Oxygen Delivery Method): room air          PHYSICAL EXAM   GENERAL: NAD, well developed, well nourished  HEAD: Atraumatic, normocephalic  EYES: EOMI, PERRLA, conjunctiva and sclera clear  ENT: moist mucous membrane  NECK: supple, No JVD, midline trachea  CHEST/LUNG: No increased WOB, symmetric excursions, palpable graft  Heart: RRR ppp, no peripheral edema  ABDOMEN: Round, nondistended, soft, nontender. b/l inguinal scar with right side open at inferior pole, draining serous fluid, w/ mild erythema & induration  EXTREMITIES: Brisk cap refill. no clubbing or cyanosis. Palpable b/l popliteal and right DP & PT pulses. left DP nonpalpable  NERVOUS SYSTEM: AOx4, speech clear, no neuro-deficits  MSK: full ROM, no deformities  SKIN: warm to touch, no rash or lesions. wound just superior to left medial malleolus 4 x 3 cm w/ fibrinous exudate, no bleeding, no e/o infection    I&O's Detail    14 Dec 2023 07:01  -  15 Dec 2023 07:00  --------------------------------------------------------  IN:    Oral Fluid: 450 mL  Total IN: 450 mL    OUT:    Voided (mL): 400 mL  Total OUT: 400 mL    Total NET: 50 mL      15 Dec 2023 07:01  -  16 Dec 2023 05:42  --------------------------------------------------------  IN:    Oral Fluid: 400 mL  Total IN: 400 mL    OUT:  Total OUT: 0 mL    Total NET: 400 mL          Daily     Daily     LABS:                        9.4    9.92  )-----------( 487      ( 14 Dec 2023 08:45 )             29.8     12-14    141  |  111<H>  |  14  ----------------------------<  90  4.3   |  24  |  1.03    Ca    8.3<L>      14 Dec 2023 08:45        Urinalysis Basic - ( 14 Dec 2023 08:45 )    Color: x / Appearance: x / SG: x / pH: x  Gluc: 90 mg/dL / Ketone: x  / Bili: x / Urobili: x   Blood: x / Protein: x / Nitrite: x   Leuk Esterase: x / RBC: x / WBC x   Sq Epi: x / Non Sq Epi: x / Bacteria: x        RADIOLOGY & ADDITIONAL STUDIES:

## 2023-12-16 NOTE — PROGRESS NOTE ADULT - ASSESSMENT
73 year old male with extensive vascular surgery hx, most recent being a right femoral cutdown with aortogram and B/L LE angiogram , SFA covered stent placed in SFA, left axillary to anterior tibial bypass with PTFE graft 11/1/23. He has a seroma at the right femoral site, actively draining. Pulses are palpable, LEs are well perfused. No leukocytosis, HH stable.     Plan:  - Partial graft explantation of fem-fem, CFA repair, wound washout, possible debridement and muscle flap on Monday 12/18     - Cleared by Cardiology      - Cleared by Medicine  - Daily Dakin's solution and packing to right femoral site  - Monitor for bleeding from right groin   - Monitor cellulitis of skin  - IV abx, per ID  - Groin wound culture culture with MRSA, blood cultures negative  - On Eliquis, ASA--will need to transition Eliquis to heparin gtt/Lovenox today, in preparation for OR Monday  - Wound care for left ankle ulcer  - Rest of management as per primary team    Plan will be discussed with Vascular surgery attending, Dr. Sosa

## 2023-12-16 NOTE — PROGRESS NOTE ADULT - SUBJECTIVE AND OBJECTIVE BOX
74 y/o male with PMHX of HTN, GERD,  severe PVD s/p multiple stents b/l legs, s/p L  fem/popliteal bypass, chronic left lower ankle ulceration present to ED with worsening right groin redness. Patient reports recent right bypass procedure with Dr. Sosa.  Was discharge to rehab where his PCP noted increasing fever and redness of right groin. Patient also reports some mild cough and sore throat. Patient was admitted with right groin cellulitis  and was started on IV antibiotics with ID consult    Subjective: Patient seen today, feels well, no overnight issues reported     REVIEW OF SYSTEMS:    CONSTITUTIONAL: No weakness, fevers or chills  EYES/ENT: No visual changes;  No vertigo or throat pain   NECK: No pain or stiffness  RESPIRATORY: No cough, wheezing, hemoptysis; No shortness of breath  CARDIOVASCULAR: No chest pain or palpitations  GASTROINTESTINAL: No abdominal or epigastric pain. No nausea, vomiting, or hematemesis; No diarrhea or constipation. No melena or hematochezia.  GENITOURINARY: No dysuria, frequency or hematuria  NEUROLOGICAL: No numbness or weakness  SKIN: No itching, rashes      Vital Signs Last 24 Hrs  T(C): 36.4 (16 Dec 2023 17:01), Max: 36.9 (16 Dec 2023 07:32)  T(F): 97.5 (16 Dec 2023 17:01), Max: 98.4 (16 Dec 2023 07:32)  HR: 56 (16 Dec 2023 17:01) (56 - 60)  BP: 132/80 (16 Dec 2023 17:01) (132/80 - 160/54)  RR: 18 (16 Dec 2023 17:01) (18 - 18)  SpO2: 100% (16 Dec 2023 17:01) (96% - 100%)    Parameters below as of 16 Dec 2023 17:01  Patient On (Oxygen Delivery Method): room air    PHYSICAL EXAM:  GENERAL: NAD, lying in bed comfortably  HEAD:  Atraumatic, Normocephalic  EYES: conjunctiva and sclera clear  ENT: Moist mucous membranes  NECK: Supple, No JVD  CHEST/LUNG: Clear to auscultation bilaterally; No rales, rhonchi, wheezing. Unlabored respirations  HEART: Regular rate and rhythm; No murmurs  ABDOMEN: Bowel sounds present; Soft, Nontender, Nondistended.   EXTREMITIES:  2+ Peripheral Pulses, brisk capillary refill. No clubbing, cyanosis, or edema  NERVOUS SYSTEM:  Alert & Oriented X3, speech clear. No deficits   MSK: FROM all 4 extremities, full and equal strength  SKIN: R groin skin opening, clean, minimal drainage on dressing, no surrounding erythema, no pain     MEDICATIONS  (STANDING):  aspirin enteric coated 81 milliGRAM(s) Oral daily  atorvastatin 80 milliGRAM(s) Oral at bedtime  Breztri Aerosphere: Budesonide 160 mcg, glycopyrrolate 9 mcg, and formoterol fumarate 4.8 mcg per actuation 2 Puff(s) 2 Inhalation Inhalation two times a day  cefTRIAXone Injectable. 1000 milliGRAM(s) IV Push every 24 hours  Dakins Solution - 1/4 Strength 1 Application(s) Topical daily  doxycycline monohydrate Capsule 100 milliGRAM(s) Oral every 12 hours  enoxaparin Injectable 65 milliGRAM(s) SubCutaneous every 12 hours  ferrous    sulfate 325 milliGRAM(s) Oral daily  influenza  Vaccine (HIGH DOSE) 0.7 milliLiter(s) IntraMuscular once  levothyroxine 100 MICROGram(s) Oral daily  losartan 50 milliGRAM(s) Oral daily  metoprolol succinate ER 25 milliGRAM(s) Oral daily  multivitamin 1 Tablet(s) Oral daily  sodium chloride 0.9%. 1000 milliLiter(s) (75 mL/Hr) IV Continuous <Continuous>    MEDICATIONS  (PRN):  acetaminophen     Tablet .. 650 milliGRAM(s) Oral every 6 hours PRN Temp greater or equal to 38C (100.4F), Mild Pain (1 - 3)  aluminum hydroxide/magnesium hydroxide/simethicone Suspension 30 milliLiter(s) Oral every 4 hours PRN Dyspepsia  melatonin 3 milliGRAM(s) Oral at bedtime PRN Insomnia  ondansetron Injectable 4 milliGRAM(s) IV Push every 8 hours PRN Nausea and/or Vomiting    LABS:              Urinalysis Basic - ( 14 Dec 2023 08:45 )    Color: x / Appearance: x / SG: x / pH: x  Gluc: 90 mg/dL / Ketone: x  / Bili: x / Urobili: x   Blood: x / Protein: x / Nitrite: x   Leuk Esterase: x / RBC: x / WBC x   Sq Epi: x / Non Sq Epi: x / Bacteria: x        RADIOLOGY:         72 y/o male with PMHX of HTN, GERD,  severe PVD s/p multiple stents b/l legs, s/p L  fem/popliteal bypass, chronic left lower ankle ulceration present to ED with worsening right groin redness. Patient reports recent right bypass procedure with Dr. Sosa.  Was discharge to rehab where his PCP noted increasing fever and redness of right groin. Patient also reports some mild cough and sore throat. Patient was admitted with right groin cellulitis  and was started on IV antibiotics with ID consult    Subjective: Patient seen today, feels well, no overnight issues reported     REVIEW OF SYSTEMS:    CONSTITUTIONAL: No weakness, fevers or chills  EYES/ENT: No visual changes;  No vertigo or throat pain   NECK: No pain or stiffness  RESPIRATORY: No cough, wheezing, hemoptysis; No shortness of breath  CARDIOVASCULAR: No chest pain or palpitations  GASTROINTESTINAL: No abdominal or epigastric pain. No nausea, vomiting, or hematemesis; No diarrhea or constipation. No melena or hematochezia.  GENITOURINARY: No dysuria, frequency or hematuria  NEUROLOGICAL: No numbness or weakness  SKIN: No itching, rashes      Vital Signs Last 24 Hrs  T(C): 36.4 (16 Dec 2023 17:01), Max: 36.9 (16 Dec 2023 07:32)  T(F): 97.5 (16 Dec 2023 17:01), Max: 98.4 (16 Dec 2023 07:32)  HR: 56 (16 Dec 2023 17:01) (56 - 60)  BP: 132/80 (16 Dec 2023 17:01) (132/80 - 160/54)  RR: 18 (16 Dec 2023 17:01) (18 - 18)  SpO2: 100% (16 Dec 2023 17:01) (96% - 100%)    Parameters below as of 16 Dec 2023 17:01  Patient On (Oxygen Delivery Method): room air    PHYSICAL EXAM:  GENERAL: NAD, lying in bed comfortably  HEAD:  Atraumatic, Normocephalic  EYES: conjunctiva and sclera clear  ENT: Moist mucous membranes  NECK: Supple, No JVD  CHEST/LUNG: Clear to auscultation bilaterally; No rales, rhonchi, wheezing. Unlabored respirations  HEART: Regular rate and rhythm; No murmurs  ABDOMEN: Bowel sounds present; Soft, Nontender, Nondistended.   EXTREMITIES:  2+ Peripheral Pulses, brisk capillary refill. No clubbing, cyanosis, or edema  NERVOUS SYSTEM:  Alert & Oriented X3, speech clear. No deficits   MSK: FROM all 4 extremities, full and equal strength  SKIN: R groin skin opening, clean, minimal drainage on dressing, no surrounding erythema, no pain     MEDICATIONS  (STANDING):  aspirin enteric coated 81 milliGRAM(s) Oral daily  atorvastatin 80 milliGRAM(s) Oral at bedtime  Breztri Aerosphere: Budesonide 160 mcg, glycopyrrolate 9 mcg, and formoterol fumarate 4.8 mcg per actuation 2 Puff(s) 2 Inhalation Inhalation two times a day  cefTRIAXone Injectable. 1000 milliGRAM(s) IV Push every 24 hours  Dakins Solution - 1/4 Strength 1 Application(s) Topical daily  doxycycline monohydrate Capsule 100 milliGRAM(s) Oral every 12 hours  enoxaparin Injectable 65 milliGRAM(s) SubCutaneous every 12 hours  ferrous    sulfate 325 milliGRAM(s) Oral daily  influenza  Vaccine (HIGH DOSE) 0.7 milliLiter(s) IntraMuscular once  levothyroxine 100 MICROGram(s) Oral daily  losartan 50 milliGRAM(s) Oral daily  metoprolol succinate ER 25 milliGRAM(s) Oral daily  multivitamin 1 Tablet(s) Oral daily  sodium chloride 0.9%. 1000 milliLiter(s) (75 mL/Hr) IV Continuous <Continuous>    MEDICATIONS  (PRN):  acetaminophen     Tablet .. 650 milliGRAM(s) Oral every 6 hours PRN Temp greater or equal to 38C (100.4F), Mild Pain (1 - 3)  aluminum hydroxide/magnesium hydroxide/simethicone Suspension 30 milliLiter(s) Oral every 4 hours PRN Dyspepsia  melatonin 3 milliGRAM(s) Oral at bedtime PRN Insomnia  ondansetron Injectable 4 milliGRAM(s) IV Push every 8 hours PRN Nausea and/or Vomiting    LABS:              Urinalysis Basic - ( 14 Dec 2023 08:45 )    Color: x / Appearance: x / SG: x / pH: x  Gluc: 90 mg/dL / Ketone: x  / Bili: x / Urobili: x   Blood: x / Protein: x / Nitrite: x   Leuk Esterase: x / RBC: x / WBC x   Sq Epi: x / Non Sq Epi: x / Bacteria: x        RADIOLOGY:

## 2023-12-16 NOTE — PROGRESS NOTE ADULT - SUBJECTIVE AND OBJECTIVE BOX
Date of Service: 12/16/23  74 y/o male with PMHX of HTN, GERD,  severe PVD s/p multiple stents b/l legs, s/p L  fem/popliteal bypass, chronic left lower ankle ulceration present to ED with worsening right groin redness. Patient reports recent procedure with Dr. Sosa. Was discharge to rehab where his PCP noted increasing fever and redness of right groin. Patient also reports some mild cough and sore throat. No chest pain or sob.   In ED patient found to have right groin cellulitis and + Covid.  (09 Dec 2023 05:36)  Podiatry was consulted for left foot ulcer. Pt resting comfortably bedside today. Pt tender to wound site.     12/16/23: Podiatry following for Lt foot ulcer. Pt resting at bedside, NAD. No additional pedal complaints.          REVIEW OF SYSTEMS:  CONSTITUTIONAL: No weakness, fevers or chills  EYES/ENT: No visual changes;  No vertigo or throat pain   NECK: No pain or stiffness  RESPIRATORY: No cough, wheezing, hemoptysis; No shortness of breath  CARDIOVASCULAR: No chest pain or palpitations  GASTROINTESTINAL: No abdominal or epigastric pain. No nausea, vomiting, or hematemesis; No diarrhea or constipation. No melena or hematochezia.  GENITOURINARY: No dysuria, frequency or hematuria  NEUROLOGICAL: No numbness or weakness  SKIN: See physical examination.  All other review of systems is negative unless indicated above    PMH: Essential hypertension    PVD (peripheral vascular disease)    Gastroesophageal reflux disease, esophagitis presence not specified    Hypothyroidism    Chronic obstructive pulmonary disease, unspecified COPD type    Eczema    Hyperlipidemia, unspecified hyperlipidemia type    Medial meniscus tear      PSH:Femoral popliteal artery thrombus    PVD (peripheral vascular disease)        Allergies:Betadine (Hives; Rash)  IV Contrast (Hives)                                                  9.4    9.92  )-----------( 487      ( 14 Dec 2023 08:45 )             29.8     12-14    141  |  111<H>  |  14  ----------------------------<  90  4.3   |  24  |  1.03    Ca    8.3<L>      14 Dec 2023 08:45      MEDICATIONS  (STANDING):  apixaban 5 milliGRAM(s) Oral every 12 hours  aspirin enteric coated 81 milliGRAM(s) Oral daily  atorvastatin 80 milliGRAM(s) Oral at bedtime  Breztri Aerosphere: Budesonide 160 mcg, glycopyrrolate 9 mcg, and formoterol fumarate 4.8 mcg per actuation 2 Puff(s) 2 Inhalation Inhalation two times a day  cefTRIAXone Injectable. 1000 milliGRAM(s) IV Push every 24 hours  Dakins Solution - 1/4 Strength 1 Application(s) Topical daily  doxycycline monohydrate Capsule 100 milliGRAM(s) Oral every 12 hours  ferrous    sulfate 325 milliGRAM(s) Oral daily  influenza  Vaccine (HIGH DOSE) 0.7 milliLiter(s) IntraMuscular once  levothyroxine 100 MICROGram(s) Oral daily  losartan 50 milliGRAM(s) Oral daily  metoprolol succinate ER 25 milliGRAM(s) Oral daily  multivitamin 1 Tablet(s) Oral daily  sodium chloride 0.9%. 1000 milliLiter(s) (75 mL/Hr) IV Continuous <Continuous>    MEDICATIONS  (PRN):  acetaminophen     Tablet .. 650 milliGRAM(s) Oral every 6 hours PRN Temp greater or equal to 38C (100.4F), Mild Pain (1 - 3)  aluminum hydroxide/magnesium hydroxide/simethicone Suspension 30 milliLiter(s) Oral every 4 hours PRN Dyspepsia  melatonin 3 milliGRAM(s) Oral at bedtime PRN Insomnia  ondansetron Injectable 4 milliGRAM(s) IV Push every 8 hours PRN Nausea and/or Vomiting          Physical Exam:   Constitutional: NAD, alert;  Lower Extremity Focus  Derm:  Skin warm, dry and supple bilateral.    Ulceration to the left medial heel, wound base is mostly granular, wound size is 3cm x 3cm, no periwound edema or erythema noted. no purulence or pus noted, no tracking/tunneling noted, no PTB, no malodor  Vascular: Dorsalis Pedis and Posterior Tibial pulses non palpable.  Capillary re-fill time more then 3 seconds digits 1-5 bilateral.    Neuro: Protective sensation diminished to the level of the digits bilateral.  MSK: Muscle strength 5/5 all major muscle groups bilateral. TTP to the wound site on the left medial mal

## 2023-12-16 NOTE — PROGRESS NOTE ADULT - ASSESSMENT
Assessment: 73y old male seen for the following:   - Full thickness wound to the left foot, chronic   - Pain to left foot  - Difficulty ambulation    Plan:   Chart reviewed and Patient evaluated;  Discussed diagnosis and treatment with patient. Discussed importance of daily foot examinations, proper shoe gear, importance of tight glycemic control.   X-rays reviewed : on wet read showing no soft tissue gas, no acute bony findings, mild edema noted to the left ankle posteriorly  There is a full thickness wound to the left medial mal, etiology of wound most likely due to arterial problem, wound looks chronic and stable without acute SOI  WC: Santyl and DSD  WBAT using surgical shoe  Offloading to bilateral heels while bedbound.   Patient to follow up at [facility] 1 week after discharge w/ Dr Cade Joseph.  Continue with antibiotics as per ID  All additional care per Med appreciated  Patient demonstrated verbal understanding of all interventions and tolerated interventions well without any complications.     Case D/W attending Dr. Joseph    Wound care instructions for Left Lower Extremity to be changed every other day:  - Gently remove dressings.  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 Discussed importance of daily foot examinations, proper shoe gear, importance of tight glycemic control.   X-rays reviewed : on wet read showing no soft tissue gas, no acute bony findings, mild edema noted to the left ankle posteriorly  There is a full thickness wound to the left medial mal, etiology of wound most likely due to arterial problem, wound looks chronic and stable without acute SOI  WC: Santyl and DSD  WBAT using surgical shoe  Offloading to bilateral heels while bedbound.   Patient to follow up at [facility] 1 week after discharge w/ Dr Cade Joseph.  Continue with antibiotics as per ID  All additional care per Med appreciated  Patient demonstrated verbal understanding of all interventions and tolerated interventions well without any complications.     Case D/W attending Dr. Joseph    Wound care instructions for Left Lower Extremity to be changed every other day:  - Gently remove dressings.  - Clean the wound with saline and dry it thoroughly with dry gauze  - Apply santyl, gauze, kerlix and secure it with tape

## 2023-12-17 LAB
ANION GAP SERPL CALC-SCNC: 3 MMOL/L — LOW (ref 5–17)
ANION GAP SERPL CALC-SCNC: 3 MMOL/L — LOW (ref 5–17)
BUN SERPL-MCNC: 20 MG/DL — SIGNIFICANT CHANGE UP (ref 7–23)
BUN SERPL-MCNC: 20 MG/DL — SIGNIFICANT CHANGE UP (ref 7–23)
CALCIUM SERPL-MCNC: 8.1 MG/DL — LOW (ref 8.5–10.1)
CALCIUM SERPL-MCNC: 8.1 MG/DL — LOW (ref 8.5–10.1)
CHLORIDE SERPL-SCNC: 111 MMOL/L — HIGH (ref 96–108)
CHLORIDE SERPL-SCNC: 111 MMOL/L — HIGH (ref 96–108)
CO2 SERPL-SCNC: 27 MMOL/L — SIGNIFICANT CHANGE UP (ref 22–31)
CO2 SERPL-SCNC: 27 MMOL/L — SIGNIFICANT CHANGE UP (ref 22–31)
CREAT SERPL-MCNC: 0.91 MG/DL — SIGNIFICANT CHANGE UP (ref 0.5–1.3)
CREAT SERPL-MCNC: 0.91 MG/DL — SIGNIFICANT CHANGE UP (ref 0.5–1.3)
EGFR: 90 ML/MIN/1.73M2 — SIGNIFICANT CHANGE UP
EGFR: 90 ML/MIN/1.73M2 — SIGNIFICANT CHANGE UP
GLUCOSE SERPL-MCNC: 87 MG/DL — SIGNIFICANT CHANGE UP (ref 70–99)
GLUCOSE SERPL-MCNC: 87 MG/DL — SIGNIFICANT CHANGE UP (ref 70–99)
HCT VFR BLD CALC: 30.6 % — LOW (ref 39–50)
HCT VFR BLD CALC: 30.6 % — LOW (ref 39–50)
HGB BLD-MCNC: 9.5 G/DL — LOW (ref 13–17)
HGB BLD-MCNC: 9.5 G/DL — LOW (ref 13–17)
MCHC RBC-ENTMCNC: 28.8 PG — SIGNIFICANT CHANGE UP (ref 27–34)
MCHC RBC-ENTMCNC: 28.8 PG — SIGNIFICANT CHANGE UP (ref 27–34)
MCHC RBC-ENTMCNC: 31 GM/DL — LOW (ref 32–36)
MCHC RBC-ENTMCNC: 31 GM/DL — LOW (ref 32–36)
MCV RBC AUTO: 92.7 FL — SIGNIFICANT CHANGE UP (ref 80–100)
MCV RBC AUTO: 92.7 FL — SIGNIFICANT CHANGE UP (ref 80–100)
PLATELET # BLD AUTO: 439 K/UL — HIGH (ref 150–400)
PLATELET # BLD AUTO: 439 K/UL — HIGH (ref 150–400)
POTASSIUM SERPL-MCNC: 4.4 MMOL/L — SIGNIFICANT CHANGE UP (ref 3.5–5.3)
POTASSIUM SERPL-MCNC: 4.4 MMOL/L — SIGNIFICANT CHANGE UP (ref 3.5–5.3)
POTASSIUM SERPL-SCNC: 4.4 MMOL/L — SIGNIFICANT CHANGE UP (ref 3.5–5.3)
POTASSIUM SERPL-SCNC: 4.4 MMOL/L — SIGNIFICANT CHANGE UP (ref 3.5–5.3)
RBC # BLD: 3.3 M/UL — LOW (ref 4.2–5.8)
RBC # BLD: 3.3 M/UL — LOW (ref 4.2–5.8)
RBC # FLD: 14.7 % — HIGH (ref 10.3–14.5)
RBC # FLD: 14.7 % — HIGH (ref 10.3–14.5)
SODIUM SERPL-SCNC: 141 MMOL/L — SIGNIFICANT CHANGE UP (ref 135–145)
SODIUM SERPL-SCNC: 141 MMOL/L — SIGNIFICANT CHANGE UP (ref 135–145)
WBC # BLD: 7.59 K/UL — SIGNIFICANT CHANGE UP (ref 3.8–10.5)
WBC # BLD: 7.59 K/UL — SIGNIFICANT CHANGE UP (ref 3.8–10.5)
WBC # FLD AUTO: 7.59 K/UL — SIGNIFICANT CHANGE UP (ref 3.8–10.5)
WBC # FLD AUTO: 7.59 K/UL — SIGNIFICANT CHANGE UP (ref 3.8–10.5)

## 2023-12-17 PROCEDURE — 99232 SBSQ HOSP IP/OBS MODERATE 35: CPT

## 2023-12-17 RX ORDER — ONDANSETRON 8 MG/1
4 TABLET, FILM COATED ORAL ONCE
Refills: 0 | Status: DISCONTINUED | OUTPATIENT
Start: 2023-12-18 | End: 2023-12-18

## 2023-12-17 RX ORDER — OXYCODONE AND ACETAMINOPHEN 5; 325 MG/1; MG/1
1 TABLET ORAL ONCE
Refills: 0 | Status: DISCONTINUED | OUTPATIENT
Start: 2023-12-18 | End: 2023-12-18

## 2023-12-17 RX ORDER — FENTANYL CITRATE 50 UG/ML
25 INJECTION INTRAVENOUS
Refills: 0 | Status: DISCONTINUED | OUTPATIENT
Start: 2023-12-18 | End: 2023-12-18

## 2023-12-17 RX ORDER — FENTANYL CITRATE 50 UG/ML
50 INJECTION INTRAVENOUS
Refills: 0 | Status: DISCONTINUED | OUTPATIENT
Start: 2023-12-18 | End: 2023-12-18

## 2023-12-17 RX ORDER — SODIUM CHLORIDE 9 MG/ML
1000 INJECTION, SOLUTION INTRAVENOUS
Refills: 0 | Status: DISCONTINUED | OUTPATIENT
Start: 2023-12-18 | End: 2023-12-18

## 2023-12-17 RX ADMIN — Medication 100 MICROGRAM(S): at 05:20

## 2023-12-17 RX ADMIN — Medication 1 TABLET(S): at 10:29

## 2023-12-17 RX ADMIN — Medication 325 MILLIGRAM(S): at 10:30

## 2023-12-17 RX ADMIN — Medication 25 MILLIGRAM(S): at 10:29

## 2023-12-17 RX ADMIN — LOSARTAN POTASSIUM 50 MILLIGRAM(S): 100 TABLET, FILM COATED ORAL at 10:29

## 2023-12-17 RX ADMIN — ENOXAPARIN SODIUM 65 MILLIGRAM(S): 100 INJECTION SUBCUTANEOUS at 10:29

## 2023-12-17 RX ADMIN — ATORVASTATIN CALCIUM 80 MILLIGRAM(S): 80 TABLET, FILM COATED ORAL at 21:48

## 2023-12-17 RX ADMIN — Medication 100 MILLIGRAM(S): at 10:29

## 2023-12-17 RX ADMIN — CEFTRIAXONE 1000 MILLIGRAM(S): 500 INJECTION, POWDER, FOR SOLUTION INTRAMUSCULAR; INTRAVENOUS at 16:07

## 2023-12-17 RX ADMIN — Medication 100 MILLIGRAM(S): at 21:48

## 2023-12-17 RX ADMIN — Medication 81 MILLIGRAM(S): at 10:29

## 2023-12-17 NOTE — PROGRESS NOTE ADULT - ASSESSMENT
Assessment: 73y old male seen for the following:   - Full thickness wound to the left foot, chronic   - Pain to left foot  - Difficulty ambulation    Plan:   Chart reviewed and Patient evaluated;  Discussed diagnosis and treatment with patient. Discussed importance of daily foot examinations, proper shoe gear, importance of tight glycemic control.   X-rays reviewed : on wet read showing no soft tissue gas, no acute bony findings, mild edema noted to the left ankle posteriorly  There is a full thickness wound to the left medial mal, etiology of wound most likely due to arterial problem, wound looks chronic and stable without acute SOI  WC: Santyl and DSD  WBAT using surgical shoe  Offloading to bilateral heels while bedbound.   Patient to follow up at [facility] 1 week after discharge w/ Dr Cade Joesph.  Continue with antibiotics as per ID  All additional care per Med appreciated  Patient demonstrated verbal understanding of all interventions and tolerated interventions well without any complications.     Case D/W attending Dr. Joseph    Wound care instructions for Left Lower Extremity to be changed every other day:  - Gently remove dressings.  - Clean the wound with saline and dry it thoroughly with dry gauze  - Apply santyl, gauze, kerlix and secure it with tape   Assessment: 73y old male seen for the following:   - Full thickness wound to the left foot, chronic   - Pain to left foot  - Difficulty ambulation    Plan:   Chart reviewed and Patient evaluated;  Discussed diagnosis and treatment with patient. Discussed importance of daily foot examinations, proper shoe gear, importance of tight glycemic control.   X-rays reviewed : on wet read showing no soft tissue gas, no acute bony findings, mild edema noted to the left ankle posteriorly  There is a full thickness wound to the left medial mal, etiology of wound most likely due to arterial problem, wound looks chronic and stable without acute SOI  WC: Santyl and DSD  WBAT using surgical shoe  Offloading to bilateral heels while bedbound.   Patient to follow up at [facility] 1 week after discharge w/ Dr Cade Joseph.  Continue with antibiotics as per ID  All additional care per Med appreciated  Patient demonstrated verbal understanding of all interventions and tolerated interventions well without any complications.     Case D/W attending Dr. Joseph    Wound care instructions for Left Lower Extremity to be changed every other day:  - Gently remove dressings.  - Clean the wound with saline and dry it thoroughly with dry gauze  - Apply santyl, gauze, kerlix and secure it with tape   "Sharyn Mesa M.D.

## 2023-12-17 NOTE — PROGRESS NOTE ADULT - ASSESSMENT
74 y/o male with PMHX of HTN, GERD,  severe PVD s/p multiple stents b/l legs, s/p L  fem/popliteal bypass, chronic left lower ankle ulceration present to ED with worsening right groin redness. Patient reports recent right bypass procedure with Dr. Sosa.  Was discharge to rehab where his PCP noted increasing fever and redness of right groin. Patient also reports some mild cough and sore throat. No chest pain or sob.     Right Groin Surgical Site Cellulitis  - ID following  - cont doxycycline/ceftriaxone  - blood culture Culture - Abscess with Gram Stain (12.10.23 @ 17:45)   No polymorphonuclear cells seen per low power field   No organisms seen per oil power field  Specimen Source: .Abscess Hip - Right  Moderate Staphylococcus aureus  - vascular consulted- plan for fem-fem graft partial explantation, CFA repair, wound washout, possible debridement and muscle flap next week   - Daily Dakin's solution and packing to right femoral site  - Monitor for bleeding from right groin   - Cardiology consulted for cardiac clearance - please see cardiology note for clearance  - RCRI Class 3 risk - 2 pts- 10.1 % 30 days risk of death, MI or cardiac arrest- patient is medically cleared for planned vascular procedure.      leukocytosis - resolved   - received IV steroid in leiu of IV contrast allergy     JENIFER on CKD  Hypokalemia -  resolved  - trend bmp     COVID +  - Not hypoxic and currently asymptomatic  - Monitor    Left Ankle chronic ulcer  - Wound care eval    PAD  - Stable  - Hold Eliquis - will switched to Lovenox  - Continue Aspirin  - Continue Atorvastatin    HTN  - Stable  - Bystolic switched to Coreg  - Monitor    Hypothyroid  - Continue levothyroxine    DVT prophylaxis  - On lovenox    72 y/o male with PMHX of HTN, GERD,  severe PVD s/p multiple stents b/l legs, s/p L  fem/popliteal bypass, chronic left lower ankle ulceration present to ED with worsening right groin redness. Patient reports recent right bypass procedure with Dr. Sosa.  Was discharge to rehab where his PCP noted increasing fever and redness of right groin. Patient also reports some mild cough and sore throat. No chest pain or sob.     Right Groin Surgical Site Cellulitis  - ID following  - cont doxycycline/ceftriaxone  - blood culture Culture - Abscess with Gram Stain (12.10.23 @ 17:45)   No polymorphonuclear cells seen per low power field   No organisms seen per oil power field  Specimen Source: .Abscess Hip - Right  Moderate Staphylococcus aureus  - vascular consulted- plan for fem-fem graft partial explantation, CFA repair, wound washout, possible debridement and muscle flap next week   - Daily Dakin's solution and packing to right femoral site  - Monitor for bleeding from right groin   - Cardiology consulted for cardiac clearance - please see cardiology note for clearance  - RCRI Class 3 risk - 2 pts- 10.1 % 30 days risk of death, MI or cardiac arrest- patient is medically cleared for planned vascular procedure.      leukocytosis - resolved   - received IV steroid in leiu of IV contrast allergy     JENIFER on CKD  Hypokalemia -  resolved  - trend bmp     COVID +  - Not hypoxic and currently asymptomatic  - Monitor    Left Ankle chronic ulcer  - Wound care eval    PAD  - Stable  - Hold Eliquis - will switched to Lovenox  - Continue Aspirin  - Continue Atorvastatin    HTN  - Stable  - Bystolic switched to Coreg  - Monitor    Hypothyroid  - Continue levothyroxine    DVT prophylaxis  - On lovenox

## 2023-12-17 NOTE — PROGRESS NOTE ADULT - SUBJECTIVE AND OBJECTIVE BOX
Pt. seen and examined at bedside this morning. Patient reports no complaints, groin pain improving daily, tolerating diet. He denies fever, chest pain, SOB, nausea, vomiting.    Physical Exam:  General: AOx3, Well developed, NAD  HEENT: NC/AT, normal pinnae and tragi  Chest: Normal respiratory effort, equal chest rise, left chest wall palpable graft   Heart: RRR  Abdomen: Soft, NTND  Inguinal: b/l inguinal scar. Open skin at distal aspect of scar, minimal amount of fibrinous exudate, no erythema and induration. Nontender. 2+ femoral pulses  Neuro/Psych: No localized deficits. Normal speech, normal tone, normal affect  Skin: Approximately 3x4 cm ulcer just superior to the left medial malleolus with some fibrinous tissue, no bleeding, no surround induration or erythema  Extremities: Warm, well perfused, no edema. Popliteal pulses 2+ b/l, DP and PT 2+ on right, 2+ DP, nonpalpable DP on left      Vitals:  T(C): 36.4 (12-16 @ 21:30), Max: 36.9 (12-16 @ 07:32)  HR: 66 (12-16 @ 21:30) (56 - 66)  BP: 141/55 (12-16 @ 21:30) (132/80 - 144/51)  RR: 18 (12-16 @ 21:30) (18 - 18)  SpO2: 97% (12-16 @ 21:30) (96% - 100%)    12-15 @ 07:01  -  12-16 @ 07:00  --------------------------------------------------------  IN:    Oral Fluid: 400 mL  Total IN: 400 mL    OUT:  Total OUT: 0 mL    Total NET: 400 mL

## 2023-12-17 NOTE — PROGRESS NOTE ADULT - ASSESSMENT
73 year old male with extensive vascular surgery hx, most recent being a right femoral cutdown with aortogram and B/L LE angiogram, SFA covered stent placed in SFA, left axillary to anterior tibial bypass with PTFE graft 11/1/23, patent, fem-fem graft occluded. He had an infected seroma at the right femoral site, +for MRSA. Pulses are palpable, LEs are well perfused.    Plan:  Continue Dakin's solution daily  Continue abx  Plan for right groin exploration, graft explantation and gracilis flap with plastic surgery Tuesday  Cardiology cleared patient for surgery  Ambulation  Rest of management as per primary team    Plan discussed with Dr. Sosa.

## 2023-12-17 NOTE — PROGRESS NOTE ADULT - SUBJECTIVE AND OBJECTIVE BOX
72 y/o male with PMHX of HTN, GERD,  severe PVD s/p multiple stents b/l legs, s/p L  fem/popliteal bypass, chronic left lower ankle ulceration present to ED with worsening right groin redness. Patient reports recent right bypass procedure with Dr. Sosa.  Was discharge to rehab where his PCP noted increasing fever and redness of right groin. Patient also reports some mild cough and sore throat. Patient was admitted with right groin cellulitis  and was started on IV antibiotics with ID consult    Subjective: Patient seen today, feels well, no complaints     REVIEW OF SYSTEMS:    CONSTITUTIONAL: No weakness, fevers or chills  EYES/ENT: No visual changes;  No vertigo or throat pain   NECK: No pain or stiffness  RESPIRATORY: No cough, wheezing, hemoptysis; No shortness of breath  CARDIOVASCULAR: No chest pain or palpitations  GASTROINTESTINAL: No abdominal or epigastric pain. No nausea, vomiting, or hematemesis; No diarrhea or constipation. No melena or hematochezia.  GENITOURINARY: No dysuria, frequency or hematuria  NEUROLOGICAL: No numbness or weakness  SKIN: No itching, rashes      Vital Signs Last 24 Hrs  T(C): 36.6 (17 Dec 2023 07:59), Max: 36.6 (17 Dec 2023 07:59)  T(F): 97.9 (17 Dec 2023 07:59), Max: 97.9 (17 Dec 2023 07:59)  HR: 66 (17 Dec 2023 07:59) (56 - 66)  BP: 142/51 (17 Dec 2023 07:59) (132/80 - 142/51)  RR: 18 (17 Dec 2023 07:59) (18 - 18)  SpO2: 100% (17 Dec 2023 07:59) (97% - 100%)    Parameters below as of 17 Dec 2023 07:59  Patient On (Oxygen Delivery Method): room air        PHYSICAL EXAM:  GENERAL: NAD, lying in bed comfortably  HEAD:  Atraumatic, Normocephalic  EYES: conjunctiva and sclera clear  ENT: Moist mucous membranes  NECK: Supple, No JVD  CHEST/LUNG: Clear to auscultation bilaterally; No rales, rhonchi, wheezing. Unlabored respirations  HEART: Regular rate and rhythm; No murmurs  ABDOMEN: Bowel sounds present; Soft, Nontender, Nondistended.   EXTREMITIES:  2+ Peripheral Pulses, brisk capillary refill. No clubbing, cyanosis, or edema  NERVOUS SYSTEM:  Alert & Oriented X3, speech clear. No deficits   MSK: FROM all 4 extremities, full and equal strength  SKIN: R groin skin opening, clean, minimal drainage on dressing, no surrounding erythema, no pain     MEDICATIONS  (STANDING):  aspirin enteric coated 81 milliGRAM(s) Oral daily  atorvastatin 80 milliGRAM(s) Oral at bedtime  Breztri Aerosphere: Budesonide 160 mcg, glycopyrrolate 9 mcg, and formoterol fumarate 4.8 mcg per actuation 2 Puff(s) 2 Inhalation Inhalation two times a day  cefTRIAXone Injectable. 1000 milliGRAM(s) IV Push every 24 hours  Dakins Solution - 1/4 Strength 1 Application(s) Topical daily  doxycycline monohydrate Capsule 100 milliGRAM(s) Oral every 12 hours  enoxaparin Injectable 65 milliGRAM(s) SubCutaneous every 12 hours  ferrous    sulfate 325 milliGRAM(s) Oral daily  influenza  Vaccine (HIGH DOSE) 0.7 milliLiter(s) IntraMuscular once  levothyroxine 100 MICROGram(s) Oral daily  losartan 50 milliGRAM(s) Oral daily  metoprolol succinate ER 25 milliGRAM(s) Oral daily  multivitamin 1 Tablet(s) Oral daily  sodium chloride 0.9%. 1000 milliLiter(s) (75 mL/Hr) IV Continuous <Continuous>    MEDICATIONS  (PRN):  acetaminophen     Tablet .. 650 milliGRAM(s) Oral every 6 hours PRN Temp greater or equal to 38C (100.4F), Mild Pain (1 - 3)  aluminum hydroxide/magnesium hydroxide/simethicone Suspension 30 milliLiter(s) Oral every 4 hours PRN Dyspepsia  melatonin 3 milliGRAM(s) Oral at bedtime PRN Insomnia  ondansetron Injectable 4 milliGRAM(s) IV Push every 8 hours PRN Nausea and/or Vomiting    LABS:                            9.5    7.59  )-----------( 439      ( 17 Dec 2023 10:12 )             30.6       Urinalysis Basic - ( 14 Dec 2023 08:45 )    Color: x / Appearance: x / SG: x / pH: x  Gluc: 90 mg/dL / Ketone: x  / Bili: x / Urobili: x   Blood: x / Protein: x / Nitrite: x   Leuk Esterase: x / RBC: x / WBC x   Sq Epi: x / Non Sq Epi: x / Bacteria: x  12-17    141  |  111<H>  |  20  ----------------------------<  87  4.4   |  27  |  0.91    Ca    8.1<L>      17 Dec 2023 10:12          RADIOLOGY:         74 y/o male with PMHX of HTN, GERD,  severe PVD s/p multiple stents b/l legs, s/p L  fem/popliteal bypass, chronic left lower ankle ulceration present to ED with worsening right groin redness. Patient reports recent right bypass procedure with Dr. Sosa.  Was discharge to rehab where his PCP noted increasing fever and redness of right groin. Patient also reports some mild cough and sore throat. Patient was admitted with right groin cellulitis  and was started on IV antibiotics with ID consult    Subjective: Patient seen today, feels well, no complaints     REVIEW OF SYSTEMS:    CONSTITUTIONAL: No weakness, fevers or chills  EYES/ENT: No visual changes;  No vertigo or throat pain   NECK: No pain or stiffness  RESPIRATORY: No cough, wheezing, hemoptysis; No shortness of breath  CARDIOVASCULAR: No chest pain or palpitations  GASTROINTESTINAL: No abdominal or epigastric pain. No nausea, vomiting, or hematemesis; No diarrhea or constipation. No melena or hematochezia.  GENITOURINARY: No dysuria, frequency or hematuria  NEUROLOGICAL: No numbness or weakness  SKIN: No itching, rashes      Vital Signs Last 24 Hrs  T(C): 36.6 (17 Dec 2023 07:59), Max: 36.6 (17 Dec 2023 07:59)  T(F): 97.9 (17 Dec 2023 07:59), Max: 97.9 (17 Dec 2023 07:59)  HR: 66 (17 Dec 2023 07:59) (56 - 66)  BP: 142/51 (17 Dec 2023 07:59) (132/80 - 142/51)  RR: 18 (17 Dec 2023 07:59) (18 - 18)  SpO2: 100% (17 Dec 2023 07:59) (97% - 100%)    Parameters below as of 17 Dec 2023 07:59  Patient On (Oxygen Delivery Method): room air        PHYSICAL EXAM:  GENERAL: NAD, lying in bed comfortably  HEAD:  Atraumatic, Normocephalic  EYES: conjunctiva and sclera clear  ENT: Moist mucous membranes  NECK: Supple, No JVD  CHEST/LUNG: Clear to auscultation bilaterally; No rales, rhonchi, wheezing. Unlabored respirations  HEART: Regular rate and rhythm; No murmurs  ABDOMEN: Bowel sounds present; Soft, Nontender, Nondistended.   EXTREMITIES:  2+ Peripheral Pulses, brisk capillary refill. No clubbing, cyanosis, or edema  NERVOUS SYSTEM:  Alert & Oriented X3, speech clear. No deficits   MSK: FROM all 4 extremities, full and equal strength  SKIN: R groin skin opening, clean, minimal drainage on dressing, no surrounding erythema, no pain     MEDICATIONS  (STANDING):  aspirin enteric coated 81 milliGRAM(s) Oral daily  atorvastatin 80 milliGRAM(s) Oral at bedtime  Breztri Aerosphere: Budesonide 160 mcg, glycopyrrolate 9 mcg, and formoterol fumarate 4.8 mcg per actuation 2 Puff(s) 2 Inhalation Inhalation two times a day  cefTRIAXone Injectable. 1000 milliGRAM(s) IV Push every 24 hours  Dakins Solution - 1/4 Strength 1 Application(s) Topical daily  doxycycline monohydrate Capsule 100 milliGRAM(s) Oral every 12 hours  enoxaparin Injectable 65 milliGRAM(s) SubCutaneous every 12 hours  ferrous    sulfate 325 milliGRAM(s) Oral daily  influenza  Vaccine (HIGH DOSE) 0.7 milliLiter(s) IntraMuscular once  levothyroxine 100 MICROGram(s) Oral daily  losartan 50 milliGRAM(s) Oral daily  metoprolol succinate ER 25 milliGRAM(s) Oral daily  multivitamin 1 Tablet(s) Oral daily  sodium chloride 0.9%. 1000 milliLiter(s) (75 mL/Hr) IV Continuous <Continuous>    MEDICATIONS  (PRN):  acetaminophen     Tablet .. 650 milliGRAM(s) Oral every 6 hours PRN Temp greater or equal to 38C (100.4F), Mild Pain (1 - 3)  aluminum hydroxide/magnesium hydroxide/simethicone Suspension 30 milliLiter(s) Oral every 4 hours PRN Dyspepsia  melatonin 3 milliGRAM(s) Oral at bedtime PRN Insomnia  ondansetron Injectable 4 milliGRAM(s) IV Push every 8 hours PRN Nausea and/or Vomiting    LABS:                            9.5    7.59  )-----------( 439      ( 17 Dec 2023 10:12 )             30.6       Urinalysis Basic - ( 14 Dec 2023 08:45 )    Color: x / Appearance: x / SG: x / pH: x  Gluc: 90 mg/dL / Ketone: x  / Bili: x / Urobili: x   Blood: x / Protein: x / Nitrite: x   Leuk Esterase: x / RBC: x / WBC x   Sq Epi: x / Non Sq Epi: x / Bacteria: x  12-17    141  |  111<H>  |  20  ----------------------------<  87  4.4   |  27  |  0.91    Ca    8.1<L>      17 Dec 2023 10:12          RADIOLOGY:

## 2023-12-17 NOTE — PROGRESS NOTE ADULT - SUBJECTIVE AND OBJECTIVE BOX
Date of Service: 12/17/23  74 y/o male with PMHX of HTN, GERD,  severe PVD s/p multiple stents b/l legs, s/p L  fem/popliteal bypass, chronic left lower ankle ulceration present to ED with worsening right groin redness. Patient reports recent procedure with Dr. Sosa. Was discharge to rehab where his PCP noted increasing fever and redness of right groin. Patient also reports some mild cough and sore throat. No chest pain or sob.   In ED patient found to have right groin cellulitis and + Covid.  (09 Dec 2023 05:36)  Podiatry was consulted for left foot ulcer. Pt resting comfortably bedside today. Pt tender to wound site.     12/17/23: Podiatry following for Lt foot ulcer. Pt resting at bedside, NAD. No additional pedal complaints.          REVIEW OF SYSTEMS:  CONSTITUTIONAL: No weakness, fevers or chills  EYES/ENT: No visual changes;  No vertigo or throat pain   NECK: No pain or stiffness  RESPIRATORY: No cough, wheezing, hemoptysis; No shortness of breath  CARDIOVASCULAR: No chest pain or palpitations  GASTROINTESTINAL: No abdominal or epigastric pain. No nausea, vomiting, or hematemesis; No diarrhea or constipation. No melena or hematochezia.  GENITOURINARY: No dysuria, frequency or hematuria  NEUROLOGICAL: No numbness or weakness  SKIN: See physical examination.  All other review of systems is negative unless indicated above    PMH: Essential hypertension    PVD (peripheral vascular disease)    Gastroesophageal reflux disease, esophagitis presence not specified    Hypothyroidism    Chronic obstructive pulmonary disease, unspecified COPD type    Eczema    Hyperlipidemia, unspecified hyperlipidemia type    Medial meniscus tear      PSH:Femoral popliteal artery thrombus    PVD (peripheral vascular disease)        Allergies:Betadine (Hives; Rash)  IV Contrast (Hives)                                                  9.5    7.59  )-----------( 439      ( 17 Dec 2023 10:12 )             30.6     12-17    141  |  111<H>  |  20  ----------------------------<  87  4.4   |  27  |  0.91    Ca    8.1<L>      17 Dec 2023 10:12    Vital Signs Last 24 Hrs  T(C): 36.6 (17 Dec 2023 07:59), Max: 36.6 (17 Dec 2023 07:59)  T(F): 97.9 (17 Dec 2023 07:59), Max: 97.9 (17 Dec 2023 07:59)  HR: 66 (17 Dec 2023 07:59) (56 - 66)  BP: 142/51 (17 Dec 2023 07:59) (132/80 - 142/51)  BP(mean): --  RR: 18 (17 Dec 2023 07:59) (18 - 18)  SpO2: 100% (17 Dec 2023 07:59) (97% - 100%)    Parameters below as of 17 Dec 2023 07:59  Patient On (Oxygen Delivery Method): room air          MEDICATIONS  (STANDING):  aspirin enteric coated 81 milliGRAM(s) Oral daily  atorvastatin 80 milliGRAM(s) Oral at bedtime  Breztri Aerosphere: Budesonide 160 mcg, glycopyrrolate 9 mcg, and formoterol fumarate 4.8 mcg per actuation 2 Puff(s) 2 Inhalation Inhalation two times a day  cefTRIAXone Injectable. 1000 milliGRAM(s) IV Push every 24 hours  Dakins Solution - 1/4 Strength 1 Application(s) Topical daily  doxycycline monohydrate Capsule 100 milliGRAM(s) Oral every 12 hours  enoxaparin Injectable 65 milliGRAM(s) SubCutaneous every 12 hours  ferrous    sulfate 325 milliGRAM(s) Oral daily  influenza  Vaccine (HIGH DOSE) 0.7 milliLiter(s) IntraMuscular once  levothyroxine 100 MICROGram(s) Oral daily  losartan 50 milliGRAM(s) Oral daily  metoprolol succinate ER 25 milliGRAM(s) Oral daily  multivitamin 1 Tablet(s) Oral daily  sodium chloride 0.9%. 1000 milliLiter(s) (75 mL/Hr) IV Continuous <Continuous>    MEDICATIONS  (PRN):  acetaminophen     Tablet .. 650 milliGRAM(s) Oral every 6 hours PRN Temp greater or equal to 38C (100.4F), Mild Pain (1 - 3)  aluminum hydroxide/magnesium hydroxide/simethicone Suspension 30 milliLiter(s) Oral every 4 hours PRN Dyspepsia  melatonin 3 milliGRAM(s) Oral at bedtime PRN Insomnia  ondansetron Injectable 4 milliGRAM(s) IV Push every 8 hours PRN Nausea and/or Vomiting          Physical Exam:   Constitutional: NAD, alert;  Lower Extremity Focus  Derm:  Skin warm, dry and supple bilateral.    Ulceration to the left medial heel, wound base is mostly granular, wound size is 3cm x 3cm, no periwound edema or erythema noted. no purulence or pus noted, no tracking/tunneling noted, no PTB, no malodor  Vascular: Dorsalis Pedis and Posterior Tibial pulses non palpable.  Capillary re-fill time more then 3 seconds digits 1-5 bilateral.    Neuro: Protective sensation diminished to the level of the digits bilateral.  MSK: Muscle strength 5/5 all major muscle groups bilateral. TTP to the wound site on the left medial mal

## 2023-12-18 ENCOUNTER — TRANSCRIPTION ENCOUNTER (OUTPATIENT)
Age: 73
End: 2023-12-18

## 2023-12-18 ENCOUNTER — APPOINTMENT (OUTPATIENT)
Dept: VASCULAR SURGERY | Facility: HOSPITAL | Age: 73
End: 2023-12-18

## 2023-12-18 LAB
ANION GAP SERPL CALC-SCNC: 4 MMOL/L — LOW (ref 5–17)
ANION GAP SERPL CALC-SCNC: 4 MMOL/L — LOW (ref 5–17)
BASOPHILS # BLD AUTO: 0.02 K/UL — SIGNIFICANT CHANGE UP (ref 0–0.2)
BASOPHILS # BLD AUTO: 0.02 K/UL — SIGNIFICANT CHANGE UP (ref 0–0.2)
BASOPHILS NFR BLD AUTO: 0.3 % — SIGNIFICANT CHANGE UP (ref 0–2)
BASOPHILS NFR BLD AUTO: 0.3 % — SIGNIFICANT CHANGE UP (ref 0–2)
BUN SERPL-MCNC: 17 MG/DL — SIGNIFICANT CHANGE UP (ref 7–23)
BUN SERPL-MCNC: 17 MG/DL — SIGNIFICANT CHANGE UP (ref 7–23)
CALCIUM SERPL-MCNC: 8 MG/DL — LOW (ref 8.5–10.1)
CALCIUM SERPL-MCNC: 8 MG/DL — LOW (ref 8.5–10.1)
CHLORIDE SERPL-SCNC: 113 MMOL/L — HIGH (ref 96–108)
CHLORIDE SERPL-SCNC: 113 MMOL/L — HIGH (ref 96–108)
CO2 SERPL-SCNC: 26 MMOL/L — SIGNIFICANT CHANGE UP (ref 22–31)
CO2 SERPL-SCNC: 26 MMOL/L — SIGNIFICANT CHANGE UP (ref 22–31)
CREAT SERPL-MCNC: 0.79 MG/DL — SIGNIFICANT CHANGE UP (ref 0.5–1.3)
CREAT SERPL-MCNC: 0.79 MG/DL — SIGNIFICANT CHANGE UP (ref 0.5–1.3)
EGFR: 95 ML/MIN/1.73M2 — SIGNIFICANT CHANGE UP
EGFR: 95 ML/MIN/1.73M2 — SIGNIFICANT CHANGE UP
EOSINOPHIL # BLD AUTO: 0.2 K/UL — SIGNIFICANT CHANGE UP (ref 0–0.5)
EOSINOPHIL # BLD AUTO: 0.2 K/UL — SIGNIFICANT CHANGE UP (ref 0–0.5)
EOSINOPHIL NFR BLD AUTO: 3.2 % — SIGNIFICANT CHANGE UP (ref 0–6)
EOSINOPHIL NFR BLD AUTO: 3.2 % — SIGNIFICANT CHANGE UP (ref 0–6)
GLUCOSE SERPL-MCNC: 100 MG/DL — HIGH (ref 70–99)
GLUCOSE SERPL-MCNC: 100 MG/DL — HIGH (ref 70–99)
HCT VFR BLD CALC: 26.8 % — LOW (ref 39–50)
HCT VFR BLD CALC: 26.8 % — LOW (ref 39–50)
HGB BLD-MCNC: 8.5 G/DL — LOW (ref 13–17)
HGB BLD-MCNC: 8.5 G/DL — LOW (ref 13–17)
IMM GRANULOCYTES NFR BLD AUTO: 0.8 % — SIGNIFICANT CHANGE UP (ref 0–0.9)
IMM GRANULOCYTES NFR BLD AUTO: 0.8 % — SIGNIFICANT CHANGE UP (ref 0–0.9)
INR BLD: 1.03 RATIO — SIGNIFICANT CHANGE UP (ref 0.85–1.18)
INR BLD: 1.03 RATIO — SIGNIFICANT CHANGE UP (ref 0.85–1.18)
LYMPHOCYTES # BLD AUTO: 1.2 K/UL — SIGNIFICANT CHANGE UP (ref 1–3.3)
LYMPHOCYTES # BLD AUTO: 1.2 K/UL — SIGNIFICANT CHANGE UP (ref 1–3.3)
LYMPHOCYTES # BLD AUTO: 19 % — SIGNIFICANT CHANGE UP (ref 13–44)
LYMPHOCYTES # BLD AUTO: 19 % — SIGNIFICANT CHANGE UP (ref 13–44)
MAGNESIUM SERPL-MCNC: 2.2 MG/DL — SIGNIFICANT CHANGE UP (ref 1.6–2.6)
MAGNESIUM SERPL-MCNC: 2.2 MG/DL — SIGNIFICANT CHANGE UP (ref 1.6–2.6)
MCHC RBC-ENTMCNC: 29.1 PG — SIGNIFICANT CHANGE UP (ref 27–34)
MCHC RBC-ENTMCNC: 29.1 PG — SIGNIFICANT CHANGE UP (ref 27–34)
MCHC RBC-ENTMCNC: 31.7 GM/DL — LOW (ref 32–36)
MCHC RBC-ENTMCNC: 31.7 GM/DL — LOW (ref 32–36)
MCV RBC AUTO: 91.8 FL — SIGNIFICANT CHANGE UP (ref 80–100)
MCV RBC AUTO: 91.8 FL — SIGNIFICANT CHANGE UP (ref 80–100)
MONOCYTES # BLD AUTO: 0.65 K/UL — SIGNIFICANT CHANGE UP (ref 0–0.9)
MONOCYTES # BLD AUTO: 0.65 K/UL — SIGNIFICANT CHANGE UP (ref 0–0.9)
MONOCYTES NFR BLD AUTO: 10.3 % — SIGNIFICANT CHANGE UP (ref 2–14)
MONOCYTES NFR BLD AUTO: 10.3 % — SIGNIFICANT CHANGE UP (ref 2–14)
NEUTROPHILS # BLD AUTO: 4.18 K/UL — SIGNIFICANT CHANGE UP (ref 1.8–7.4)
NEUTROPHILS # BLD AUTO: 4.18 K/UL — SIGNIFICANT CHANGE UP (ref 1.8–7.4)
NEUTROPHILS NFR BLD AUTO: 66.4 % — SIGNIFICANT CHANGE UP (ref 43–77)
NEUTROPHILS NFR BLD AUTO: 66.4 % — SIGNIFICANT CHANGE UP (ref 43–77)
PHOSPHATE SERPL-MCNC: 2.9 MG/DL — SIGNIFICANT CHANGE UP (ref 2.5–4.5)
PHOSPHATE SERPL-MCNC: 2.9 MG/DL — SIGNIFICANT CHANGE UP (ref 2.5–4.5)
PLATELET # BLD AUTO: 356 K/UL — SIGNIFICANT CHANGE UP (ref 150–400)
PLATELET # BLD AUTO: 356 K/UL — SIGNIFICANT CHANGE UP (ref 150–400)
POTASSIUM SERPL-MCNC: 4 MMOL/L — SIGNIFICANT CHANGE UP (ref 3.5–5.3)
POTASSIUM SERPL-MCNC: 4 MMOL/L — SIGNIFICANT CHANGE UP (ref 3.5–5.3)
POTASSIUM SERPL-SCNC: 4 MMOL/L — SIGNIFICANT CHANGE UP (ref 3.5–5.3)
POTASSIUM SERPL-SCNC: 4 MMOL/L — SIGNIFICANT CHANGE UP (ref 3.5–5.3)
PROTHROM AB SERPL-ACNC: 11.6 SEC — SIGNIFICANT CHANGE UP (ref 9.5–13)
PROTHROM AB SERPL-ACNC: 11.6 SEC — SIGNIFICANT CHANGE UP (ref 9.5–13)
RBC # BLD: 2.92 M/UL — LOW (ref 4.2–5.8)
RBC # BLD: 2.92 M/UL — LOW (ref 4.2–5.8)
RBC # FLD: 15 % — HIGH (ref 10.3–14.5)
RBC # FLD: 15 % — HIGH (ref 10.3–14.5)
SODIUM SERPL-SCNC: 143 MMOL/L — SIGNIFICANT CHANGE UP (ref 135–145)
SODIUM SERPL-SCNC: 143 MMOL/L — SIGNIFICANT CHANGE UP (ref 135–145)
WBC # BLD: 6.3 K/UL — SIGNIFICANT CHANGE UP (ref 3.8–10.5)
WBC # BLD: 6.3 K/UL — SIGNIFICANT CHANGE UP (ref 3.8–10.5)
WBC # FLD AUTO: 6.3 K/UL — SIGNIFICANT CHANGE UP (ref 3.8–10.5)
WBC # FLD AUTO: 6.3 K/UL — SIGNIFICANT CHANGE UP (ref 3.8–10.5)

## 2023-12-18 PROCEDURE — 99232 SBSQ HOSP IP/OBS MODERATE 35: CPT

## 2023-12-18 PROCEDURE — 10140 I&D HMTMA SEROMA/FLUID COLLJ: CPT | Mod: 78

## 2023-12-18 RX ORDER — SODIUM CHLORIDE 9 MG/ML
1000 INJECTION INTRAMUSCULAR; INTRAVENOUS; SUBCUTANEOUS
Refills: 0 | Status: DISCONTINUED | OUTPATIENT
Start: 2023-12-18 | End: 2023-12-18

## 2023-12-18 RX ADMIN — Medication 25 MILLIGRAM(S): at 09:53

## 2023-12-18 RX ADMIN — LOSARTAN POTASSIUM 50 MILLIGRAM(S): 100 TABLET, FILM COATED ORAL at 09:55

## 2023-12-18 RX ADMIN — Medication 100 MILLIGRAM(S): at 22:42

## 2023-12-18 RX ADMIN — Medication 3 MILLIGRAM(S): at 22:42

## 2023-12-18 RX ADMIN — Medication 100 MILLIGRAM(S): at 09:53

## 2023-12-18 RX ADMIN — ATORVASTATIN CALCIUM 80 MILLIGRAM(S): 80 TABLET, FILM COATED ORAL at 22:41

## 2023-12-18 RX ADMIN — Medication 100 MICROGRAM(S): at 05:24

## 2023-12-18 RX ADMIN — CEFTRIAXONE 1000 MILLIGRAM(S): 500 INJECTION, POWDER, FOR SOLUTION INTRAMUSCULAR; INTRAVENOUS at 16:28

## 2023-12-18 RX ADMIN — SODIUM CHLORIDE 100 MILLILITER(S): 9 INJECTION INTRAMUSCULAR; INTRAVENOUS; SUBCUTANEOUS at 07:40

## 2023-12-18 NOTE — PROGRESS NOTE ADULT - ASSESSMENT
Assessment: 73y old male seen for the following:   - Full thickness wound to the left foot, chronic   - Pain to left foot  - Difficulty ambulation    Plan:   Chart reviewed and Patient evaluated;  Discussed diagnosis and treatment with patient. Discussed importance of daily foot examinations, proper shoe gear, importance of tight glycemic control.   X-rays reviewed : on wet read showing no soft tissue gas, no acute bony findings, mild edema noted to the left ankle posteriorly  There is a full thickness wound to the left medial mal, etiology of wound most likely due to arterial problem, wound looks chronic and stable without acute SOI  WC: Santyl and DSD  WBAT using surgical shoe  Offloading to bilateral heels while bedbound.   Patient to follow up at [facility] 1 week after discharge w/ Dr Cade Joseph.  Continue with antibiotics as per ID  All additional care per Med appreciated  Patient demonstrated verbal understanding of all interventions and tolerated interventions well without any complications.     Case D/W attending Dr. Joseph    Wound care instructions for Left Lower Extremity to be changed every other day:  - Gently remove dressings.  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 Discussed importance of daily foot examinations, proper shoe gear, importance of tight glycemic control.   X-rays reviewed : on wet read showing no soft tissue gas, no acute bony findings, mild edema noted to the left ankle posteriorly  There is a full thickness wound to the left medial mal, etiology of wound most likely due to arterial problem, wound looks chronic and stable without acute SOI  WC: Santyl and DSD  WBAT using surgical shoe  Offloading to bilateral heels while bedbound.   Patient to follow up at [facility] 1 week after discharge w/ Dr Cade Joseph.  Continue with antibiotics as per ID  All additional care per Med appreciated  Patient demonstrated verbal understanding of all interventions and tolerated interventions well without any complications.     Case D/W attending Dr. Joseph    Wound care instructions for Left Lower Extremity to be changed every other day:  - Gently remove dressings.  - Clean the wound with saline and dry it thoroughly with dry gauze  - Apply santyl, gauze, kerlix and secure it with tape

## 2023-12-18 NOTE — BRIEF OPERATIVE NOTE - NSICDXBRIEFPROCEDURE_GEN_ALL_CORE_FT
PROCEDURES:  Washout, wound, inguinal region 18-Dec-2023 14:08:34  Nakul Zpaata  Closure using gracilis muscle flap 18-Dec-2023 14:14:02  Nakul Zapata   PROCEDURES:  Washout, wound, inguinal region 18-Dec-2023 14:08:34  Nakul Zapata  Closure using gracilis muscle flap 18-Dec-2023 14:14:02  Nakul Zapata

## 2023-12-18 NOTE — PROGRESS NOTE ADULT - ASSESSMENT
72 y/o male with PMHX of HTN, GERD,  severe PVD s/p multiple stents b/l legs, s/p L  fem/popliteal bypass, chronic left lower ankle ulceration present to ED with worsening right groin redness. Patient reports recent right bypass procedure with Dr. Sosa.  Was discharge to rehab where his PCP noted increasing fever and redness of right groin. Patient also reports some mild cough and sore throat. No chest pain or sob.     Right Groin Surgical Site Cellulitis  - ID following  - cont doxycycline/ceftriaxone  - blood culture Culture - Abscess with Gram Stain (12.10.23 @ 17:45)   No polymorphonuclear cells seen per low power field   No organisms seen per oil power field  Specimen Source: Abscess Hip - Right  Moderate Staphylococcus aureus  - vascular consulted- plan for fem-fem graft partial explantation, CFA repair, wound washout, possible debridement and muscle flap next week   - Daily Dakin's solution and packing to right femoral site  - Monitor for bleeding from right groin   - Cardiology consulted for cardiac clearance - please see cardiology note for clearance  - RCRI Class 3 risk - 2 pts- 10.1 % 30 days risk of death, MI or cardiac arrest- patient is medically cleared for planned vascular procedure.      leukocytosis - resolved   - received IV steroid in leiu of IV contrast allergy     JENIFER on CKD  Hypokalemia -  resolved  - trend bmp     COVID +  - Not hypoxic and currently asymptomatic  - Monitor    Left Ankle chronic ulcer  - Wound care eval    PAD  - Stable  - Hold Eliquis - switched to Lovenox- on hold for surgery   - Continue Aspirin  - Continue Atorvastatin    HTN  - Stable  - Bystolic switched to Coreg  - Monitor    Hypothyroid  - Continue levothyroxine    DVT prophylaxis  - On lovenox    74 y/o male with PMHX of HTN, GERD,  severe PVD s/p multiple stents b/l legs, s/p L  fem/popliteal bypass, chronic left lower ankle ulceration present to ED with worsening right groin redness. Patient reports recent right bypass procedure with Dr. Sosa.  Was discharge to rehab where his PCP noted increasing fever and redness of right groin. Patient also reports some mild cough and sore throat. No chest pain or sob.     Right Groin Surgical Site Cellulitis  - ID following  - cont doxycycline/ceftriaxone  - blood culture Culture - Abscess with Gram Stain (12.10.23 @ 17:45)   No polymorphonuclear cells seen per low power field   No organisms seen per oil power field  Specimen Source: Abscess Hip - Right  Moderate Staphylococcus aureus  - vascular consulted- plan for fem-fem graft partial explantation, CFA repair, wound washout, possible debridement and muscle flap next week   - Daily Dakin's solution and packing to right femoral site  - Monitor for bleeding from right groin   - Cardiology consulted for cardiac clearance - please see cardiology note for clearance  - RCRI Class 3 risk - 2 pts- 10.1 % 30 days risk of death, MI or cardiac arrest- patient is medically cleared for planned vascular procedure.      leukocytosis - resolved   - received IV steroid in leiu of IV contrast allergy     JENIFER on CKD  Hypokalemia -  resolved  - trend bmp     COVID +  - Not hypoxic and currently asymptomatic  - Monitor    Left Ankle chronic ulcer  - Wound care eval    PAD  - Stable  - Hold Eliquis - switched to Lovenox- on hold for surgery   - Continue Aspirin  - Continue Atorvastatin    HTN  - Stable  - Bystolic switched to Coreg  - Monitor    Hypothyroid  - Continue levothyroxine    DVT prophylaxis  - On lovenox

## 2023-12-18 NOTE — BRIEF OPERATIVE NOTE - NSICDXBRIEFPREOP_GEN_ALL_CORE_FT
PRE-OP DIAGNOSIS:  Open wound of inguinal region 18-Dec-2023 14:09:02  Nakul Zapata  
PRE-OP DIAGNOSIS:  Open wound of inguinal region 18-Dec-2023 14:09:02  Nakul Zapata

## 2023-12-18 NOTE — PROGRESS NOTE ADULT - SUBJECTIVE AND OBJECTIVE BOX
Date of service: 12-18-23 @ 14:16    Lying in bed in NAD  Events noted  s/p right inguinal vascular surgery s/p washout; vascular graft reported intact    ROS: no fever or chills; denies dizziness, no HA, no SOB or cough, no abdominal pain, no diarrhea or constipation; no dysuria, no legs pain, no rashes    MEDICATIONS  (STANDING):  aspirin enteric coated 81 milliGRAM(s) Oral daily  atorvastatin 80 milliGRAM(s) Oral at bedtime  Breztri Aerosphere: Budesonide 160 mcg, glycopyrrolate 9 mcg, and formoterol fumarate 4.8 mcg per actuation 2 Puff(s) 2 Inhalation Inhalation two times a day  cefTRIAXone Injectable. 1000 milliGRAM(s) IV Push every 24 hours  Dakins Solution - 1/4 Strength 1 Application(s) Topical daily  doxycycline monohydrate Capsule 100 milliGRAM(s) Oral every 12 hours  ferrous    sulfate 325 milliGRAM(s) Oral daily  influenza  Vaccine (HIGH DOSE) 0.7 milliLiter(s) IntraMuscular once  lactated ringers. 1000 milliLiter(s) (75 mL/Hr) IV Continuous <Continuous>  levothyroxine 100 MICROGram(s) Oral daily  losartan 50 milliGRAM(s) Oral daily  metoprolol succinate ER 25 milliGRAM(s) Oral daily  multivitamin 1 Tablet(s) Oral daily  sodium chloride 0.9%. 1000 milliLiter(s) (100 mL/Hr) IV Continuous <Continuous>    Vital Signs Last 24 Hrs  T(C): 36.1 (18 Dec 2023 13:53), Max: 37 (17 Dec 2023 21:00)  T(F): 97 (18 Dec 2023 13:53), Max: 98.6 (17 Dec 2023 21:00)  HR: 49 (18 Dec 2023 14:08) (48 - 63)  BP: 143/43 (18 Dec 2023 14:08) (131/59 - 162/52)  BP(mean): --  RR: 11 (18 Dec 2023 14:08) (10 - 18)  SpO2: 100% (18 Dec 2023 14:08) (98% - 100%)    Parameters below as of 18 Dec 2023 13:53  Patient On (Oxygen Delivery Method): nasal cannula  O2 Flow (L/min): 3     Physical exam:    Constitutional:  No acute distress  HEENT: NC/AT, EOMI, PERRLA, conjunctivae clear; ears and nose atraumatic  Neck: supple; thyroid not palpable  Back: no tenderness  Respiratory: respiratory effort normal; clear to auscultation  Cardiovascular: S1S2 regular, no murmurs  Abdomen: soft, not tender, not distended, positive BS; no liver or spleen organomegaly  Genitourinary: no suprapubic tenderness  Lymphatic: no LN palpable  Musculoskeletal: no muscle tenderness, no joint swelling or tenderness  Extremities: no pedal edema  Right inguinal erythema, edema, warmth and tenderness resolving s/p surgery  Neurological/ Psychiatric: AxOx3, judgement and insight normal; moving all extremities  Skin: no rashes; no palpable lesions    Labs: reviewed                        8.5    6.30  )-----------( 356      ( 18 Dec 2023 06:11 )             26.8     12-18    143  |  113<H>  |  17  ----------------------------<  100<H>  4.0   |  26  |  0.79    Ca    8.0<L>      18 Dec 2023 06:11  Phos  2.9     12-18  Mg     2.2     12-18                        9.4    9.92  )-----------( 487      ( 14 Dec 2023 08:45 )             29.8     12-14    141  |  111<H>  |  14  ----------------------------<  90  4.3   |  24  |  1.03    Ca    8.3<L>      14 Dec 2023 08:45                        9.6    9.00  )-----------( 273      ( 09 Dec 2023 08:09 )             29.5     12-09    135  |  98  |  37<H>  ----------------------------<  104<H>  2.7<LL>   |  30  |  1.74<H>    Ca    7.9<L>      09 Dec 2023 08:09    TPro  6.4  /  Alb  2.4<L>  /  TBili  0.9  /  DBili  x   /  AST  46<H>  /  ALT  28  /  AlkPhos  151<H>  12-09     LIVER FUNCTIONS - ( 09 Dec 2023 00:23 )  Alb: 2.4 g/dL / Pro: 6.4 gm/dL / ALK PHOS: 151 U/L / ALT: 28 U/L / AST: 46 U/L / GGT: x           Urinalysis (12-09 @ 06:16)  Urine Appearance: Clear  Protein, Urine: Negative mg/dL  Urine Microscopic-Add On (NC) (12-09 @ 06:16)  White Blood Cell - Urine: 1 /HPF  Red Blood Cell - Urine: 2 /HPF    COVID (12-09 @ 01:00)  Detected    Culture - Blood (collected 09 Dec 2023 01:08)  Source: .Blood None  Preliminary Report (10 Dec 2023 07:02):    No growth at 24 hours    Culture - Blood (collected 09 Dec 2023 00:23)  Source: .Blood None  Preliminary Report (10 Dec 2023 07:02):    No growth at 24 hours    Radiology: all available radiological tests reviewed  < from: Xray Chest 1 View-PORTABLE IMMEDIATE (12.09.23 @ 00:02) >  IMPRESSION: No evidence of acute cardiopulmonary disease.    < end of copied text >  < from: CT Abdomen and Pelvis No Cont (12.09.23 @ 02:26) >  Postsurgical changes of the right groin with surrounding fat stranding.   No definite evidence of drainable fluid collection, although evaluation   is limited due to lack of intravenous contrast.  Prominent right inguinal lymph nodes, likely reactive.  Multiple stents and bypass grafts as above, the patency of which cannot be evaluated.  < end of copied text >    Advanced directives addressed: full resuscitation

## 2023-12-18 NOTE — BRIEF OPERATIVE NOTE - NSICDXBRIEFPOSTOP_GEN_ALL_CORE_FT
POST-OP DIAGNOSIS:  Open wound of groin without complication 18-Dec-2023 14:09:17  Nakul Zapata  
POST-OP DIAGNOSIS:  Open wound of groin without complication 18-Dec-2023 14:09:17  Nakul Zapata

## 2023-12-18 NOTE — PROGRESS NOTE ADULT - SUBJECTIVE AND OBJECTIVE BOX
Date of Service: 12/18/23  72 y/o male with PMHX of HTN, GERD,  severe PVD s/p multiple stents b/l legs, s/p L  fem/popliteal bypass, chronic left lower ankle ulceration present to ED with worsening right groin redness. Patient reports recent procedure with Dr. Sosa. Was discharge to rehab where his PCP noted increasing fever and redness of right groin. Patient also reports some mild cough and sore throat. No chest pain or sob.   In ED patient found to have right groin cellulitis and + Covid.  (09 Dec 2023 05:36)  Podiatry was consulted for left foot ulcer. Pt resting comfortably bedside today. Pt tender to wound site.     12/18/23: Podiatry following for Lt foot ulcer. Pt resting at bedside, NAD. No additional pedal complaints.          REVIEW OF SYSTEMS:  CONSTITUTIONAL: No weakness, fevers or chills  EYES/ENT: No visual changes;  No vertigo or throat pain   NECK: No pain or stiffness  RESPIRATORY: No cough, wheezing, hemoptysis; No shortness of breath  CARDIOVASCULAR: No chest pain or palpitations  GASTROINTESTINAL: No abdominal or epigastric pain. No nausea, vomiting, or hematemesis; No diarrhea or constipation. No melena or hematochezia.  GENITOURINARY: No dysuria, frequency or hematuria  NEUROLOGICAL: No numbness or weakness  SKIN: See physical examination.  All other review of systems is negative unless indicated above    PMH: Essential hypertension    PVD (peripheral vascular disease)    Gastroesophageal reflux disease, esophagitis presence not specified    Hypothyroidism    Chronic obstructive pulmonary disease, unspecified COPD type    Eczema    Hyperlipidemia, unspecified hyperlipidemia type    Medial meniscus tear      PSH:Femoral popliteal artery thrombus    PVD (peripheral vascular disease)        Allergies:Betadine (Hives; Rash)  IV Contrast (Hives)    Labs                        8.5    6.30  )-----------( 356      ( 18 Dec 2023 06:11 )             26.8   12-18    143  |  113<H>  |  17  ----------------------------<  100<H>  4.0   |  26  |  0.79    Ca    8.0<L>      18 Dec 2023 06:11  Phos  2.9     12-18  Mg     2.2     12-18          Vital Signs Last 24 Hrs  T(C): 36.1 (18 Dec 2023 14:30), Max: 37 (17 Dec 2023 21:00)  T(F): 97 (18 Dec 2023 14:30), Max: 98.6 (17 Dec 2023 21:00)  HR: 48 (18 Dec 2023 14:30) (48 - 63)  BP: 151/48 (18 Dec 2023 14:30) (131/59 - 162/52)  BP(mean): --  RR: 12 (18 Dec 2023 14:30) (10 - 18)  SpO2: 100% (18 Dec 2023 14:30) (98% - 100%)    Parameters below as of 18 Dec 2023 14:30  Patient On (Oxygen Delivery Method): nasal cannula  O2 Flow (L/min): 3      MEDICATIONS  (STANDING):  aspirin enteric coated 81 milliGRAM(s) Oral daily  atorvastatin 80 milliGRAM(s) Oral at bedtime  Breztri Aerosphere: Budesonide 160 mcg, glycopyrrolate 9 mcg, and formoterol fumarate 4.8 mcg per actuation 2 Puff(s) 2 Inhalation Inhalation two times a day  cefTRIAXone Injectable. 1000 milliGRAM(s) IV Push every 24 hours  Dakins Solution - 1/4 Strength 1 Application(s) Topical daily  doxycycline monohydrate Capsule 100 milliGRAM(s) Oral every 12 hours  ferrous    sulfate 325 milliGRAM(s) Oral daily  influenza  Vaccine (HIGH DOSE) 0.7 milliLiter(s) IntraMuscular once  lactated ringers. 1000 milliLiter(s) (75 mL/Hr) IV Continuous <Continuous>  levothyroxine 100 MICROGram(s) Oral daily  losartan 50 milliGRAM(s) Oral daily  metoprolol succinate ER 25 milliGRAM(s) Oral daily  multivitamin 1 Tablet(s) Oral daily  sodium chloride 0.9%. 1000 milliLiter(s) (100 mL/Hr) IV Continuous <Continuous>    MEDICATIONS  (PRN):  acetaminophen     Tablet .. 650 milliGRAM(s) Oral every 6 hours PRN Temp greater or equal to 38C (100.4F), Mild Pain (1 - 3)  aluminum hydroxide/magnesium hydroxide/simethicone Suspension 30 milliLiter(s) Oral every 4 hours PRN Dyspepsia  fentaNYL    Injectable 50 MICROGram(s) IV Push every 5 minutes PRN Severe Pain (7 - 10)  fentaNYL    Injectable 25 MICROGram(s) IV Push every 5 minutes PRN Moderate Pain (4 - 6)  melatonin 3 milliGRAM(s) Oral at bedtime PRN Insomnia  ondansetron Injectable 4 milliGRAM(s) IV Push once PRN Nausea and/or Vomiting  ondansetron Injectable 4 milliGRAM(s) IV Push every 8 hours PRN Nausea and/or Vomiting  oxycodone    5 mG/acetaminophen 325 mG 1 Tablet(s) Oral once PRN Moderate Pain (4 - 6)              Physical Exam:   Constitutional: NAD, alert;  Lower Extremity Focus  Derm:  Skin warm, dry and supple bilateral.    Ulceration to the left medial heel, wound base is mostly granular, wound size is 3cm x 3cm, no periwound edema or erythema noted. no purulence or pus noted, no tracking/tunneling noted, no PTB, no malodor  Vascular: Dorsalis Pedis and Posterior Tibial pulses non palpable.  Capillary re-fill time more then 3 seconds digits 1-5 bilateral.    Neuro: Protective sensation diminished to the level of the digits bilateral.  MSK: Muscle strength 5/5 all major muscle groups bilateral. TTP to the wound site on the left medial mal

## 2023-12-18 NOTE — PROGRESS NOTE ADULT - ASSESSMENT
74 y/o male with h/o HTN, GERD,  severe PVD s/p multiple stents b/l legs, s/p L  fem/popliteal bypass, chronic left lower ankle ulceration was admitted on 12/9 for worsening right groin redness. Patient reports a recent vascular procedure with Dr. Sosa and a right inguinal vascular access was used. He was discharge to rehab where he was noted with increasing fever and redness of right groin. Patient also reports cough, sore throat and mild SOB. In ER he tested COVID-19 PCR detected. He received vancomycin IV and cefepime.     1. Right inguinal cellulitis s/p recent vascular surgery. Likely postsurgical infection with possible vascular graft involvement. COVID-19 viral syndrome resolving. PVD s/p vascular bypass.   -low grade fever resolved  -BC x 2 noted  -on ceftriaxone 1 gm IV qd and doxycycline 100 mg PO q12h # 8  -tolerating abx well so far; no side effects noted  -local wound care  -vascular surgery evaluation appreciated  -plan for partial graft explantation of fem-fem, CFA repair, wound washout, and muscle flap  -case reviewed with vascular attending --> due to concern of possible vascular graft involvement - plan for longer abx therapy duration  -plan for PICC line   -continue abx coverage for 6 weeks  -f/u cultures  -monitor temps  -f/u CBC  -supportive care  2. Other issues:   -care per medicine

## 2023-12-18 NOTE — PROGRESS NOTE ADULT - SUBJECTIVE AND OBJECTIVE BOX
74 y/o male with PMHX of HTN, GERD,  severe PVD s/p multiple stents b/l legs, s/p L  fem/popliteal bypass, chronic left lower ankle ulceration present to ED with worsening right groin redness. Patient reports recent right bypass procedure with Dr. Sosa.  Was discharge to rehab where his PCP noted increasing fever and redness of right groin. Patient also reports some mild cough and sore throat. Patient was admitted with right groin cellulitis  and was started on IV antibiotics with ID consult    Subjective: patient was seen and examined. VSS- NPO for vascular surgery today.     ROS:   All 10 systems reviewed and found to be negative with the exception of what has been described above.     Vital Signs Last 24 Hrs  T(C): 36.4 (18 Dec 2023 08:57), Max: 37 (17 Dec 2023 21:00)  T(F): 97.5 (18 Dec 2023 08:57), Max: 98.6 (17 Dec 2023 21:00)  HR: 60 (18 Dec 2023 09:54) (57 - 63)  BP: 157/54 (18 Dec 2023 09:54) (131/59 - 161/52)  BP(mean): --  RR: 18 (18 Dec 2023 08:57) (18 - 18)  SpO2: 100% (18 Dec 2023 08:57) (98% - 100%)    Parameters below as of 18 Dec 2023 08:57  Patient On (Oxygen Delivery Method): room air      PHYSICAL EXAM:  GENERAL: NAD, lying in bed comfortably  HEAD:  Atraumatic, Normocephalic  EYES: conjunctiva and sclera clear  ENT: Moist mucous membranes  NECK: Supple, No JVD  CHEST/LUNG: Clear to auscultation bilaterally; No rales, rhonchi, wheezing. Unlabored respirations  HEART: Regular rate and rhythm; No murmurs  ABDOMEN: Bowel sounds present; Soft, Nontender, Nondistended.   EXTREMITIES:  2+ Peripheral Pulses, brisk capillary refill. No clubbing, cyanosis, or edema  NERVOUS SYSTEM:  Alert & Oriented X3, speech clear. No deficits   MSK: FROM all 4 extremities, full and equal strength  SKIN: R groin skin opening, clean, minimal drainage on dressing, no surrounding erythema, no pain     MEDICATIONS  (STANDING):  aspirin enteric coated 81 milliGRAM(s) Oral daily  atorvastatin 80 milliGRAM(s) Oral at bedtime  Breztri Aerosphere: Budesonide 160 mcg, glycopyrrolate 9 mcg, and formoterol fumarate 4.8 mcg per actuation 2 Puff(s) 2 Inhalation Inhalation two times a day  cefTRIAXone Injectable. 1000 milliGRAM(s) IV Push every 24 hours  Dakins Solution - 1/4 Strength 1 Application(s) Topical daily  doxycycline monohydrate Capsule 100 milliGRAM(s) Oral every 12 hours  ferrous    sulfate 325 milliGRAM(s) Oral daily  influenza  Vaccine (HIGH DOSE) 0.7 milliLiter(s) IntraMuscular once  levothyroxine 100 MICROGram(s) Oral daily  losartan 50 milliGRAM(s) Oral daily  metoprolol succinate ER 25 milliGRAM(s) Oral daily  multivitamin 1 Tablet(s) Oral daily  sodium chloride 0.9%. 1000 milliLiter(s) (100 mL/Hr) IV Continuous <Continuous>    MEDICATIONS  (PRN):  acetaminophen     Tablet .. 650 milliGRAM(s) Oral every 6 hours PRN Temp greater or equal to 38C (100.4F), Mild Pain (1 - 3)  aluminum hydroxide/magnesium hydroxide/simethicone Suspension 30 milliLiter(s) Oral every 4 hours PRN Dyspepsia  melatonin 3 milliGRAM(s) Oral at bedtime PRN Insomnia  ondansetron Injectable 4 milliGRAM(s) IV Push every 8 hours PRN Nausea and/or Vomiting      LABS:      12-18    143  |  113<H>  |  17  ----------------------------<  100<H>  4.0   |  26  |  0.79    Ca    8.0<L>      18 Dec 2023 06:11  Phos  2.9     12-18  Mg     2.2     12-18                          8.5    6.30  )-----------( 356      ( 18 Dec 2023 06:11 )             26.8         Urinalysis Basic - ( 14 Dec 2023 08:45 )    Color: x / Appearance: x / SG: x / pH: x  Gluc: 90 mg/dL / Ketone: x  / Bili: x / Urobili: x   Blood: x / Protein: x / Nitrite: x   Leuk Esterase: x / RBC: x / WBC x   Sq Epi: x / Non Sq Epi: x / Bacteria: x  12-17    141  |  111<H>  |  20  ----------------------------<  87  4.4   |  27  |  0.91    Ca    8.1<L>      17 Dec 2023 10:12          RADIOLOGY:         72 y/o male with PMHX of HTN, GERD,  severe PVD s/p multiple stents b/l legs, s/p L  fem/popliteal bypass, chronic left lower ankle ulceration present to ED with worsening right groin redness. Patient reports recent right bypass procedure with Dr. Sosa.  Was discharge to rehab where his PCP noted increasing fever and redness of right groin. Patient also reports some mild cough and sore throat. Patient was admitted with right groin cellulitis  and was started on IV antibiotics with ID consult    Subjective: patient was seen and examined. VSS- NPO for vascular surgery today.     ROS:   All 10 systems reviewed and found to be negative with the exception of what has been described above.     Vital Signs Last 24 Hrs  T(C): 36.4 (18 Dec 2023 08:57), Max: 37 (17 Dec 2023 21:00)  T(F): 97.5 (18 Dec 2023 08:57), Max: 98.6 (17 Dec 2023 21:00)  HR: 60 (18 Dec 2023 09:54) (57 - 63)  BP: 157/54 (18 Dec 2023 09:54) (131/59 - 161/52)  BP(mean): --  RR: 18 (18 Dec 2023 08:57) (18 - 18)  SpO2: 100% (18 Dec 2023 08:57) (98% - 100%)    Parameters below as of 18 Dec 2023 08:57  Patient On (Oxygen Delivery Method): room air      PHYSICAL EXAM:  GENERAL: NAD, lying in bed comfortably  HEAD:  Atraumatic, Normocephalic  EYES: conjunctiva and sclera clear  ENT: Moist mucous membranes  NECK: Supple, No JVD  CHEST/LUNG: Clear to auscultation bilaterally; No rales, rhonchi, wheezing. Unlabored respirations  HEART: Regular rate and rhythm; No murmurs  ABDOMEN: Bowel sounds present; Soft, Nontender, Nondistended.   EXTREMITIES:  2+ Peripheral Pulses, brisk capillary refill. No clubbing, cyanosis, or edema  NERVOUS SYSTEM:  Alert & Oriented X3, speech clear. No deficits   MSK: FROM all 4 extremities, full and equal strength  SKIN: R groin skin opening, clean, minimal drainage on dressing, no surrounding erythema, no pain     MEDICATIONS  (STANDING):  aspirin enteric coated 81 milliGRAM(s) Oral daily  atorvastatin 80 milliGRAM(s) Oral at bedtime  Breztri Aerosphere: Budesonide 160 mcg, glycopyrrolate 9 mcg, and formoterol fumarate 4.8 mcg per actuation 2 Puff(s) 2 Inhalation Inhalation two times a day  cefTRIAXone Injectable. 1000 milliGRAM(s) IV Push every 24 hours  Dakins Solution - 1/4 Strength 1 Application(s) Topical daily  doxycycline monohydrate Capsule 100 milliGRAM(s) Oral every 12 hours  ferrous    sulfate 325 milliGRAM(s) Oral daily  influenza  Vaccine (HIGH DOSE) 0.7 milliLiter(s) IntraMuscular once  levothyroxine 100 MICROGram(s) Oral daily  losartan 50 milliGRAM(s) Oral daily  metoprolol succinate ER 25 milliGRAM(s) Oral daily  multivitamin 1 Tablet(s) Oral daily  sodium chloride 0.9%. 1000 milliLiter(s) (100 mL/Hr) IV Continuous <Continuous>    MEDICATIONS  (PRN):  acetaminophen     Tablet .. 650 milliGRAM(s) Oral every 6 hours PRN Temp greater or equal to 38C (100.4F), Mild Pain (1 - 3)  aluminum hydroxide/magnesium hydroxide/simethicone Suspension 30 milliLiter(s) Oral every 4 hours PRN Dyspepsia  melatonin 3 milliGRAM(s) Oral at bedtime PRN Insomnia  ondansetron Injectable 4 milliGRAM(s) IV Push every 8 hours PRN Nausea and/or Vomiting      LABS:      12-18    143  |  113<H>  |  17  ----------------------------<  100<H>  4.0   |  26  |  0.79    Ca    8.0<L>      18 Dec 2023 06:11  Phos  2.9     12-18  Mg     2.2     12-18                          8.5    6.30  )-----------( 356      ( 18 Dec 2023 06:11 )             26.8         Urinalysis Basic - ( 14 Dec 2023 08:45 )    Color: x / Appearance: x / SG: x / pH: x  Gluc: 90 mg/dL / Ketone: x  / Bili: x / Urobili: x   Blood: x / Protein: x / Nitrite: x   Leuk Esterase: x / RBC: x / WBC x   Sq Epi: x / Non Sq Epi: x / Bacteria: x  12-17    141  |  111<H>  |  20  ----------------------------<  87  4.4   |  27  |  0.91    Ca    8.1<L>      17 Dec 2023 10:12          RADIOLOGY:

## 2023-12-18 NOTE — PROGRESS NOTE ADULT - ASSESSMENT
73 year old male with extensive vascular surgery hx, most recent being a right femoral cutdown with aortogram and B/L LE angiogram, SFA covered stent placed in SFA, left axillary to anterior tibial bypass with PTFE graft 11/1/23, patent, fem-fem graft occluded. He had an infected seroma at the right femoral site, +for MRSA. Pulses are palpable, LEs are well perfused.    Plan:  Continue Dakin's solution daily  Continue abx  Plan for right groin exploration, graft explantation and gracilis flap with plastic surgery today   Keep NPO for surgery   Cardiology cleared patient for surgery  Podiatry on board   Ambulation  Rest of management as per primary team    Plan discussed with Dr. Sosa.

## 2023-12-18 NOTE — PROGRESS NOTE ADULT - SUBJECTIVE AND OBJECTIVE BOX
Patient seen and examined on routine rounds.   He denies nausea, vomiting, fever or chills. Patient updated about plans for surgery   Nurse report no acute event overnight   VS reviewed      Physical Exam:  General: AOx3, Well developed, NAD  HEENT: NC/AT, normal pinnae and tragi  Chest: Normal respiratory effort, equal chest rise, left chest wall palpable graft   Heart: RRR  Abdomen: Soft, NTND  Inguinal: b/l inguinal scar. Open skin at distal aspect of scar, minimal amount of fibrinous exudate, no erythema and induration. Nontender. 2+ femoral pulses  Neuro/Psych: No localized deficits. Normal speech, normal tone, normal affect  Skin: Approximately 3x4 cm ulcer just superior to the left medial malleolus with some fibrinous tissue, no bleeding, no surround induration or erythema  Extremities: Warm, well perfused, no edema. Popliteal pulses 2+ b/l, DP and PT 2+ on right, 2+ DP, nonpalpable DP on left    Vital Signs Last 24 Hrs  T(C): 37 (17 Dec 2023 21:00), Max: 37 (17 Dec 2023 21:00)  T(F): 98.6 (17 Dec 2023 21:00), Max: 98.6 (17 Dec 2023 21:00)  HR: 63 (17 Dec 2023 21:00) (57 - 66)  BP: 131/59 (17 Dec 2023 21:00) (131/59 - 161/52)  BP(mean): --  RR: 18 (17 Dec 2023 21:00) (18 - 18)  SpO2: 98% (17 Dec 2023 21:00) (98% - 100%)    Parameters below as of 17 Dec 2023 21:00  Patient On (Oxygen Delivery Method): room air                          9.5    7.59  )-----------( 439      ( 17 Dec 2023 10:12 )             30.6     12-17    141  |  111<H>  |  20  ----------------------------<  87  4.4   |  27  |  0.91    Ca    8.1<L>      17 Dec 2023 10:12    MEDICATIONS  (STANDING):  aspirin enteric coated 81 milliGRAM(s) Oral daily  atorvastatin 80 milliGRAM(s) Oral at bedtime  Breztri Aerosphere: Budesonide 160 mcg, glycopyrrolate 9 mcg, and formoterol fumarate 4.8 mcg per actuation 2 Puff(s) 2 Inhalation Inhalation two times a day  cefTRIAXone Injectable. 1000 milliGRAM(s) IV Push every 24 hours  Dakins Solution - 1/4 Strength 1 Application(s) Topical daily  doxycycline monohydrate Capsule 100 milliGRAM(s) Oral every 12 hours  ferrous    sulfate 325 milliGRAM(s) Oral daily  influenza  Vaccine (HIGH DOSE) 0.7 milliLiter(s) IntraMuscular once  levothyroxine 100 MICROGram(s) Oral daily  losartan 50 milliGRAM(s) Oral daily  metoprolol succinate ER 25 milliGRAM(s) Oral daily  multivitamin 1 Tablet(s) Oral daily  sodium chloride 0.9%. 1000 milliLiter(s) (75 mL/Hr) IV Continuous <Continuous>    MEDICATIONS  (PRN):  acetaminophen     Tablet .. 650 milliGRAM(s) Oral every 6 hours PRN Temp greater or equal to 38C (100.4F), Mild Pain (1 - 3)  aluminum hydroxide/magnesium hydroxide/simethicone Suspension 30 milliLiter(s) Oral every 4 hours PRN Dyspepsia  melatonin 3 milliGRAM(s) Oral at bedtime PRN Insomnia  ondansetron Injectable 4 milliGRAM(s) IV Push every 8 hours PRN Nausea and/or Vomiting

## 2023-12-18 NOTE — BRIEF OPERATIVE NOTE - OPERATION/FINDINGS
Fibrosis between adductor longus and gracilis muscle. Muscle with robust blood supply. Flap reflected to right groin with no tension.
Right groin wound with fibrosis, no purulence or fluid collection. Area irrigated with 1L using pulse lavage. Graft not exposed.

## 2023-12-19 LAB
ANION GAP SERPL CALC-SCNC: 3 MMOL/L — LOW (ref 5–17)
ANION GAP SERPL CALC-SCNC: 3 MMOL/L — LOW (ref 5–17)
BUN SERPL-MCNC: 16 MG/DL — SIGNIFICANT CHANGE UP (ref 7–23)
BUN SERPL-MCNC: 16 MG/DL — SIGNIFICANT CHANGE UP (ref 7–23)
CALCIUM SERPL-MCNC: 7.9 MG/DL — LOW (ref 8.5–10.1)
CALCIUM SERPL-MCNC: 7.9 MG/DL — LOW (ref 8.5–10.1)
CHLORIDE SERPL-SCNC: 113 MMOL/L — HIGH (ref 96–108)
CHLORIDE SERPL-SCNC: 113 MMOL/L — HIGH (ref 96–108)
CO2 SERPL-SCNC: 27 MMOL/L — SIGNIFICANT CHANGE UP (ref 22–31)
CO2 SERPL-SCNC: 27 MMOL/L — SIGNIFICANT CHANGE UP (ref 22–31)
CREAT SERPL-MCNC: 0.91 MG/DL — SIGNIFICANT CHANGE UP (ref 0.5–1.3)
CREAT SERPL-MCNC: 0.91 MG/DL — SIGNIFICANT CHANGE UP (ref 0.5–1.3)
EGFR: 90 ML/MIN/1.73M2 — SIGNIFICANT CHANGE UP
EGFR: 90 ML/MIN/1.73M2 — SIGNIFICANT CHANGE UP
GLUCOSE SERPL-MCNC: 101 MG/DL — HIGH (ref 70–99)
GLUCOSE SERPL-MCNC: 101 MG/DL — HIGH (ref 70–99)
HCT VFR BLD CALC: 28.3 % — LOW (ref 39–50)
HCT VFR BLD CALC: 28.3 % — LOW (ref 39–50)
HGB BLD-MCNC: 9 G/DL — LOW (ref 13–17)
HGB BLD-MCNC: 9 G/DL — LOW (ref 13–17)
MCHC RBC-ENTMCNC: 29.4 PG — SIGNIFICANT CHANGE UP (ref 27–34)
MCHC RBC-ENTMCNC: 29.4 PG — SIGNIFICANT CHANGE UP (ref 27–34)
MCHC RBC-ENTMCNC: 31.8 GM/DL — LOW (ref 32–36)
MCHC RBC-ENTMCNC: 31.8 GM/DL — LOW (ref 32–36)
MCV RBC AUTO: 92.5 FL — SIGNIFICANT CHANGE UP (ref 80–100)
MCV RBC AUTO: 92.5 FL — SIGNIFICANT CHANGE UP (ref 80–100)
PLATELET # BLD AUTO: 344 K/UL — SIGNIFICANT CHANGE UP (ref 150–400)
PLATELET # BLD AUTO: 344 K/UL — SIGNIFICANT CHANGE UP (ref 150–400)
POTASSIUM SERPL-MCNC: 4.9 MMOL/L — SIGNIFICANT CHANGE UP (ref 3.5–5.3)
POTASSIUM SERPL-MCNC: 4.9 MMOL/L — SIGNIFICANT CHANGE UP (ref 3.5–5.3)
POTASSIUM SERPL-SCNC: 4.9 MMOL/L — SIGNIFICANT CHANGE UP (ref 3.5–5.3)
POTASSIUM SERPL-SCNC: 4.9 MMOL/L — SIGNIFICANT CHANGE UP (ref 3.5–5.3)
RBC # BLD: 3.06 M/UL — LOW (ref 4.2–5.8)
RBC # BLD: 3.06 M/UL — LOW (ref 4.2–5.8)
RBC # FLD: 15 % — HIGH (ref 10.3–14.5)
RBC # FLD: 15 % — HIGH (ref 10.3–14.5)
SODIUM SERPL-SCNC: 143 MMOL/L — SIGNIFICANT CHANGE UP (ref 135–145)
SODIUM SERPL-SCNC: 143 MMOL/L — SIGNIFICANT CHANGE UP (ref 135–145)
WBC # BLD: 9.81 K/UL — SIGNIFICANT CHANGE UP (ref 3.8–10.5)
WBC # BLD: 9.81 K/UL — SIGNIFICANT CHANGE UP (ref 3.8–10.5)
WBC # FLD AUTO: 9.81 K/UL — SIGNIFICANT CHANGE UP (ref 3.8–10.5)
WBC # FLD AUTO: 9.81 K/UL — SIGNIFICANT CHANGE UP (ref 3.8–10.5)

## 2023-12-19 PROCEDURE — 99232 SBSQ HOSP IP/OBS MODERATE 35: CPT

## 2023-12-19 RX ORDER — APIXABAN 2.5 MG/1
5 TABLET, FILM COATED ORAL EVERY 12 HOURS
Refills: 0 | Status: DISCONTINUED | OUTPATIENT
Start: 2023-12-19 | End: 2023-12-19

## 2023-12-19 RX ADMIN — Medication 1 TABLET(S): at 09:34

## 2023-12-19 RX ADMIN — CEFTRIAXONE 1000 MILLIGRAM(S): 500 INJECTION, POWDER, FOR SOLUTION INTRAMUSCULAR; INTRAVENOUS at 14:54

## 2023-12-19 RX ADMIN — Medication 325 MILLIGRAM(S): at 09:34

## 2023-12-19 RX ADMIN — LOSARTAN POTASSIUM 50 MILLIGRAM(S): 100 TABLET, FILM COATED ORAL at 09:34

## 2023-12-19 RX ADMIN — Medication 1 APPLICATION(S): at 10:12

## 2023-12-19 RX ADMIN — Medication 100 MILLIGRAM(S): at 09:34

## 2023-12-19 RX ADMIN — ATORVASTATIN CALCIUM 80 MILLIGRAM(S): 80 TABLET, FILM COATED ORAL at 21:24

## 2023-12-19 RX ADMIN — Medication 100 MILLIGRAM(S): at 21:24

## 2023-12-19 RX ADMIN — Medication 25 MILLIGRAM(S): at 09:34

## 2023-12-19 RX ADMIN — Medication 81 MILLIGRAM(S): at 09:34

## 2023-12-19 RX ADMIN — Medication 100 MICROGRAM(S): at 06:26

## 2023-12-19 RX ADMIN — Medication 650 MILLIGRAM(S): at 21:24

## 2023-12-19 NOTE — PROGRESS NOTE ADULT - ASSESSMENT
72 y/o male with h/o HTN, GERD,  severe PVD s/p multiple stents b/l legs, s/p L  fem/popliteal bypass, chronic left lower ankle ulceration was admitted on 12/9 for worsening right groin redness. Patient reports a recent vascular procedure with Dr. Sosa and a right inguinal vascular access was used. He was discharge to rehab where he was noted with increasing fever and redness of right groin. Patient also reports cough, sore throat and mild SOB. In ER he tested COVID-19 PCR detected. He received vancomycin IV and cefepime.     1. Right inguinal cellulitis s/p recent vascular surgery. 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 COVID-19 viral syndrome. ARF resolved. PVD s/p vascular bypass.   -low grade fever  -BC x 2 noted   -on ceftriaxone 1 gm IV qd and doxycycline 100 mg PO q12h # 3  -tolerating abx well so far; no side effects noted  -local wound care  -vascular surgery evaluation appreciated  -cultures reviewed  -change abx to ertapenem 1 gm IV qd  -reason for abx use and side effects reviewed with patient; monitor BMP  -will need 6 weeks of abx therapy  -plan for PICC line  -monitor temps  -f/u CBC  -supportive care  2. Other issues:   -care per medicine

## 2023-12-19 NOTE — PROGRESS NOTE ADULT - SUBJECTIVE AND OBJECTIVE BOX
Date of Service: 12/19/23  72 y/o male with PMHX of HTN, GERD,  severe PVD s/p multiple stents b/l legs, s/p L  fem/popliteal bypass, chronic left lower ankle ulceration present to ED with worsening right groin redness. Patient reports recent procedure with Dr. Sosa. Was discharge to rehab where his PCP noted increasing fever and redness of right groin. Patient also reports some mild cough and sore throat. No chest pain or sob.   In ED patient found to have right groin cellulitis and + Covid.  (09 Dec 2023 05:36)  Podiatry was consulted for left foot ulcer. Pt resting comfortably bedside today. Pt tender to wound site.     12/19/23: Podiatry following for Lt foot ulcer. Pt resting at bedside, NAD. No additional pedal complaints.          REVIEW OF SYSTEMS:  CONSTITUTIONAL: No weakness, fevers or chills  EYES/ENT: No visual changes;  No vertigo or throat pain   NECK: No pain or stiffness  RESPIRATORY: No cough, wheezing, hemoptysis; No shortness of breath  CARDIOVASCULAR: No chest pain or palpitations  GASTROINTESTINAL: No abdominal or epigastric pain. No nausea, vomiting, or hematemesis; No diarrhea or constipation. No melena or hematochezia.  GENITOURINARY: No dysuria, frequency or hematuria  NEUROLOGICAL: No numbness or weakness  SKIN: See physical examination.  All other review of systems is negative unless indicated above    PMH: Essential hypertension    PVD (peripheral vascular disease)    Gastroesophageal reflux disease, esophagitis presence not specified    Hypothyroidism    Chronic obstructive pulmonary disease, unspecified COPD type    Eczema    Hyperlipidemia, unspecified hyperlipidemia type    Medial meniscus tear      PSH:Femoral popliteal artery thrombus    PVD (peripheral vascular disease)        Allergies:Betadine (Hives; Rash)  IV Contrast (Hives)    Labs                                   9.0    9.81  )-----------( 344      ( 19 Dec 2023 07:46 )             28.3     12-19    143  |  113<H>  |  16  ----------------------------<  101<H>  4.9   |  27  |  0.91    Ca    7.9<L>      19 Dec 2023 07:46  Phos  2.9     12-18  Mg     2.2     12-18      Vital Signs Last 24 Hrs  T(C): 37.1 (19 Dec 2023 07:58), Max: 37.1 (19 Dec 2023 07:58)  T(F): 98.8 (19 Dec 2023 07:58), Max: 98.8 (19 Dec 2023 07:58)  HR: 62 (19 Dec 2023 07:58) (45 - 62)  BP: 165/52 (19 Dec 2023 07:58) (139/47 - 165/52)  BP(mean): 94 (18 Dec 2023 22:40) (94 - 94)  RR: 18 (19 Dec 2023 07:58) (10 - 18)  SpO2: 95% (19 Dec 2023 07:58) (95% - 100%)    Parameters below as of 18 Dec 2023 22:40  Patient On (Oxygen Delivery Method): room air        MEDICATIONS  (STANDING):  aspirin enteric coated 81 milliGRAM(s) Oral daily  atorvastatin 80 milliGRAM(s) Oral at bedtime  Breztri Aerosphere: Budesonide 160 mcg, glycopyrrolate 9 mcg, and formoterol fumarate 4.8 mcg per actuation 2 Puff(s) 2 Inhalation Inhalation two times a day  cefTRIAXone Injectable. 1000 milliGRAM(s) IV Push every 24 hours  Dakins Solution - 1/4 Strength 1 Application(s) Topical daily  doxycycline monohydrate Capsule 100 milliGRAM(s) Oral every 12 hours  ferrous    sulfate 325 milliGRAM(s) Oral daily  influenza  Vaccine (HIGH DOSE) 0.7 milliLiter(s) IntraMuscular once  lactated ringers. 1000 milliLiter(s) (75 mL/Hr) IV Continuous <Continuous>  levothyroxine 100 MICROGram(s) Oral daily  losartan 50 milliGRAM(s) Oral daily  metoprolol succinate ER 25 milliGRAM(s) Oral daily  multivitamin 1 Tablet(s) Oral daily  sodium chloride 0.9%. 1000 milliLiter(s) (100 mL/Hr) IV Continuous <Continuous>    MEDICATIONS  (PRN):  acetaminophen     Tablet .. 650 milliGRAM(s) Oral every 6 hours PRN Temp greater or equal to 38C (100.4F), Mild Pain (1 - 3)  aluminum hydroxide/magnesium hydroxide/simethicone Suspension 30 milliLiter(s) Oral every 4 hours PRN Dyspepsia  fentaNYL    Injectable 50 MICROGram(s) IV Push every 5 minutes PRN Severe Pain (7 - 10)  fentaNYL    Injectable 25 MICROGram(s) IV Push every 5 minutes PRN Moderate Pain (4 - 6)  melatonin 3 milliGRAM(s) Oral at bedtime PRN Insomnia  ondansetron Injectable 4 milliGRAM(s) IV Push once PRN Nausea and/or Vomiting  ondansetron Injectable 4 milliGRAM(s) IV Push every 8 hours PRN Nausea and/or Vomiting  oxycodone    5 mG/acetaminophen 325 mG 1 Tablet(s) Oral once PRN Moderate Pain (4 - 6)              Physical Exam:   Constitutional: NAD, alert;  Lower Extremity Focus  Derm:  Skin warm, dry and supple bilateral.    Ulceration to the left medial heel, wound base is mostly granular, wound size is 3cm x 3cm, no periwound edema or erythema noted. no purulence or pus noted, no tracking/tunneling noted, no PTB, no malodor  Vascular: Dorsalis Pedis and Posterior Tibial pulses non palpable.  Capillary re-fill time more then 3 seconds digits 1-5 bilateral.    Neuro: Protective sensation diminished to the level of the digits bilateral.  MSK: Muscle strength 5/5 all major muscle groups bilateral. TTP to the wound site on the left medial mal            Date of Service: 12/19/23  74 y/o male with PMHX of HTN, GERD,  severe PVD s/p multiple stents b/l legs, s/p L  fem/popliteal bypass, chronic left lower ankle ulceration present to ED with worsening right groin redness. Patient reports recent procedure with Dr. Sosa. Was discharge to rehab where his PCP noted increasing fever and redness of right groin. Patient also reports some mild cough and sore throat. No chest pain or sob.   In ED patient found to have right groin cellulitis and + Covid.  (09 Dec 2023 05:36)  Podiatry was consulted for left foot ulcer. Pt resting comfortably bedside today. Pt tender to wound site.     12/19/23: Podiatry following for Lt foot ulcer. Pt resting at bedside, NAD. No additional pedal complaints.          REVIEW OF SYSTEMS:  CONSTITUTIONAL: No weakness, fevers or chills  EYES/ENT: No visual changes;  No vertigo or throat pain   NECK: No pain or stiffness  RESPIRATORY: No cough, wheezing, hemoptysis; No shortness of breath  CARDIOVASCULAR: No chest pain or palpitations  GASTROINTESTINAL: No abdominal or epigastric pain. No nausea, vomiting, or hematemesis; No diarrhea or constipation. No melena or hematochezia.  GENITOURINARY: No dysuria, frequency or hematuria  NEUROLOGICAL: No numbness or weakness  SKIN: See physical examination.  All other review of systems is negative unless indicated above    PMH: Essential hypertension    PVD (peripheral vascular disease)    Gastroesophageal reflux disease, esophagitis presence not specified    Hypothyroidism    Chronic obstructive pulmonary disease, unspecified COPD type    Eczema    Hyperlipidemia, unspecified hyperlipidemia type    Medial meniscus tear      PSH:Femoral popliteal artery thrombus    PVD (peripheral vascular disease)        Allergies:Betadine (Hives; Rash)  IV Contrast (Hives)    Labs                                   9.0    9.81  )-----------( 344      ( 19 Dec 2023 07:46 )             28.3     12-19    143  |  113<H>  |  16  ----------------------------<  101<H>  4.9   |  27  |  0.91    Ca    7.9<L>      19 Dec 2023 07:46  Phos  2.9     12-18  Mg     2.2     12-18      Vital Signs Last 24 Hrs  T(C): 37.1 (19 Dec 2023 07:58), Max: 37.1 (19 Dec 2023 07:58)  T(F): 98.8 (19 Dec 2023 07:58), Max: 98.8 (19 Dec 2023 07:58)  HR: 62 (19 Dec 2023 07:58) (45 - 62)  BP: 165/52 (19 Dec 2023 07:58) (139/47 - 165/52)  BP(mean): 94 (18 Dec 2023 22:40) (94 - 94)  RR: 18 (19 Dec 2023 07:58) (10 - 18)  SpO2: 95% (19 Dec 2023 07:58) (95% - 100%)    Parameters below as of 18 Dec 2023 22:40  Patient On (Oxygen Delivery Method): room air        MEDICATIONS  (STANDING):  aspirin enteric coated 81 milliGRAM(s) Oral daily  atorvastatin 80 milliGRAM(s) Oral at bedtime  Breztri Aerosphere: Budesonide 160 mcg, glycopyrrolate 9 mcg, and formoterol fumarate 4.8 mcg per actuation 2 Puff(s) 2 Inhalation Inhalation two times a day  cefTRIAXone Injectable. 1000 milliGRAM(s) IV Push every 24 hours  Dakins Solution - 1/4 Strength 1 Application(s) Topical daily  doxycycline monohydrate Capsule 100 milliGRAM(s) Oral every 12 hours  ferrous    sulfate 325 milliGRAM(s) Oral daily  influenza  Vaccine (HIGH DOSE) 0.7 milliLiter(s) IntraMuscular once  lactated ringers. 1000 milliLiter(s) (75 mL/Hr) IV Continuous <Continuous>  levothyroxine 100 MICROGram(s) Oral daily  losartan 50 milliGRAM(s) Oral daily  metoprolol succinate ER 25 milliGRAM(s) Oral daily  multivitamin 1 Tablet(s) Oral daily  sodium chloride 0.9%. 1000 milliLiter(s) (100 mL/Hr) IV Continuous <Continuous>    MEDICATIONS  (PRN):  acetaminophen     Tablet .. 650 milliGRAM(s) Oral every 6 hours PRN Temp greater or equal to 38C (100.4F), Mild Pain (1 - 3)  aluminum hydroxide/magnesium hydroxide/simethicone Suspension 30 milliLiter(s) Oral every 4 hours PRN Dyspepsia  fentaNYL    Injectable 50 MICROGram(s) IV Push every 5 minutes PRN Severe Pain (7 - 10)  fentaNYL    Injectable 25 MICROGram(s) IV Push every 5 minutes PRN Moderate Pain (4 - 6)  melatonin 3 milliGRAM(s) Oral at bedtime PRN Insomnia  ondansetron Injectable 4 milliGRAM(s) IV Push once PRN Nausea and/or Vomiting  ondansetron Injectable 4 milliGRAM(s) IV Push every 8 hours PRN Nausea and/or Vomiting  oxycodone    5 mG/acetaminophen 325 mG 1 Tablet(s) Oral once PRN Moderate Pain (4 - 6)              Physical Exam:   Constitutional: NAD, alert;  Lower Extremity Focus  Derm:  Skin warm, dry and supple bilateral.    Ulceration to the left medial heel, wound base is mostly granular, wound size is 3cm x 3cm, no periwound edema or erythema noted. no purulence or pus noted, no tracking/tunneling noted, no PTB, no malodor  Vascular: Dorsalis Pedis and Posterior Tibial pulses non palpable.  Capillary re-fill time more then 3 seconds digits 1-5 bilateral.    Neuro: Protective sensation diminished to the level of the digits bilateral.  MSK: Muscle strength 5/5 all major muscle groups bilateral. TTP to the wound site on the left medial mal

## 2023-12-19 NOTE — PROGRESS NOTE ADULT - ASSESSMENT
73 year old male with extensive vascular surgery hx, most recent being a right femoral cutdown with aortogram and B/L LE angiogram, SFA covered stent placed in SFA, left axillary to anterior tibial bypass with PTFE graft 11/1/23, patent, fem-fem graft occluded. He had an infected seroma at the right femoral site, +for MRSA. Pulses are palpable, LEs are well perfused.  He is POD#1 s/p R groin wound washout, Closure using gracilis muscle flap    Plan:  Continue abx per ID recs: PICC line for 6 weeks abx  Plan for right groin exploration, graft explantation and gracilis flap with plastic surgery today   Podiatry on board   Keep R leg elevated. No abduction of the R leg  Monitor drain output  To resume hep gtt at flat rate of 500units if H/H ist stable   Plastic surgery on board   Rest of management as per primary team    Plan discussed with Dr. Sosa.  73 year old male with extensive vascular surgery hx, most recent being a right femoral cutdown with aortogram and B/L LE angiogram, SFA covered stent placed in SFA, left axillary to anterior tibial bypass with PTFE graft 11/1/23, patent, fem-fem graft occluded. He had an infected seroma at the right femoral site, +for MRSA. Pulses are palpable, LEs are well perfused.  He is POD#1 s/p R groin wound washout, Closure using gracilis muscle flap    Plan:  Continue abx per ID recs: PICC line for 6 weeks abx  Podiatry on board   Keep R leg elevated. No abduction of the R leg  Monitor drain output  To resume hep gtt at flat rate of 500units if H/H is stable   Plastic surgery on board   If no bleeding while on AC and PICC line set up, and pt working with PT, he may be discharged from vascular surgery stand point  Rest of management as per primary team    Plan discussed with Dr. Sosa.

## 2023-12-19 NOTE — PROGRESS NOTE ADULT - SUBJECTIVE AND OBJECTIVE BOX
Patient seen and examined on routine rounds.   He is POD#1 s/p R groin wound washout, Closure using gracilis muscle flap  He denies nausea, vomiting, fever or chills. Pain well controlled   Nurse report no acute event overnight   VS reviewed      Physical Exam:  General: AOx3, Well developed, NAD  HEENT: NC/AT, normal pinnae and tragi  Chest: Normal respiratory effort, equal chest rise, left chest wall palpable graft   Heart: RRR  Abdomen: Soft, NTND  Inguinal: R groin with c/d/i dressing..  Neuro/Psych: No localized deficits. Normal speech, normal tone, normal affect  Skin: Approximately 3x4 cm ulcer just superior to the left medial malleolus with some fibrinous tissue, no bleeding, no surround induration or erythema  Extremities: B/l LE with acewrap dressing, c/d/i. R leg Delfino with sanguinous output. Popliteal pulses 2+ b/l, DP and PT 2+ on right, 2+ DP, nonpalpable DP on left      Vital Signs Last 24 Hrs  T(C): 36.8 (18 Dec 2023 22:40), Max: 36.8 (18 Dec 2023 22:40)  T(F): 98.2 (18 Dec 2023 22:40), Max: 98.2 (18 Dec 2023 22:40)  HR: 52 (18 Dec 2023 22:40) (45 - 62)  BP: 154/75 (18 Dec 2023 22:40) (139/47 - 162/52)  BP(mean): 94 (18 Dec 2023 22:40) (94 - 94)  RR: 18 (18 Dec 2023 22:40) (10 - 18)  SpO2: 100% (18 Dec 2023 22:40) (98% - 100%)    Parameters below as of 18 Dec 2023 22:40  Patient On (Oxygen Delivery Method): room air                          8.5    6.30  )-----------( 356      ( 18 Dec 2023 06:11 )             26.8     12-18    143  |  113<H>  |  17  ----------------------------<  100<H>  4.0   |  26  |  0.79    Ca    8.0<L>      18 Dec 2023 06:11  Phos  2.9     12-18  Mg     2.2     12-18      I&O's Detail    17 Dec 2023 07:01  -  18 Dec 2023 07:00  --------------------------------------------------------  IN:  Total IN: 0 mL    OUT:    Voided (mL): 650 mL  Total OUT: 650 mL    Total NET: -650 mL      18 Dec 2023 07:01  -  19 Dec 2023 00:27  --------------------------------------------------------  IN:    Lactated Ringers: 75 mL    Other (mL): 982 mL  Total IN: 1057 mL    OUT:    Bulb (mL): 125 mL  Total OUT: 125 mL    Total NET: 932 mL    MEDICATIONS  (STANDING):  aspirin enteric coated 81 milliGRAM(s) Oral daily  atorvastatin 80 milliGRAM(s) Oral at bedtime  Breztri Aerosphere: Budesonide 160 mcg, glycopyrrolate 9 mcg, and formoterol fumarate 4.8 mcg per actuation 2 Puff(s) 2 Inhalation Inhalation two times a day  cefTRIAXone Injectable. 1000 milliGRAM(s) IV Push every 24 hours  Dakins Solution - 1/4 Strength 1 Application(s) Topical daily  doxycycline monohydrate Capsule 100 milliGRAM(s) Oral every 12 hours  ferrous    sulfate 325 milliGRAM(s) Oral daily  influenza  Vaccine (HIGH DOSE) 0.7 milliLiter(s) IntraMuscular once  levothyroxine 100 MICROGram(s) Oral daily  losartan 50 milliGRAM(s) Oral daily  metoprolol succinate ER 25 milliGRAM(s) Oral daily  multivitamin 1 Tablet(s) Oral daily    MEDICATIONS  (PRN):  acetaminophen     Tablet .. 650 milliGRAM(s) Oral every 6 hours PRN Temp greater or equal to 38C (100.4F), Mild Pain (1 - 3)  aluminum hydroxide/magnesium hydroxide/simethicone Suspension 30 milliLiter(s) Oral every 4 hours PRN Dyspepsia  melatonin 3 milliGRAM(s) Oral at bedtime PRN Insomnia  ondansetron Injectable 4 milliGRAM(s) IV Push every 8 hours PRN Nausea and/or Vomiting

## 2023-12-19 NOTE — PROGRESS NOTE ADULT - ASSESSMENT
Assessment: 73y old male seen for the following:   - Full thickness wound to the left foot, chronic   - Pain to left foot  - Difficulty ambulation    Plan:   Chart reviewed and Patient evaluated;  Discussed diagnosis and treatment with patient. Discussed importance of daily foot examinations, proper shoe gear, importance of tight glycemic control.   X-rays reviewed : on wet read showing no soft tissue gas, no acute bony findings, mild edema noted to the left ankle posteriorly  There is a full thickness wound to the left medial mal, etiology of wound most likely due to arterial problem, wound looks chronic and stable without acute SOI  WC: Santyl and DSD  WBAT using surgical shoe  Offloading to bilateral heels while bedbound.   Patient to follow up at [facility] 1 week after discharge w/ Dr Cade Joseph.  Continue with antibiotics as per ID  All additional care per Med appreciated  Patient demonstrated verbal understanding of all interventions and tolerated interventions well without any complications.     Case D/W attending Dr. Joseph    Wound care instructions for Left Lower Extremity to be changed every other day:  - Gently remove dressings.  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 Discussed importance of daily foot examinations, proper shoe gear, importance of tight glycemic control.   X-rays reviewed : on wet read showing no soft tissue gas, no acute bony findings, mild edema noted to the left ankle posteriorly  There is a full thickness wound to the left medial mal, etiology of wound most likely due to arterial problem, wound looks chronic and stable without acute SOI  WC: Santyl and DSD  WBAT using surgical shoe  Offloading to bilateral heels while bedbound.   Patient to follow up at [facility] 1 week after discharge w/ Dr Cade Joseph.  Continue with antibiotics as per ID  All additional care per Med appreciated  Patient demonstrated verbal understanding of all interventions and tolerated interventions well without any complications.     Case D/W attending Dr. Joseph    Wound care instructions for Left Lower Extremity to be changed every other day:  - Gently remove dressings.  - Clean the wound with saline and dry it thoroughly with dry gauze  - Apply santyl, gauze, kerlix and secure it with tape

## 2023-12-19 NOTE — PROGRESS NOTE ADULT - SUBJECTIVE AND OBJECTIVE BOX
Date of service: 12-19-23 @ 16:26    Lying in bed in NAD  Has right inguinal tenderness  No SOB at rest    ROS: no fever or chills; denies dizziness, no HA, no abdominal pain, no diarrhea or constipation; no dysuria, no legs pain, no rashes    MEDICATIONS  (STANDING):  aspirin enteric coated 81 milliGRAM(s) Oral daily  atorvastatin 80 milliGRAM(s) Oral at bedtime  Breztri Aerosphere: Budesonide 160 mcg, glycopyrrolate 9 mcg, and formoterol fumarate 4.8 mcg per actuation 2 Puff(s) 2 Inhalation Inhalation two times a day  cefTRIAXone Injectable. 1000 milliGRAM(s) IV Push every 24 hours  Dakins Solution - 1/4 Strength 1 Application(s) Topical daily  doxycycline monohydrate Capsule 100 milliGRAM(s) Oral every 12 hours  ferrous    sulfate 325 milliGRAM(s) Oral daily  influenza  Vaccine (HIGH DOSE) 0.7 milliLiter(s) IntraMuscular once  levothyroxine 100 MICROGram(s) Oral daily  losartan 50 milliGRAM(s) Oral daily  metoprolol succinate ER 25 milliGRAM(s) Oral daily  multivitamin 1 Tablet(s) Oral daily    Vital Signs Last 24 Hrs  T(C): 37.1 (19 Dec 2023 07:58), Max: 37.1 (19 Dec 2023 07:58)  T(F): 98.8 (19 Dec 2023 07:58), Max: 98.8 (19 Dec 2023 07:58)  HR: 62 (19 Dec 2023 07:58) (50 - 62)  BP: 165/52 (19 Dec 2023 07:58) (154/75 - 165/52)  BP(mean): 94 (18 Dec 2023 22:40) (94 - 94)  RR: 18 (19 Dec 2023 07:58) (18 - 18)  SpO2: 95% (19 Dec 2023 07:58) (95% - 100%)    Parameters below as of 18 Dec 2023 22:40  Patient On (Oxygen Delivery Method): room air     Physical exam:    Constitutional:  No acute distress  HEENT: NC/AT, EOMI, PERRLA, conjunctivae clear; ears and nose atraumatic; pharynx benign  Neck: supple; thyroid not palpable  Back: no tenderness  Respiratory: respiratory effort normal; clear to auscultation  Cardiovascular: S1S2 regular, no murmurs  Abdomen: soft, not tender, not distended, positive BS; no liver or spleen organomegaly  Genitourinary: no suprapubic tenderness  Lymphatic: no LN palpable  Musculoskeletal: no muscle tenderness, no joint swelling or tenderness  Extremities: no pedal edema  Right inguinal erythema, edema, warmth and tenderness; postsurgical wound partially open, but no discharge   Neurological/ Psychiatric: AxOx3, judgement and insight normal; moving all extremities  Skin: no rashes; no palpable lesions    Labs: reviewed                        9.0    9.81  )-----------( 344      ( 19 Dec 2023 07:46 )             28.3     12-19    143  |  113<H>  |  16  ----------------------------<  101<H>  4.9   |  27  |  0.91    Ca    7.9<L>      19 Dec 2023 07:46  Phos  2.9     12-18  Mg     2.2     12-18                        9.6    9.00  )-----------( 273      ( 09 Dec 2023 08:09 )             29.5     12-09    135  |  98  |  37<H>  ----------------------------<  104<H>  2.7<LL>   |  30  |  1.74<H>    Ca    7.9<L>      09 Dec 2023 08:09    TPro  6.4  /  Alb  2.4<L>  /  TBili  0.9  /  DBili  x   /  AST  46<H>  /  ALT  28  /  AlkPhos  151<H>  12-09     LIVER FUNCTIONS - ( 09 Dec 2023 00:23 )  Alb: 2.4 g/dL / Pro: 6.4 gm/dL / ALK PHOS: 151 U/L / ALT: 28 U/L / AST: 46 U/L / GGT: x           Urinalysis (12-09 @ 06:16)  Urine Appearance: Clear  Protein, Urine: Negative mg/dL  Urine Microscopic-Add On (NC) (12-09 @ 06:16)  White Blood Cell - Urine: 1 /HPF  Red Blood Cell - Urine: 2 /HPF    COVID (12-09 @ 01:00)  Detected    Culture - Abscess with Gram Stain (collected 10 Dec 2023 17:45)  Source: .Abscess Hip - Right  Gram Stain (11 Dec 2023 20:21):    No polymorphonuclear cells seen per low power field    No organisms seen per oil power field  Final Report (15 Dec 2023 19:12):    Moderate Staphylococcus aureus    Rare Anaerobic Gram Positive Isaac Most closely resembling Peptoniphilus    species . "Susceptibilities not performed"  Organism: Staphylococcus aureus (15 Dec 2023 19:12)  Organism: Staphylococcus aureus (15 Dec 2023 19:12)      Method Type: MARISA      -  Ampicillin/Sulbactam: S <=8/4      -  Cefazolin: S <=4      -  Clindamycin: R <=0.25 This isolate is presumed to be clindamycin resistant based on detection of inducible resistance. Clindamycin may still be effective in some patients.      -  Erythromycin: R >4      -  Gentamicin: S <=1 Should not be used as monotherapy      -  Oxacillin: S <=0.25 Oxacillin predicts susceptibility for dicloxacillin, methicillin, and nafcillin      -  Penicillin: R >8      -  Rifampin: S <=1 Should not be used as monotherapy      -  Tetracycline: S <=1      -  Trimethoprim/Sulfamethoxazole: S <=0.5/9.5      -  Vancomycin: S 2    Radiology: all available radiological tests reviewed    Advanced directives addressed: full resuscitation

## 2023-12-19 NOTE — PROGRESS NOTE ADULT - ASSESSMENT
73y Male POD 1 s/p Right groin gracilis flap, doing well.    - care as per primary team  -No hip ABduction  -OOB walking with assistance  - pain control  - monitor I/O's

## 2023-12-19 NOTE — PROGRESS NOTE ADULT - ASSESSMENT
72 y/o male with PMHX of HTN, GERD,  severe PVD s/p multiple stents b/l legs, s/p L  fem/popliteal bypass, chronic left lower ankle ulceration present to ED with worsening right groin redness. Patient reports recent right bypass procedure with Dr. Sosa.  Was discharge to rehab where his PCP noted increasing fever and redness of right groin. Patient also reports some mild cough and sore throat. No chest pain or sob.     Right Groin Surgical Site Cellulitis  - ID following  - cont doxycycline/ceftriaxone  - will need long term IV antibiotic x6 weeks - due to concern of possible vascular graft involvement  - Plan for PICC line   - blood culture Culture - Abscess with Gram Stain (12.10.23 @ 17:45)   No polymorphonuclear cells seen per low power field   No organisms seen per oil power field  Specimen Source: Abscess Hip - Right  Moderate Staphylococcus aureus  - vascular consulted  - POD#1  s/p R groin wound washout, Closure using gracilis muscle flap  - BEBETO intact x1  Keep R leg elevated. No abduction of the R leg  Plastic surgery on board       leukocytosis - resolved   - received IV steroid in leiu of IV contrast allergy     JENIFER on CKD  Hypokalemia -  resolved  - trend bmp     COVID +  - Not hypoxic and currently asymptomatic  - Monitor    Left Ankle chronic ulcer  - Wound care eval  - podiatry consulted     PAD  - Stable  - d/w Vascular resident- ok to restart apixaban   - Continue Aspirin  - Continue Atorvastatin    HTN  - Stable  - Bystolic switched to Coreg  - Monitor    Hypothyroid  - Continue levothyroxine    DVT prophylaxis  - On lovenox     Case d/w team on IDR. Plan for possible dc in 24 hours

## 2023-12-19 NOTE — PROGRESS NOTE ADULT - SUBJECTIVE AND OBJECTIVE BOX
73y Male POD 1 s/p Right Gracilis muscle flap for groin coverage. Doing well, pain controlled no issues over night.    T(C): 37.1 (12-19-23 @ 07:58), Max: 37.1 (12-19-23 @ 07:58)  HR: 62 (12-19-23 @ 07:58) (45 - 62)  BP: 165/52 (12-19-23 @ 07:58) (139/47 - 165/52)  RR: 18 (12-19-23 @ 07:58) (10 - 18)  SpO2: 95% (12-19-23 @ 07:58) (95% - 100%)    12-18-23 @ 07:01  -  12-19-23 @ 07:00  --------------------------------------------------------  IN: 1057 mL / OUT: 455 mL / NET: 602 mL        PE: Gen- NAD, AAOx3  CVS- RRR  Chest- equal chest expansion  Abd- Soft, NT, ND    BEBETO Drain 155 overnight- serosanguinous drainage    Focused: Dressing C/D/I, Incision C/D/I, no palpable fluid collections, no surrounding erythema, discharge or signs of infection.  Drains in place, serosanguinous drainage

## 2023-12-19 NOTE — PROGRESS NOTE ADULT - SUBJECTIVE AND OBJECTIVE BOX
74 y/o male with PMHX of HTN, GERD,  severe PVD s/p multiple stents b/l legs, s/p L  fem/popliteal bypass, chronic left lower ankle ulceration present to ED with worsening right groin redness. Patient reports recent right bypass procedure with Dr. Sosa.  Was discharge to rehab where his PCP noted increasing fever and redness of right groin. Patient also reports some mild cough and sore throat. Patient was admitted with right groin cellulitis  and was started on IV antibiotics with ID consult    Subjective: POD#1 vascular surgery, VSS- denies pain.     ROS:   All 10 systems reviewed and found to be negative with the exception of what has been described above.     Vital Signs Last 24 Hrs  T(C): 37.1 (19 Dec 2023 07:58), Max: 37.1 (19 Dec 2023 07:58)  T(F): 98.8 (19 Dec 2023 07:58), Max: 98.8 (19 Dec 2023 07:58)  HR: 62 (19 Dec 2023 07:58) (50 - 62)  BP: 165/52 (19 Dec 2023 07:58) (154/75 - 165/52)  BP(mean): 94 (18 Dec 2023 22:40) (94 - 94)  RR: 18 (19 Dec 2023 07:58) (18 - 18)  SpO2: 95% (19 Dec 2023 07:58) (95% - 100%)    Parameters below as of 18 Dec 2023 22:40  Patient On (Oxygen Delivery Method): room air        PHYSICAL EXAM:  GENERAL: NAD, lying in bed comfortably  HEAD:  Atraumatic, Normocephalic  EYES: conjunctiva and sclera clear  ENT: Moist mucous membranes  NECK: Supple, No JVD  CHEST/LUNG: Clear to auscultation bilaterally; No rales, rhonchi, wheezing. Unlabored respirations  HEART: Regular rate and rhythm; No murmurs  ABDOMEN: Bowel sounds present; Soft, Nontender, Nondistended.   EXTREMITIES:  2+ Peripheral Pulses, brisk capillary refill. No clubbing, cyanosis, or edema  NERVOUS SYSTEM:  Alert & Oriented X3, speech clear. No deficits   MSK: right leg dressing intact. Right BEBETO with sanguinous drain   SKIN: R groin skin opening, clean, minimal drainage on dressing, no surrounding erythema, no pain     MEDICATIONS  (STANDING):  aspirin enteric coated 81 milliGRAM(s) Oral daily  atorvastatin 80 milliGRAM(s) Oral at bedtime  Breztri Aerosphere: Budesonide 160 mcg, glycopyrrolate 9 mcg, and formoterol fumarate 4.8 mcg per actuation 2 Puff(s) 2 Inhalation Inhalation two times a day  cefTRIAXone Injectable. 1000 milliGRAM(s) IV Push every 24 hours  Dakins Solution - 1/4 Strength 1 Application(s) Topical daily  doxycycline monohydrate Capsule 100 milliGRAM(s) Oral every 12 hours  ferrous    sulfate 325 milliGRAM(s) Oral daily  influenza  Vaccine (HIGH DOSE) 0.7 milliLiter(s) IntraMuscular once  levothyroxine 100 MICROGram(s) Oral daily  losartan 50 milliGRAM(s) Oral daily  metoprolol succinate ER 25 milliGRAM(s) Oral daily  multivitamin 1 Tablet(s) Oral daily    MEDICATIONS  (PRN):  acetaminophen     Tablet .. 650 milliGRAM(s) Oral every 6 hours PRN Temp greater or equal to 38C (100.4F), Mild Pain (1 - 3)  aluminum hydroxide/magnesium hydroxide/simethicone Suspension 30 milliLiter(s) Oral every 4 hours PRN Dyspepsia  melatonin 3 milliGRAM(s) Oral at bedtime PRN Insomnia  ondansetron Injectable 4 milliGRAM(s) IV Push every 8 hours PRN Nausea and/or Vomiting        LABS:      12-18    143  |  113<H>  |  17  ----------------------------<  100<H>  4.0   |  26  |  0.79    Ca    8.0<L>      18 Dec 2023 06:11  Phos  2.9     12-18  Mg     2.2     12-18                          8.5    6.30  )-----------( 356      ( 18 Dec 2023 06:11 )             26.8         Urinalysis Basic - ( 14 Dec 2023 08:45 )    Color: x / Appearance: x / SG: x / pH: x  Gluc: 90 mg/dL / Ketone: x  / Bili: x / Urobili: x   Blood: x / Protein: x / Nitrite: x   Leuk Esterase: x / RBC: x / WBC x   Sq Epi: x / Non Sq Epi: x / Bacteria: x  12-17    141  |  111<H>  |  20  ----------------------------<  87  4.4   |  27  |  0.91    Ca    8.1<L>      17 Dec 2023 10:12          RADIOLOGY:

## 2023-12-20 LAB
APTT BLD: 33 SEC — SIGNIFICANT CHANGE UP (ref 24.5–35.6)
APTT BLD: 33 SEC — SIGNIFICANT CHANGE UP (ref 24.5–35.6)
HCT VFR BLD CALC: 25 % — LOW (ref 39–50)
HCT VFR BLD CALC: 25 % — LOW (ref 39–50)
HGB BLD-MCNC: 8 G/DL — LOW (ref 13–17)
HGB BLD-MCNC: 8 G/DL — LOW (ref 13–17)
MCHC RBC-ENTMCNC: 29.5 PG — SIGNIFICANT CHANGE UP (ref 27–34)
MCHC RBC-ENTMCNC: 29.5 PG — SIGNIFICANT CHANGE UP (ref 27–34)
MCHC RBC-ENTMCNC: 32 GM/DL — SIGNIFICANT CHANGE UP (ref 32–36)
MCHC RBC-ENTMCNC: 32 GM/DL — SIGNIFICANT CHANGE UP (ref 32–36)
MCV RBC AUTO: 92.3 FL — SIGNIFICANT CHANGE UP (ref 80–100)
MCV RBC AUTO: 92.3 FL — SIGNIFICANT CHANGE UP (ref 80–100)
PLATELET # BLD AUTO: 291 K/UL — SIGNIFICANT CHANGE UP (ref 150–400)
PLATELET # BLD AUTO: 291 K/UL — SIGNIFICANT CHANGE UP (ref 150–400)
RBC # BLD: 2.71 M/UL — LOW (ref 4.2–5.8)
RBC # BLD: 2.71 M/UL — LOW (ref 4.2–5.8)
RBC # FLD: 15.1 % — HIGH (ref 10.3–14.5)
RBC # FLD: 15.1 % — HIGH (ref 10.3–14.5)
WBC # BLD: 8.74 K/UL — SIGNIFICANT CHANGE UP (ref 3.8–10.5)
WBC # BLD: 8.74 K/UL — SIGNIFICANT CHANGE UP (ref 3.8–10.5)
WBC # FLD AUTO: 8.74 K/UL — SIGNIFICANT CHANGE UP (ref 3.8–10.5)
WBC # FLD AUTO: 8.74 K/UL — SIGNIFICANT CHANGE UP (ref 3.8–10.5)

## 2023-12-20 PROCEDURE — 99232 SBSQ HOSP IP/OBS MODERATE 35: CPT

## 2023-12-20 RX ORDER — HEPARIN SODIUM 5000 [USP'U]/ML
500 INJECTION INTRAVENOUS; SUBCUTANEOUS
Qty: 25000 | Refills: 0 | Status: DISCONTINUED | OUTPATIENT
Start: 2023-12-20 | End: 2023-12-21

## 2023-12-20 RX ADMIN — Medication 100 MICROGRAM(S): at 05:41

## 2023-12-20 RX ADMIN — Medication 1 TABLET(S): at 09:23

## 2023-12-20 RX ADMIN — ATORVASTATIN CALCIUM 80 MILLIGRAM(S): 80 TABLET, FILM COATED ORAL at 20:33

## 2023-12-20 RX ADMIN — Medication 25 MILLIGRAM(S): at 09:23

## 2023-12-20 RX ADMIN — Medication 81 MILLIGRAM(S): at 09:23

## 2023-12-20 RX ADMIN — HEPARIN SODIUM 500 UNIT(S)/HR: 5000 INJECTION INTRAVENOUS; SUBCUTANEOUS at 20:33

## 2023-12-20 RX ADMIN — Medication 325 MILLIGRAM(S): at 09:23

## 2023-12-20 RX ADMIN — LOSARTAN POTASSIUM 50 MILLIGRAM(S): 100 TABLET, FILM COATED ORAL at 09:23

## 2023-12-20 RX ADMIN — Medication 3 MILLIGRAM(S): at 20:33

## 2023-12-20 RX ADMIN — HEPARIN SODIUM 500 UNIT(S)/HR: 5000 INJECTION INTRAVENOUS; SUBCUTANEOUS at 10:26

## 2023-12-20 RX ADMIN — CEFTRIAXONE 1000 MILLIGRAM(S): 500 INJECTION, POWDER, FOR SOLUTION INTRAMUSCULAR; INTRAVENOUS at 18:00

## 2023-12-20 RX ADMIN — Medication 1 APPLICATION(S): at 09:25

## 2023-12-20 RX ADMIN — Medication 100 MILLIGRAM(S): at 09:23

## 2023-12-20 NOTE — PROGRESS NOTE ADULT - SUBJECTIVE AND OBJECTIVE BOX
Date of service: 12-20-23 @ 13:47    Lying in bed in NAD  Hs low grade fever  Right inguinal wound is less tender    ROS: no fever or chills; denies dizziness, no HA, no SOB or cough, no abdominal pain, no diarrhea or constipation; no dysuria    MEDICATIONS  (STANDING):  aspirin enteric coated 81 milliGRAM(s) Oral daily  atorvastatin 80 milliGRAM(s) Oral at bedtime  Breztri Aerosphere: Budesonide 160 mcg, glycopyrrolate 9 mcg, and formoterol fumarate 4.8 mcg per actuation 2 Puff(s) 2 Inhalation Inhalation two times a day  cefTRIAXone Injectable. 1000 milliGRAM(s) IV Push every 24 hours  Dakins Solution - 1/4 Strength 1 Application(s) Topical daily  doxycycline monohydrate Capsule 100 milliGRAM(s) Oral every 12 hours  ferrous    sulfate 325 milliGRAM(s) Oral daily  heparin  Infusion. 500 Unit(s)/Hr (5 mL/Hr) IV Continuous <Continuous>  influenza  Vaccine (HIGH DOSE) 0.7 milliLiter(s) IntraMuscular once  levothyroxine 100 MICROGram(s) Oral daily  losartan 50 milliGRAM(s) Oral daily  metoprolol succinate ER 25 milliGRAM(s) Oral daily  multivitamin 1 Tablet(s) Oral daily    Vital Signs Last 24 Hrs  T(C): 36.4 (20 Dec 2023 08:02), Max: 37.3 (19 Dec 2023 20:17)  T(F): 97.5 (20 Dec 2023 08:02), Max: 99.1 (19 Dec 2023 20:17)  HR: 61 (20 Dec 2023 08:02) (61 - 67)  BP: 170/52 (20 Dec 2023 08:02) (155/50 - 170/52)  BP(mean): 88 (20 Dec 2023 08:02) (88 - 88)  RR: 18 (20 Dec 2023 08:02) (18 - 18)  SpO2: 100% (20 Dec 2023 08:02) (98% - 100%)    Parameters below as of 20 Dec 2023 08:02  Patient On (Oxygen Delivery Method): room air     Physical exam:    Constitutional:  No acute distress  HEENT: NC/AT, EOMI, PERRLA, conjunctivae clear; ears and nose atraumatic; pharynx benign  Neck: supple; thyroid not palpable  Back: no tenderness  Respiratory: respiratory effort normal; clear to auscultation  Cardiovascular: S1S2 regular, no murmurs  Abdomen: soft, not tender, not distended, positive BS; no liver or spleen organomegaly  Genitourinary: no suprapubic tenderness  Lymphatic: no LN palpable  Musculoskeletal: no muscle tenderness, no joint swelling or tenderness  Extremities: no pedal edema  Right inguinal erythema, edema, warmth and tenderness; postsurgical wound partially open, but no discharge   Neurological/ Psychiatric: AxOx3, judgement and insight normal; moving all extremities  Skin: no rashes; no palpable lesions    Labs: reviewed                        9.0    9.81  )-----------( 344      ( 19 Dec 2023 07:46 )             28.3     12-19    143  |  113<H>  |  16  ----------------------------<  101<H>  4.9   |  27  |  0.91    Ca    7.9<L>      19 Dec 2023 07:46                        9.6    9.00  )-----------( 273      ( 09 Dec 2023 08:09 )             29.5     12-09    135  |  98  |  37<H>  ----------------------------<  104<H>  2.7<LL>   |  30  |  1.74<H>    Ca    7.9<L>      09 Dec 2023 08:09    TPro  6.4  /  Alb  2.4<L>  /  TBili  0.9  /  DBili  x   /  AST  46<H>  /  ALT  28  /  AlkPhos  151<H>  12-09     LIVER FUNCTIONS - ( 09 Dec 2023 00:23 )  Alb: 2.4 g/dL / Pro: 6.4 gm/dL / ALK PHOS: 151 U/L / ALT: 28 U/L / AST: 46 U/L / GGT: x           Urinalysis (12-09 @ 06:16)  Urine Appearance: Clear  Protein, Urine: Negative mg/dL  Urine Microscopic-Add On (NC) (12-09 @ 06:16)  White Blood Cell - Urine: 1 /HPF  Red Blood Cell - Urine: 2 /HPF    COVID (12-09 @ 01:00)  Detected    Culture - Abscess with Gram Stain (collected 10 Dec 2023 17:45)  Source: .Abscess Hip - Right  Gram Stain (11 Dec 2023 20:21):    No polymorphonuclear cells seen per low power field    No organisms seen per oil power field  Final Report (15 Dec 2023 19:12):    Moderate Staphylococcus aureus    Rare Anaerobic Gram Positive Isaac Most closely resembling Peptoniphilus    species . "Susceptibilities not performed"  Organism: Staphylococcus aureus (15 Dec 2023 19:12)  Organism: Staphylococcus aureus (15 Dec 2023 19:12)      Method Type: MARISA      -  Ampicillin/Sulbactam: S <=8/4      -  Cefazolin: S <=4      -  Clindamycin: R <=0.25 This isolate is presumed to be clindamycin resistant based on detection of inducible resistance. Clindamycin may still be effective in some patients.      -  Erythromycin: R >4      -  Gentamicin: S <=1 Should not be used as monotherapy      -  Oxacillin: S <=0.25 Oxacillin predicts susceptibility for dicloxacillin, methicillin, and nafcillin      -  Penicillin: R >8      -  Rifampin: S <=1 Should not be used as monotherapy      -  Tetracycline: S <=1      -  Trimethoprim/Sulfamethoxazole: S <=0.5/9.5      -  Vancomycin: S 2    Radiology: all available radiological tests reviewed    Advanced directives addressed: full resuscitation

## 2023-12-20 NOTE — PROGRESS NOTE ADULT - ASSESSMENT
Assessment: 73y old male seen for the following:   - Full thickness wound to the left foot, chronic   - Pain to left foot  - Difficulty ambulation    Plan:   Chart reviewed and Patient evaluated;  Discussed diagnosis and treatment with patient. Discussed importance of daily foot examinations, proper shoe gear, importance of tight glycemic control.   X-rays reviewed : on wet read showing no soft tissue gas, no acute bony findings, mild edema noted to the left ankle posteriorly  There is a full thickness wound to the left medial mal, etiology of wound most likely due to arterial problem, wound looks chronic and stable without acute SOI  WC: Santyl and DSD  WBAT using surgical shoe  Offloading to bilateral heels while bedbound.   Patient to follow up at [facility] 1 week after discharge w/ Dr Cade Joseph.  Continue with antibiotics as per ID  All additional care per Med appreciated  Patient demonstrated verbal understanding of all interventions and tolerated interventions well without any complications.     Case D/W attending Dr. Joseph    Wound care instructions for Left Lower Extremity to be changed every other day:  - Gently remove dressings.  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 Discussed importance of daily foot examinations, proper shoe gear, importance of tight glycemic control.   X-rays reviewed : on wet read showing no soft tissue gas, no acute bony findings, mild edema noted to the left ankle posteriorly  There is a full thickness wound to the left medial mal, etiology of wound most likely due to arterial problem, wound looks chronic and stable without acute SOI  WC: Santyl and DSD  WBAT using surgical shoe  Offloading to bilateral heels while bedbound.   Patient to follow up at [facility] 1 week after discharge w/ Dr Cade Joseph.  Continue with antibiotics as per ID  All additional care per Med appreciated  Patient demonstrated verbal understanding of all interventions and tolerated interventions well without any complications.     Case D/W attending Dr. Joseph    Wound care instructions for Left Lower Extremity to be changed every other day:  - Gently remove dressings.  - Clean the wound with saline and dry it thoroughly with dry gauze  - Apply santyl, gauze, kerlix and secure it with tape

## 2023-12-20 NOTE — PHYSICAL THERAPY INITIAL EVALUATION ADULT - PHYSICAL ASSIST/NONPHYSICAL ASSIST: GAIT, REHAB EVAL
verbal/tactile cues for sequencing & proper use of RW/1 person assist
verbal cues/nonverbal cues (demo/gestures)/1 person assist

## 2023-12-20 NOTE — PROGRESS NOTE ADULT - ASSESSMENT
73 year old male with extensive vascular surgery hx, most recent being a right femoral cutdown with aortogram and B/L LE angiogram, SFA covered stent placed in SFA, left axillary to anterior tibial bypass with PTFE graft 11/1/23, patent, fem-fem graft occluded. He had an infected seroma at the right femoral site, +for MRSA. Pulses are palpable, LEs are well perfused.  He is POD#2 s/p R groin wound washout, Closure using gracilis muscle flap    Plan:  Continue abx per ID recs: PICC line for 6 weeks abx  Podiatry on board   Keep R leg elevated. No abduction of the R leg  Monitor drain output  To resume hep gtt at flat rate of 500units if H/H is stable   Plastic surgery on board   If no bleeding while on AC and PICC line set up, and pt working with PT, he may be discharged from vascular surgery stand point  Rest of management as per primary team    Plan discussed with Dr. Sosa.

## 2023-12-20 NOTE — PHYSICAL THERAPY INITIAL EVALUATION ADULT - GENERAL OBSERVATIONS, REHAB EVAL
BEBETO drain; Bilat LE's Ace wrapped; pt rec'd in bed supine; denied pain
The Pt was received on 5S in bed supine, calm, cooperative, and willing to participate with PT, +IV, +airborne isolation. Pt responded well to functional mobility trng and ambulation tx. Required CGA/SBA for mobility, able to ambulate 50ft with Straight cane and CGA support. No c/o pain or adverse effects t/o. Assisted back into bed supine and adjusted for comfort, +alarm. The Pt was in NAD at end of tx.  Call bell, tray, and phone in place and within reach. NSG made aware.

## 2023-12-20 NOTE — PROGRESS NOTE ADULT - SUBJECTIVE AND OBJECTIVE BOX
Date of Service: 12/19/23  72 y/o male with PMHX of HTN, GERD,  severe PVD s/p multiple stents b/l legs, s/p L  fem/popliteal bypass, chronic left lower ankle ulceration present to ED with worsening right groin redness. Patient reports recent procedure with Dr. Sosa. Was discharge to rehab where his PCP noted increasing fever and redness of right groin. Patient also reports some mild cough and sore throat. No chest pain or sob.   In ED patient found to have right groin cellulitis and + Covid.  (09 Dec 2023 05:36)  Podiatry was consulted for left foot ulcer. Pt resting comfortably bedside today. Pt tender to wound site.     12/19/23: Podiatry following for Lt foot ulcer. Pt resting at bedside, NAD. No additional pedal complaints.          REVIEW OF SYSTEMS:  CONSTITUTIONAL: No weakness, fevers or chills  EYES/ENT: No visual changes;  No vertigo or throat pain   NECK: No pain or stiffness  RESPIRATORY: No cough, wheezing, hemoptysis; No shortness of breath  CARDIOVASCULAR: No chest pain or palpitations  GASTROINTESTINAL: No abdominal or epigastric pain. No nausea, vomiting, or hematemesis; No diarrhea or constipation. No melena or hematochezia.  GENITOURINARY: No dysuria, frequency or hematuria  NEUROLOGICAL: No numbness or weakness  SKIN: See physical examination.  All other review of systems is negative unless indicated above    PMH: Essential hypertension    PVD (peripheral vascular disease)    Gastroesophageal reflux disease, esophagitis presence not specified    Hypothyroidism    Chronic obstructive pulmonary disease, unspecified COPD type    Eczema    Hyperlipidemia, unspecified hyperlipidemia type    Medial meniscus tear      PSH:Femoral popliteal artery thrombus    PVD (peripheral vascular disease)        Allergies:Betadine (Hives; Rash)  IV Contrast (Hives)    Labs                                   9.0    9.81  )-----------( 344      ( 19 Dec 2023 07:46 )             28.3     12-19    143  |  113<H>  |  16  ----------------------------<  101<H>  4.9   |  27  |  0.91    Ca    7.9<L>      19 Dec 2023 07:46  Phos  2.9     12-18  Mg     2.2     12-18      Vital Signs Last 24 Hrs  T(C): 37.1 (19 Dec 2023 07:58), Max: 37.1 (19 Dec 2023 07:58)  T(F): 98.8 (19 Dec 2023 07:58), Max: 98.8 (19 Dec 2023 07:58)  HR: 62 (19 Dec 2023 07:58) (45 - 62)  BP: 165/52 (19 Dec 2023 07:58) (139/47 - 165/52)  BP(mean): 94 (18 Dec 2023 22:40) (94 - 94)  RR: 18 (19 Dec 2023 07:58) (10 - 18)  SpO2: 95% (19 Dec 2023 07:58) (95% - 100%)    Parameters below as of 18 Dec 2023 22:40  Patient On (Oxygen Delivery Method): room air        MEDICATIONS  (STANDING):  aspirin enteric coated 81 milliGRAM(s) Oral daily  atorvastatin 80 milliGRAM(s) Oral at bedtime  Breztri Aerosphere: Budesonide 160 mcg, glycopyrrolate 9 mcg, and formoterol fumarate 4.8 mcg per actuation 2 Puff(s) 2 Inhalation Inhalation two times a day  cefTRIAXone Injectable. 1000 milliGRAM(s) IV Push every 24 hours  Dakins Solution - 1/4 Strength 1 Application(s) Topical daily  doxycycline monohydrate Capsule 100 milliGRAM(s) Oral every 12 hours  ferrous    sulfate 325 milliGRAM(s) Oral daily  influenza  Vaccine (HIGH DOSE) 0.7 milliLiter(s) IntraMuscular once  lactated ringers. 1000 milliLiter(s) (75 mL/Hr) IV Continuous <Continuous>  levothyroxine 100 MICROGram(s) Oral daily  losartan 50 milliGRAM(s) Oral daily  metoprolol succinate ER 25 milliGRAM(s) Oral daily  multivitamin 1 Tablet(s) Oral daily  sodium chloride 0.9%. 1000 milliLiter(s) (100 mL/Hr) IV Continuous <Continuous>    MEDICATIONS  (PRN):  acetaminophen     Tablet .. 650 milliGRAM(s) Oral every 6 hours PRN Temp greater or equal to 38C (100.4F), Mild Pain (1 - 3)  aluminum hydroxide/magnesium hydroxide/simethicone Suspension 30 milliLiter(s) Oral every 4 hours PRN Dyspepsia  fentaNYL    Injectable 50 MICROGram(s) IV Push every 5 minutes PRN Severe Pain (7 - 10)  fentaNYL    Injectable 25 MICROGram(s) IV Push every 5 minutes PRN Moderate Pain (4 - 6)  melatonin 3 milliGRAM(s) Oral at bedtime PRN Insomnia  ondansetron Injectable 4 milliGRAM(s) IV Push once PRN Nausea and/or Vomiting  ondansetron Injectable 4 milliGRAM(s) IV Push every 8 hours PRN Nausea and/or Vomiting  oxycodone    5 mG/acetaminophen 325 mG 1 Tablet(s) Oral once PRN Moderate Pain (4 - 6)              Physical Exam:   Constitutional: NAD, alert;  Lower Extremity Focus  Derm:  Skin warm, dry and supple bilateral.    Ulceration to the left medial heel, wound base is mostly granular, wound size is 3cm x 3cm, no periwound edema or erythema noted. no purulence or pus noted, no tracking/tunneling noted, no PTB, no malodor  Vascular: Dorsalis Pedis and Posterior Tibial pulses non palpable.  Capillary re-fill time more then 3 seconds digits 1-5 bilateral.    Neuro: Protective sensation diminished to the level of the digits bilateral.  MSK: Muscle strength 5/5 all major muscle groups bilateral. TTP to the wound site on the left medial mal            Date of Service: 12/20/23  74 y/o male with PMHX of HTN, GERD,  severe PVD s/p multiple stents b/l legs, s/p L  fem/popliteal bypass, chronic left lower ankle ulceration present to ED with worsening right groin redness. Patient reports recent procedure with Dr. Sosa. Was discharge to rehab where his PCP noted increasing fever and redness of right groin. Patient also reports some mild cough and sore throat. No chest pain or sob.   In ED patient found to have right groin cellulitis and + Covid.  (09 Dec 2023 05:36)  Podiatry was consulted for left foot ulcer. Pt resting comfortably bedside today. Pt tender to wound site.     12/20/23: Podiatry following for Lt foot ulcer. Pt resting at bedside, NAD. No additional pedal complaints.          REVIEW OF SYSTEMS:  CONSTITUTIONAL: No weakness, fevers or chills  EYES/ENT: No visual changes;  No vertigo or throat pain   NECK: No pain or stiffness  RESPIRATORY: No cough, wheezing, hemoptysis; No shortness of breath  CARDIOVASCULAR: No chest pain or palpitations  GASTROINTESTINAL: No abdominal or epigastric pain. No nausea, vomiting, or hematemesis; No diarrhea or constipation. No melena or hematochezia.  GENITOURINARY: No dysuria, frequency or hematuria  NEUROLOGICAL: No numbness or weakness  SKIN: See physical examination.  All other review of systems is negative unless indicated above    PMH: Essential hypertension    PVD (peripheral vascular disease)    Gastroesophageal reflux disease, esophagitis presence not specified    Hypothyroidism    Chronic obstructive pulmonary disease, unspecified COPD type    Eczema    Hyperlipidemia, unspecified hyperlipidemia type    Medial meniscus tear      PSH:Femoral popliteal artery thrombus    PVD (peripheral vascular disease)        Allergies:Betadine (Hives; Rash)  IV Contrast (Hives)    Labs                                   9.0    9.81  )-----------( 344      ( 19 Dec 2023 07:46 )             28.3     12-19    143  |  113<H>  |  16  ----------------------------<  101<H>  4.9   |  27  |  0.91    Ca    7.9<L>      19 Dec 2023 07:46  Phos  2.9     12-18  Mg     2.2     12-18      Vital Signs Last 24 Hrs  T(C): 37.1 (19 Dec 2023 07:58), Max: 37.1 (19 Dec 2023 07:58)  T(F): 98.8 (19 Dec 2023 07:58), Max: 98.8 (19 Dec 2023 07:58)  HR: 62 (19 Dec 2023 07:58) (45 - 62)  BP: 165/52 (19 Dec 2023 07:58) (139/47 - 165/52)  BP(mean): 94 (18 Dec 2023 22:40) (94 - 94)  RR: 18 (19 Dec 2023 07:58) (10 - 18)  SpO2: 95% (19 Dec 2023 07:58) (95% - 100%)    Parameters below as of 18 Dec 2023 22:40  Patient On (Oxygen Delivery Method): room air        MEDICATIONS  (STANDING):  aspirin enteric coated 81 milliGRAM(s) Oral daily  atorvastatin 80 milliGRAM(s) Oral at bedtime  Breztri Aerosphere: Budesonide 160 mcg, glycopyrrolate 9 mcg, and formoterol fumarate 4.8 mcg per actuation 2 Puff(s) 2 Inhalation Inhalation two times a day  cefTRIAXone Injectable. 1000 milliGRAM(s) IV Push every 24 hours  Dakins Solution - 1/4 Strength 1 Application(s) Topical daily  doxycycline monohydrate Capsule 100 milliGRAM(s) Oral every 12 hours  ferrous    sulfate 325 milliGRAM(s) Oral daily  influenza  Vaccine (HIGH DOSE) 0.7 milliLiter(s) IntraMuscular once  lactated ringers. 1000 milliLiter(s) (75 mL/Hr) IV Continuous <Continuous>  levothyroxine 100 MICROGram(s) Oral daily  losartan 50 milliGRAM(s) Oral daily  metoprolol succinate ER 25 milliGRAM(s) Oral daily  multivitamin 1 Tablet(s) Oral daily  sodium chloride 0.9%. 1000 milliLiter(s) (100 mL/Hr) IV Continuous <Continuous>    MEDICATIONS  (PRN):  acetaminophen     Tablet .. 650 milliGRAM(s) Oral every 6 hours PRN Temp greater or equal to 38C (100.4F), Mild Pain (1 - 3)  aluminum hydroxide/magnesium hydroxide/simethicone Suspension 30 milliLiter(s) Oral every 4 hours PRN Dyspepsia  fentaNYL    Injectable 50 MICROGram(s) IV Push every 5 minutes PRN Severe Pain (7 - 10)  fentaNYL    Injectable 25 MICROGram(s) IV Push every 5 minutes PRN Moderate Pain (4 - 6)  melatonin 3 milliGRAM(s) Oral at bedtime PRN Insomnia  ondansetron Injectable 4 milliGRAM(s) IV Push once PRN Nausea and/or Vomiting  ondansetron Injectable 4 milliGRAM(s) IV Push every 8 hours PRN Nausea and/or Vomiting  oxycodone    5 mG/acetaminophen 325 mG 1 Tablet(s) Oral once PRN Moderate Pain (4 - 6)              Physical Exam:   Constitutional: NAD, alert;  Lower Extremity Focus  Derm:  Skin warm, dry and supple bilateral.    Ulceration to the left medial heel, wound base is mostly granular, wound size is 3cm x 3cm, no periwound edema or erythema noted. no purulence or pus noted, no tracking/tunneling noted, no PTB, no malodor  Vascular: Dorsalis Pedis and Posterior Tibial pulses non palpable.  Capillary re-fill time more then 3 seconds digits 1-5 bilateral.    Neuro: Protective sensation diminished to the level of the digits bilateral.  MSK: Muscle strength 5/5 all major muscle groups bilateral. TTP to the wound site on the left medial mal            Date of Service: 12/20/23  72 y/o male with PMHX of HTN, GERD,  severe PVD s/p multiple stents b/l legs, s/p L  fem/popliteal bypass, chronic left lower ankle ulceration present to ED with worsening right groin redness. Patient reports recent procedure with Dr. Sosa. Was discharge to rehab where his PCP noted increasing fever and redness of right groin. Patient also reports some mild cough and sore throat. No chest pain or sob.   In ED patient found to have right groin cellulitis and + Covid.  (09 Dec 2023 05:36)  Podiatry was consulted for left foot ulcer. Pt resting comfortably bedside today. Pt tender to wound site.     12/20/23: Podiatry following for Lt foot ulcer. Pt resting at bedside, NAD. No additional pedal complaints.          REVIEW OF SYSTEMS:  CONSTITUTIONAL: No weakness, fevers or chills  EYES/ENT: No visual changes;  No vertigo or throat pain   NECK: No pain or stiffness  RESPIRATORY: No cough, wheezing, hemoptysis; No shortness of breath  CARDIOVASCULAR: No chest pain or palpitations  GASTROINTESTINAL: No abdominal or epigastric pain. No nausea, vomiting, or hematemesis; No diarrhea or constipation. No melena or hematochezia.  GENITOURINARY: No dysuria, frequency or hematuria  NEUROLOGICAL: No numbness or weakness  SKIN: See physical examination.  All other review of systems is negative unless indicated above    PMH: Essential hypertension    PVD (peripheral vascular disease)    Gastroesophageal reflux disease, esophagitis presence not specified    Hypothyroidism    Chronic obstructive pulmonary disease, unspecified COPD type    Eczema    Hyperlipidemia, unspecified hyperlipidemia type    Medial meniscus tear      PSH:Femoral popliteal artery thrombus    PVD (peripheral vascular disease)        Allergies:Betadine (Hives; Rash)  IV Contrast (Hives)    Labs                                   9.0    9.81  )-----------( 344      ( 19 Dec 2023 07:46 )             28.3     12-19    143  |  113<H>  |  16  ----------------------------<  101<H>  4.9   |  27  |  0.91    Ca    7.9<L>      19 Dec 2023 07:46  Phos  2.9     12-18  Mg     2.2     12-18      Vital Signs Last 24 Hrs  T(C): 37.1 (19 Dec 2023 07:58), Max: 37.1 (19 Dec 2023 07:58)  T(F): 98.8 (19 Dec 2023 07:58), Max: 98.8 (19 Dec 2023 07:58)  HR: 62 (19 Dec 2023 07:58) (45 - 62)  BP: 165/52 (19 Dec 2023 07:58) (139/47 - 165/52)  BP(mean): 94 (18 Dec 2023 22:40) (94 - 94)  RR: 18 (19 Dec 2023 07:58) (10 - 18)  SpO2: 95% (19 Dec 2023 07:58) (95% - 100%)    Parameters below as of 18 Dec 2023 22:40  Patient On (Oxygen Delivery Method): room air        MEDICATIONS  (STANDING):  aspirin enteric coated 81 milliGRAM(s) Oral daily  atorvastatin 80 milliGRAM(s) Oral at bedtime  Breztri Aerosphere: Budesonide 160 mcg, glycopyrrolate 9 mcg, and formoterol fumarate 4.8 mcg per actuation 2 Puff(s) 2 Inhalation Inhalation two times a day  cefTRIAXone Injectable. 1000 milliGRAM(s) IV Push every 24 hours  Dakins Solution - 1/4 Strength 1 Application(s) Topical daily  doxycycline monohydrate Capsule 100 milliGRAM(s) Oral every 12 hours  ferrous    sulfate 325 milliGRAM(s) Oral daily  influenza  Vaccine (HIGH DOSE) 0.7 milliLiter(s) IntraMuscular once  lactated ringers. 1000 milliLiter(s) (75 mL/Hr) IV Continuous <Continuous>  levothyroxine 100 MICROGram(s) Oral daily  losartan 50 milliGRAM(s) Oral daily  metoprolol succinate ER 25 milliGRAM(s) Oral daily  multivitamin 1 Tablet(s) Oral daily  sodium chloride 0.9%. 1000 milliLiter(s) (100 mL/Hr) IV Continuous <Continuous>    MEDICATIONS  (PRN):  acetaminophen     Tablet .. 650 milliGRAM(s) Oral every 6 hours PRN Temp greater or equal to 38C (100.4F), Mild Pain (1 - 3)  aluminum hydroxide/magnesium hydroxide/simethicone Suspension 30 milliLiter(s) Oral every 4 hours PRN Dyspepsia  fentaNYL    Injectable 50 MICROGram(s) IV Push every 5 minutes PRN Severe Pain (7 - 10)  fentaNYL    Injectable 25 MICROGram(s) IV Push every 5 minutes PRN Moderate Pain (4 - 6)  melatonin 3 milliGRAM(s) Oral at bedtime PRN Insomnia  ondansetron Injectable 4 milliGRAM(s) IV Push once PRN Nausea and/or Vomiting  ondansetron Injectable 4 milliGRAM(s) IV Push every 8 hours PRN Nausea and/or Vomiting  oxycodone    5 mG/acetaminophen 325 mG 1 Tablet(s) Oral once PRN Moderate Pain (4 - 6)              Physical Exam:   Constitutional: NAD, alert;  Lower Extremity Focus  Derm:  Skin warm, dry and supple bilateral.    Ulceration to the left medial heel, wound base is mostly granular, wound size is 3cm x 3cm, no periwound edema or erythema noted. no purulence or pus noted, no tracking/tunneling noted, no PTB, no malodor  Vascular: Dorsalis Pedis and Posterior Tibial pulses non palpable.  Capillary re-fill time more then 3 seconds digits 1-5 bilateral.    Neuro: Protective sensation diminished to the level of the digits bilateral.  MSK: Muscle strength 5/5 all major muscle groups bilateral. TTP to the wound site on the left medial mal            Date of Service: 12/20/23  74 y/o male with PMHX of HTN, GERD,  severe PVD s/p multiple stents b/l legs, s/p L  fem/popliteal bypass, chronic left lower ankle ulceration present to ED with worsening right groin redness. Patient reports recent procedure with Dr. Sosa. Was discharge to rehab where his PCP noted increasing fever and redness of right groin. Patient also reports some mild cough and sore throat. No chest pain or sob.   In ED patient found to have right groin cellulitis and + Covid.  (09 Dec 2023 05:36)  Podiatry was consulted for left foot ulcer. Pt resting comfortably bedside today. Pt tender to wound site.     12/20/23: Podiatry following for Lt foot ulcer. Pt resting at bedside, NAD. No additional pedal complaints.          REVIEW OF SYSTEMS:  CONSTITUTIONAL: No weakness, fevers or chills  EYES/ENT: No visual changes;  No vertigo or throat pain   NECK: No pain or stiffness  RESPIRATORY: No cough, wheezing, hemoptysis; No shortness of breath  CARDIOVASCULAR: No chest pain or palpitations  GASTROINTESTINAL: No abdominal or epigastric pain. No nausea, vomiting, or hematemesis; No diarrhea or constipation. No melena or hematochezia.  GENITOURINARY: No dysuria, frequency or hematuria  NEUROLOGICAL: No numbness or weakness  SKIN: See physical examination.  All other review of systems is negative unless indicated above    PMH: Essential hypertension    PVD (peripheral vascular disease)    Gastroesophageal reflux disease, esophagitis presence not specified    Hypothyroidism    Chronic obstructive pulmonary disease, unspecified COPD type    Eczema    Hyperlipidemia, unspecified hyperlipidemia type    Medial meniscus tear      PSH:Femoral popliteal artery thrombus    PVD (peripheral vascular disease)        Allergies:Betadine (Hives; Rash)  IV Contrast (Hives)    Labs                                              9.0    9.81  )-----------( 344      ( 19 Dec 2023 07:46 )             28.3     12-19    143  |  113<H>  |  16  ----------------------------<  101<H>  4.9   |  27  |  0.91    Ca    7.9<L>      19 Dec 2023 07:46      Vital Signs Last 24 Hrs  T(C): 36.4 (20 Dec 2023 08:02), Max: 37.3 (19 Dec 2023 20:17)  T(F): 97.5 (20 Dec 2023 08:02), Max: 99.1 (19 Dec 2023 20:17)  HR: 61 (20 Dec 2023 08:02) (61 - 67)  BP: 170/52 (20 Dec 2023 08:02) (155/50 - 170/52)  BP(mean): 88 (20 Dec 2023 08:02) (88 - 88)  RR: 18 (20 Dec 2023 08:02) (18 - 18)  SpO2: 100% (20 Dec 2023 08:02) (98% - 100%)    Parameters below as of 20 Dec 2023 08:02  Patient On (Oxygen Delivery Method): room air      MEDICATIONS  (STANDING):  aspirin enteric coated 81 milliGRAM(s) Oral daily  atorvastatin 80 milliGRAM(s) Oral at bedtime  Breztri Aerosphere: Budesonide 160 mcg, glycopyrrolate 9 mcg, and formoterol fumarate 4.8 mcg per actuation 2 Puff(s) 2 Inhalation Inhalation two times a day  cefTRIAXone Injectable. 1000 milliGRAM(s) IV Push every 24 hours  Dakins Solution - 1/4 Strength 1 Application(s) Topical daily  doxycycline monohydrate Capsule 100 milliGRAM(s) Oral every 12 hours  ferrous    sulfate 325 milliGRAM(s) Oral daily  influenza  Vaccine (HIGH DOSE) 0.7 milliLiter(s) IntraMuscular once  lactated ringers. 1000 milliLiter(s) (75 mL/Hr) IV Continuous <Continuous>  levothyroxine 100 MICROGram(s) Oral daily  losartan 50 milliGRAM(s) Oral daily  metoprolol succinate ER 25 milliGRAM(s) Oral daily  multivitamin 1 Tablet(s) Oral daily  sodium chloride 0.9%. 1000 milliLiter(s) (100 mL/Hr) IV Continuous <Continuous>    MEDICATIONS  (PRN):  acetaminophen     Tablet .. 650 milliGRAM(s) Oral every 6 hours PRN Temp greater or equal to 38C (100.4F), Mild Pain (1 - 3)  aluminum hydroxide/magnesium hydroxide/simethicone Suspension 30 milliLiter(s) Oral every 4 hours PRN Dyspepsia  fentaNYL    Injectable 50 MICROGram(s) IV Push every 5 minutes PRN Severe Pain (7 - 10)  fentaNYL    Injectable 25 MICROGram(s) IV Push every 5 minutes PRN Moderate Pain (4 - 6)  melatonin 3 milliGRAM(s) Oral at bedtime PRN Insomnia  ondansetron Injectable 4 milliGRAM(s) IV Push once PRN Nausea and/or Vomiting  ondansetron Injectable 4 milliGRAM(s) IV Push every 8 hours PRN Nausea and/or Vomiting  oxycodone    5 mG/acetaminophen 325 mG 1 Tablet(s) Oral once PRN Moderate Pain (4 - 6)              Physical Exam:   Constitutional: NAD, alert;  Lower Extremity Focus  Derm:  Skin warm, dry and supple bilateral.    Ulceration to the left medial heel, wound base is mostly granular, wound size is 3cm x 3cm, no periwound edema or erythema noted. no purulence or pus noted, no tracking/tunneling noted, no PTB, no malodor  Vascular: Dorsalis Pedis and Posterior Tibial pulses non palpable.  Capillary re-fill time more then 3 seconds digits 1-5 bilateral.    Neuro: Protective sensation diminished to the level of the digits bilateral.  MSK: Muscle strength 5/5 all major muscle groups bilateral. TTP to the wound site on the left medial mal            Date of Service: 12/20/23  72 y/o male with PMHX of HTN, GERD,  severe PVD s/p multiple stents b/l legs, s/p L  fem/popliteal bypass, chronic left lower ankle ulceration present to ED with worsening right groin redness. Patient reports recent procedure with Dr. Sosa. Was discharge to rehab where his PCP noted increasing fever and redness of right groin. Patient also reports some mild cough and sore throat. No chest pain or sob.   In ED patient found to have right groin cellulitis and + Covid.  (09 Dec 2023 05:36)  Podiatry was consulted for left foot ulcer. Pt resting comfortably bedside today. Pt tender to wound site.     12/20/23: Podiatry following for Lt foot ulcer. Pt resting at bedside, NAD. No additional pedal complaints.          REVIEW OF SYSTEMS:  CONSTITUTIONAL: No weakness, fevers or chills  EYES/ENT: No visual changes;  No vertigo or throat pain   NECK: No pain or stiffness  RESPIRATORY: No cough, wheezing, hemoptysis; No shortness of breath  CARDIOVASCULAR: No chest pain or palpitations  GASTROINTESTINAL: No abdominal or epigastric pain. No nausea, vomiting, or hematemesis; No diarrhea or constipation. No melena or hematochezia.  GENITOURINARY: No dysuria, frequency or hematuria  NEUROLOGICAL: No numbness or weakness  SKIN: See physical examination.  All other review of systems is negative unless indicated above    PMH: Essential hypertension    PVD (peripheral vascular disease)    Gastroesophageal reflux disease, esophagitis presence not specified    Hypothyroidism    Chronic obstructive pulmonary disease, unspecified COPD type    Eczema    Hyperlipidemia, unspecified hyperlipidemia type    Medial meniscus tear      PSH:Femoral popliteal artery thrombus    PVD (peripheral vascular disease)        Allergies:Betadine (Hives; Rash)  IV Contrast (Hives)    Labs                                              9.0    9.81  )-----------( 344      ( 19 Dec 2023 07:46 )             28.3     12-19    143  |  113<H>  |  16  ----------------------------<  101<H>  4.9   |  27  |  0.91    Ca    7.9<L>      19 Dec 2023 07:46      Vital Signs Last 24 Hrs  T(C): 36.4 (20 Dec 2023 08:02), Max: 37.3 (19 Dec 2023 20:17)  T(F): 97.5 (20 Dec 2023 08:02), Max: 99.1 (19 Dec 2023 20:17)  HR: 61 (20 Dec 2023 08:02) (61 - 67)  BP: 170/52 (20 Dec 2023 08:02) (155/50 - 170/52)  BP(mean): 88 (20 Dec 2023 08:02) (88 - 88)  RR: 18 (20 Dec 2023 08:02) (18 - 18)  SpO2: 100% (20 Dec 2023 08:02) (98% - 100%)    Parameters below as of 20 Dec 2023 08:02  Patient On (Oxygen Delivery Method): room air      MEDICATIONS  (STANDING):  aspirin enteric coated 81 milliGRAM(s) Oral daily  atorvastatin 80 milliGRAM(s) Oral at bedtime  Breztri Aerosphere: Budesonide 160 mcg, glycopyrrolate 9 mcg, and formoterol fumarate 4.8 mcg per actuation 2 Puff(s) 2 Inhalation Inhalation two times a day  cefTRIAXone Injectable. 1000 milliGRAM(s) IV Push every 24 hours  Dakins Solution - 1/4 Strength 1 Application(s) Topical daily  doxycycline monohydrate Capsule 100 milliGRAM(s) Oral every 12 hours  ferrous    sulfate 325 milliGRAM(s) Oral daily  influenza  Vaccine (HIGH DOSE) 0.7 milliLiter(s) IntraMuscular once  lactated ringers. 1000 milliLiter(s) (75 mL/Hr) IV Continuous <Continuous>  levothyroxine 100 MICROGram(s) Oral daily  losartan 50 milliGRAM(s) Oral daily  metoprolol succinate ER 25 milliGRAM(s) Oral daily  multivitamin 1 Tablet(s) Oral daily  sodium chloride 0.9%. 1000 milliLiter(s) (100 mL/Hr) IV Continuous <Continuous>    MEDICATIONS  (PRN):  acetaminophen     Tablet .. 650 milliGRAM(s) Oral every 6 hours PRN Temp greater or equal to 38C (100.4F), Mild Pain (1 - 3)  aluminum hydroxide/magnesium hydroxide/simethicone Suspension 30 milliLiter(s) Oral every 4 hours PRN Dyspepsia  fentaNYL    Injectable 50 MICROGram(s) IV Push every 5 minutes PRN Severe Pain (7 - 10)  fentaNYL    Injectable 25 MICROGram(s) IV Push every 5 minutes PRN Moderate Pain (4 - 6)  melatonin 3 milliGRAM(s) Oral at bedtime PRN Insomnia  ondansetron Injectable 4 milliGRAM(s) IV Push once PRN Nausea and/or Vomiting  ondansetron Injectable 4 milliGRAM(s) IV Push every 8 hours PRN Nausea and/or Vomiting  oxycodone    5 mG/acetaminophen 325 mG 1 Tablet(s) Oral once PRN Moderate Pain (4 - 6)              Physical Exam:   Constitutional: NAD, alert;  Lower Extremity Focus  Derm:  Skin warm, dry and supple bilateral.    Ulceration to the left medial heel, wound base is mostly granular, wound size is 3cm x 3cm, no periwound edema or erythema noted. no purulence or pus noted, no tracking/tunneling noted, no PTB, no malodor  Vascular: Dorsalis Pedis and Posterior Tibial pulses non palpable.  Capillary re-fill time more then 3 seconds digits 1-5 bilateral.    Neuro: Protective sensation diminished to the level of the digits bilateral.  MSK: Muscle strength 5/5 all major muscle groups bilateral. TTP to the wound site on the left medial mal

## 2023-12-20 NOTE — PROGRESS NOTE ADULT - ASSESSMENT
72 y/o male with h/o HTN, GERD,  severe PVD s/p multiple stents b/l legs, s/p L  fem/popliteal bypass, chronic left lower ankle ulceration was admitted on 12/9 for worsening right groin redness. Patient reports a recent vascular procedure with Dr. Sosa and a right inguinal vascular access was used. He was discharge to rehab where he was noted with increasing fever and redness of right groin. Patient also reports cough, sore throat and mild SOB. In ER he tested COVID-19 PCR detected. He received vancomycin IV and cefepime.     1. Right inguinal cellulitis s/p recent vascular surgery. 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 COVID-19 viral syndrome. ARF resolved. PVD s/p vascular bypass.   -low grade fever  -BC x 2 noted   -s/p ceftriaxone 1 gm IV qd and doxycycline 100 mg PO q12h # 3  -on ertapenem 1 gm IV qd # 1  -tolerating abx well so far; no side effects noted  -local wound care  -vascular surgery evaluation appreciated  -cultures reviewed  -will need 6 weeks of abx therapy  -plan for PICC line  -monitor temps  -f/u CBC  -supportive care  2. Other issues:   -care per medicine       74 y/o male with h/o HTN, GERD,  severe PVD s/p multiple stents b/l legs, s/p L  fem/popliteal bypass, chronic left lower ankle ulceration was admitted on 12/9 for worsening right groin redness. Patient reports a recent vascular procedure with Dr. Sosa and a right inguinal vascular access was used. He was discharge to rehab where he was noted with increasing fever and redness of right groin. Patient also reports cough, sore throat and mild SOB. In ER he tested COVID-19 PCR detected. He received vancomycin IV and cefepime.     1. Right inguinal cellulitis s/p recent vascular surgery. 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 COVID-19 viral syndrome. ARF resolved. PVD s/p vascular bypass.   -low grade fever  -BC x 2 noted   -s/p ceftriaxone 1 gm IV qd and doxycycline 100 mg PO q12h # 3  -on ertapenem 1 gm IV qd # 1  -tolerating abx well so far; no side effects noted  -local wound care  -vascular surgery evaluation appreciated  -cultures reviewed  -will need 6 weeks of abx therapy  -plan for PICC line  -monitor temps  -f/u CBC  -supportive care  2. Other issues:   -care per medicine

## 2023-12-20 NOTE — PROGRESS NOTE ADULT - SUBJECTIVE AND OBJECTIVE BOX
74 y/o male with PMHX of HTN, GERD,  severe PVD s/p multiple stents b/l legs, s/p L  fem/popliteal bypass, chronic left lower ankle ulceration present to ED with worsening right groin redness. Patient reports recent right bypass procedure with Dr. Sosa.  Was discharge to rehab where his PCP noted increasing fever and redness of right groin. Patient also reports some mild cough and sore throat. Patient was admitted with right groin cellulitis  and was started on IV antibiotics with ID consult    Subjective: POD#2-denies SOB, fever or chills. VSS.     ROS:   All 10 systems reviewed and found to be negative with the exception of what has been described above.     Vital Signs Last 24 Hrs  T(C): 36.4 (20 Dec 2023 08:02), Max: 37.3 (19 Dec 2023 20:17)  T(F): 97.5 (20 Dec 2023 08:02), Max: 99.1 (19 Dec 2023 20:17)  HR: 61 (20 Dec 2023 08:02) (61 - 67)  BP: 170/52 (20 Dec 2023 08:02) (155/50 - 170/52)  BP(mean): 88 (20 Dec 2023 08:02) (88 - 88)  RR: 18 (20 Dec 2023 08:02) (18 - 18)  SpO2: 100% (20 Dec 2023 08:02) (98% - 100%)    Parameters below as of 20 Dec 2023 08:02  Patient On (Oxygen Delivery Method): room air        PE:  Constitutional: NAD, laying in bed  HEENT: NC/AT  Back: no tenderness  Respiratory: respirations even and non labored, LCTA  Cardiovascular: S1S2 regular, no murmurs  Abdomen: soft, not tender, not distended, positive BS  Genitourinary: voiding  Musculoskeletal: no muscle tenderness, no joint swelling or tenderness  Extremities: no pedal edema - right leg dressing, BEBETO in place   Neurological: no focal deficits      MEDICATIONS  (STANDING):  aspirin enteric coated 81 milliGRAM(s) Oral daily  atorvastatin 80 milliGRAM(s) Oral at bedtime  Breztri Aerosphere: Budesonide 160 mcg, glycopyrrolate 9 mcg, and formoterol fumarate 4.8 mcg per actuation 2 Puff(s) 2 Inhalation Inhalation two times a day  cefTRIAXone Injectable. 1000 milliGRAM(s) IV Push every 24 hours  Dakins Solution - 1/4 Strength 1 Application(s) Topical daily  doxycycline monohydrate Capsule 100 milliGRAM(s) Oral every 12 hours  ferrous    sulfate 325 milliGRAM(s) Oral daily  heparin  Infusion. 500 Unit(s)/Hr (5 mL/Hr) IV Continuous <Continuous>  influenza  Vaccine (HIGH DOSE) 0.7 milliLiter(s) IntraMuscular once  levothyroxine 100 MICROGram(s) Oral daily  losartan 50 milliGRAM(s) Oral daily  metoprolol succinate ER 25 milliGRAM(s) Oral daily  multivitamin 1 Tablet(s) Oral daily    MEDICATIONS  (PRN):  acetaminophen     Tablet .. 650 milliGRAM(s) Oral every 6 hours PRN Temp greater or equal to 38C (100.4F), Mild Pain (1 - 3)  aluminum hydroxide/magnesium hydroxide/simethicone Suspension 30 milliLiter(s) Oral every 4 hours PRN Dyspepsia  melatonin 3 milliGRAM(s) Oral at bedtime PRN Insomnia  ondansetron Injectable 4 milliGRAM(s) IV Push every 8 hours PRN Nausea and/or Vomiting          LABS:    12-19    143  |  113<H>  |  16  ----------------------------<  101<H>  4.9   |  27  |  0.91    Ca    7.9<L>      19 Dec 2023 07:46                            9.0    9.81  )-----------( 344      ( 19 Dec 2023 07:46 )             28.3         Urinalysis Basic - ( 14 Dec 2023 08:45 )    Color: x / Appearance: x / SG: x / pH: x  Gluc: 90 mg/dL / Ketone: x  / Bili: x / Urobili: x   Blood: x / Protein: x / Nitrite: x   Leuk Esterase: x / RBC: x / WBC x   Sq Epi: x / Non Sq Epi: x / Bacteria: x  12-17    141  |  111<H>  |  20  ----------------------------<  87  4.4   |  27  |  0.91    Ca    8.1<L>      17 Dec 2023 10:12          RADIOLOGY:         72 y/o male with PMHX of HTN, GERD,  severe PVD s/p multiple stents b/l legs, s/p L  fem/popliteal bypass, chronic left lower ankle ulceration present to ED with worsening right groin redness. Patient reports recent right bypass procedure with Dr. Sosa.  Was discharge to rehab where his PCP noted increasing fever and redness of right groin. Patient also reports some mild cough and sore throat. Patient was admitted with right groin cellulitis  and was started on IV antibiotics with ID consult    Subjective: POD#2-denies SOB, fever or chills. VSS.     ROS:   All 10 systems reviewed and found to be negative with the exception of what has been described above.     Vital Signs Last 24 Hrs  T(C): 36.4 (20 Dec 2023 08:02), Max: 37.3 (19 Dec 2023 20:17)  T(F): 97.5 (20 Dec 2023 08:02), Max: 99.1 (19 Dec 2023 20:17)  HR: 61 (20 Dec 2023 08:02) (61 - 67)  BP: 170/52 (20 Dec 2023 08:02) (155/50 - 170/52)  BP(mean): 88 (20 Dec 2023 08:02) (88 - 88)  RR: 18 (20 Dec 2023 08:02) (18 - 18)  SpO2: 100% (20 Dec 2023 08:02) (98% - 100%)    Parameters below as of 20 Dec 2023 08:02  Patient On (Oxygen Delivery Method): room air        PE:  Constitutional: NAD, laying in bed  HEENT: NC/AT  Back: no tenderness  Respiratory: respirations even and non labored, LCTA  Cardiovascular: S1S2 regular, no murmurs  Abdomen: soft, not tender, not distended, positive BS  Genitourinary: voiding  Musculoskeletal: no muscle tenderness, no joint swelling or tenderness  Extremities: no pedal edema - right leg dressing, BEBETO in place   Neurological: no focal deficits      MEDICATIONS  (STANDING):  aspirin enteric coated 81 milliGRAM(s) Oral daily  atorvastatin 80 milliGRAM(s) Oral at bedtime  Breztri Aerosphere: Budesonide 160 mcg, glycopyrrolate 9 mcg, and formoterol fumarate 4.8 mcg per actuation 2 Puff(s) 2 Inhalation Inhalation two times a day  cefTRIAXone Injectable. 1000 milliGRAM(s) IV Push every 24 hours  Dakins Solution - 1/4 Strength 1 Application(s) Topical daily  doxycycline monohydrate Capsule 100 milliGRAM(s) Oral every 12 hours  ferrous    sulfate 325 milliGRAM(s) Oral daily  heparin  Infusion. 500 Unit(s)/Hr (5 mL/Hr) IV Continuous <Continuous>  influenza  Vaccine (HIGH DOSE) 0.7 milliLiter(s) IntraMuscular once  levothyroxine 100 MICROGram(s) Oral daily  losartan 50 milliGRAM(s) Oral daily  metoprolol succinate ER 25 milliGRAM(s) Oral daily  multivitamin 1 Tablet(s) Oral daily    MEDICATIONS  (PRN):  acetaminophen     Tablet .. 650 milliGRAM(s) Oral every 6 hours PRN Temp greater or equal to 38C (100.4F), Mild Pain (1 - 3)  aluminum hydroxide/magnesium hydroxide/simethicone Suspension 30 milliLiter(s) Oral every 4 hours PRN Dyspepsia  melatonin 3 milliGRAM(s) Oral at bedtime PRN Insomnia  ondansetron Injectable 4 milliGRAM(s) IV Push every 8 hours PRN Nausea and/or Vomiting          LABS:    12-19    143  |  113<H>  |  16  ----------------------------<  101<H>  4.9   |  27  |  0.91    Ca    7.9<L>      19 Dec 2023 07:46                            9.0    9.81  )-----------( 344      ( 19 Dec 2023 07:46 )             28.3         Urinalysis Basic - ( 14 Dec 2023 08:45 )    Color: x / Appearance: x / SG: x / pH: x  Gluc: 90 mg/dL / Ketone: x  / Bili: x / Urobili: x   Blood: x / Protein: x / Nitrite: x   Leuk Esterase: x / RBC: x / WBC x   Sq Epi: x / Non Sq Epi: x / Bacteria: x  12-17    141  |  111<H>  |  20  ----------------------------<  87  4.4   |  27  |  0.91    Ca    8.1<L>      17 Dec 2023 10:12          RADIOLOGY:

## 2023-12-20 NOTE — PHYSICAL THERAPY INITIAL EVALUATION ADULT - MODALITIES TREATMENT COMMENTS
pt left seated in recliner post Re-eval; chair alarm, BEBETO drain in place; LE's elevated with pillow under ankles; callbell in reach; pt instructed not to get up alone; call nursing for assist; tasha well; denied pain; isolation maintained; RN Amanda made aware pt OOB

## 2023-12-20 NOTE — PROGRESS NOTE ADULT - SUBJECTIVE AND OBJECTIVE BOX
Patient seen and examined on routine rounds.   He is POD#2 s/p R groin wound washout, Closure using gracilis muscle flap  He denies nausea, vomiting, fever or chills. Pain well controlled   Nurse report no acute event overnight   VS reviewed      Physical Exam:  General: AOx3, Well developed, NAD  HEENT: NC/AT, normal pinnae and tragi  Chest: Normal respiratory effort, equal chest rise, left chest wall palpable graft   Heart: RRR  Abdomen: Soft, NTND  Inguinal: R groin with c/d/i dressing..  Neuro/Psych: No localized deficits. Normal speech, normal tone, normal affect  Skin: Approximately 3x4 cm ulcer just superior to the left medial malleolus with some fibrinous tissue, no bleeding, no surround induration or erythema  Extremities: B/l LE with acewrap dressing, c/d/i. R leg Delfino with sanguinous output. Popliteal pulses 2+ b/l, DP and PT 2+ on right, 2+ DP, nonpalpable DP on left    Vital Signs Last 24 Hrs  T(C): 37.3 (19 Dec 2023 20:17), Max: 37.3 (19 Dec 2023 20:17)  T(F): 99.1 (19 Dec 2023 20:17), Max: 99.1 (19 Dec 2023 20:17)  HR: 67 (19 Dec 2023 20:17) (61 - 67)  BP: 157/49 (19 Dec 2023 20:17) (155/50 - 165/52)  BP(mean): --  RR: 18 (19 Dec 2023 20:17) (18 - 18)  SpO2: 98% (19 Dec 2023 20:17) (95% - 100%)    Parameters below as of 19 Dec 2023 20:17  Patient On (Oxygen Delivery Method): room air    I&O's Detail    18 Dec 2023 07:01  -  19 Dec 2023 07:00  --------------------------------------------------------  IN:    Lactated Ringers: 75 mL    Other (mL): 982 mL  Total IN: 1057 mL    OUT:    Bulb (mL): 155 mL    Voided (mL): 300 mL  Total OUT: 455 mL    Total NET: 602 mL      19 Dec 2023 07:01  -  20 Dec 2023 04:49  --------------------------------------------------------  IN:  Total IN: 0 mL    OUT:    Bulb (mL): 25 mL  Total OUT: 25 mL    Total NET: -25 mL      MEDICATIONS  (STANDING):  aspirin enteric coated 81 milliGRAM(s) Oral daily  atorvastatin 80 milliGRAM(s) Oral at bedtime  Breztri Aerosphere: Budesonide 160 mcg, glycopyrrolate 9 mcg, and formoterol fumarate 4.8 mcg per actuation 2 Puff(s) 2 Inhalation Inhalation two times a day  cefTRIAXone Injectable. 1000 milliGRAM(s) IV Push every 24 hours  Dakins Solution - 1/4 Strength 1 Application(s) Topical daily  doxycycline monohydrate Capsule 100 milliGRAM(s) Oral every 12 hours  ferrous    sulfate 325 milliGRAM(s) Oral daily  influenza  Vaccine (HIGH DOSE) 0.7 milliLiter(s) IntraMuscular once  levothyroxine 100 MICROGram(s) Oral daily  losartan 50 milliGRAM(s) Oral daily  metoprolol succinate ER 25 milliGRAM(s) Oral daily  multivitamin 1 Tablet(s) Oral daily    MEDICATIONS  (PRN):  acetaminophen     Tablet .. 650 milliGRAM(s) Oral every 6 hours PRN Temp greater or equal to 38C (100.4F), Mild Pain (1 - 3)  aluminum hydroxide/magnesium hydroxide/simethicone Suspension 30 milliLiter(s) Oral every 4 hours PRN Dyspepsia  melatonin 3 milliGRAM(s) Oral at bedtime PRN Insomnia  ondansetron Injectable 4 milliGRAM(s) IV Push every 8 hours PRN Nausea and/or Vomiting

## 2023-12-20 NOTE — PHYSICAL THERAPY INITIAL EVALUATION ADULT - PLANNED THERAPY INTERVENTIONS, PT EVAL
bed mobility training/gait training/transfer training
balance training/gait training/transfer training

## 2023-12-20 NOTE — PROGRESS NOTE ADULT - ASSESSMENT
74 y/o male with PMHX of HTN, GERD,  severe PVD s/p multiple stents b/l legs, s/p L  fem/popliteal bypass, chronic left lower ankle ulceration present to ED with worsening right groin redness. Patient reports recent right bypass procedure with Dr. Sosa.  Was discharge to rehab where his PCP noted increasing fever and redness of right groin. Patient also reports some mild cough and sore throat. No chest pain or sob.     Right Groin Surgical Site Cellulitis  - ID following  - cont doxycycline/ceftriaxone  - will need long term IV antibiotic x6 weeks - due to concern of possible vascular graft involvement  - Plan for PICC line   - blood culture Culture - Abscess with Gram Stain (12.10.23 @ 17:45)   No polymorphonuclear cells seen per low power field   No organisms seen per oil power field  Specimen Source: Abscess Hip - Right  Moderate Staphylococcus aureus  - vascular following   - POD#2  s/p R groin wound washout, Closure using gracilis muscle flap  - BEBETO intact x1  - started on heparin drip as per Vascular recommendations   Keep R leg elevated. No abduction of the R leg  Plastic surgery on board       leukocytosis - resolved   - received IV steroid in leiu of IV contrast allergy     JENIFER on CKD  Hypokalemia -  resolved  - trend bmp     COVID +  - Not hypoxic and currently asymptomatic  - Monitor    Left Ankle chronic ulcer  - Wound care eval  - podiatry consulted     PAD  - Stable  - d/w Vascular resident- ok to restart apixaban   - Continue Aspirin  - Continue Atorvastatin    HTN  - Stable  - Bystolic switched to Coreg  - Monitor    Hypothyroid  - Continue levothyroxine    DVT prophylaxis  - On lovenox     Case d/w team on IDR.    72 y/o male with PMHX of HTN, GERD,  severe PVD s/p multiple stents b/l legs, s/p L  fem/popliteal bypass, chronic left lower ankle ulceration present to ED with worsening right groin redness. Patient reports recent right bypass procedure with Dr. Sosa.  Was discharge to rehab where his PCP noted increasing fever and redness of right groin. Patient also reports some mild cough and sore throat. No chest pain or sob.     Right Groin Surgical Site Cellulitis  - ID following  - cont doxycycline/ceftriaxone  - will need long term IV antibiotic x6 weeks - due to concern of possible vascular graft involvement  - Plan for PICC line   - blood culture Culture - Abscess with Gram Stain (12.10.23 @ 17:45)   No polymorphonuclear cells seen per low power field   No organisms seen per oil power field  Specimen Source: Abscess Hip - Right  Moderate Staphylococcus aureus  - vascular following   - POD#2  s/p R groin wound washout, Closure using gracilis muscle flap  - BEBETO intact x1  - started on heparin drip as per Vascular recommendations   Keep R leg elevated. No abduction of the R leg  Plastic surgery on board       leukocytosis - resolved   - received IV steroid in leiu of IV contrast allergy     JENIFER on CKD  Hypokalemia -  resolved  - trend bmp     COVID +  - Not hypoxic and currently asymptomatic  - Monitor    Left Ankle chronic ulcer  - Wound care eval  - podiatry consulted     PAD  - Stable  - d/w Vascular resident- ok to restart apixaban   - Continue Aspirin  - Continue Atorvastatin    HTN  - Stable  - Bystolic switched to Coreg  - Monitor    Hypothyroid  - Continue levothyroxine    DVT prophylaxis  - On lovenox     Case d/w team on IDR.

## 2023-12-20 NOTE — PHYSICAL THERAPY INITIAL EVALUATION ADULT - GAIT DEVIATIONS NOTED, PT EVAL
decreased jackie/decreased step length
decreased jackie/decreased step length/increased stride width/decreased weight-shifting ability

## 2023-12-21 LAB
ANION GAP SERPL CALC-SCNC: 4 MMOL/L — LOW (ref 5–17)
ANION GAP SERPL CALC-SCNC: 4 MMOL/L — LOW (ref 5–17)
BUN SERPL-MCNC: 14 MG/DL — SIGNIFICANT CHANGE UP (ref 7–23)
BUN SERPL-MCNC: 14 MG/DL — SIGNIFICANT CHANGE UP (ref 7–23)
CALCIUM SERPL-MCNC: 8.4 MG/DL — LOW (ref 8.5–10.1)
CALCIUM SERPL-MCNC: 8.4 MG/DL — LOW (ref 8.5–10.1)
CHLORIDE SERPL-SCNC: 112 MMOL/L — HIGH (ref 96–108)
CHLORIDE SERPL-SCNC: 112 MMOL/L — HIGH (ref 96–108)
CO2 SERPL-SCNC: 26 MMOL/L — SIGNIFICANT CHANGE UP (ref 22–31)
CO2 SERPL-SCNC: 26 MMOL/L — SIGNIFICANT CHANGE UP (ref 22–31)
CREAT SERPL-MCNC: 0.74 MG/DL — SIGNIFICANT CHANGE UP (ref 0.5–1.3)
CREAT SERPL-MCNC: 0.74 MG/DL — SIGNIFICANT CHANGE UP (ref 0.5–1.3)
EGFR: 97 ML/MIN/1.73M2 — SIGNIFICANT CHANGE UP
EGFR: 97 ML/MIN/1.73M2 — SIGNIFICANT CHANGE UP
GLUCOSE SERPL-MCNC: 97 MG/DL — SIGNIFICANT CHANGE UP (ref 70–99)
GLUCOSE SERPL-MCNC: 97 MG/DL — SIGNIFICANT CHANGE UP (ref 70–99)
HCT VFR BLD CALC: 26 % — LOW (ref 39–50)
HCT VFR BLD CALC: 26 % — LOW (ref 39–50)
HGB BLD-MCNC: 8.2 G/DL — LOW (ref 13–17)
HGB BLD-MCNC: 8.2 G/DL — LOW (ref 13–17)
MCHC RBC-ENTMCNC: 29.4 PG — SIGNIFICANT CHANGE UP (ref 27–34)
MCHC RBC-ENTMCNC: 29.4 PG — SIGNIFICANT CHANGE UP (ref 27–34)
MCHC RBC-ENTMCNC: 31.5 GM/DL — LOW (ref 32–36)
MCHC RBC-ENTMCNC: 31.5 GM/DL — LOW (ref 32–36)
MCV RBC AUTO: 93.2 FL — SIGNIFICANT CHANGE UP (ref 80–100)
MCV RBC AUTO: 93.2 FL — SIGNIFICANT CHANGE UP (ref 80–100)
PLATELET # BLD AUTO: 288 K/UL — SIGNIFICANT CHANGE UP (ref 150–400)
PLATELET # BLD AUTO: 288 K/UL — SIGNIFICANT CHANGE UP (ref 150–400)
POTASSIUM SERPL-MCNC: 4.1 MMOL/L — SIGNIFICANT CHANGE UP (ref 3.5–5.3)
POTASSIUM SERPL-MCNC: 4.1 MMOL/L — SIGNIFICANT CHANGE UP (ref 3.5–5.3)
POTASSIUM SERPL-SCNC: 4.1 MMOL/L — SIGNIFICANT CHANGE UP (ref 3.5–5.3)
POTASSIUM SERPL-SCNC: 4.1 MMOL/L — SIGNIFICANT CHANGE UP (ref 3.5–5.3)
RBC # BLD: 2.79 M/UL — LOW (ref 4.2–5.8)
RBC # BLD: 2.79 M/UL — LOW (ref 4.2–5.8)
RBC # FLD: 15.2 % — HIGH (ref 10.3–14.5)
RBC # FLD: 15.2 % — HIGH (ref 10.3–14.5)
SODIUM SERPL-SCNC: 142 MMOL/L — SIGNIFICANT CHANGE UP (ref 135–145)
SODIUM SERPL-SCNC: 142 MMOL/L — SIGNIFICANT CHANGE UP (ref 135–145)
WBC # BLD: 7.45 K/UL — SIGNIFICANT CHANGE UP (ref 3.8–10.5)
WBC # BLD: 7.45 K/UL — SIGNIFICANT CHANGE UP (ref 3.8–10.5)
WBC # FLD AUTO: 7.45 K/UL — SIGNIFICANT CHANGE UP (ref 3.8–10.5)
WBC # FLD AUTO: 7.45 K/UL — SIGNIFICANT CHANGE UP (ref 3.8–10.5)

## 2023-12-21 PROCEDURE — 99232 SBSQ HOSP IP/OBS MODERATE 35: CPT

## 2023-12-21 RX ORDER — APIXABAN 2.5 MG/1
5 TABLET, FILM COATED ORAL EVERY 12 HOURS
Refills: 0 | Status: DISCONTINUED | OUTPATIENT
Start: 2023-12-21 | End: 2023-12-22

## 2023-12-21 RX ADMIN — HEPARIN SODIUM 500 UNIT(S)/HR: 5000 INJECTION INTRAVENOUS; SUBCUTANEOUS at 06:48

## 2023-12-21 RX ADMIN — Medication 3 MILLIGRAM(S): at 21:49

## 2023-12-21 RX ADMIN — Medication 81 MILLIGRAM(S): at 11:18

## 2023-12-21 RX ADMIN — Medication 100 MICROGRAM(S): at 06:48

## 2023-12-21 RX ADMIN — Medication 100 MILLIGRAM(S): at 00:05

## 2023-12-21 RX ADMIN — Medication 1 TABLET(S): at 11:17

## 2023-12-21 RX ADMIN — APIXABAN 5 MILLIGRAM(S): 2.5 TABLET, FILM COATED ORAL at 21:48

## 2023-12-21 RX ADMIN — CEFTRIAXONE 1000 MILLIGRAM(S): 500 INJECTION, POWDER, FOR SOLUTION INTRAMUSCULAR; INTRAVENOUS at 15:52

## 2023-12-21 RX ADMIN — Medication 325 MILLIGRAM(S): at 11:17

## 2023-12-21 RX ADMIN — ATORVASTATIN CALCIUM 80 MILLIGRAM(S): 80 TABLET, FILM COATED ORAL at 21:48

## 2023-12-21 RX ADMIN — Medication 100 MILLIGRAM(S): at 21:48

## 2023-12-21 RX ADMIN — Medication 100 MILLIGRAM(S): at 11:18

## 2023-12-21 NOTE — PROGRESS NOTE ADULT - ASSESSMENT
Assessment: 73y old male seen for the following:   - Full thickness wound to the left foot, chronic   - Pain to left foot  - Difficulty ambulation    Plan:   Chart reviewed and Patient evaluated;  Discussed diagnosis and treatment with patient. Discussed importance of daily foot examinations, proper shoe gear, importance of tight glycemic control.   X-rays reviewed : on wet read showing no soft tissue gas, no acute bony findings, mild edema noted to the left ankle posteriorly  There is a full thickness wound to the left medial mal, etiology of wound most likely due to arterial problem, wound looks chronic and stable without acute SOI  WC: Santyl and DSD  WBAT using surgical shoe  Offloading to bilateral heels while bedbound.   Patient to follow up at [facility] 1 week after discharge w/ Dr Cade Joseph.  Continue with antibiotics as per ID  All additional care per Med appreciated  Patient demonstrated verbal understanding of all interventions and tolerated interventions well without any complications.     Case D/W attending Dr. Joseph    Wound care instructions for Left Lower Extremity to be changed every other day:  - Gently remove dressings.  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 Discussed importance of daily foot examinations, proper shoe gear, importance of tight glycemic control.   X-rays reviewed : on wet read showing no soft tissue gas, no acute bony findings, mild edema noted to the left ankle posteriorly  There is a full thickness wound to the left medial mal, etiology of wound most likely due to arterial problem, wound looks chronic and stable without acute SOI  WC: Santyl and DSD  WBAT using surgical shoe  Offloading to bilateral heels while bedbound.   Patient to follow up at [facility] 1 week after discharge w/ Dr Cade Joseph.  Continue with antibiotics as per ID  All additional care per Med appreciated  Patient demonstrated verbal understanding of all interventions and tolerated interventions well without any complications.     Case D/W attending Dr. Joseph    Wound care instructions for Left Lower Extremity to be changed every other day:  - Gently remove dressings.  - Clean the wound with saline and dry it thoroughly with dry gauze  - Apply santyl, gauze, kerlix and secure it with tape

## 2023-12-21 NOTE — PROGRESS NOTE ADULT - ASSESSMENT
72 y/o male with PMHX of HTN, GERD,  severe PVD s/p multiple stents b/l legs, s/p L  fem/popliteal bypass, chronic left lower ankle ulceration present to ED with worsening right groin redness. Patient reports recent right bypass procedure with Dr. Sosa.  Was discharge to rehab where his PCP noted increasing fever and redness of right groin. Patient also reports some mild cough and sore throat. No chest pain or sob.     Right Groin Surgical Site Cellulitis  - ID following  - cont doxycycline/ceftriaxone  - will need long term IV antibiotic x6 weeks - due to concern of possible vascular graft involvement  - Plan for PICC line on dc   - blood culture Culture - Abscess with Gram Stain (12.10.23 @ 17:45)   No polymorphonuclear cells seen per low power field   No organisms seen per oil power field  Specimen Source: Abscess Hip - Right  Moderate Staphylococcus aureus  - vascular following   - POD#3  s/p R groin wound washout, Closure using gracilis muscle flap  - BEBETO intact x1- will likely be dc'd with BEBETO   - started on heparin drip as per Vascular recommendations - d/w vascular- will dc heparin and start DOAC   Keep R leg elevated. No abduction of the R leg  Plastic surgery on board       leukocytosis - resolved   - received IV steroid in leiu of IV contrast allergy     JENIFER on CKD  Hypokalemia -  resolved  - trend bmp     COVID +  - Not hypoxic and currently asymptomatic  - Monitor    Left Ankle chronic ulcer  - Wound care eval  - podiatry consulted     PAD  - Stable  - 12/21  Vascular resident- ok to restart apixaban   - Continue Aspirin  - Continue Atorvastatin    HTN  - Stable  - Bystolic switched to Coreg  - Monitor    Hypothyroid  - Continue levothyroxine    DVT prophylaxis  - On lovenox     Case d/w team on IDR.

## 2023-12-21 NOTE — PROGRESS NOTE ADULT - SUBJECTIVE AND OBJECTIVE BOX
Date of service: 12-21-23 @ 14:49    Lying in bed in NAD  Has right inguinal tenderness s/p surgery  No fever    ROS: no fever or chills; denies dizziness, no HA, no SOB or cough, no abdominal pain, no diarrhea or constipation; no dysuria, no legs pain, no rashes    MEDICATIONS  (STANDING):  apixaban 5 milliGRAM(s) Oral every 12 hours  aspirin enteric coated 81 milliGRAM(s) Oral daily  atorvastatin 80 milliGRAM(s) Oral at bedtime  Breztri Aerosphere: Budesonide 160 mcg, glycopyrrolate 9 mcg, and formoterol fumarate 4.8 mcg per actuation 2 Puff(s) 2 Inhalation Inhalation two times a day  cefTRIAXone Injectable. 1000 milliGRAM(s) IV Push every 24 hours  Dakins Solution - 1/4 Strength 1 Application(s) Topical daily  doxycycline monohydrate Capsule 100 milliGRAM(s) Oral every 12 hours  ferrous    sulfate 325 milliGRAM(s) Oral daily  influenza  Vaccine (HIGH DOSE) 0.7 milliLiter(s) IntraMuscular once  levothyroxine 100 MICROGram(s) Oral daily  losartan 50 milliGRAM(s) Oral daily  metoprolol succinate ER 25 milliGRAM(s) Oral daily  multivitamin 1 Tablet(s) Oral daily    Vital Signs Last 24 Hrs  T(C): 36.7 (21 Dec 2023 08:09), Max: 36.9 (20 Dec 2023 19:46)  T(F): 98.1 (21 Dec 2023 08:09), Max: 98.4 (20 Dec 2023 19:46)  HR: 67 (21 Dec 2023 11:15) (57 - 67)  BP: 94/76 (21 Dec 2023 11:15) (94/76 - 163/48)  BP(mean): --  RR: 19 (21 Dec 2023 08:09) (18 - 19)  SpO2: 95% (21 Dec 2023 08:09) (95% - 100%)    Parameters below as of 21 Dec 2023 08:09  Patient On (Oxygen Delivery Method): room air     Physical exam:    Constitutional:  No acute distress  HEENT: NC/AT, EOMI, PERRLA, conjunctivae clear; ears and nose atraumatic; pharynx benign  Neck: supple; thyroid not palpable  Back: no tenderness  Respiratory: respiratory effort normal; clear to auscultation  Cardiovascular: S1S2 regular, no murmurs  Abdomen: soft, not tender, not distended, positive BS; no liver or spleen organomegaly  Genitourinary: no suprapubic tenderness  Lymphatic: no LN palpable  Musculoskeletal: no muscle tenderness, no joint swelling or tenderness  Extremities: no pedal edema  Right inguinal erythema, edema, warmth and tenderness; postsurgical wound partially open, but no discharge   Neurological/ Psychiatric: AxOx3, judgement and insight normal; moving all extremities  Skin: no rashes; no palpable lesions    Labs: reviewed                        8.2    7.45  )-----------( 288      ( 21 Dec 2023 06:28 )             26.0     12-21    142  |  112<H>  |  14  ----------------------------<  97  4.1   |  26  |  0.74    Ca    8.4<L>      21 Dec 2023 06:28                        9.6    9.00  )-----------( 273      ( 09 Dec 2023 08:09 )             29.5     12-09    135  |  98  |  37<H>  ----------------------------<  104<H>  2.7<LL>   |  30  |  1.74<H>    Ca    7.9<L>      09 Dec 2023 08:09    TPro  6.4  /  Alb  2.4<L>  /  TBili  0.9  /  DBili  x   /  AST  46<H>  /  ALT  28  /  AlkPhos  151<H>  12-09     LIVER FUNCTIONS - ( 09 Dec 2023 00:23 )  Alb: 2.4 g/dL / Pro: 6.4 gm/dL / ALK PHOS: 151 U/L / ALT: 28 U/L / AST: 46 U/L / GGT: x           Urinalysis (12-09 @ 06:16)  Urine Appearance: Clear  Protein, Urine: Negative mg/dL  Urine Microscopic-Add On (NC) (12-09 @ 06:16)  White Blood Cell - Urine: 1 /HPF  Red Blood Cell - Urine: 2 /HPF    COVID (12-09 @ 01:00)  Detected    Culture - Abscess with Gram Stain (collected 10 Dec 2023 17:45)  Source: .Abscess Hip - Right  Gram Stain (11 Dec 2023 20:21):    No polymorphonuclear cells seen per low power field    No organisms seen per oil power field  Final Report (15 Dec 2023 19:12):    Moderate Staphylococcus aureus    Rare Anaerobic Gram Positive Isaac Most closely resembling Peptoniphilus    species . "Susceptibilities not performed"  Organism: Staphylococcus aureus (15 Dec 2023 19:12)  Organism: Staphylococcus aureus (15 Dec 2023 19:12)      Method Type: MARISA      -  Ampicillin/Sulbactam: S <=8/4      -  Cefazolin: S <=4      -  Clindamycin: R <=0.25 This isolate is presumed to be clindamycin resistant based on detection of inducible resistance. Clindamycin may still be effective in some patients.      -  Erythromycin: R >4      -  Gentamicin: S <=1 Should not be used as monotherapy      -  Oxacillin: S <=0.25 Oxacillin predicts susceptibility for dicloxacillin, methicillin, and nafcillin      -  Penicillin: R >8      -  Rifampin: S <=1 Should not be used as monotherapy      -  Tetracycline: S <=1      -  Trimethoprim/Sulfamethoxazole: S <=0.5/9.5      -  Vancomycin: S 2    Radiology: all available radiological tests reviewed    Advanced directives addressed: full resuscitation

## 2023-12-21 NOTE — PROGRESS NOTE ADULT - SUBJECTIVE AND OBJECTIVE BOX
72 y/o male with PMHX of HTN, GERD,  severe PVD s/p multiple stents b/l legs, s/p L  fem/popliteal bypass, chronic left lower ankle ulceration present to ED with worsening right groin redness. Patient reports recent right bypass procedure with Dr. Sosa.  Was discharge to rehab where his PCP noted increasing fever and redness of right groin. Patient also reports some mild cough and sore throat. Patient was admitted with right groin cellulitis  and was started on IV antibiotics with ID consult    Subjective: POD#3- no acute overnight events. VSS_ plan for possible dc today. No acute overnight events.     ROS:   All 10 systems reviewed and found to be negative with the exception of what has been described above.     Vital Signs Last 24 Hrs  T(C): 36.7 (21 Dec 2023 08:09), Max: 36.9 (20 Dec 2023 19:46)  T(F): 98.1 (21 Dec 2023 08:09), Max: 98.4 (20 Dec 2023 19:46)  HR: 57 (21 Dec 2023 08:09) (57 - 59)  BP: 154/50 (21 Dec 2023 08:09) (154/50 - 163/48)  BP(mean): --  RR: 19 (21 Dec 2023 08:09) (18 - 19)  SpO2: 95% (21 Dec 2023 08:09) (95% - 100%)    Parameters below as of 21 Dec 2023 08:09  Patient On (Oxygen Delivery Method): room air        PE:  Constitutional: NAD, laying in bed  HEENT: NC/AT  Back: no tenderness  Respiratory: respirations even and non labored, LCTA  Cardiovascular: S1S2 regular, no murmurs  Abdomen: soft, not tender, not distended, positive BS  Genitourinary: voiding  Musculoskeletal: no muscle tenderness, no joint swelling or tenderness  Extremities: no pedal edema - right leg dressing, BEBETO in place   Neurological: no focal deficits      MEDICATIONS  (STANDING):  apixaban 5 milliGRAM(s) Oral every 12 hours  aspirin enteric coated 81 milliGRAM(s) Oral daily  atorvastatin 80 milliGRAM(s) Oral at bedtime  Breztri Aerosphere: Budesonide 160 mcg, glycopyrrolate 9 mcg, and formoterol fumarate 4.8 mcg per actuation 2 Puff(s) 2 Inhalation Inhalation two times a day  cefTRIAXone Injectable. 1000 milliGRAM(s) IV Push every 24 hours  Dakins Solution - 1/4 Strength 1 Application(s) Topical daily  doxycycline monohydrate Capsule 100 milliGRAM(s) Oral every 12 hours  ferrous    sulfate 325 milliGRAM(s) Oral daily  influenza  Vaccine (HIGH DOSE) 0.7 milliLiter(s) IntraMuscular once  levothyroxine 100 MICROGram(s) Oral daily  losartan 50 milliGRAM(s) Oral daily  metoprolol succinate ER 25 milliGRAM(s) Oral daily  multivitamin 1 Tablet(s) Oral daily    MEDICATIONS  (PRN):  acetaminophen     Tablet .. 650 milliGRAM(s) Oral every 6 hours PRN Temp greater or equal to 38C (100.4F), Mild Pain (1 - 3)  aluminum hydroxide/magnesium hydroxide/simethicone Suspension 30 milliLiter(s) Oral every 4 hours PRN Dyspepsia  melatonin 3 milliGRAM(s) Oral at bedtime PRN Insomnia  ondansetron Injectable 4 milliGRAM(s) IV Push every 8 hours PRN Nausea and/or Vomiting            LABS:    12-21    142  |  112<H>  |  14  ----------------------------<  97  4.1   |  26  |  0.74    Ca    8.4<L>      21 Dec 2023 06:28                              8.2    7.45  )-----------( 288      ( 21 Dec 2023 06:28 )             26.0         Urinalysis Basic - ( 14 Dec 2023 08:45 )    Color: x / Appearance: x / SG: x / pH: x  Gluc: 90 mg/dL / Ketone: x  / Bili: x / Urobili: x   Blood: x / Protein: x / Nitrite: x   Leuk Esterase: x / RBC: x / WBC x   Sq Epi: x / Non Sq Epi: x / Bacteria: x  12-17    141  |  111<H>  |  20  ----------------------------<  87  4.4   |  27  |  0.91    Ca    8.1<L>      17 Dec 2023 10:12          RADIOLOGY:

## 2023-12-21 NOTE — PROGRESS NOTE ADULT - SUBJECTIVE AND OBJECTIVE BOX
Date of Service: 12/21/23  72 y/o male with PMHX of HTN, GERD,  severe PVD s/p multiple stents b/l legs, s/p L  fem/popliteal bypass, chronic left lower ankle ulceration present to ED with worsening right groin redness. Patient reports recent procedure with Dr. Sosa. Was discharge to rehab where his PCP noted increasing fever and redness of right groin. Patient also reports some mild cough and sore throat. No chest pain or sob.   In ED patient found to have right groin cellulitis and + Covid.  (09 Dec 2023 05:36)  Podiatry was consulted for left foot ulcer. Pt resting comfortably bedside today. Pt tender to wound site.     12/21/23: Podiatry following for Lt foot ulcer. Pt sitting on chair, watching TV, NAD. No additional pedal complaints.          REVIEW OF SYSTEMS:  CONSTITUTIONAL: No weakness, fevers or chills  EYES/ENT: No visual changes;  No vertigo or throat pain   NECK: No pain or stiffness  RESPIRATORY: No cough, wheezing, hemoptysis; No shortness of breath  CARDIOVASCULAR: No chest pain or palpitations  GASTROINTESTINAL: No abdominal or epigastric pain. No nausea, vomiting, or hematemesis; No diarrhea or constipation. No melena or hematochezia.  GENITOURINARY: No dysuria, frequency or hematuria  NEUROLOGICAL: No numbness or weakness  SKIN: See physical examination.  All other review of systems is negative unless indicated above    PMH: Essential hypertension    PVD (peripheral vascular disease)    Gastroesophageal reflux disease, esophagitis presence not specified    Hypothyroidism    Chronic obstructive pulmonary disease, unspecified COPD type    Eczema    Hyperlipidemia, unspecified hyperlipidemia type    Medial meniscus tear      PSH:Femoral popliteal artery thrombus    PVD (peripheral vascular disease)        Allergies:Betadine (Hives; Rash)  IV Contrast (Hives)    Labs                                   8.2    7.45  )-----------( 288      ( 21 Dec 2023 06:28 )             26.0   12-21    142  |  112<H>  |  14  ----------------------------<  97  4.1   |  26  |  0.74    Ca    8.4<L>      21 Dec 2023 06:28        Vital Signs Last 24 Hrs  T(C): 36.7 (21 Dec 2023 08:09), Max: 36.9 (20 Dec 2023 19:46)  T(F): 98.1 (21 Dec 2023 08:09), Max: 98.4 (20 Dec 2023 19:46)  HR: 57 (21 Dec 2023 08:09) (57 - 59)  BP: 154/50 (21 Dec 2023 08:09) (154/50 - 163/48)  BP(mean): --  RR: 19 (21 Dec 2023 08:09) (18 - 19)  SpO2: 95% (21 Dec 2023 08:09) (95% - 100%)    Parameters below as of 21 Dec 2023 08:09  Patient On (Oxygen Delivery Method): room air        MEDICATIONS  (STANDING):  aspirin enteric coated 81 milliGRAM(s) Oral daily  atorvastatin 80 milliGRAM(s) Oral at bedtime  Breztri Aerosphere: Budesonide 160 mcg, glycopyrrolate 9 mcg, and formoterol fumarate 4.8 mcg per actuation 2 Puff(s) 2 Inhalation Inhalation two times a day  cefTRIAXone Injectable. 1000 milliGRAM(s) IV Push every 24 hours  Dakins Solution - 1/4 Strength 1 Application(s) Topical daily  doxycycline monohydrate Capsule 100 milliGRAM(s) Oral every 12 hours  ferrous    sulfate 325 milliGRAM(s) Oral daily  influenza  Vaccine (HIGH DOSE) 0.7 milliLiter(s) IntraMuscular once  lactated ringers. 1000 milliLiter(s) (75 mL/Hr) IV Continuous <Continuous>  levothyroxine 100 MICROGram(s) Oral daily  losartan 50 milliGRAM(s) Oral daily  metoprolol succinate ER 25 milliGRAM(s) Oral daily  multivitamin 1 Tablet(s) Oral daily  sodium chloride 0.9%. 1000 milliLiter(s) (100 mL/Hr) IV Continuous <Continuous>    MEDICATIONS  (PRN):  acetaminophen     Tablet .. 650 milliGRAM(s) Oral every 6 hours PRN Temp greater or equal to 38C (100.4F), Mild Pain (1 - 3)  aluminum hydroxide/magnesium hydroxide/simethicone Suspension 30 milliLiter(s) Oral every 4 hours PRN Dyspepsia  fentaNYL    Injectable 50 MICROGram(s) IV Push every 5 minutes PRN Severe Pain (7 - 10)  fentaNYL    Injectable 25 MICROGram(s) IV Push every 5 minutes PRN Moderate Pain (4 - 6)  melatonin 3 milliGRAM(s) Oral at bedtime PRN Insomnia  ondansetron Injectable 4 milliGRAM(s) IV Push once PRN Nausea and/or Vomiting  ondansetron Injectable 4 milliGRAM(s) IV Push every 8 hours PRN Nausea and/or Vomiting  oxycodone    5 mG/acetaminophen 325 mG 1 Tablet(s) Oral once PRN Moderate Pain (4 - 6)              Physical Exam:   Constitutional: NAD, alert;  Lower Extremity Focus  Derm:  Skin warm, dry and supple bilateral.    Ulceration to the left medial heel, wound base is mostly granular, wound size is 3cm x 3cm, no periwound edema or erythema noted. no purulence or pus noted, no tracking/tunneling noted, no PTB, no malodor  Vascular: Dorsalis Pedis and Posterior Tibial pulses non palpable.  Capillary re-fill time more then 3 seconds digits 1-5 bilateral.    Neuro: Protective sensation diminished to the level of the digits bilateral.  MSK: Muscle strength 5/5 all major muscle groups bilateral. TTP to the wound site on the left medial mal

## 2023-12-21 NOTE — PROGRESS NOTE ADULT - ASSESSMENT
73 year old male with extensive vascular surgery hx, most recent being a right femoral cutdown with aortogram and B/L LE angiogram, SFA covered stent placed in SFA, left axillary to anterior tibial bypass with PTFE graft 11/1/23, patent, fem-fem graft occluded. He had an infected seroma at the right femoral site, +for MRSA. Pulses are palpable, LEs are well perfused.  He is POD#3 s/p R groin wound washout, Closure using gracilis muscle flap    Plan:  Continue abx per ID recs: PICC line for 6 weeks abx  Podiatry on board   Keep R leg elevated. No abduction of the R leg  Monitor drain output  Can resume Eliquis   Plastic surgery on board   If no bleeding while on AC and PICC line set up, and pt working with PT, he may be discharged from vascular surgery stand point  Rest of management as per primary team    Plan discussed with Dr. Sosa.

## 2023-12-21 NOTE — PROGRESS NOTE ADULT - ASSESSMENT
72 y/o male with h/o HTN, GERD,  severe PVD s/p multiple stents b/l legs, s/p L  fem/popliteal bypass, chronic left lower ankle ulceration was admitted on 12/9 for worsening right groin redness. Patient reports a recent vascular procedure with Dr. Sosa and a right inguinal vascular access was used. He was discharge to rehab where he was noted with increasing fever and redness of right groin. Patient also reports cough, sore throat and mild SOB. In ER he tested COVID-19 PCR detected. He received vancomycin IV and cefepime.     1. Right inguinal cellulitis s/p recent vascular surgery. 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 COVID-19 viral syndrome. ARF resolved. PVD s/p vascular bypass.   -low grade fever  -BC x 2 noted   -s/p ceftriaxone 1 gm IV qd and doxycycline 100 mg PO q12h # 3  -on ertapenem 1 gm IV qd # 2  -tolerating abx well so far; no side effects noted  -local wound care  -vascular surgery evaluation appreciated  -cultures reviewed  -will need 6 weeks of abx therapy for the date of surgery  -weekly labs   -f/u with vascular for wound care  -plan for PICC line when ready for discharge   -monitor temps  -f/u CBC  -supportive care  2. Other issues:   -care per medicine

## 2023-12-21 NOTE — PROGRESS NOTE ADULT - SUBJECTIVE AND OBJECTIVE BOX
Patient seen and examined on routine rounds.   He is POD#3 s/p R groin wound washout, Closure using gracilis muscle flap  He denies nausea, vomiting, fever or chills. Pain well controlled   Nurse report no acute event overnight   VS reviewed      Physical Exam:  General: AOx3, Well developed, NAD  HEENT: NC/AT, normal pinnae and tragi  Chest: Normal respiratory effort, equal chest rise, left chest wall palpable graft   Heart: RRR  Abdomen: Soft, NTND  Inguinal: R groin with c/d/i dressing..  Neuro/Psych: No localized deficits. Normal speech, normal tone, normal affect  Skin: Approximately 3x4 cm ulcer just superior to the left medial malleolus with some fibrinous tissue, no bleeding, no surround induration or erythema  Extremities: B/l LE with acewrap dressing, c/d/i. R leg Delfino with sanguinous output. Popliteal pulses 2+ b/l, DP and PT 2+ on right, 2+ DP, nonpalpable DP on left    Vital Signs Last 24 Hrs  T(C): 36.9 (20 Dec 2023 19:46), Max: 36.9 (20 Dec 2023 19:46)  T(F): 98.4 (20 Dec 2023 19:46), Max: 98.4 (20 Dec 2023 19:46)  HR: 59 (20 Dec 2023 19:46) (59 - 61)  BP: 163/48 (20 Dec 2023 15:58) (163/48 - 170/52)  BP(mean): 88 (20 Dec 2023 08:02) (88 - 88)  RR: 18 (20 Dec 2023 19:46) (18 - 18)  SpO2: 98% (20 Dec 2023 19:46) (98% - 100%)    Parameters below as of 20 Dec 2023 19:46  Patient On (Oxygen Delivery Method): room air    MEDICATIONS  (STANDING):  aspirin enteric coated 81 milliGRAM(s) Oral daily  atorvastatin 80 milliGRAM(s) Oral at bedtime  Breztri Aerosphere: Budesonide 160 mcg, glycopyrrolate 9 mcg, and formoterol fumarate 4.8 mcg per actuation 2 Puff(s) 2 Inhalation Inhalation two times a day  cefTRIAXone Injectable. 1000 milliGRAM(s) IV Push every 24 hours  Dakins Solution - 1/4 Strength 1 Application(s) Topical daily  doxycycline monohydrate Capsule 100 milliGRAM(s) Oral every 12 hours  ferrous    sulfate 325 milliGRAM(s) Oral daily  heparin  Infusion. 500 Unit(s)/Hr (5 mL/Hr) IV Continuous <Continuous>  influenza  Vaccine (HIGH DOSE) 0.7 milliLiter(s) IntraMuscular once  levothyroxine 100 MICROGram(s) Oral daily  losartan 50 milliGRAM(s) Oral daily  metoprolol succinate ER 25 milliGRAM(s) Oral daily  multivitamin 1 Tablet(s) Oral daily    MEDICATIONS  (PRN):  acetaminophen     Tablet .. 650 milliGRAM(s) Oral every 6 hours PRN Temp greater or equal to 38C (100.4F), Mild Pain (1 - 3)  aluminum hydroxide/magnesium hydroxide/simethicone Suspension 30 milliLiter(s) Oral every 4 hours PRN Dyspepsia  melatonin 3 milliGRAM(s) Oral at bedtime PRN Insomnia  ondansetron Injectable 4 milliGRAM(s) IV Push every 8 hours PRN Nausea and/or Vomiting

## 2023-12-22 ENCOUNTER — TRANSCRIPTION ENCOUNTER (OUTPATIENT)
Age: 73
End: 2023-12-22

## 2023-12-22 VITALS
DIASTOLIC BLOOD PRESSURE: 52 MMHG | HEART RATE: 69 BPM | SYSTOLIC BLOOD PRESSURE: 136 MMHG | RESPIRATION RATE: 18 BRPM | TEMPERATURE: 99 F | OXYGEN SATURATION: 100 %

## 2023-12-22 LAB
ANION GAP SERPL CALC-SCNC: 3 MMOL/L — LOW (ref 5–17)
ANION GAP SERPL CALC-SCNC: 3 MMOL/L — LOW (ref 5–17)
BUN SERPL-MCNC: 11 MG/DL — SIGNIFICANT CHANGE UP (ref 7–23)
BUN SERPL-MCNC: 11 MG/DL — SIGNIFICANT CHANGE UP (ref 7–23)
CALCIUM SERPL-MCNC: 8.3 MG/DL — LOW (ref 8.5–10.1)
CALCIUM SERPL-MCNC: 8.3 MG/DL — LOW (ref 8.5–10.1)
CHLORIDE SERPL-SCNC: 111 MMOL/L — HIGH (ref 96–108)
CHLORIDE SERPL-SCNC: 111 MMOL/L — HIGH (ref 96–108)
CO2 SERPL-SCNC: 27 MMOL/L — SIGNIFICANT CHANGE UP (ref 22–31)
CO2 SERPL-SCNC: 27 MMOL/L — SIGNIFICANT CHANGE UP (ref 22–31)
CREAT SERPL-MCNC: 0.81 MG/DL — SIGNIFICANT CHANGE UP (ref 0.5–1.3)
CREAT SERPL-MCNC: 0.81 MG/DL — SIGNIFICANT CHANGE UP (ref 0.5–1.3)
EGFR: 94 ML/MIN/1.73M2 — SIGNIFICANT CHANGE UP
EGFR: 94 ML/MIN/1.73M2 — SIGNIFICANT CHANGE UP
GLUCOSE SERPL-MCNC: 98 MG/DL — SIGNIFICANT CHANGE UP (ref 70–99)
GLUCOSE SERPL-MCNC: 98 MG/DL — SIGNIFICANT CHANGE UP (ref 70–99)
HCT VFR BLD CALC: 25.7 % — LOW (ref 39–50)
HCT VFR BLD CALC: 25.7 % — LOW (ref 39–50)
HGB BLD-MCNC: 8.3 G/DL — LOW (ref 13–17)
HGB BLD-MCNC: 8.3 G/DL — LOW (ref 13–17)
MCHC RBC-ENTMCNC: 29.6 PG — SIGNIFICANT CHANGE UP (ref 27–34)
MCHC RBC-ENTMCNC: 29.6 PG — SIGNIFICANT CHANGE UP (ref 27–34)
MCHC RBC-ENTMCNC: 32.3 GM/DL — SIGNIFICANT CHANGE UP (ref 32–36)
MCHC RBC-ENTMCNC: 32.3 GM/DL — SIGNIFICANT CHANGE UP (ref 32–36)
MCV RBC AUTO: 91.8 FL — SIGNIFICANT CHANGE UP (ref 80–100)
MCV RBC AUTO: 91.8 FL — SIGNIFICANT CHANGE UP (ref 80–100)
PLATELET # BLD AUTO: 301 K/UL — SIGNIFICANT CHANGE UP (ref 150–400)
PLATELET # BLD AUTO: 301 K/UL — SIGNIFICANT CHANGE UP (ref 150–400)
POTASSIUM SERPL-MCNC: 4 MMOL/L — SIGNIFICANT CHANGE UP (ref 3.5–5.3)
POTASSIUM SERPL-MCNC: 4 MMOL/L — SIGNIFICANT CHANGE UP (ref 3.5–5.3)
POTASSIUM SERPL-SCNC: 4 MMOL/L — SIGNIFICANT CHANGE UP (ref 3.5–5.3)
POTASSIUM SERPL-SCNC: 4 MMOL/L — SIGNIFICANT CHANGE UP (ref 3.5–5.3)
RBC # BLD: 2.8 M/UL — LOW (ref 4.2–5.8)
RBC # BLD: 2.8 M/UL — LOW (ref 4.2–5.8)
RBC # FLD: 15.1 % — HIGH (ref 10.3–14.5)
RBC # FLD: 15.1 % — HIGH (ref 10.3–14.5)
SODIUM SERPL-SCNC: 141 MMOL/L — SIGNIFICANT CHANGE UP (ref 135–145)
SODIUM SERPL-SCNC: 141 MMOL/L — SIGNIFICANT CHANGE UP (ref 135–145)
WBC # BLD: 6.2 K/UL — SIGNIFICANT CHANGE UP (ref 3.8–10.5)
WBC # BLD: 6.2 K/UL — SIGNIFICANT CHANGE UP (ref 3.8–10.5)
WBC # FLD AUTO: 6.2 K/UL — SIGNIFICANT CHANGE UP (ref 3.8–10.5)
WBC # FLD AUTO: 6.2 K/UL — SIGNIFICANT CHANGE UP (ref 3.8–10.5)

## 2023-12-22 PROCEDURE — 99239 HOSP IP/OBS DSCHRG MGMT >30: CPT

## 2023-12-22 PROCEDURE — 71045 X-RAY EXAM CHEST 1 VIEW: CPT | Mod: 26

## 2023-12-22 RX ORDER — ASPIRIN/CALCIUM CARB/MAGNESIUM 324 MG
1 TABLET ORAL
Qty: 0 | Refills: 0 | DISCHARGE
Start: 2023-12-22

## 2023-12-22 RX ORDER — POLYETHYLENE GLYCOL 3350 17 G/17G
17 POWDER, FOR SOLUTION ORAL DAILY
Refills: 0 | Status: DISCONTINUED | OUTPATIENT
Start: 2023-12-22 | End: 2023-12-22

## 2023-12-22 RX ORDER — METOPROLOL TARTRATE 50 MG
1 TABLET ORAL
Qty: 0 | Refills: 0 | DISCHARGE
Start: 2023-12-22

## 2023-12-22 RX ORDER — SENNA PLUS 8.6 MG/1
2 TABLET ORAL AT BEDTIME
Refills: 0 | Status: DISCONTINUED | OUTPATIENT
Start: 2023-12-22 | End: 2023-12-22

## 2023-12-22 RX ORDER — LANOLIN ALCOHOL/MO/W.PET/CERES
1 CREAM (GRAM) TOPICAL
Refills: 0 | DISCHARGE
Start: 2023-12-22

## 2023-12-22 RX ORDER — POLYETHYLENE GLYCOL 3350 17 G/17G
17 POWDER, FOR SOLUTION ORAL
Qty: 0 | Refills: 0 | DISCHARGE
Start: 2023-12-22

## 2023-12-22 RX ORDER — LOSARTAN POTASSIUM 100 MG/1
1 TABLET, FILM COATED ORAL
Qty: 0 | Refills: 0 | DISCHARGE
Start: 2023-12-22

## 2023-12-22 RX ORDER — LOSARTAN POTASSIUM 100 MG/1
25 TABLET, FILM COATED ORAL ONCE
Refills: 0 | Status: COMPLETED | OUTPATIENT
Start: 2023-12-22 | End: 2023-12-22

## 2023-12-22 RX ORDER — CEFTRIAXONE 500 MG/1
1000 INJECTION, POWDER, FOR SOLUTION INTRAMUSCULAR; INTRAVENOUS EVERY 24 HOURS
Refills: 0 | Status: DISCONTINUED | OUTPATIENT
Start: 2023-12-22 | End: 2023-12-22

## 2023-12-22 RX ORDER — SENNA PLUS 8.6 MG/1
2 TABLET ORAL
Qty: 0 | Refills: 0 | DISCHARGE
Start: 2023-12-22

## 2023-12-22 RX ORDER — APIXABAN 2.5 MG/1
1 TABLET, FILM COATED ORAL
Qty: 0 | Refills: 0 | DISCHARGE
Start: 2023-12-22

## 2023-12-22 RX ADMIN — LOSARTAN POTASSIUM 25 MILLIGRAM(S): 100 TABLET, FILM COATED ORAL at 17:18

## 2023-12-22 RX ADMIN — ATORVASTATIN CALCIUM 80 MILLIGRAM(S): 80 TABLET, FILM COATED ORAL at 21:10

## 2023-12-22 RX ADMIN — LOSARTAN POTASSIUM 50 MILLIGRAM(S): 100 TABLET, FILM COATED ORAL at 10:36

## 2023-12-22 RX ADMIN — POLYETHYLENE GLYCOL 3350 17 GRAM(S): 17 POWDER, FOR SOLUTION ORAL at 11:48

## 2023-12-22 RX ADMIN — Medication 81 MILLIGRAM(S): at 10:35

## 2023-12-22 RX ADMIN — CEFTRIAXONE 1000 MILLIGRAM(S): 500 INJECTION, POWDER, FOR SOLUTION INTRAMUSCULAR; INTRAVENOUS at 10:50

## 2023-12-22 RX ADMIN — Medication 100 MICROGRAM(S): at 05:04

## 2023-12-22 RX ADMIN — Medication 100 MILLIGRAM(S): at 21:10

## 2023-12-22 RX ADMIN — Medication 100 MILLIGRAM(S): at 10:36

## 2023-12-22 RX ADMIN — APIXABAN 5 MILLIGRAM(S): 2.5 TABLET, FILM COATED ORAL at 21:10

## 2023-12-22 RX ADMIN — Medication 1 TABLET(S): at 10:36

## 2023-12-22 RX ADMIN — APIXABAN 5 MILLIGRAM(S): 2.5 TABLET, FILM COATED ORAL at 10:34

## 2023-12-22 RX ADMIN — Medication 25 MILLIGRAM(S): at 10:36

## 2023-12-22 RX ADMIN — SENNA PLUS 2 TABLET(S): 8.6 TABLET ORAL at 21:10

## 2023-12-22 RX ADMIN — Medication 325 MILLIGRAM(S): at 10:50

## 2023-12-22 NOTE — DISCHARGE NOTE PROVIDER - PROVIDER TOKENS
PROVIDER:[TOKEN:[[id number]]],PROVIDER:[TOKEN:[[id number]]],PROVIDER:[TOKEN:[[id number]]],PROVIDER:[TOKEN:[[id number]]] PROVIDER:[TOKEN:[[id number]]],PROVIDER:[TOKEN:[[id number]]],PROVIDER:[TOKEN:[[id number]]],PROVIDER:[TOKEN:[[id number]]]

## 2023-12-22 NOTE — PROGRESS NOTE ADULT - ASSESSMENT
73 year old male with extensive vascular surgery hx, most recent being a right femoral cutdown with aortogram and B/L LE angiogram, SFA covered stent placed in SFA, left axillary to anterior tibial bypass with PTFE graft 11/1/23, patent, fem-fem graft occluded. He had an infected seroma at the right femoral site, +for MRSA. Pulses are palpable, LEs are well perfused.  He is POD#4 s/p R groin wound washout, Closure using gracilis muscle flap    Plan:  Continue abx per ID recs: PICC line for 6 weeks abx  Podiatry on board   Keep R leg elevated. No abduction of the R leg  Monitor drain output  Cont Eliquis   Plastic surgery on board   If no bleeding while on AC and PICC line set up, and pt working with PT, he may be discharged from vascular surgery stand point  Rest of management as per primary team    Plan discussed with Dr. Sosa.  73 year old male with extensive vascular surgery hx, most recent being a right femoral cutdown with aortogram and B/L LE angiogram, SFA covered stent placed in SFA, left axillary to anterior tibial bypass with PTFE graft 11/1/23, patent, fem-fem graft occluded. He had an infected seroma at the right femoral site, +for MRSA. Pulses are palpable, LEs are well perfused.  He is POD#4 s/p R groin wound washout, Closure using gracilis muscle flap    Plan:  Continue abx per ID recs: PICC line for 6 weeks abx  Podiatry on board   Keep R leg elevated. No abduction of the R leg  Monitor drain output  Cont [facility]   Plastic surgery on board: DC drain, pt can be discharged. Follow up in wound care January 9th   If no bleeding while on AC and PICC line set up, and pt working with PT, he may be discharged from vascular surgery stand point  Rest of management as per primary team    Plan discussed with Dr. Sosa.  73 year old male with extensive vascular surgery hx, most recent being a right femoral cutdown with aortogram and B/L LE angiogram, SFA covered stent placed in SFA, left axillary to anterior tibial bypass with PTFE graft 11/1/23, patent, fem-fem graft occluded. He had an infected seroma at the right femoral site, +for MRSA. Pulses are palpable, LEs are well perfused.  He is POD#4 s/p R groin wound washout, Closure using gracilis muscle flap    Plan:  Continue abx per ID recs: PICC line for 6 weeks abx  Podiatry on board   Keep R leg elevated. No abduction of the R leg  Monitor drain output  Cont [facility]   Plastic surgery on board: DC drain, pt can be discharged. Follow up in wound care January 9th   If no bleeding while on AC and PICC line set up, and pt working with PT, he may be discharged from vascular surgery stand point  Rest of management as per primary team    Plan discussed with Dr. Sosa.

## 2023-12-22 NOTE — PROGRESS NOTE ADULT - SUBJECTIVE AND OBJECTIVE BOX
Date of Service: 12/22/23  72 y/o male with PMHX of HTN, GERD,  severe PVD s/p multiple stents b/l legs, s/p L  fem/popliteal bypass, chronic left lower ankle ulceration present to ED with worsening right groin redness. Patient reports recent procedure with Dr. Sosa. Was discharge to rehab where his PCP noted increasing fever and redness of right groin. Patient also reports some mild cough and sore throat. No chest pain or sob.   In ED patient found to have right groin cellulitis and + Covid.  (09 Dec 2023 05:36)  Podiatry was consulted for left foot ulcer. Pt resting comfortably bedside today. Pt tender to wound site.     12/22/23: Podiatry following for Lt foot ulcer w attending presnet. Pt sitting on chair, watching TV, NAD. No additional pedal complaints.          REVIEW OF SYSTEMS:  CONSTITUTIONAL: No weakness, fevers or chills  EYES/ENT: No visual changes;  No vertigo or throat pain   NECK: No pain or stiffness  RESPIRATORY: No cough, wheezing, hemoptysis; No shortness of breath  CARDIOVASCULAR: No chest pain or palpitations  GASTROINTESTINAL: No abdominal or epigastric pain. No nausea, vomiting, or hematemesis; No diarrhea or constipation. No melena or hematochezia.  GENITOURINARY: No dysuria, frequency or hematuria  NEUROLOGICAL: No numbness or weakness  SKIN: See physical examination.  All other review of systems is negative unless indicated above    PMH: Essential hypertension    PVD (peripheral vascular disease)    Gastroesophageal reflux disease, esophagitis presence not specified    Hypothyroidism    Chronic obstructive pulmonary disease, unspecified COPD type    Eczema    Hyperlipidemia, unspecified hyperlipidemia type    Medial meniscus tear      PSH:Femoral popliteal artery thrombus    PVD (peripheral vascular disease)        Allergies:Betadine (Hives; Rash)  IV Contrast (Hives)    Labs                        8.3    6.20  )-----------( 301      ( 22 Dec 2023 07:09 )             25.7     12-22    141  |  111<H>  |  11  ----------------------------<  98  4.0   |  27  |  0.81    Ca    8.3<L>      22 Dec 2023 07:09                               Vital Signs Last 24 Hrs  T(C): 36.6 (22 Dec 2023 15:47), Max: 36.8 (21 Dec 2023 21:01)  T(F): 97.9 (22 Dec 2023 15:47), Max: 98.2 (21 Dec 2023 21:01)  HR: 59 (22 Dec 2023 15:47) (58 - 66)  BP: 176/82 (22 Dec 2023 15:47) (100/76 - 176/82)  BP(mean): --  RR: 18 (22 Dec 2023 15:47) (18 - 18)  SpO2: 100% (22 Dec 2023 15:47) (99% - 100%)    Parameters below as of 22 Dec 2023 15:47  Patient On (Oxygen Delivery Method): room air          MEDICATIONS  (STANDING):  apixaban 5 milliGRAM(s) Oral every 12 hours  aspirin enteric coated 81 milliGRAM(s) Oral daily  atorvastatin 80 milliGRAM(s) Oral at bedtime  Breztri Aerosphere: Budesonide 160 mcg, glycopyrrolate 9 mcg, and formoterol fumarate 4.8 mcg per actuation 2 Puff(s) 2 Inhalation Inhalation two times a day  cefTRIAXone Injectable. 1000 milliGRAM(s) IV Push every 24 hours  Dakins Solution - 1/4 Strength 1 Application(s) Topical daily  doxycycline monohydrate Capsule 100 milliGRAM(s) Oral every 12 hours  ferrous    sulfate 325 milliGRAM(s) Oral daily  influenza  Vaccine (HIGH DOSE) 0.7 milliLiter(s) IntraMuscular once  levothyroxine 100 MICROGram(s) Oral daily  losartan 50 milliGRAM(s) Oral daily  metoprolol succinate ER 25 milliGRAM(s) Oral daily  multivitamin 1 Tablet(s) Oral daily  polyethylene glycol 3350 17 Gram(s) Oral daily  senna 2 Tablet(s) Oral at bedtime    MEDICATIONS  (PRN):  acetaminophen     Tablet .. 650 milliGRAM(s) Oral every 6 hours PRN Temp greater or equal to 38C (100.4F), Mild Pain (1 - 3)  aluminum hydroxide/magnesium hydroxide/simethicone Suspension 30 milliLiter(s) Oral every 4 hours PRN Dyspepsia  melatonin 3 milliGRAM(s) Oral at bedtime PRN Insomnia  ondansetron Injectable 4 milliGRAM(s) IV Push every 8 hours PRN Nausea and/or Vomiting            Physical Exam:   Constitutional: NAD, alert;  Lower Extremity Focus  Derm:  Skin warm, dry and supple bilateral.    Ulceration to the left medial heel, wound base is mostly granular, wound size is 3cm x 3cm, no periwound edema or erythema noted. no purulence or pus noted, no tracking/tunneling noted, no PTB, no malodor  Vascular: Dorsalis Pedis and Posterior Tibial pulses non palpable.  Capillary re-fill time more then 3 seconds digits 1-5 bilateral.    Neuro: Protective sensation diminished to the level of the digits bilateral.  MSK: Muscle strength 5/5 all major muscle groups bilateral. TTP to the wound site on the left medial mal            Date of Service: 12/22/23  74 y/o male with PMHX of HTN, GERD,  severe PVD s/p multiple stents b/l legs, s/p L  fem/popliteal bypass, chronic left lower ankle ulceration present to ED with worsening right groin redness. Patient reports recent procedure with Dr. Sosa. Was discharge to rehab where his PCP noted increasing fever and redness of right groin. Patient also reports some mild cough and sore throat. No chest pain or sob.   In ED patient found to have right groin cellulitis and + Covid.  (09 Dec 2023 05:36)  Podiatry was consulted for left foot ulcer. Pt resting comfortably bedside today. Pt tender to wound site.     12/22/23: Podiatry following for Lt foot ulcer w attending presnet. Pt sitting on chair, watching TV, NAD. No additional pedal complaints.          REVIEW OF SYSTEMS:  CONSTITUTIONAL: No weakness, fevers or chills  EYES/ENT: No visual changes;  No vertigo or throat pain   NECK: No pain or stiffness  RESPIRATORY: No cough, wheezing, hemoptysis; No shortness of breath  CARDIOVASCULAR: No chest pain or palpitations  GASTROINTESTINAL: No abdominal or epigastric pain. No nausea, vomiting, or hematemesis; No diarrhea or constipation. No melena or hematochezia.  GENITOURINARY: No dysuria, frequency or hematuria  NEUROLOGICAL: No numbness or weakness  SKIN: See physical examination.  All other review of systems is negative unless indicated above    PMH: Essential hypertension    PVD (peripheral vascular disease)    Gastroesophageal reflux disease, esophagitis presence not specified    Hypothyroidism    Chronic obstructive pulmonary disease, unspecified COPD type    Eczema    Hyperlipidemia, unspecified hyperlipidemia type    Medial meniscus tear      PSH:Femoral popliteal artery thrombus    PVD (peripheral vascular disease)        Allergies:Betadine (Hives; Rash)  IV Contrast (Hives)    Labs                        8.3    6.20  )-----------( 301      ( 22 Dec 2023 07:09 )             25.7     12-22    141  |  111<H>  |  11  ----------------------------<  98  4.0   |  27  |  0.81    Ca    8.3<L>      22 Dec 2023 07:09                               Vital Signs Last 24 Hrs  T(C): 36.6 (22 Dec 2023 15:47), Max: 36.8 (21 Dec 2023 21:01)  T(F): 97.9 (22 Dec 2023 15:47), Max: 98.2 (21 Dec 2023 21:01)  HR: 59 (22 Dec 2023 15:47) (58 - 66)  BP: 176/82 (22 Dec 2023 15:47) (100/76 - 176/82)  BP(mean): --  RR: 18 (22 Dec 2023 15:47) (18 - 18)  SpO2: 100% (22 Dec 2023 15:47) (99% - 100%)    Parameters below as of 22 Dec 2023 15:47  Patient On (Oxygen Delivery Method): room air          MEDICATIONS  (STANDING):  apixaban 5 milliGRAM(s) Oral every 12 hours  aspirin enteric coated 81 milliGRAM(s) Oral daily  atorvastatin 80 milliGRAM(s) Oral at bedtime  Breztri Aerosphere: Budesonide 160 mcg, glycopyrrolate 9 mcg, and formoterol fumarate 4.8 mcg per actuation 2 Puff(s) 2 Inhalation Inhalation two times a day  cefTRIAXone Injectable. 1000 milliGRAM(s) IV Push every 24 hours  Dakins Solution - 1/4 Strength 1 Application(s) Topical daily  doxycycline monohydrate Capsule 100 milliGRAM(s) Oral every 12 hours  ferrous    sulfate 325 milliGRAM(s) Oral daily  influenza  Vaccine (HIGH DOSE) 0.7 milliLiter(s) IntraMuscular once  levothyroxine 100 MICROGram(s) Oral daily  losartan 50 milliGRAM(s) Oral daily  metoprolol succinate ER 25 milliGRAM(s) Oral daily  multivitamin 1 Tablet(s) Oral daily  polyethylene glycol 3350 17 Gram(s) Oral daily  senna 2 Tablet(s) Oral at bedtime    MEDICATIONS  (PRN):  acetaminophen     Tablet .. 650 milliGRAM(s) Oral every 6 hours PRN Temp greater or equal to 38C (100.4F), Mild Pain (1 - 3)  aluminum hydroxide/magnesium hydroxide/simethicone Suspension 30 milliLiter(s) Oral every 4 hours PRN Dyspepsia  melatonin 3 milliGRAM(s) Oral at bedtime PRN Insomnia  ondansetron Injectable 4 milliGRAM(s) IV Push every 8 hours PRN Nausea and/or Vomiting            Physical Exam:   Constitutional: NAD, alert;  Lower Extremity Focus  Derm:  Skin warm, dry and supple bilateral.    Ulceration to the left medial heel, wound base is mostly granular, wound size is 3cm x 3cm, no periwound edema or erythema noted. no purulence or pus noted, no tracking/tunneling noted, no PTB, no malodor  Vascular: Dorsalis Pedis and Posterior Tibial pulses non palpable.  Capillary re-fill time more then 3 seconds digits 1-5 bilateral.    Neuro: Protective sensation diminished to the level of the digits bilateral.  MSK: Muscle strength 5/5 all major muscle groups bilateral. TTP to the wound site on the left medial mal

## 2023-12-22 NOTE — DISCHARGE NOTE PROVIDER - NSDCCPCAREPLAN_GEN_ALL_CORE_FT
PRINCIPAL DISCHARGE DIAGNOSIS  Diagnosis: Cellulitis  Assessment and Plan of Treatment: complete antibiotic regimen as prescribed. Complete the full course of antibiotics- do not skip or miss doses- do not stop even when you start to feel better.   Notify your PCP if your symptom has worsened or if you develop excessive diarrhea while on antibiotic.   you are discahrged on IV antibiotic for 6 weeks.   you will need weekly blood work whil brayden antibiotics.   PICC care and maintenance  - Keep R leg elevated. No abduction of the R leg  - f/u with Vascular and plastic surgeon   ilya medicaitons as per your discharge medication paperowrk

## 2023-12-22 NOTE — PROGRESS NOTE ADULT - SUBJECTIVE AND OBJECTIVE BOX
Patient seen and examined on routine rounds.   He is POD#4 s/p R groin wound washout, Closure using gracilis muscle flap  He denies nausea, vomiting, fever or chills. Pain well controlled   Nurse report no acute event overnight   VS reviewed      Physical Exam:  General: AOx3, Well developed, NAD  HEENT: NC/AT, normal pinnae and tragi  Chest: Normal respiratory effort, equal chest rise, left chest wall palpable graft   Heart: RRR  Abdomen: Soft, NTND  Inguinal: R groin with c/d/i dressing..  Neuro/Psych: No localized deficits. Normal speech, normal tone, normal affect  Skin: Approximately 3x4 cm ulcer just superior to the left medial malleolus with some fibrinous tissue, no bleeding, no surround induration or erythema  Extremities: B/l LE with acewrap dressing, c/d/i. R leg Delfino with sanguinous output. Popliteal pulses 2+ b/l, DP and PT 2+ on right, 2+ DP, nonpalpable DP on left      Vital Signs Last 24 Hrs  T(C): 36.8 (21 Dec 2023 21:01), Max: 36.8 (21 Dec 2023 21:01)  T(F): 98.2 (21 Dec 2023 21:01), Max: 98.2 (21 Dec 2023 21:01)  HR: 66 (21 Dec 2023 21:01) (57 - 67)  BP: 100/76 (21 Dec 2023 21:01) (94/76 - 165/45)  BP(mean): --  RR: 18 (21 Dec 2023 21:01) (18 - 19)  SpO2: 99% (21 Dec 2023 21:01) (95% - 100%)    Parameters below as of 21 Dec 2023 21:01  Patient On (Oxygen Delivery Method): room air      MEDICATIONS  (STANDING):  apixaban 5 milliGRAM(s) Oral every 12 hours  aspirin enteric coated 81 milliGRAM(s) Oral daily  atorvastatin 80 milliGRAM(s) Oral at bedtime  Breztri Aerosphere: Budesonide 160 mcg, glycopyrrolate 9 mcg, and formoterol fumarate 4.8 mcg per actuation 2 Puff(s) 2 Inhalation Inhalation two times a day  Dakins Solution - 1/4 Strength 1 Application(s) Topical daily  doxycycline monohydrate Capsule 100 milliGRAM(s) Oral every 12 hours  ferrous    sulfate 325 milliGRAM(s) Oral daily  influenza  Vaccine (HIGH DOSE) 0.7 milliLiter(s) IntraMuscular once  levothyroxine 100 MICROGram(s) Oral daily  losartan 50 milliGRAM(s) Oral daily  metoprolol succinate ER 25 milliGRAM(s) Oral daily  multivitamin 1 Tablet(s) Oral daily    MEDICATIONS  (PRN):  acetaminophen     Tablet .. 650 milliGRAM(s) Oral every 6 hours PRN Temp greater or equal to 38C (100.4F), Mild Pain (1 - 3)  aluminum hydroxide/magnesium hydroxide/simethicone Suspension 30 milliLiter(s) Oral every 4 hours PRN Dyspepsia  melatonin 3 milliGRAM(s) Oral at bedtime PRN Insomnia  ondansetron Injectable 4 milliGRAM(s) IV Push every 8 hours PRN Nausea and/or Vomiting

## 2023-12-22 NOTE — DISCHARGE NOTE PROVIDER - NSDCFUADDINST_GEN_ALL_CORE_FT
Wound care instructions for right leg:  Please shower daily with soap and water  Please keep leg elevated  Please do not abduct the right leg

## 2023-12-22 NOTE — DISCHARGE NOTE PROVIDER - NSDCCAREPROVSEEN_GEN_ALL_CORE_FT
Colleen, Ron Sanz, Mckenzie Joseph, Cade Molina, Gopi Zaidi, Meenakshi Stevens, Juancho Reza, Lemuel Knight, Ashanti Patel, Kay Zapata, Nakul Perkins, Alexy Nath, Aureliano Moser, Greg Sosa, Kelsey

## 2023-12-22 NOTE — DISCHARGE NOTE PROVIDER - CARE PROVIDER_API CALL
Alexy Perkins  Plastic Surgery  [address] [place] [address]  Phone: [phone]  Fax: [phone]  Follow Up Time:     Kelsey Sosa  Vascular Surgery  [address] [facility], [address]  [place] [address]  Phone: [phone]  Fax: [phone]  Follow Up Time:     Cade Joseph  Foot and Ankle Surgery  [address] [facility], [address]  [place] [address]  Phone: [phone]  Fax: [phone]  Follow Up Time:     Alvin Griffith  Internal Medicine  [address] [place] [address], [address]  [place] [address]  Phone: [phone]  Fax: [phone]  Follow Up Time:    Alexy Perkins  Plastic Surgery  [address] [place] [address]  Phone: [phone]  Fax: [phone]  Follow Up Time:     Kelsey Sosa  Vascular Surgery  [address] [facility], [address]  [place] [address]  Phone: [phone]  Fax: [phone]  Follow Up Time:     Cade Joseph  Foot and Ankle Surgery  [address] [facility], [address]  [place] [address]  Phone: [phone]  Fax: [phone]  Follow Up Time:     Alvin Griffith  Internal Medicine  [address] [place] [address], [address]  [place] [address]  Phone: [phone]  Fax: [phone]  Follow Up Time:

## 2023-12-22 NOTE — DISCHARGE NOTE PROVIDER - NSDCMRMEDTOKEN_GEN_ALL_CORE_FT
apixaban 5 mg oral tablet: 1 tab(s) orally every 12 hours  aspirin 81 mg oral delayed release tablet: 1 tab(s) orally once a day  Breztri Aerosphere 160 mcg-9 mcg-4.8 mcg/inh inhalation aerosol: 2 puff(s) inhaled 2 times a day  docusate sodium 100 mg oral capsule: 2 cap(s) orally once a day (at bedtime)  ferrous sulfate 325 mg (65 mg elemental iron) oral tablet: 1 tab(s) orally once a day  Invanz 1 g injection: 1 gram(s) intravenously once a day for 6 weeks  levothyroxine 100 mcg (0.1 mg) oral tablet: 1 tab(s) orally once a day  losartan 50 mg oral tablet: 1 tab(s) orally once a day  melatonin 3 mg oral tablet: 1 tab(s) orally once a day (at bedtime) As needed Insomnia  metoprolol succinate 25 mg oral tablet, extended release: 1 tab(s) orally once a day  Multiple Vitamins oral tablet: 1 tab(s) orally once a day  polyethylene glycol 3350 oral powder for reconstitution: 17 gram(s) orally once a day  rosuvastatin 20 mg oral tablet: 1 tab(s) orally once a day (at bedtime)  senna leaf extract oral tablet: 2 tab(s) orally once a day (at bedtime)  torsemide 20 mg oral tablet: 3 tab(s) orally once a day  Tylenol 325 mg oral tablet: 2 tab(s) orally every 6 hours as needed for  Vitamin D3 1000 intl units oral capsule: 1 cap(s) orally once a day

## 2023-12-22 NOTE — PROGRESS NOTE ADULT - ASSESSMENT
Assessment: 73y old male seen for the following:   - Full thickness wound to the left foot, chronic   - Pain to left foot  - Difficulty ambulation    Plan:   Chart reviewed and Patient evaluated;  Discussed diagnosis and treatment with patient. Discussed importance of daily foot examinations, proper shoe gear, importance of tight glycemic control.   X-rays reviewed : on wet read showing no soft tissue gas, no acute bony findings, mild edema noted to the left ankle posteriorly  There is a full thickness wound to the left medial mal, etiology of wound most likely due to arterial problem, wound looks chronic and stable without acute SOI  WC: Santyl and DSD  WBAT using surgical shoe  Offloading to bilateral heels while bedbound.   Patient to follow up at [facility] 1 week after discharge w/ Dr Cade Joseph.  Continue with antibiotics as per ID  All additional care per Med appreciated  Patient demonstrated verbal understanding of all interventions and tolerated interventions well without any complications.     Case D/W attending Dr. Joseph    Wound care instructions for Left Lower Extremity to be changed every other day:  - Gently remove dressings.  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 Discussed importance of daily foot examinations, proper shoe gear, importance of tight glycemic control.   X-rays reviewed : on wet read showing no soft tissue gas, no acute bony findings, mild edema noted to the left ankle posteriorly  There is a full thickness wound to the left medial mal, etiology of wound most likely due to arterial problem, wound looks chronic and stable without acute SOI  WC: Santyl and DSD  WBAT using surgical shoe  Offloading to bilateral heels while bedbound.   Patient to follow up at [facility] 1 week after discharge w/ Dr Cade Joseph.  Continue with antibiotics as per ID  All additional care per Med appreciated  Patient demonstrated verbal understanding of all interventions and tolerated interventions well without any complications.     Case D/W attending Dr. Joseph    Wound care instructions for Left Lower Extremity to be changed every other day:  - Gently remove dressings.  - Clean the wound with saline and dry it thoroughly with dry gauze  - Apply santyl, gauze, kerlix and secure it with tape

## 2023-12-22 NOTE — DISCHARGE NOTE NURSING/CASE MANAGEMENT/SOCIAL WORK - NSDCPEFALRISK_GEN_ALL_CORE
For information on Fall & Injury Prevention, visit: [email or web address] OR  [email or web address] OR  https://www.cdc.gov/steadi/patient.html For information on Fall & Injury Prevention, visit: [email or web address]/[email or web address] OR  [email or web address].[facility]/[email or web address]-[email or web address]-[email or web address]-injury OR  https://www.cdc.gov/steadi/patient.html

## 2023-12-22 NOTE — DISCHARGE NOTE PROVIDER - ATTENDING DISCHARGE PHYSICAL EXAMINATION:
Patient seen and examined with IRLANDA Boone.  I was physically present for the key portions of the evaluation and management (E/M) service provided.  I agree with the above history, physical, and plan which I have reviewed and edited where appropriate.  - plan for PICC to be placed and invanz for total of 6 weeks as per ID  - outpt f/u with vascular dr bender  - to see dr gonzalez on 1/9 in wound care center.  patient aware

## 2023-12-22 NOTE — DISCHARGE NOTE PROVIDER - NPI NUMBER (FOR SYSADMIN USE ONLY) :
[[id number]],[[id number]],[[id number]],[[id number]] [[id number]],[[id number]],[[id number]],[[id number]]

## 2023-12-22 NOTE — DISCHARGE NOTE NURSING/CASE MANAGEMENT/SOCIAL WORK - NSDCPEELIQUISREACT_GEN_ALL_CORE
Detail Level: Simple Price (Do Not Change): 0.00 Instructions: This plan will send the code FBSE to the PM system.  DO NOT or CHANGE the price. Apixaban/Eliquis increases your risk for bleeding. Notify your doctor if you experience any of the following side effects: bleeding, coughing or vomiting blood, red or black stool, unexpected pain or swelling, itching or hives, chest pain, chest tightness, trouble breathing, changes in how much or how often you urinate, red or pink urine, numbness or tingling in your feet, or unusual muscle weakness. When Apixaban/Eliquis is taken with other medicines, they can affect how it works. Taking other medications such as aspirin, blood thinners, nonsteroidal anti-inflammatories, and medications that treat depression can increase your risk of bleeding. It is very important to tell your health care provider about all of the other medicines, including over-the-counter medications, herbs, and vitamins you are taking. DO NOT start, stop, or change the dosage of any medicine, including over-the-counter medicines, vitamins, and herbal products without your doctor’s approval. Any products containing aspirin or are nonsteroidal anti-inflammatories lessen the blood’s ability to form clots and add to the effect of Apixaban/Eliquis. Never take aspirin or medicines that contain aspirin without speaking to your doctor.

## 2023-12-22 NOTE — DISCHARGE NOTE NURSING/CASE MANAGEMENT/SOCIAL WORK - PATIENT PORTAL LINK FT
You can access the FollowMyHealth Patient Portal offered by [facility] by registering at the following website: [email or web address]. By joining [employer]’s FollowMyHealth portal, you will also be able to view your health information using other applications (apps) compatible with our system. You can access the FollowMyHealth Patient Portal offered by [facility] by registering at the following website: [email or web address]. By joining [employer]’s FollowMyHealth portal, you will also be able to view your health information using other applications (apps) compatible with our system.

## 2023-12-22 NOTE — DISCHARGE NOTE PROVIDER - HOSPITAL COURSE
72 y/o man with PMHX of HTN, GERD,  severe PVD s/p multiple stents b/l legs, s/p L  fem/popliteal bypass, chronic left lower ankle ulceration present to ED with worsening right groin redness. Patient reports recent right bypass procedure with Dr. Sosa.  Was discharge to rehab where his PCP noted increasing fever and redness of right groin. Patient also reports some mild cough and sore throat. Patient was admitted with right groin cellulitis  and was started on IV antibiotics with ID consult. S/P  R groin wound washout, Closure using gracilis muscle flap- being followed by [facility]ascular [facility] Plastics.   Plan for dc on long term IV antibiotic via PICC line.     12/22- patient was seen and examined. VSS- denies pain, fever or chills.     Vital Signs Last 24 Hrs  T(C): 36.6 (22 Dec 2023 09:29), Max: 36.8 (21 Dec 2023 21:01)  T(F): 97.9 (22 Dec 2023 09:29), Max: 98.2 (21 Dec 2023 21:01)  HR: 58 (22 Dec 2023 09:29) (58 - 66)  BP: 168/58 (22 Dec 2023 09:29) (100/76 - 168/58)  BP(mean): --  RR: 18 (22 Dec 2023 09:29) (18 - 18)  SpO2: 100% (22 Dec 2023 09:29) (99% - 100%)    Parameters below as of 22 Dec 2023 09:29  Patient On (Oxygen Delivery Method): room air    ROS:   All 10 systems reviewed and found to be negative with the exception of what has been described above.    PE:  Constitutional: NAD, laying in bed  HEENT: NC/AT  Back: no tenderness  Respiratory: respirations even and non labored, LCTA  Cardiovascular: S1S2 regular, no murmurs  Abdomen: soft, not tender, not distended, positive BS  Genitourinary: voiding  Musculoskeletal: no muscle tenderness, no joint swelling or tenderness  Extremities: no pedal edema   Neurological: no focal deficits    meds/Labs- reviewed      PLAN   Right Groin Surgical Site Cellulitis  - ID consult   - cont doxycycline/ceftriaxone- transition to Invanz   - will need long term IV antibiotic x6 weeks - due to concern of possible vascular graft involvement  - Plan for PICC line on dc   - blood culture Culture - Abscess with Gram Stain (12.10.23 @ 17:45)   No polymorphonuclear cells seen per low power field   No organisms seen per oil power field  Specimen Source: Abscess Hip - Right  Moderate Staphylococcus aureus  - vascular following   - POD#4  s/p R groin wound washout, Closure using gracilis muscle flap  - BEBETO dcd  - eluqis resumes   Keep R leg elevated. No abduction of the R leg  Plastic surgery on board       leukocytosis - resolved     JENIFER on CKD  Hypokalemia -  resolved  - trend bmp     COVID +  - Not hypoxic and currently asymptomatic  - Monitor    Left Ankle chronic ulcer  - Wound care eval  - podiatry consulted     PAD  - Stable  - 12/21  Vascular resident- ok to restart apixaban   - Continue Aspirin  - Continue Atorvastatin    HTN  - Stable  - Bystolic switched to Coreg  - Monitor    Hypothyroid  - Continue levothyroxine    DVT prophylaxis  Case d/w team on IDR.     74 y/o man with PMHX of HTN, GERD,  severe PVD s/p multiple stents b/l legs, s/p L  fem/popliteal bypass, chronic left lower ankle ulceration present to ED with worsening right groin redness. Patient reports recent right bypass procedure with Dr. Sosa.  Was discharge to rehab where his PCP noted increasing fever and redness of right groin. Patient also reports some mild cough and sore throat. Patient was admitted with right groin cellulitis  and was started on IV antibiotics with ID consult. S/P  R groin wound washout, Closure using gracilis muscle flap- being followed by [facility] Pl[facility].   Plan for dc on long term IV antibiotic via PICC line.     12/22- patient was seen and examined. VSS- denies pain, fever or chills.     Vital Signs Last 24 Hrs  T(C): 36.6 (22 Dec 2023 09:29), Max: 36.8 (21 Dec 2023 21:01)  T(F): 97.9 (22 Dec 2023 09:29), Max: 98.2 (21 Dec 2023 21:01)  HR: 58 (22 Dec 2023 09:29) (58 - 66)  BP: 168/58 (22 Dec 2023 09:29) (100/76 - 168/58)  BP(mean): --  RR: 18 (22 Dec 2023 09:29) (18 - 18)  SpO2: 100% (22 Dec 2023 09:29) (99% - 100%)    Parameters below as of 22 Dec 2023 09:29  Patient On (Oxygen Delivery Method): room air    ROS:   All 10 systems reviewed and found to be negative with the exception of what has been described above.    PE:  Constitutional: NAD, laying in bed  HEENT: NC/AT  Back: no tenderness  Respiratory: respirations even and non labored, LCTA  Cardiovascular: S1S2 regular, no murmurs  Abdomen: soft, not tender, not distended, positive BS  Genitourinary: voiding  Musculoskeletal: no muscle tenderness, no joint swelling or tenderness  Extremities: no pedal edema   Neurological: no focal deficits    meds/Labs- reviewed      PLAN   Right Groin Surgical Site Cellulitis  - ID consult   - cont doxycycline/ceftriaxone- transition to Invanz   - will need long term IV antibiotic x6 weeks - due to concern of possible vascular graft involvement  - Plan for PICC line on dc   - blood culture Culture - Abscess with Gram Stain (12.10.23 @ 17:45)   No polymorphonuclear cells seen per low power field   No organisms seen per oil power field  Specimen Source: Abscess Hip - Right  Moderate Staphylococcus aureus  - vascular following   - POD#4  s/p R groin wound washout, Closure using gracilis muscle flap  - BEBETO dcd  - eluqis resumes   Keep R leg elevated. No abduction of the R leg  Plastic surgery on board       leukocytosis - resolved     JENIFER on CKD  Hypokalemia -  resolved  - trend bmp     COVID +  - Not hypoxic and currently asymptomatic  - Monitor    Left Ankle chronic ulcer  - Wound care eval  - podiatry consulted     PAD  - Stable  - 12/21  Vascular resident- ok to restart apixaban   - Continue Aspirin  - Continue Atorvastatin    HTN  - Stable  - Bystolic switched to Coreg  - Monitor    Hypothyroid  - Continue levothyroxine    DVT prophylaxis  Case d/w team on IDR.

## 2023-12-22 NOTE — CHART NOTE - NSCHARTNOTEFT_GEN_A_CORE
Patient has an appointment at the wound care center with Dr. Perkins at 9:30 am January 9th, 2024. Please have him follow up there on discharge. Thank you.

## 2023-12-23 RX ORDER — ERTAPENEM SODIUM 1 G/1
1 INJECTION, POWDER, LYOPHILIZED, FOR SOLUTION INTRAMUSCULAR; INTRAVENOUS
Refills: 0 | DISCHARGE
Start: 2023-12-23

## 2023-12-23 NOTE — PROGRESS NOTE ADULT - SUBJECTIVE AND OBJECTIVE BOX
Patient seen and examined on routine rounds.   He is POD#5 s/p R groin wound washout, Closure using gracilis muscle flap  He denies nausea, vomiting, fever or chills. Pain well controlled   Nurse report no acute event overnight   VS reviewed      Physical Exam:  General: AOx3, Well developed, NAD  HEENT: NC/AT, normal pinnae and tragi  Chest: Normal respiratory effort, equal chest rise, left chest wall palpable graft   Heart: RRR  Abdomen: Soft, NTND  Inguinal: R groin with c/d/i dressing..  Neuro/Psych: No localized deficits. Normal speech, normal tone, normal affect  Skin: Approximately 3x4 cm ulcer just superior to the left medial malleolus with some fibrinous tissue, no bleeding, no surround induration or erythema  Extremities: B/l LE with acewrap dressing, c/d/i. R leg Delfino with sanguinous output. Popliteal pulses 2+ b/l, DP and PT 2+ on right, 2+ DP, nonpalpable DP on left

## 2023-12-23 NOTE — PROGRESS NOTE ADULT - ASSESSMENT
73 year old male with extensive vascular surgery hx, most recent being a right femoral cutdown with aortogram and B/L LE angiogram, SFA covered stent placed in SFA, left axillary to anterior tibial bypass with PTFE graft 11/1/23, patent, fem-fem graft occluded. He had an infected seroma at the right femoral site, +for MRSA. Pulses are palpable, LEs are well perfused.  He is POD#5 s/p R groin wound washout, Closure using gracilis muscle flap    Plan:  Continue abx per ID recs: PICC line for 6 weeks abx  Podiatry on board   Keep R leg elevated. No abduction of the R leg  Monitor drain output  Cont [facility]   Plastic surgery on board: DC drain, pt can be discharged. Follow up in wound care January 9th   If no bleeding while on AC and PICC line set up, and pt working with PT, he may be discharged from vascular surgery stand point  Rest of management as per primary team    Plan discussed with Dr. Sosa.  73 year old male with extensive vascular surgery hx, most recent being a right femoral cutdown with aortogram and B/L LE angiogram, SFA covered stent placed in SFA, left axillary to anterior tibial bypass with PTFE graft 11/1/23, patent, fem-fem graft occluded. He had an infected seroma at the right femoral site, +for MRSA. Pulses are palpable, LEs are well perfused.  He is POD#5 s/p R groin wound washout, Closure using gracilis muscle flap    Plan:  Continue abx per ID recs: PICC line for 6 weeks abx  Podiatry on board   Keep R leg elevated. No abduction of the R leg  Monitor drain output  Cont [facility]   Plastic surgery on board: DC drain, pt can be discharged. Follow up in wound care January 9th   If no bleeding while on AC and PICC line set up, and pt working with PT, he may be discharged from vascular surgery stand point  Rest of management as per primary team    Plan discussed with Dr. Sosa.

## 2023-12-23 NOTE — PROGRESS NOTE ADULT - REASON FOR ADMISSION
Wound check

## 2023-12-23 NOTE — PROGRESS NOTE ADULT - PROVIDER SPECIALTY LIST ADULT
Hospitalist
Infectious Disease
Infectious Disease
Trauma Surgery
Vascular Surgery
Infectious Disease
Vascular Surgery
Hospitalist
Infectious Disease
Podiatry
Podiatry
Vascular Surgery
Hospitalist
Podiatry
Vascular Surgery
Hospitalist
Podiatry
Infectious Disease
Podiatry
Infectious Disease
Infectious Disease
Plastic Surgery
Podiatry
Vascular Surgery

## 2023-12-25 RX ORDER — SACCHAROMYCES BOULARDII 250 MG
1 POWDER IN PACKET (EA) ORAL
Refills: 0 | DISCHARGE
Start: 2023-12-25

## 2024-01-02 ENCOUNTER — APPOINTMENT (OUTPATIENT)
Dept: VASCULAR SURGERY | Facility: CLINIC | Age: 74
End: 2024-01-02

## 2024-01-02 ENCOUNTER — INPATIENT (INPATIENT)
Facility: HOSPITAL | Age: 74
LOS: 2 days | Discharge: NURSING, REHAB OR LONG-TERM CARE | DRG: 682 | End: 2024-01-05
Attending: STUDENT IN AN ORGANIZED HEALTH CARE EDUCATION/TRAINING PROGRAM | Admitting: INTERNAL MEDICINE
Payer: MEDICARE

## 2024-01-02 VITALS
OXYGEN SATURATION: 86 % | HEIGHT: 67 IN | RESPIRATION RATE: 18 BRPM | DIASTOLIC BLOOD PRESSURE: 30 MMHG | HEART RATE: 63 BPM | TEMPERATURE: 98 F | WEIGHT: 139.99 LBS | SYSTOLIC BLOOD PRESSURE: 105 MMHG

## 2024-01-02 DIAGNOSIS — I73.9 PERIPHERAL VASCULAR DISEASE, UNSPECIFIED: ICD-10-CM

## 2024-01-02 DIAGNOSIS — R55 SYNCOPE AND COLLAPSE: ICD-10-CM

## 2024-01-02 DIAGNOSIS — I73.9 PERIPHERAL VASCULAR DISEASE, UNSPECIFIED: Chronic | ICD-10-CM

## 2024-01-02 DIAGNOSIS — I74.3 EMBOLISM AND THROMBOSIS OF ARTERIES OF THE LOWER EXTREMITIES: Chronic | ICD-10-CM

## 2024-01-02 LAB
ALBUMIN SERPL ELPH-MCNC: 2.9 G/DL — LOW (ref 3.3–5)
ALBUMIN SERPL ELPH-MCNC: 2.9 G/DL — LOW (ref 3.3–5)
ALP SERPL-CCNC: 190 U/L — HIGH (ref 40–120)
ALP SERPL-CCNC: 190 U/L — HIGH (ref 40–120)
ALT FLD-CCNC: 24 U/L — SIGNIFICANT CHANGE UP (ref 12–78)
ALT FLD-CCNC: 24 U/L — SIGNIFICANT CHANGE UP (ref 12–78)
ANION GAP SERPL CALC-SCNC: 5 MMOL/L — SIGNIFICANT CHANGE UP (ref 5–17)
ANION GAP SERPL CALC-SCNC: 5 MMOL/L — SIGNIFICANT CHANGE UP (ref 5–17)
APPEARANCE UR: CLEAR — SIGNIFICANT CHANGE UP
APPEARANCE UR: CLEAR — SIGNIFICANT CHANGE UP
AST SERPL-CCNC: 46 U/L — HIGH (ref 15–37)
AST SERPL-CCNC: 46 U/L — HIGH (ref 15–37)
BASOPHILS # BLD AUTO: 0.02 K/UL — SIGNIFICANT CHANGE UP (ref 0–0.2)
BASOPHILS # BLD AUTO: 0.02 K/UL — SIGNIFICANT CHANGE UP (ref 0–0.2)
BASOPHILS NFR BLD AUTO: 0.2 % — SIGNIFICANT CHANGE UP (ref 0–2)
BASOPHILS NFR BLD AUTO: 0.2 % — SIGNIFICANT CHANGE UP (ref 0–2)
BILIRUB SERPL-MCNC: 0.6 MG/DL — SIGNIFICANT CHANGE UP (ref 0.2–1.2)
BILIRUB SERPL-MCNC: 0.6 MG/DL — SIGNIFICANT CHANGE UP (ref 0.2–1.2)
BILIRUB UR-MCNC: NEGATIVE — SIGNIFICANT CHANGE UP
BILIRUB UR-MCNC: NEGATIVE — SIGNIFICANT CHANGE UP
BUN SERPL-MCNC: 64 MG/DL — HIGH (ref 7–23)
BUN SERPL-MCNC: 64 MG/DL — HIGH (ref 7–23)
CALCIUM SERPL-MCNC: 9 MG/DL — SIGNIFICANT CHANGE UP (ref 8.5–10.1)
CALCIUM SERPL-MCNC: 9 MG/DL — SIGNIFICANT CHANGE UP (ref 8.5–10.1)
CHLORIDE SERPL-SCNC: 103 MMOL/L — SIGNIFICANT CHANGE UP (ref 96–108)
CHLORIDE SERPL-SCNC: 103 MMOL/L — SIGNIFICANT CHANGE UP (ref 96–108)
CO2 SERPL-SCNC: 29 MMOL/L — SIGNIFICANT CHANGE UP (ref 22–31)
CO2 SERPL-SCNC: 29 MMOL/L — SIGNIFICANT CHANGE UP (ref 22–31)
COLOR SPEC: YELLOW — SIGNIFICANT CHANGE UP
COLOR SPEC: YELLOW — SIGNIFICANT CHANGE UP
CREAT SERPL-MCNC: 2.43 MG/DL — HIGH (ref 0.5–1.3)
CREAT SERPL-MCNC: 2.43 MG/DL — HIGH (ref 0.5–1.3)
DIFF PNL FLD: NEGATIVE — SIGNIFICANT CHANGE UP
DIFF PNL FLD: NEGATIVE — SIGNIFICANT CHANGE UP
EGFR: 28 ML/MIN/1.73M2 — LOW
EGFR: 28 ML/MIN/1.73M2 — LOW
EOSINOPHIL # BLD AUTO: 0.05 K/UL — SIGNIFICANT CHANGE UP (ref 0–0.5)
EOSINOPHIL # BLD AUTO: 0.05 K/UL — SIGNIFICANT CHANGE UP (ref 0–0.5)
EOSINOPHIL NFR BLD AUTO: 0.6 % — SIGNIFICANT CHANGE UP (ref 0–6)
EOSINOPHIL NFR BLD AUTO: 0.6 % — SIGNIFICANT CHANGE UP (ref 0–6)
FLUAV AG NPH QL: SIGNIFICANT CHANGE UP
FLUAV AG NPH QL: SIGNIFICANT CHANGE UP
FLUBV AG NPH QL: SIGNIFICANT CHANGE UP
FLUBV AG NPH QL: SIGNIFICANT CHANGE UP
GLUCOSE SERPL-MCNC: 128 MG/DL — HIGH (ref 70–99)
GLUCOSE SERPL-MCNC: 128 MG/DL — HIGH (ref 70–99)
GLUCOSE UR QL: NEGATIVE MG/DL — SIGNIFICANT CHANGE UP
GLUCOSE UR QL: NEGATIVE MG/DL — SIGNIFICANT CHANGE UP
HCT VFR BLD CALC: 31.3 % — LOW (ref 39–50)
HCT VFR BLD CALC: 31.3 % — LOW (ref 39–50)
HGB BLD-MCNC: 10.1 G/DL — LOW (ref 13–17)
HGB BLD-MCNC: 10.1 G/DL — LOW (ref 13–17)
IMM GRANULOCYTES NFR BLD AUTO: 0.2 % — SIGNIFICANT CHANGE UP (ref 0–0.9)
IMM GRANULOCYTES NFR BLD AUTO: 0.2 % — SIGNIFICANT CHANGE UP (ref 0–0.9)
KETONES UR-MCNC: NEGATIVE MG/DL — SIGNIFICANT CHANGE UP
KETONES UR-MCNC: NEGATIVE MG/DL — SIGNIFICANT CHANGE UP
LEUKOCYTE ESTERASE UR-ACNC: NEGATIVE — SIGNIFICANT CHANGE UP
LEUKOCYTE ESTERASE UR-ACNC: NEGATIVE — SIGNIFICANT CHANGE UP
LYMPHOCYTES # BLD AUTO: 0.6 K/UL — LOW (ref 1–3.3)
LYMPHOCYTES # BLD AUTO: 0.6 K/UL — LOW (ref 1–3.3)
LYMPHOCYTES # BLD AUTO: 7.5 % — LOW (ref 13–44)
LYMPHOCYTES # BLD AUTO: 7.5 % — LOW (ref 13–44)
MAGNESIUM SERPL-MCNC: 2.5 MG/DL — SIGNIFICANT CHANGE UP (ref 1.6–2.6)
MAGNESIUM SERPL-MCNC: 2.5 MG/DL — SIGNIFICANT CHANGE UP (ref 1.6–2.6)
MCHC RBC-ENTMCNC: 29.4 PG — SIGNIFICANT CHANGE UP (ref 27–34)
MCHC RBC-ENTMCNC: 29.4 PG — SIGNIFICANT CHANGE UP (ref 27–34)
MCHC RBC-ENTMCNC: 32.3 GM/DL — SIGNIFICANT CHANGE UP (ref 32–36)
MCHC RBC-ENTMCNC: 32.3 GM/DL — SIGNIFICANT CHANGE UP (ref 32–36)
MCV RBC AUTO: 91.3 FL — SIGNIFICANT CHANGE UP (ref 80–100)
MCV RBC AUTO: 91.3 FL — SIGNIFICANT CHANGE UP (ref 80–100)
MONOCYTES # BLD AUTO: 0.59 K/UL — SIGNIFICANT CHANGE UP (ref 0–0.9)
MONOCYTES # BLD AUTO: 0.59 K/UL — SIGNIFICANT CHANGE UP (ref 0–0.9)
MONOCYTES NFR BLD AUTO: 7.3 % — SIGNIFICANT CHANGE UP (ref 2–14)
MONOCYTES NFR BLD AUTO: 7.3 % — SIGNIFICANT CHANGE UP (ref 2–14)
NEUTROPHILS # BLD AUTO: 6.75 K/UL — SIGNIFICANT CHANGE UP (ref 1.8–7.4)
NEUTROPHILS # BLD AUTO: 6.75 K/UL — SIGNIFICANT CHANGE UP (ref 1.8–7.4)
NEUTROPHILS NFR BLD AUTO: 84.2 % — HIGH (ref 43–77)
NEUTROPHILS NFR BLD AUTO: 84.2 % — HIGH (ref 43–77)
NITRITE UR-MCNC: NEGATIVE — SIGNIFICANT CHANGE UP
NITRITE UR-MCNC: NEGATIVE — SIGNIFICANT CHANGE UP
NT-PROBNP SERPL-SCNC: 559 PG/ML — HIGH (ref 0–125)
NT-PROBNP SERPL-SCNC: 559 PG/ML — HIGH (ref 0–125)
PH UR: 5 — SIGNIFICANT CHANGE UP (ref 5–8)
PH UR: 5 — SIGNIFICANT CHANGE UP (ref 5–8)
PLATELET # BLD AUTO: 359 K/UL — SIGNIFICANT CHANGE UP (ref 150–400)
PLATELET # BLD AUTO: 359 K/UL — SIGNIFICANT CHANGE UP (ref 150–400)
POTASSIUM SERPL-MCNC: 4 MMOL/L — SIGNIFICANT CHANGE UP (ref 3.5–5.3)
POTASSIUM SERPL-MCNC: 4 MMOL/L — SIGNIFICANT CHANGE UP (ref 3.5–5.3)
POTASSIUM SERPL-SCNC: 4 MMOL/L — SIGNIFICANT CHANGE UP (ref 3.5–5.3)
POTASSIUM SERPL-SCNC: 4 MMOL/L — SIGNIFICANT CHANGE UP (ref 3.5–5.3)
PROT SERPL-MCNC: 7.5 GM/DL — SIGNIFICANT CHANGE UP (ref 6–8.3)
PROT SERPL-MCNC: 7.5 GM/DL — SIGNIFICANT CHANGE UP (ref 6–8.3)
PROT UR-MCNC: NEGATIVE MG/DL — SIGNIFICANT CHANGE UP
PROT UR-MCNC: NEGATIVE MG/DL — SIGNIFICANT CHANGE UP
RBC # BLD: 3.43 M/UL — LOW (ref 4.2–5.8)
RBC # BLD: 3.43 M/UL — LOW (ref 4.2–5.8)
RBC # FLD: 15.1 % — HIGH (ref 10.3–14.5)
RBC # FLD: 15.1 % — HIGH (ref 10.3–14.5)
RSV RNA NPH QL NAA+NON-PROBE: SIGNIFICANT CHANGE UP
RSV RNA NPH QL NAA+NON-PROBE: SIGNIFICANT CHANGE UP
SARS-COV-2 RNA SPEC QL NAA+PROBE: DETECTED
SARS-COV-2 RNA SPEC QL NAA+PROBE: DETECTED
SODIUM SERPL-SCNC: 137 MMOL/L — SIGNIFICANT CHANGE UP (ref 135–145)
SODIUM SERPL-SCNC: 137 MMOL/L — SIGNIFICANT CHANGE UP (ref 135–145)
SP GR SPEC: 1.01 — SIGNIFICANT CHANGE UP (ref 1–1.03)
SP GR SPEC: 1.01 — SIGNIFICANT CHANGE UP (ref 1–1.03)
TROPONIN I, HIGH SENSITIVITY RESULT: 13.55 NG/L — SIGNIFICANT CHANGE UP
TROPONIN I, HIGH SENSITIVITY RESULT: 13.55 NG/L — SIGNIFICANT CHANGE UP
UROBILINOGEN FLD QL: 0.2 MG/DL — SIGNIFICANT CHANGE UP (ref 0.2–1)
UROBILINOGEN FLD QL: 0.2 MG/DL — SIGNIFICANT CHANGE UP (ref 0.2–1)
WBC # BLD: 8.03 K/UL — SIGNIFICANT CHANGE UP (ref 3.8–10.5)
WBC # BLD: 8.03 K/UL — SIGNIFICANT CHANGE UP (ref 3.8–10.5)
WBC # FLD AUTO: 8.03 K/UL — SIGNIFICANT CHANGE UP (ref 3.8–10.5)
WBC # FLD AUTO: 8.03 K/UL — SIGNIFICANT CHANGE UP (ref 3.8–10.5)

## 2024-01-02 PROCEDURE — 99285 EMERGENCY DEPT VISIT HI MDM: CPT

## 2024-01-02 PROCEDURE — 94640 AIRWAY INHALATION TREATMENT: CPT

## 2024-01-02 PROCEDURE — 99223 1ST HOSP IP/OBS HIGH 75: CPT

## 2024-01-02 PROCEDURE — 85027 COMPLETE CBC AUTOMATED: CPT

## 2024-01-02 PROCEDURE — 71045 X-RAY EXAM CHEST 1 VIEW: CPT | Mod: 26

## 2024-01-02 PROCEDURE — 80048 BASIC METABOLIC PNL TOTAL CA: CPT

## 2024-01-02 PROCEDURE — 36415 COLL VENOUS BLD VENIPUNCTURE: CPT

## 2024-01-02 PROCEDURE — 70450 CT HEAD/BRAIN W/O DYE: CPT | Mod: 26,MA

## 2024-01-02 PROCEDURE — 85730 THROMBOPLASTIN TIME PARTIAL: CPT

## 2024-01-02 PROCEDURE — 85610 PROTHROMBIN TIME: CPT

## 2024-01-02 PROCEDURE — 97163 PT EVAL HIGH COMPLEX 45 MIN: CPT | Mod: GP

## 2024-01-02 PROCEDURE — 85025 COMPLETE CBC W/AUTO DIFF WBC: CPT

## 2024-01-02 PROCEDURE — 97116 GAIT TRAINING THERAPY: CPT | Mod: GP

## 2024-01-02 PROCEDURE — 84443 ASSAY THYROID STIM HORMONE: CPT

## 2024-01-02 PROCEDURE — 80053 COMPREHEN METABOLIC PANEL: CPT

## 2024-01-02 PROCEDURE — 93010 ELECTROCARDIOGRAM REPORT: CPT

## 2024-01-02 RX ORDER — OMEPRAZOLE 10 MG/1
1 CAPSULE, DELAYED RELEASE ORAL
Qty: 0 | Refills: 0 | DISCHARGE

## 2024-01-02 RX ORDER — METOPROLOL TARTRATE 50 MG
1 TABLET ORAL
Refills: 0 | DISCHARGE

## 2024-01-02 RX ORDER — SPIRONOLACTONE 25 MG/1
1 TABLET, FILM COATED ORAL
Qty: 0 | Refills: 0 | DISCHARGE

## 2024-01-02 RX ORDER — CHOLECALCIFEROL (VITAMIN D3) 125 MCG
1 CAPSULE ORAL
Qty: 0 | Refills: 0 | DISCHARGE

## 2024-01-02 RX ORDER — FERROUS SULFATE 325(65) MG
325 TABLET ORAL DAILY
Refills: 0 | Status: DISCONTINUED | OUTPATIENT
Start: 2024-01-02 | End: 2024-01-05

## 2024-01-02 RX ORDER — POLYETHYLENE GLYCOL 3350 17 G/17G
17 POWDER, FOR SOLUTION ORAL DAILY
Refills: 0 | Status: DISCONTINUED | OUTPATIENT
Start: 2024-01-02 | End: 2024-01-05

## 2024-01-02 RX ORDER — FUROSEMIDE 40 MG
1 TABLET ORAL
Qty: 0 | Refills: 0 | DISCHARGE

## 2024-01-02 RX ORDER — METOPROLOL TARTRATE 50 MG
25 TABLET ORAL DAILY
Refills: 0 | Status: DISCONTINUED | OUTPATIENT
Start: 2024-01-02 | End: 2024-01-02

## 2024-01-02 RX ORDER — ALBUTEROL 90 UG/1
2 AEROSOL, METERED ORAL EVERY 6 HOURS
Refills: 0 | Status: DISCONTINUED | OUTPATIENT
Start: 2024-01-02 | End: 2024-01-05

## 2024-01-02 RX ORDER — FERROUS SULFATE 325(65) MG
1 TABLET ORAL
Refills: 0 | DISCHARGE

## 2024-01-02 RX ORDER — SENNA PLUS 8.6 MG/1
2 TABLET ORAL AT BEDTIME
Refills: 0 | Status: DISCONTINUED | OUTPATIENT
Start: 2024-01-02 | End: 2024-01-05

## 2024-01-02 RX ORDER — TIOTROPIUM BROMIDE 18 UG/1
2 CAPSULE ORAL; RESPIRATORY (INHALATION) DAILY
Refills: 0 | Status: DISCONTINUED | OUTPATIENT
Start: 2024-01-02 | End: 2024-01-05

## 2024-01-02 RX ORDER — ACETAMINOPHEN 500 MG
2 TABLET ORAL
Refills: 0 | DISCHARGE

## 2024-01-02 RX ORDER — SODIUM CHLORIDE 9 MG/ML
1000 INJECTION INTRAMUSCULAR; INTRAVENOUS; SUBCUTANEOUS
Refills: 0 | Status: DISCONTINUED | OUTPATIENT
Start: 2024-01-02 | End: 2024-01-05

## 2024-01-02 RX ORDER — ASCORBIC ACID 60 MG
1 TABLET,CHEWABLE ORAL
Refills: 0 | DISCHARGE

## 2024-01-02 RX ORDER — LEVOTHYROXINE SODIUM 125 MCG
1 TABLET ORAL
Qty: 0 | Refills: 0 | DISCHARGE

## 2024-01-02 RX ORDER — DOCUSATE SODIUM 100 MG
2 CAPSULE ORAL
Refills: 0 | DISCHARGE

## 2024-01-02 RX ORDER — LISINOPRIL 2.5 MG/1
1 TABLET ORAL
Refills: 0 | DISCHARGE

## 2024-01-02 RX ORDER — VALSARTAN 80 MG/1
1 TABLET ORAL
Qty: 0 | Refills: 0 | DISCHARGE

## 2024-01-02 RX ORDER — SIMVASTATIN 20 MG/1
1 TABLET, FILM COATED ORAL
Refills: 0 | DISCHARGE

## 2024-01-02 RX ORDER — LEVOTHYROXINE SODIUM 125 MCG
100 TABLET ORAL DAILY
Refills: 0 | Status: DISCONTINUED | OUTPATIENT
Start: 2024-01-02 | End: 2024-01-05

## 2024-01-02 RX ORDER — CHOLECALCIFEROL (VITAMIN D3) 125 MCG
1000 CAPSULE ORAL DAILY
Refills: 0 | Status: DISCONTINUED | OUTPATIENT
Start: 2024-01-02 | End: 2024-01-05

## 2024-01-02 RX ORDER — CLOPIDOGREL BISULFATE 75 MG/1
1 TABLET, FILM COATED ORAL
Refills: 0 | DISCHARGE

## 2024-01-02 RX ORDER — ATORVASTATIN CALCIUM 80 MG/1
40 TABLET, FILM COATED ORAL AT BEDTIME
Refills: 0 | Status: DISCONTINUED | OUTPATIENT
Start: 2024-01-02 | End: 2024-01-05

## 2024-01-02 RX ORDER — SACCHAROMYCES BOULARDII 250 MG
250 POWDER IN PACKET (EA) ORAL
Refills: 0 | Status: DISCONTINUED | OUTPATIENT
Start: 2024-01-02 | End: 2024-01-05

## 2024-01-02 RX ORDER — IPRATROPIUM/ALBUTEROL SULFATE 18-103MCG
3 AEROSOL WITH ADAPTER (GRAM) INHALATION
Refills: 0 | DISCHARGE

## 2024-01-02 RX ORDER — MULTIVIT-MIN/FERROUS GLUCONATE 9 MG/15 ML
1 LIQUID (ML) ORAL
Qty: 0 | Refills: 0 | DISCHARGE

## 2024-01-02 RX ORDER — ERTAPENEM SODIUM 1 G/1
500 INJECTION, POWDER, LYOPHILIZED, FOR SOLUTION INTRAMUSCULAR; INTRAVENOUS EVERY 24 HOURS
Refills: 0 | Status: DISCONTINUED | OUTPATIENT
Start: 2024-01-02 | End: 2024-01-04

## 2024-01-02 RX ORDER — ACETAMINOPHEN 500 MG
650 TABLET ORAL EVERY 6 HOURS
Refills: 0 | Status: DISCONTINUED | OUTPATIENT
Start: 2024-01-02 | End: 2024-01-05

## 2024-01-02 RX ORDER — ASPIRIN/CALCIUM CARB/MAGNESIUM 324 MG
1 TABLET ORAL
Qty: 0 | Refills: 0 | DISCHARGE

## 2024-01-02 RX ORDER — POTASSIUM CHLORIDE 20 MEQ
1 PACKET (EA) ORAL
Refills: 0 | DISCHARGE

## 2024-01-02 RX ORDER — IRON POLYSACCHARIDE COMPLEX 150 MG
1 CAPSULE ORAL
Refills: 0 | DISCHARGE

## 2024-01-02 RX ORDER — ROSUVASTATIN CALCIUM 5 MG/1
1 TABLET ORAL
Refills: 0 | DISCHARGE

## 2024-01-02 RX ORDER — LEVOTHYROXINE SODIUM 125 MCG
1 TABLET ORAL
Refills: 0 | DISCHARGE

## 2024-01-02 RX ORDER — APIXABAN 2.5 MG/1
5 TABLET, FILM COATED ORAL
Refills: 0 | Status: DISCONTINUED | OUTPATIENT
Start: 2024-01-02 | End: 2024-01-05

## 2024-01-02 RX ORDER — LANOLIN ALCOHOL/MO/W.PET/CERES
5 CREAM (GRAM) TOPICAL AT BEDTIME
Refills: 0 | Status: DISCONTINUED | OUTPATIENT
Start: 2024-01-02 | End: 2024-01-05

## 2024-01-02 RX ORDER — SODIUM CHLORIDE 9 MG/ML
1000 INJECTION INTRAMUSCULAR; INTRAVENOUS; SUBCUTANEOUS ONCE
Refills: 0 | Status: COMPLETED | OUTPATIENT
Start: 2024-01-02 | End: 2024-01-02

## 2024-01-02 RX ADMIN — APIXABAN 5 MILLIGRAM(S): 2.5 TABLET, FILM COATED ORAL at 23:20

## 2024-01-02 RX ADMIN — SODIUM CHLORIDE 100 MILLILITER(S): 9 INJECTION INTRAMUSCULAR; INTRAVENOUS; SUBCUTANEOUS at 23:24

## 2024-01-02 RX ADMIN — ATORVASTATIN CALCIUM 40 MILLIGRAM(S): 80 TABLET, FILM COATED ORAL at 23:20

## 2024-01-02 RX ADMIN — SENNA PLUS 2 TABLET(S): 8.6 TABLET ORAL at 23:20

## 2024-01-02 RX ADMIN — SODIUM CHLORIDE 125 MILLILITER(S): 9 INJECTION INTRAMUSCULAR; INTRAVENOUS; SUBCUTANEOUS at 21:30

## 2024-01-02 RX ADMIN — Medication 100 MILLIGRAM(S): at 17:28

## 2024-01-02 RX ADMIN — Medication 5 MILLIGRAM(S): at 23:20

## 2024-01-02 RX ADMIN — SODIUM CHLORIDE 1000 MILLILITER(S): 9 INJECTION INTRAMUSCULAR; INTRAVENOUS; SUBCUTANEOUS at 14:15

## 2024-01-02 NOTE — ED PROVIDER NOTE - OBJECTIVE STATEMENT
73-year-old male presents the ER after near syncopal episode while at his vascular surgeons office.  Vascular surgeon removed his stitches and then he became pale and decreased responsiveness and nearly passed out.  No falls.  No prodrome symptoms.  No chest pain shortness of breath.  At the nursing home currently.  Low p.o. intake as per wife at the nursing home.  He says he just does not have an appetite.  Not eating or drinking much.  No headaches or fevers.  No numbness or tingling in feet.  As per chart review patient is on Xarelto correction patient is on apixaban 5 mg twice a day.  He has a history of peripheral vascular disease CAD.  Patient currently has a PICC line in place and is receiving ertapenem 1 g daily for duration of 6 weeks that was started on December 23.

## 2024-01-02 NOTE — ED PROVIDER NOTE - PHYSICAL EXAMINATION
Patient has several sutures in the inner thigh on the left side that appear to be healing well no erythema no abscess no signs of infection no pus no drainage.      Patient has thick white drainage seen in the left inner ear.  Tympanic membrane Visualized.  No signs of otitis externa.  No mastoid tenderness palpation.  No mastoid erythema.

## 2024-01-02 NOTE — ED ADULT NURSE NOTE - OBJECTIVE STATEMENT
pt presents to Ed with complaints of hypotension at MD office. pt was at Vascular surgeon's office and they noticed pt to be AMS. upon EMS arrival. pt found to by hypotensive 70/30s initially. pt has PICC to right upper arm. per wife, pt has been AMS for a few weeks. pt currently residing in [facility] for rehab. pt axo4 no other comlpaints at this time. md patton at bedside pt presents to Ed with complaints of hypotension at MD office. pt was at Vascular surgeon's office and they noticed pt to be AMS. upon EMS arrival. pt found to by hypotensive 70/30s initially. pt has PICC to right upper arm. per wife, pt has been AMS for a few weeks. pt currently residing in [facility] for rehab. pt axo4 no other comlpaints at this time. md patton at bedside

## 2024-01-02 NOTE — ED PROVIDER NOTE - NS ED MD DISPO SPECIAL CONSIDERATION1

## 2024-01-02 NOTE — ED ADULT NURSE NOTE - NSFALLRISKINTERV_ED_ALL_ED
Assistance OOB with selected safe patient handling equipment if applicable/Assistance with ambulation/Communicate fall risk and risk factors to all staff, patient, and family/Provide visual cue: yellow wristband, yellow gown, etc/Reinforce activity limits and safety measures with patient and family/Call bell, personal items and telephone in reach/Instruct patient to call for assistance before getting out of bed/chair/stretcher/Non-slip footwear applied when patient is off stretcher/[place] [place] to call system/Physically safe environment - no spills, clutter or unnecessary equipment/Purposeful Proactive Rounding/Room/bathroom lighting operational, light cord in reach Assistance OOB with selected safe patient handling equipment if applicable/Assistance with ambulation/Communicate fall risk and risk factors to all staff, patient, and family/Provide visual cue: yellow wristband, yellow gown, etc/Reinforce activity limits and safety measures with patient and family/Call bell, personal items and telephone in reach/Instruct patient to call for assistance before getting out of bed/chair/stretcher/Non-slip footwear applied when patient is off stretcher/[place] to call system/Physically safe environment - no spills, clutter or unnecessary equipment/Purposeful Proactive Rounding/Room/bathroom lighting operational, light cord in reach

## 2024-01-02 NOTE — ED ADULT NURSE REASSESSMENT NOTE - NS ED NURSE REASSESS COMMENT FT1
Pt received in bed by AM RN Isaac. Introduced self to pt and updated pt on plan of care. Pt verbalized understanding. Pt noted to be on RA and O2 100%. Pt denies any pain or complaints at this time. AOx4, bed at lowest height, call bell within reach.

## 2024-01-02 NOTE — H&P ADULT - ASSESSMENT
74 y/o M w/ PMH of PVD s/p multiple stents, chronic lower ankle ulceration, CAD, recent admission for R groin surgical site cellulitis 2/2  recent R bypass procedure  s/p R groin wound washout/closure using gracilis muscle flap on 12/18/23(on longterm IV meropenem via PICC), hypothyroidism, CKD, h/o COVID p/w near syncope    *Near-syncope   -IVF for possible dehydration   -CTH: No bleeding/CVA reported   -Check orthostatics  -Remote telemetry  -Echo  -Troponin negative x 1  -EKG:   -F/u outpatient cardio / neuro if above work up remains negative     *Acute on CKD   -On IV abx, will consult nephro for further evaluation  -IVF and recheck creatinine in AM to check for improvement  -I/Os + Daily weights  -UA  -Avoid nephrotoxic agents   -Will hold ARB / Diuretic overnight     *R groin surgical site cellulitis s/p recent vascular surgery  -Plastic surgery / Vascular consult  -ID consult to resume restricted IV antibiotics inpatient     *L foot wound  -Podiatry consult   -On Abx     *Mastoiditis reported on CT  -ED contacted ENT, who did not believe this to be true masoiditis  -F/u ID   -Outpatient ENT     *Elevated alk phos / transaminitis  -F/u outpatient GI for further evaluation if reamins stable during hospitalization     *H/o COVID   -Patient was COVID positive on 12/9/23  -Currently asymptomatic     *DVT ppx   -Eliquis    >75 mins spent on this admission      72 y/o M w/ PMH of PVD s/p multiple stents, chronic lower ankle ulceration, CAD, recent admission for R groin surgical site cellulitis 2/2  recent R bypass procedure  s/p R groin wound washout/closure using gracilis muscle flap on 12/18/23(on longterm IV meropenem via PICC), hypothyroidism, CKD, h/o COVID p/w near syncope    *Near-syncope - likely 2/2 dehydration   -IVF   -CTH: No bleeding/CVA reported   -Check orthostatics  -Remote telemetry  -Last Echo on 10/23/23  -Troponin negative x 1  -EKG: Junction rhythm, bradycardic at 57 bpm (previous EKG from 12/9/23 had sinus bradycardia of 46) -> cardio consult for further evaluation and will hold beta blocker overnight  -F/u outpatient neuro if above work up remains negative     *Acute on CKD   -On IV abx, will consult nephro for further evaluation  -IVF and recheck creatinine in AM to check for improvement  -I/Os  -UA  -Avoid nephrotoxic agents   -Will hold ARB / Diuretic overnight     *R groin surgical site cellulitis s/p recent vascular surgery  -Plastic surgery / Vascular consult  -ID consult to resume restricted IV antibiotics inpatient     *L foot wound  -Podiatry consult   -On Abx     *Mastoiditis reported on CT  -ED contacted ENT, who did not believe this to be true masoiditis  -F/u ID   -Outpatient ENT     *Elevated alk phos / transaminitis  -F/u outpatient GI for further evaluation if reamins stable during hospitalization     *H/o COVID   -Patient was COVID positive on 12/9/23  -Currently asymptomatic     *DVT ppx   -Eliquis    >75 mins spent on this admission      74 y/o M w/ PMH of PVD s/p multiple stents, chronic lower ankle ulceration, CAD, recent admission for R groin surgical site cellulitis 2/2  recent R bypass procedure  s/p R groin wound washout/closure using gracilis muscle flap on 12/18/23(on longterm IV meropenem via PICC), hypothyroidism, CKD, h/o COVID p/w near syncope    *Near-syncope - likely 2/2 dehydration   -IVF   -CTH: No bleeding/CVA reported   -Check orthostatics  -Remote telemetry  -Last Echo on 10/23/23  -Troponin negative x 1  -EKG: Junction rhythm, bradycardic at 57 bpm (previous EKG from 12/9/23 had sinus bradycardia of 46) -> cardio consult for further evaluation and will hold beta blocker overnight  -F/u outpatient neuro if above work up remains negative     *Acute on CKD   -On IV abx, will consult nephro for further evaluation  -IVF and recheck creatinine in AM to check for improvement  -I/Os  -UA  -Avoid nephrotoxic agents   -Will hold ARB / Diuretic overnight     *R groin surgical site cellulitis s/p recent vascular surgery  -Plastic surgery / Vascular consult  -ID consult to resume restricted IV antibiotics inpatient     *L foot wound  -Podiatry consult   -On Abx     *Mastoiditis reported on CT  -ED contacted ENT, who did not believe this to be true masoiditis  -F/u ID   -Outpatient ENT     *Elevated alk phos / transaminitis  -F/u outpatient GI for further evaluation if reamins stable during hospitalization     *H/o COVID   -Patient was COVID positive on 12/9/23  -Currently asymptomatic     *DVT ppx   -Eliquis    >75 mins spent on this admission

## 2024-01-02 NOTE — ED ADULT TRIAGE NOTE - CHIEF COMPLAINT QUOTE
pt presents to Ed with complaints of hypotension at MD office. pt was at Vascular surgeon's office and they noticed pt to be AMS. upon EMS arrival. pt found to by hypotensive 70/30s initially. pt has PICC to right upper arm. per wife, pt has been AMS for a few weeks. pt currently residing in [facility] for rehab. pt presents to Ed with complaints of hypotension at MD office. pt was at Vascular surgeon's office and they noticed pt to be AMS. upon EMS arrival. pt found to by hypotensive 70/30s initially. pt has PICC to right upper arm. per wife, pt has been AMS for a few weeks. pt currently residing in [facility] for rehab.

## 2024-01-02 NOTE — H&P ADULT - NSHPOUTPATIENTPROVIDERS_GEN_ALL_CORE
PCP: Dr. Alvin Griffiht   Cardio: Dr Camilo   Neuro: Denies having one   Vascular: Dr. Kelsey Sosa  Plastic Surgery: Dr Alexy Perkins  Podiatry: Dr Joseph PCP: Dr. Alvin Griffith   Cardio: Dr Camilo   Neuro: Denies having one   Vascular: Dr. Kelsey Sosa  Plastic Surgery: Dr Alexy Perkins  Podiatry: Dr Joseph

## 2024-01-02 NOTE — ED PROVIDER NOTE - PROGRESS NOTE DETAILS
I consulted ENT.  They state that since the patient does not have any clinical signs of mastoiditis likely an inner ear infection despite radiology's reading of the CT scan.   CT scan shows sclerosis but not erosion of the bones. I reexamined the patient he does not have any tenderness to palpation over the mastoid bone or erythema in that area making mastoiditis less likely.  He is already on PICC line ertapenem which would treat bacterial infection associated mastoiditis.  Questionable inner ear infection?   Patient also found to have JENIFER.  Likely due to prerenal causes given the patient does have good appetite.  Will admit to medicine for IV fluids antibiotics and further management.

## 2024-01-02 NOTE — H&P ADULT - CONVERSATION DETAILS
States HCP is his wife, but denies having any limitations on resuscitation status for this admission

## 2024-01-02 NOTE — PHARMACOTHERAPY INTERVENTION NOTE - COMMENTS
Medication reconciliation completed.  Reviewed Medication list and confirmed med allergies with list from Care Facility "[facility]"; confirmed with Dr. First Medchelita.  Medication reconciliation completed.  Reviewed Medication list and confirmed med allergies with list from Care Facility "[facility]"; confirmed with Dr. First Medchelita.

## 2024-01-02 NOTE — ED PROVIDER NOTE - CLINICAL SUMMARY MEDICAL DECISION MAKING FREE TEXT BOX
Elderly male from nursing home presents with a near syncopal episode while getting stitches removed.  Vitals normal here.  Found to have JENIFER on lab workup.  Found to have questionable left mastoiditis on CT head but ENT was consulted more likely an ear infection given patient is not clinically appearing like a mastoiditis patient.  No fever.  UA pending.  Will need to medicine for further management.

## 2024-01-02 NOTE — ED ADULT NURSE NOTE - CHIEF COMPLAINT QUOTE
pt presents to Ed with complaints of hypotension at MD office. pt was at Vascular surgeon's office and they noticed pt to be AMS. upon EMS arrival. pt found to by hypotensive 70/30s initially. pt has PICC to right upper arm. per wife, pt has been AMS for a few weeks. pt currently residing in [facility] for rehab. pt presents to Ed with complaints of hypotension at MD office. pt was at Vascular surgeon's office and they noticed pt to be AMS. upon EMS arrival. pt found to by hypotensive 70/30s initially. pt has PICC to right upper arm. per wife, pt has been AMS for a few weeks. pt currently residing in [facility] for rehab.

## 2024-01-02 NOTE — ED PROVIDER NOTE - NEUROLOGICAL, MLM
Initiate Treatment: Neutrogena SA for blackheads nose prn Detail Level: Generalized Alert and oriented, no focal deficits, no motor or sensory deficits.

## 2024-01-02 NOTE — H&P ADULT - HISTORY OF PRESENT ILLNESS
72 y/o M w/ PMH of PVD s/p multiple stents, chronic lower ankle ulceration, CAD, recent admission for R groin surgical site cellulitis 2/2  recent R bypass procedure  s/p R groin wound washout/closure using gracilis muscle flap on 12/18/23(on longterm IV meropenem via PICC), hypothyroidism, CKD, h/o COVID p/w near syncope. Patient states that after getting sutures removed from his groin, he slumped a bit to the side, and wasn't responding appropriately. States that he didn't lose consciousness fully, and felt like he was fainting. States he has been having decreased PO intake because he does not like the food at the rehab center. Denies abdominal pain, nausea, vomiting, CP, SOB, dizziness, LH, cough, runny nose, sore throat.         PSH: PVD s/p multiple stents, L fem / pop bypass, R groin wound washout    Social Hx: Denies tobacco / etoh / drugs     Family Hx: Denies pertinent hx

## 2024-01-02 NOTE — ED ADULT TRIAGE NOTE - NS ED NURSE AMBULANCES
[facility]

## 2024-01-03 DIAGNOSIS — Z88.8 ALLERGY STATUS TO OTHER DRUGS, MEDICAMENTS AND BIOLOGICAL SUBSTANCES: ICD-10-CM

## 2024-01-03 DIAGNOSIS — I73.9 PERIPHERAL VASCULAR DISEASE, UNSPECIFIED: ICD-10-CM

## 2024-01-03 DIAGNOSIS — Z86.718 PERSONAL HISTORY OF OTHER VENOUS THROMBOSIS AND EMBOLISM: ICD-10-CM

## 2024-01-03 DIAGNOSIS — U07.1 COVID-19: ICD-10-CM

## 2024-01-03 DIAGNOSIS — D64.9 ANEMIA, UNSPECIFIED: ICD-10-CM
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
## 2024-01-03 DIAGNOSIS — I12.9 HYPERTENSIVE CHRONIC KIDNEY DISEASE WITH STAGE 1 THROUGH STAGE 4 CHRONIC KIDNEY DISEASE, OR UNSPECIFIED CHRONIC KIDNEY DISEASE: ICD-10-CM

## 2024-01-03 DIAGNOSIS — E78.5 HYPERLIPIDEMIA, UNSPECIFIED: ICD-10-CM
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
## 2024-01-03 DIAGNOSIS — E87.6 HYPOKALEMIA: ICD-10-CM

## 2024-01-03 DIAGNOSIS — N17.9 ACUTE KIDNEY FAILURE, UNSPECIFIED: ICD-10-CM

## 2024-01-03 DIAGNOSIS — E43 UNSPECIFIED SEVERE PROTEIN-CALORIE MALNUTRITION: ICD-10-CM

## 2024-01-03 DIAGNOSIS — L76.33 POSTPROCEDURAL SEROMA OF SKIN AND SUBCUTANEOUS TISSUE FOLLOWING A DERMATOLOGIC PROCEDURE: ICD-10-CM

## 2024-01-03 DIAGNOSIS — Z87.891 PERSONAL HISTORY OF NICOTINE DEPENDENCE: ICD-10-CM

## 2024-01-03 DIAGNOSIS — I25.10 ATHEROSCLEROTIC HEART DISEASE OF NATIVE CORONARY ARTERY WITHOUT ANGINA PECTORIS: ICD-10-CM

## 2024-01-03 DIAGNOSIS — L03.314 CELLULITIS OF GROIN: ICD-10-CM

## 2024-01-03 DIAGNOSIS — Z79.82 LONG TERM (CURRENT) USE OF ASPIRIN: ICD-10-CM

## 2024-01-03 DIAGNOSIS — L02.214 CUTANEOUS ABSCESS OF GROIN: ICD-10-CM

## 2024-01-03 DIAGNOSIS — B95.62 METHICILLIN RESISTANT STAPHYLOCOCCUS AUREUS INFECTION AS THE CAUSE OF DISEASES CLASSIFIED ELSEWHERE: ICD-10-CM

## 2024-01-03 DIAGNOSIS — Z79.890 HORMONE REPLACEMENT THERAPY: ICD-10-CM

## 2024-01-03 DIAGNOSIS — L97.329 NON-PRESSURE CHRONIC ULCER OF LEFT ANKLE WITH UNSPECIFIED SEVERITY: ICD-10-CM

## 2024-01-03 DIAGNOSIS — J44.9 CHRONIC OBSTRUCTIVE PULMONARY DISEASE, UNSPECIFIED: ICD-10-CM

## 2024-01-03 DIAGNOSIS — N18.32 CHRONIC KIDNEY DISEASE, STAGE 3B: ICD-10-CM

## 2024-01-03 DIAGNOSIS — I35.0 NONRHEUMATIC AORTIC (VALVE) STENOSIS: ICD-10-CM

## 2024-01-03 DIAGNOSIS — E03.9 HYPOTHYROIDISM, UNSPECIFIED: ICD-10-CM

## 2024-01-03 DIAGNOSIS — K21.9 GASTRO-ESOPHAGEAL REFLUX DISEASE WITHOUT ESOPHAGITIS: ICD-10-CM

## 2024-01-03 DIAGNOSIS — Z91.041 RADIOGRAPHIC DYE ALLERGY STATUS: ICD-10-CM

## 2024-01-03 DIAGNOSIS — T81.40XA INFECTION FOLLOWING A PROCEDURE, UNSPECIFIED, INITIAL ENCOUNTER: ICD-10-CM

## 2024-01-03 LAB
ALBUMIN SERPL ELPH-MCNC: 2.8 G/DL — LOW (ref 3.3–5)
ALBUMIN SERPL ELPH-MCNC: 2.8 G/DL — LOW (ref 3.3–5)
ALP SERPL-CCNC: 198 U/L — HIGH (ref 40–120)
ALP SERPL-CCNC: 198 U/L — HIGH (ref 40–120)
ALT FLD-CCNC: 23 U/L — SIGNIFICANT CHANGE UP (ref 12–78)
ALT FLD-CCNC: 23 U/L — SIGNIFICANT CHANGE UP (ref 12–78)
ANION GAP SERPL CALC-SCNC: 5 MMOL/L — SIGNIFICANT CHANGE UP (ref 5–17)
ANION GAP SERPL CALC-SCNC: 5 MMOL/L — SIGNIFICANT CHANGE UP (ref 5–17)
APTT BLD: 34.2 SEC — SIGNIFICANT CHANGE UP (ref 24.5–35.6)
APTT BLD: 34.2 SEC — SIGNIFICANT CHANGE UP (ref 24.5–35.6)
AST SERPL-CCNC: 47 U/L — HIGH (ref 15–37)
AST SERPL-CCNC: 47 U/L — HIGH (ref 15–37)
BASOPHILS # BLD AUTO: 0.02 K/UL — SIGNIFICANT CHANGE UP (ref 0–0.2)
BASOPHILS # BLD AUTO: 0.02 K/UL — SIGNIFICANT CHANGE UP (ref 0–0.2)
BASOPHILS NFR BLD AUTO: 0.4 % — SIGNIFICANT CHANGE UP (ref 0–2)
BASOPHILS NFR BLD AUTO: 0.4 % — SIGNIFICANT CHANGE UP (ref 0–2)
BILIRUB SERPL-MCNC: 0.5 MG/DL — SIGNIFICANT CHANGE UP (ref 0.2–1.2)
BILIRUB SERPL-MCNC: 0.5 MG/DL — SIGNIFICANT CHANGE UP (ref 0.2–1.2)
BUN SERPL-MCNC: 53 MG/DL — HIGH (ref 7–23)
BUN SERPL-MCNC: 53 MG/DL — HIGH (ref 7–23)
CALCIUM SERPL-MCNC: 8.6 MG/DL — SIGNIFICANT CHANGE UP (ref 8.5–10.1)
CALCIUM SERPL-MCNC: 8.6 MG/DL — SIGNIFICANT CHANGE UP (ref 8.5–10.1)
CHLORIDE SERPL-SCNC: 108 MMOL/L — SIGNIFICANT CHANGE UP (ref 96–108)
CHLORIDE SERPL-SCNC: 108 MMOL/L — SIGNIFICANT CHANGE UP (ref 96–108)
CO2 SERPL-SCNC: 31 MMOL/L — SIGNIFICANT CHANGE UP (ref 22–31)
CO2 SERPL-SCNC: 31 MMOL/L — SIGNIFICANT CHANGE UP (ref 22–31)
CREAT SERPL-MCNC: 1.85 MG/DL — HIGH (ref 0.5–1.3)
CREAT SERPL-MCNC: 1.85 MG/DL — HIGH (ref 0.5–1.3)
EGFR: 38 ML/MIN/1.73M2 — LOW
EGFR: 38 ML/MIN/1.73M2 — LOW
EOSINOPHIL # BLD AUTO: 0.08 K/UL — SIGNIFICANT CHANGE UP (ref 0–0.5)
EOSINOPHIL # BLD AUTO: 0.08 K/UL — SIGNIFICANT CHANGE UP (ref 0–0.5)
EOSINOPHIL NFR BLD AUTO: 1.6 % — SIGNIFICANT CHANGE UP (ref 0–6)
EOSINOPHIL NFR BLD AUTO: 1.6 % — SIGNIFICANT CHANGE UP (ref 0–6)
GLUCOSE SERPL-MCNC: 115 MG/DL — HIGH (ref 70–99)
GLUCOSE SERPL-MCNC: 115 MG/DL — HIGH (ref 70–99)
HCT VFR BLD CALC: 31 % — LOW (ref 39–50)
HCT VFR BLD CALC: 31 % — LOW (ref 39–50)
HGB BLD-MCNC: 9.8 G/DL — LOW (ref 13–17)
HGB BLD-MCNC: 9.8 G/DL — LOW (ref 13–17)
IMM GRANULOCYTES NFR BLD AUTO: 0.4 % — SIGNIFICANT CHANGE UP (ref 0–0.9)
IMM GRANULOCYTES NFR BLD AUTO: 0.4 % — SIGNIFICANT CHANGE UP (ref 0–0.9)
INR BLD: 1.8 RATIO — HIGH (ref 0.85–1.18)
INR BLD: 1.8 RATIO — HIGH (ref 0.85–1.18)
LYMPHOCYTES # BLD AUTO: 0.82 K/UL — LOW (ref 1–3.3)
LYMPHOCYTES # BLD AUTO: 0.82 K/UL — LOW (ref 1–3.3)
LYMPHOCYTES # BLD AUTO: 16.6 % — SIGNIFICANT CHANGE UP (ref 13–44)
LYMPHOCYTES # BLD AUTO: 16.6 % — SIGNIFICANT CHANGE UP (ref 13–44)
MCHC RBC-ENTMCNC: 29 PG — SIGNIFICANT CHANGE UP (ref 27–34)
MCHC RBC-ENTMCNC: 29 PG — SIGNIFICANT CHANGE UP (ref 27–34)
MCHC RBC-ENTMCNC: 31.6 GM/DL — LOW (ref 32–36)
MCHC RBC-ENTMCNC: 31.6 GM/DL — LOW (ref 32–36)
MCV RBC AUTO: 91.7 FL — SIGNIFICANT CHANGE UP (ref 80–100)
MCV RBC AUTO: 91.7 FL — SIGNIFICANT CHANGE UP (ref 80–100)
MONOCYTES # BLD AUTO: 0.61 K/UL — SIGNIFICANT CHANGE UP (ref 0–0.9)
MONOCYTES # BLD AUTO: 0.61 K/UL — SIGNIFICANT CHANGE UP (ref 0–0.9)
MONOCYTES NFR BLD AUTO: 12.3 % — SIGNIFICANT CHANGE UP (ref 2–14)
MONOCYTES NFR BLD AUTO: 12.3 % — SIGNIFICANT CHANGE UP (ref 2–14)
NEUTROPHILS # BLD AUTO: 3.4 K/UL — SIGNIFICANT CHANGE UP (ref 1.8–7.4)
NEUTROPHILS # BLD AUTO: 3.4 K/UL — SIGNIFICANT CHANGE UP (ref 1.8–7.4)
NEUTROPHILS NFR BLD AUTO: 68.7 % — SIGNIFICANT CHANGE UP (ref 43–77)
NEUTROPHILS NFR BLD AUTO: 68.7 % — SIGNIFICANT CHANGE UP (ref 43–77)
PLATELET # BLD AUTO: 320 K/UL — SIGNIFICANT CHANGE UP (ref 150–400)
PLATELET # BLD AUTO: 320 K/UL — SIGNIFICANT CHANGE UP (ref 150–400)
POTASSIUM SERPL-MCNC: 3.3 MMOL/L — LOW (ref 3.5–5.3)
POTASSIUM SERPL-MCNC: 3.3 MMOL/L — LOW (ref 3.5–5.3)
POTASSIUM SERPL-SCNC: 3.3 MMOL/L — LOW (ref 3.5–5.3)
POTASSIUM SERPL-SCNC: 3.3 MMOL/L — LOW (ref 3.5–5.3)
PROT SERPL-MCNC: 6.9 GM/DL — SIGNIFICANT CHANGE UP (ref 6–8.3)
PROT SERPL-MCNC: 6.9 GM/DL — SIGNIFICANT CHANGE UP (ref 6–8.3)
PROTHROM AB SERPL-ACNC: 20 SEC — HIGH (ref 9.5–13)
PROTHROM AB SERPL-ACNC: 20 SEC — HIGH (ref 9.5–13)
RBC # BLD: 3.38 M/UL — LOW (ref 4.2–5.8)
RBC # BLD: 3.38 M/UL — LOW (ref 4.2–5.8)
RBC # FLD: 15 % — HIGH (ref 10.3–14.5)
RBC # FLD: 15 % — HIGH (ref 10.3–14.5)
SODIUM SERPL-SCNC: 144 MMOL/L — SIGNIFICANT CHANGE UP (ref 135–145)
SODIUM SERPL-SCNC: 144 MMOL/L — SIGNIFICANT CHANGE UP (ref 135–145)
TSH SERPL-MCNC: 1.24 UU/ML — SIGNIFICANT CHANGE UP (ref 0.34–4.82)
TSH SERPL-MCNC: 1.24 UU/ML — SIGNIFICANT CHANGE UP (ref 0.34–4.82)
WBC # BLD: 4.95 K/UL — SIGNIFICANT CHANGE UP (ref 3.8–10.5)
WBC # BLD: 4.95 K/UL — SIGNIFICANT CHANGE UP (ref 3.8–10.5)
WBC # FLD AUTO: 4.95 K/UL — SIGNIFICANT CHANGE UP (ref 3.8–10.5)
WBC # FLD AUTO: 4.95 K/UL — SIGNIFICANT CHANGE UP (ref 3.8–10.5)

## 2024-01-03 PROCEDURE — 99232 SBSQ HOSP IP/OBS MODERATE 35: CPT

## 2024-01-03 RX ORDER — POTASSIUM CHLORIDE 20 MEQ
40 PACKET (EA) ORAL EVERY 4 HOURS
Refills: 0 | Status: COMPLETED | OUTPATIENT
Start: 2024-01-03 | End: 2024-01-03

## 2024-01-03 RX ORDER — COLLAGENASE CLOSTRIDIUM HIST. 250 UNIT/G
1 OINTMENT (GRAM) TOPICAL DAILY
Refills: 0 | Status: DISCONTINUED | OUTPATIENT
Start: 2024-01-03 | End: 2024-01-05

## 2024-01-03 RX ORDER — INFLUENZA VIRUS VACCINE 15; 15; 15; 15 UG/.5ML; UG/.5ML; UG/.5ML; UG/.5ML
0.7 SUSPENSION INTRAMUSCULAR ONCE
Refills: 0 | Status: DISCONTINUED | OUTPATIENT
Start: 2024-01-03 | End: 2024-01-05

## 2024-01-03 RX ADMIN — APIXABAN 5 MILLIGRAM(S): 2.5 TABLET, FILM COATED ORAL at 10:59

## 2024-01-03 RX ADMIN — Medication 100 MICROGRAM(S): at 05:06

## 2024-01-03 RX ADMIN — Medication 250 MILLIGRAM(S): at 21:46

## 2024-01-03 RX ADMIN — TIOTROPIUM BROMIDE 2 PUFF(S): 18 CAPSULE ORAL; RESPIRATORY (INHALATION) at 14:02

## 2024-01-03 RX ADMIN — ATORVASTATIN CALCIUM 40 MILLIGRAM(S): 80 TABLET, FILM COATED ORAL at 21:46

## 2024-01-03 RX ADMIN — Medication 1 TABLET(S): at 10:59

## 2024-01-03 RX ADMIN — Medication 250 MILLIGRAM(S): at 01:00

## 2024-01-03 RX ADMIN — Medication 40 MILLIEQUIVALENT(S): at 11:00

## 2024-01-03 RX ADMIN — Medication 325 MILLIGRAM(S): at 10:59

## 2024-01-03 RX ADMIN — Medication 250 MILLIGRAM(S): at 11:02

## 2024-01-03 RX ADMIN — ERTAPENEM SODIUM 120 MILLIGRAM(S): 1 INJECTION, POWDER, LYOPHILIZED, FOR SOLUTION INTRAMUSCULAR; INTRAVENOUS at 01:00

## 2024-01-03 RX ADMIN — APIXABAN 5 MILLIGRAM(S): 2.5 TABLET, FILM COATED ORAL at 21:46

## 2024-01-03 RX ADMIN — SENNA PLUS 2 TABLET(S): 8.6 TABLET ORAL at 21:46

## 2024-01-03 RX ADMIN — Medication 1000 UNIT(S): at 11:01

## 2024-01-03 RX ADMIN — Medication 5 MILLIGRAM(S): at 21:46

## 2024-01-03 RX ADMIN — Medication 40 MILLIEQUIVALENT(S): at 15:03

## 2024-01-03 NOTE — DIETITIAN NUTRITION RISK NOTIFICATION - TREATMENT: THE FOLLOWING DIET HAS BEEN RECOMMENDED
Diet, Regular:   DASH/TLC {Sodium & Cholesterol Restricted} (DASH) (01-02-24 @ 21:49) [Active]

## 2024-01-03 NOTE — PATIENT PROFILE ADULT - FUNCTIONAL ASSESSMENT - BASIC MOBILITY 6.
2-calculated by average/Not able to assess (calculate score using [facility] averaging method)  2-calculated by average/Not able to assess (calculate score using [facility] averaging method)

## 2024-01-03 NOTE — CONSULT NOTE ADULT - SUBJECTIVE AND OBJECTIVE BOX
Patient is a 73y old  Male who presents with a chief complaint of Syncope and collapse    HPI:  72 y/o male with h/o HTN, GERD,  severe PVD s/p multiple stents b/l legs, s/p L  fem/popliteal bypass, chronic left lower ankle ulceration, COVID-19 viral syndrome, recent hospitalization on 12/9 for right inguinal cellulitis with likely postsurgical infection Peptoniphilus and MSSA s/p wound debridement, undergoing abx therapy via PICC line with ertapenem 1 gm IV qd was admitted on 1/2 for a near syncope episode. Patient states that after getting sutures removed from his groin, he slumped a bit to the side, and wasn't responding appropriately. States that he didn't lose consciousness fully, and felt like he was fainting. States he has been having decreased PO intake because he does not like the food at the rehab center. No fever or chills reported as outpatient. In ER he received ertapenem.     PMH: as above  PSH: as above  Meds: per reconciliation sheet, noted below  MEDICATIONS  (STANDING):  apixaban 5 milliGRAM(s) Oral two times a day  atorvastatin 40 milliGRAM(s) Oral at bedtime  cholecalciferol 1000 Unit(s) Oral daily  ertapenem  IVPB 500 milliGRAM(s) IV Intermittent every 24 hours  ferrous    sulfate 325 milliGRAM(s) Oral daily  influenza  Vaccine (HIGH DOSE) 0.7 milliLiter(s) IntraMuscular once  levothyroxine 100 MICROGram(s) Oral daily  multivitamin 1 Tablet(s) Oral daily  potassium chloride    Tablet ER 40 milliEquivalent(s) Oral every 4 hours  saccharomyces boulardii 250 milliGRAM(s) Oral two times a day  senna 2 Tablet(s) Oral at bedtime  sodium chloride 0.9%. 1000 milliLiter(s) (125 mL/Hr) IV Continuous <Continuous>  sodium chloride 0.9%. 1000 milliLiter(s) (100 mL/Hr) IV Continuous <Continuous>  tiotropium 2.5 MICROgram(s) Inhaler 2 Puff(s) Inhalation daily    MEDICATIONS  (PRN):  acetaminophen     Tablet .. 650 milliGRAM(s) Oral every 6 hours PRN Mild Pain (1 - 3)  albuterol    90 MICROgram(s) HFA Inhaler 2 Puff(s) Inhalation every 6 hours PRN Bronchospasm  melatonin 5 milliGRAM(s) Oral at bedtime PRN Insomnia  polyethylene glycol 3350 17 Gram(s) Oral daily PRN Constipation    Allergies    IV Contrast (Hives)  Betadine (Hives; Rash)    Intolerances      Social: no smoking, no alcohol, no illegal drugs; no recent travel, no exposure to TB  FAMILY HISTORY:    no history of premature cardiovascular disease in first degree relatives    ROS: the patient denies fever, no chills, no HA, no seizures, no dizziness, no sore throat, no nasal congestion, no blurry vision, no CP, no palpitations, no SOB, no cough, no abdominal pain, no diarrhea, no N/V, no dysuria, no leg pain, no claudication, no rash, no joint aches, no rectal pain or bleeding, no night sweats  All other systems reviewed and are negative    Vital Signs Last 24 Hrs  T(C): 36.6 (03 Jan 2024 08:33), Max: 36.8 (02 Jan 2024 17:10)  T(F): 97.8 (03 Jan 2024 08:33), Max: 98.3 (02 Jan 2024 17:10)  HR: 83 (03 Jan 2024 14:02) (56 - 86)  BP: 146/75 (03 Jan 2024 08:33) (120/75 - 146/75)  BP(mean): 89 (02 Jan 2024 21:34) (74 - 123)  RR: 18 (03 Jan 2024 08:33) (14 - 18)  SpO2: 100% (03 Jan 2024 08:33) (99% - 100%)    Parameters below as of 03 Jan 2024 08:33  Patient On (Oxygen Delivery Method): room air    PE:    Constitutional:  No acute distress  HEENT: NC/AT, EOMI, PERRLA, conjunctivae clear; ears and nose atraumatic; pharynx benign  Neck: supple; thyroid not palpable  Back: no tenderness  Respiratory: respiratory effort normal; clear to auscultation  Cardiovascular: S1S2 regular, no murmurs  Abdomen: soft, not tender, not distended, positive BS; no liver or spleen organomegaly  Genitourinary: no suprapubic tenderness  Lymphatic: no LN palpable  Musculoskeletal: no muscle tenderness, no joint swelling or tenderness  Right inguinal postoperative wound   Extremities: no pedal edema  Neurological/ Psychiatric: AxOx3, judgement and insight normal; moving all extremities  Skin: no rashes; no palpable lesions    Labs: all available labs reviewed                        9.8    4.95  )-----------( 320      ( 03 Jan 2024 07:32 )             31.0     01-03    144  |  108  |  53<H>  ----------------------------<  115<H>  3.3<L>   |  31  |  1.85<H>    Ca    8.6      03 Jan 2024 07:32  Mg     2.5     01-02    TPro  6.9  /  Alb  2.8<L>  /  TBili  0.5  /  DBili  x   /  AST  47<H>  /  ALT  23  /  AlkPhos  198<H>  01-03     LIVER FUNCTIONS - ( 03 Jan 2024 07:32 )  Alb: 2.8 g/dL / Pro: 6.9 gm/dL / ALK PHOS: 198 U/L / ALT: 23 U/L / AST: 47 U/L / GGT: x           Urinalysis (01-02 @ 16:28)  Urine Appearance: Clear  Protein, Urine: Negative mg/dL                    Radiology: all available radiological tests reviewed    Advanced directives addressed: full resuscitation Patient is a 73y old  Male who presents with a chief complaint of Syncope and collapse    HPI:  74 y/o male with h/o HTN, GERD,  severe PVD s/p multiple stents b/l legs, s/p L  fem/popliteal bypass, chronic left lower ankle ulceration, COVID-19 viral syndrome, recent hospitalization on 12/9 for right inguinal cellulitis with likely postsurgical infection Peptoniphilus and MSSA s/p wound debridement, undergoing abx therapy via PICC line with ertapenem 1 gm IV qd was admitted on 1/2 for a near syncope episode. Patient states that after getting sutures removed from his groin, he slumped a bit to the side, and wasn't responding appropriately. States that he didn't lose consciousness fully, and felt like he was fainting. States he has been having decreased PO intake because he does not like the food at the rehab center. No fever or chills reported as outpatient. In ER he received ertapenem.     PMH: as above  PSH: as above  Meds: per reconciliation sheet, noted below  MEDICATIONS  (STANDING):  apixaban 5 milliGRAM(s) Oral two times a day  atorvastatin 40 milliGRAM(s) Oral at bedtime  cholecalciferol 1000 Unit(s) Oral daily  ertapenem  IVPB 500 milliGRAM(s) IV Intermittent every 24 hours  ferrous    sulfate 325 milliGRAM(s) Oral daily  influenza  Vaccine (HIGH DOSE) 0.7 milliLiter(s) IntraMuscular once  levothyroxine 100 MICROGram(s) Oral daily  multivitamin 1 Tablet(s) Oral daily  potassium chloride    Tablet ER 40 milliEquivalent(s) Oral every 4 hours  saccharomyces boulardii 250 milliGRAM(s) Oral two times a day  senna 2 Tablet(s) Oral at bedtime  sodium chloride 0.9%. 1000 milliLiter(s) (125 mL/Hr) IV Continuous <Continuous>  sodium chloride 0.9%. 1000 milliLiter(s) (100 mL/Hr) IV Continuous <Continuous>  tiotropium 2.5 MICROgram(s) Inhaler 2 Puff(s) Inhalation daily    MEDICATIONS  (PRN):  acetaminophen     Tablet .. 650 milliGRAM(s) Oral every 6 hours PRN Mild Pain (1 - 3)  albuterol    90 MICROgram(s) HFA Inhaler 2 Puff(s) Inhalation every 6 hours PRN Bronchospasm  melatonin 5 milliGRAM(s) Oral at bedtime PRN Insomnia  polyethylene glycol 3350 17 Gram(s) Oral daily PRN Constipation    Allergies    IV Contrast (Hives)  Betadine (Hives; Rash)    Intolerances      Social: no smoking, no alcohol, no illegal drugs; no recent travel, no exposure to TB  FAMILY HISTORY:    no history of premature cardiovascular disease in first degree relatives    ROS: the patient denies fever, no chills, no HA, no seizures, no dizziness, no sore throat, no nasal congestion, no blurry vision, no CP, no palpitations, no SOB, no cough, no abdominal pain, no diarrhea, no N/V, no dysuria, no leg pain, no claudication, no rash, no joint aches, no rectal pain or bleeding, no night sweats  All other systems reviewed and are negative    Vital Signs Last 24 Hrs  T(C): 36.6 (03 Jan 2024 08:33), Max: 36.8 (02 Jan 2024 17:10)  T(F): 97.8 (03 Jan 2024 08:33), Max: 98.3 (02 Jan 2024 17:10)  HR: 83 (03 Jan 2024 14:02) (56 - 86)  BP: 146/75 (03 Jan 2024 08:33) (120/75 - 146/75)  BP(mean): 89 (02 Jan 2024 21:34) (74 - 123)  RR: 18 (03 Jan 2024 08:33) (14 - 18)  SpO2: 100% (03 Jan 2024 08:33) (99% - 100%)    Parameters below as of 03 Jan 2024 08:33  Patient On (Oxygen Delivery Method): room air    PE:    Constitutional:  No acute distress  HEENT: NC/AT, EOMI, PERRLA, conjunctivae clear; ears and nose atraumatic; pharynx benign  Neck: supple; thyroid not palpable  Back: no tenderness  Respiratory: respiratory effort normal; clear to auscultation  Cardiovascular: S1S2 regular, no murmurs  Abdomen: soft, not tender, not distended, positive BS; no liver or spleen organomegaly  Genitourinary: no suprapubic tenderness  Lymphatic: no LN palpable  Musculoskeletal: no muscle tenderness, no joint swelling or tenderness  Right inguinal postoperative wound dry, no drainage    Extremities: no pedal edema  Right medial ankle superficial ulcer, no surrounding erythema  Neurological/ Psychiatric: AxOx3, judgement and insight normal; moving all extremities  Skin: no rashes; no palpable lesions    Labs: all available labs reviewed                        9.8    4.95  )-----------( 320      ( 03 Jan 2024 07:32 )             31.0     01-03    144  |  108  |  53<H>  ----------------------------<  115<H>  3.3<L>   |  31  |  1.85<H>    Ca    8.6      03 Jan 2024 07:32  Mg     2.5     01-02    TPro  6.9  /  Alb  2.8<L>  /  TBili  0.5  /  DBili  x   /  AST  47<H>  /  ALT  23  /  AlkPhos  198<H>  01-03     LIVER FUNCTIONS - ( 03 Jan 2024 07:32 )  Alb: 2.8 g/dL / Pro: 6.9 gm/dL / ALK PHOS: 198 U/L / ALT: 23 U/L / AST: 47 U/L / GGT: x           Urinalysis (01-02 @ 16:28)  Urine Appearance: Clear  Protein, Urine: Negative mg/dL                    Radiology: all available radiological tests reviewed    Advanced directives addressed: full resuscitation

## 2024-01-03 NOTE — DIETITIAN INITIAL EVALUATION ADULT - PERTINENT LABORATORY DATA
01-03    144  |  108  |  53<H>  ----------------------------<  115<H>  3.3<L>   |  31  |  1.85<H>    Ca    8.6      03 Jan 2024 07:32  Mg     2.5     01-02    TPro  6.9  /  Alb  2.8<L>  /  TBili  0.5  /  DBili  x   /  AST  47<H>  /  ALT  23  /  AlkPhos  198<H>  01-03  A1C with Estimated Average Glucose Result: 5.3 % (12-09-23 @ 08:09)  A1C with Estimated Average Glucose Result: 6.2 % (10-23-23 @ 06:00)

## 2024-01-03 NOTE — CONSULT NOTE ADULT - ASSESSMENT
74 y/o male with h/o HTN, GERD,  severe PVD s/p multiple stents b/l legs, s/p L  fem/popliteal bypass, chronic left lower ankle ulceration, COVID-19 viral syndrome, recent hospitalization on 12/9 for right inguinal cellulitis with likely postsurgical infection Peptoniphilus and MSSA s/p wound debridement, undergoing abx therapy via PICC line with ertapenem 1 gm IV qd was admitted on 1/2 for a near syncope episode. Patient states that after getting sutures removed from his groin, he slumped a bit to the side, and wasn't responding appropriately. States that he didn't lose consciousness fully, and felt like he was fainting. States he has been having decreased PO intake because he does not like the food at the rehab center. No fever or chills reported as outpatient. In ER he received ertapenem.    1. Near syncopal episode. Right inguinal cellulitis s/p recent vascular surgery. Postsurgical infection Peptoniphilus and MSSA on IV abx therapy. CRF stage 3. PVD s/p vascular bypass.   -no fever noted  -postoperative wound is healing  -on ertapenem 1 gm IV qd since 12/18  -tolerating abx well so far; no side effects noted  -local wound care  -vascular surgery evaluation  -cardiology evaluation  -continue ertapenem 1 gm IV qd  -reason for abx use and side effects reviewed with patient; monitor BMP   -will need 6 weeks of abx therapy for the date of surgery  -wound care  -PICC line care  -monitor temps  -f/u CBC  -supportive care  2. Other issues:   -care per medicine       74 y/o male with h/o HTN, GERD,  severe PVD s/p multiple stents b/l legs, s/p L  fem/popliteal bypass, chronic left lower ankle ulceration, COVID-19 viral syndrome, recent hospitalization on 12/9 for right inguinal cellulitis with likely postsurgical infection Peptoniphilus and MSSA s/p wound debridement, undergoing abx therapy via PICC line with ertapenem 1 gm IV qd was admitted on 1/2 for a near syncope episode. Patient states that after getting sutures removed from his groin, he slumped a bit to the side, and wasn't responding appropriately. States that he didn't lose consciousness fully, and felt like he was fainting. States he has been having decreased PO intake because he does not like the food at the rehab center. No fever or chills reported as outpatient. In ER he received ertapenem.    1. Near syncopal episode. Right inguinal cellulitis s/p recent vascular surgery. Postsurgical infection Peptoniphilus and MSSA on IV abx therapy. CRF stage 3. PVD s/p vascular bypass.   -no fever noted  -postoperative wound is healing  -on ertapenem 1 gm IV qd since 12/18  -tolerating abx well so far; no side effects noted  -local wound care  -vascular surgery evaluation  -cardiology evaluation  -continue ertapenem 500 gm IV qd  -reason for abx use and side effects reviewed with patient; monitor BMP   -will need 6 weeks of abx therapy for the date of surgery  -wound care  -PICC line care  -monitor temps  -f/u CBC  -supportive care  2. Other issues:   -care per medicine

## 2024-01-03 NOTE — DIETITIAN INITIAL EVALUATION ADULT - NSFNSGIIOFT_GEN_A_CORE
I&O's Detail    02 Jan 2024 07:01  -  03 Jan 2024 07:00  --------------------------------------------------------  IN:    sodium chloride 0.9%: 600 mL  Total IN: 600 mL    OUT:    Voided (mL): 600 mL  Total OUT: 600 mL    Total NET: 0 mL

## 2024-01-03 NOTE — DIETITIAN INITIAL EVALUATION ADULT - ENTER FROM (ML/KG)
Gen - NAD, comfortable in stretcher, non-toxic appearing, speaking in full sentences   Skin - warm, dry, intact  HEENT - AT/NC, PERRL, EOMI, no conjunctival injection, moist oral mucosa, o/p clear with no erythema, edema, or exudate, uvula midline, airway patent, neck supple and NT, FROM, no palpable nodes  CV - S1S2, R/R/R  Resp - respiration non-labored, CTAB, symmetric bs b/l, no r/r/w  GI - NABS, soft, ND, NT, no rebound or guarding  Neuro - AxOx3, ambulatory without gait disturbance 30

## 2024-01-03 NOTE — PATIENT PROFILE ADULT - FALL HARM RISK - HARM RISK INTERVENTIONS
Assistance with ambulation/Assistance OOB with selected safe patient handling equipment/Communicate Risk of Fall with Harm to all staff/Discuss with provider need for PT consult/Monitor for mental status changes/Monitor gait and stability/Provide patient with walking aids - walker, cane, crutches/Reinforce activity limits and safety measures with patient and family/Reorient to person, place and time as needed/Review medications for side effects contributing to fall risk/Sit up slowly, dangle for a short time, stand at bedside before walking/Tailored Fall Risk Interventions/Use of alarms - bed, chair and/or voice tab/Visual Cue: Yellow wristband and red socks/Bed in lowest position, wheels locked, appropriate side rails in place/Call bell, personal items and telephone in reach/Instruct patient to call for assistance before getting out of bed or chair/Non-slip footwear when patient is out of bed/[place] to call system/Physically safe environment - no spills, clutter or unnecessary equipment/Purposeful Proactive Rounding/Room/bathroom lighting operational, light cord in reach Assistance with ambulation/Assistance OOB with selected safe patient handling equipment/Communicate Risk of Fall with Harm to all staff/Discuss with provider need for PT consult/Monitor for mental status changes/Monitor gait and stability/Provide patient with walking aids - walker, cane, crutches/Reinforce activity limits and safety measures with patient and family/Reorient to person, place and time as needed/Review medications for side effects contributing to fall risk/Sit up slowly, dangle for a short time, stand at bedside before walking/Tailored Fall Risk Interventions/Use of alarms - bed, chair and/or voice tab/Visual Cue: Yellow wristband and red socks/Bed in lowest position, wheels locked, appropriate side rails in place/Call bell, personal items and telephone in reach/Instruct patient to call for assistance before getting out of bed or chair/Non-slip footwear when patient is out of bed/[place] to call system/Physically safe environment - no spills, clutter or unnecessary equipment/Purposeful Proactive Rounding/Room/bathroom lighting operational, light cord in reach

## 2024-01-03 NOTE — PHYSICAL THERAPY INITIAL EVALUATION ADULT - PERTINENT HX OF CURRENT PROBLEM, REHAB EVAL
near syncopal episode @ [facility]; recent R LE bypass sx complicated by R groin cellulitis with subsequent washout

## 2024-01-03 NOTE — DIETITIAN INITIAL EVALUATION ADULT - ADD RECOMMEND
1. Recommend liberalize diet to regular to maximize caloric and nutrient intake.   2. Recommend ensure plus high protein BID to optimize PO intake (provides 350 kcal, 20g protein/ shake)   3. Consider adding thiamine 100 mg daily 2/2 poor PO intake/ malnutrition   4. C/w MVI w/ minerals daily to ensure 100% RDA met   5. Encourage protein-rich foods, maximize food preferences   6. Monitor bowel movements, c/w bowel regimen (senna daily, miralax PRN)  7. Obtain vitamin D 25OH level to assess nutriture   8. Please obtain current and weekly weights   9. Confirm goals of care regarding nutrition support   RD will continue to monitor PO intake, labs, hydration, and wt prn.

## 2024-01-03 NOTE — PHYSICAL THERAPY INITIAL EVALUATION ADULT - LIVES WITH, PROFILE
[address]; 13 stairs inside with handrail; [facility] in [place]/spouse 4 [address]; 13 stairs inside with handrail; [facility] [place]/spouse

## 2024-01-03 NOTE — DIETITIAN INITIAL EVALUATION ADULT - OTHER INFO
72 y/o M w/ PMH of PVD s/p multiple stents, chronic lower ankle ulceration, CAD, recent admission for R groin surgical site cellulitis 2/2  recent R bypass procedure  s/p R groin wound washout/closure using gracilis muscle flap on 12/18/23(on long-term IV meropenem via PICC), hypothyroidism, CKD, h/o COVID p/w near syncope. States that after getting sutures removed from his groin, he slumped a bit to the side, and wasn't responding appropriately. States that he didn't lose consciousness fully, and felt like he was fainting. States he has been having decreased PO intake because he does not like the food at the rehab center. Adm w/ near syncope likely 2/2 dehydration.     Pt known to nutr services, dx'd w/ PCM on past admission - continues to meet criteria. Pt reports appetite has increased since admission. Endorses UBW of 160# from 3 mo ago w/ a 30# wt loss 2/2 poor intake at rehab. RD noted wt of 140# w/ 2+ edema on 10/26/23; unable to obtain bed scale wt 1/3/24 2/2 pt in chair; dosing wt of 140# in EMR, seems accurate - ? accuracy of wt loss 2/2 pt UBW not aligning w/ wts from EMR. NFPE reveals moderate-severe muscle/fat wasting - appears bony, thin. Recommend liberalize diet to regular to maximize caloric and nutrient intake and add ensure plus high protein BID to optimize PO intake (provides 350 kcal, 20g protein/ shake). See below for further recommendations.

## 2024-01-03 NOTE — PROGRESS NOTE ADULT - SUBJECTIVE AND OBJECTIVE BOX
72 y/o ma w/ PMH of PVD s/p multiple stents, chronic lower ankle ulceration, CAD, recent admission for R groin surgical site cellulitis 2/2  recent R bypass procedure  s/p R groin wound washout/closure using gracilis muscle flap on 12/18/23(on longterm IV meropenem via PICC), hypothyroidism, CKD, h/o COVID p/w near syncope. Patient states that after getting sutures removed from his groin, he slumped a bit to the side, and wasn't responding appropriately. States that he didn't lose consciousness fully, and felt like he was fainting. States he has been having decreased PO intake because he does not like the food at the rehab center.     Patient admitted with JENIFER and near syncope- likely related to dehydration.     1/3- pt was seen and examined. No acute overnight events. VSS- denies dizziness or SOB,    Vital Signs Last 24 Hrs  T(C): 36.6 (03 Jan 2024 08:33), Max: 36.8 (02 Jan 2024 17:10)  T(F): 97.8 (03 Jan 2024 08:33), Max: 98.3 (02 Jan 2024 17:10)  HR: 86 (03 Jan 2024 08:33) (56 - 86)  BP: 146/75 (03 Jan 2024 08:33) (120/75 - 146/75)  BP(mean): 89 (02 Jan 2024 21:34) (69 - 123)  RR: 18 (03 Jan 2024 08:33) (14 - 18)  SpO2: 100% (03 Jan 2024 08:33) (99% - 100%)    Parameters below as of 03 Jan 2024 08:33  Patient On (Oxygen Delivery Method): room air    ROS:   All 10 systems reviewed and found to be negative with the exception of what has been described above.     PE:  Constitutional: NAD, laying in bed  HEENT: NC/AT  Back: no tenderness  Respiratory: respirations even and non labored, LCTA  Cardiovascular: S1S2 regular, no murmurs  Abdomen: soft, not tender, not distended, positive BS  Genitourinary: voiding  Musculoskeletal: no muscle tenderness, no joint swelling or tenderness  Extremities: no pedal edema   Neurological: no focal deficits    MEDICATIONS  (STANDING):  apixaban 5 milliGRAM(s) Oral two times a day  atorvastatin 40 milliGRAM(s) Oral at bedtime  cholecalciferol 1000 Unit(s) Oral daily  ertapenem  IVPB 500 milliGRAM(s) IV Intermittent every 24 hours  ferrous    sulfate 325 milliGRAM(s) Oral daily  influenza  Vaccine (HIGH DOSE) 0.7 milliLiter(s) IntraMuscular once  levothyroxine 100 MICROGram(s) Oral daily  multivitamin 1 Tablet(s) Oral daily  potassium chloride    Tablet ER 40 milliEquivalent(s) Oral every 4 hours  saccharomyces boulardii 250 milliGRAM(s) Oral two times a day  senna 2 Tablet(s) Oral at bedtime  sodium chloride 0.9%. 1000 milliLiter(s) (100 mL/Hr) IV Continuous <Continuous>  sodium chloride 0.9%. 1000 milliLiter(s) (125 mL/Hr) IV Continuous <Continuous>  tiotropium 2.5 MICROgram(s) Inhaler 2 Puff(s) Inhalation daily    MEDICATIONS  (PRN):  acetaminophen     Tablet .. 650 milliGRAM(s) Oral every 6 hours PRN Mild Pain (1 - 3)  albuterol    90 MICROgram(s) HFA Inhaler 2 Puff(s) Inhalation every 6 hours PRN Bronchospasm  melatonin 5 milliGRAM(s) Oral at bedtime PRN Insomnia  polyethylene glycol 3350 17 Gram(s) Oral daily PRN Constipation                            9.8    4.95  )-----------( 320      ( 03 Jan 2024 07:32 )             31.0     01-03    144  |  108  |  53<H>  ----------------------------<  115<H>  3.3<L>   |  31  |  1.85<H>    Ca    8.6      03 Jan 2024 07:32  Mg     2.5     01-02    TPro  6.9  /  Alb  2.8<L>  /  TBili  0.5  /  DBili  x   /  AST  47<H>  /  ALT  23  /  AlkPhos  198<H>  01-03      ACC: [id number] EXAM:  CT BRAIN   ORDERED BY: DOLORES WAGNER     PROCEDURE DATE:  01/02/2024          INTERPRETATION:  History: Near syncope episode    Head CT  1/2/2024    Thin axial sections through the head obtained from skull base to vertex.   Coronal and sagittal reconstructed images provided.    Comparison made to previous head CT 10/24/2023.    There is prominence of the cortical sulci, fissures and ventricles   related generalized volume loss. There is a cavum of septum pellucidum   and vergae. There is mild periventricular  white matter hypodensity   likely related to small vessel ischemic disease. There is no midline   shift. There is no acute intracranial hemorrhage. There are no abnormal   extra-axial collections.There is a  leftparasellar aneurysmal coil.  The calvarium is intact. The visualized orbits are unremarkable. There is   left mastoid air cell opacification with associated bony sclerosis   related to mastoiditis.  IMPRESSION:  No acute intracranial hemorrhage.  Moderate generalized volume loss.  Left parasellar aneurysmal coil.  Left-sided mastoiditis.   72 y/o ma w/ PMH of PVD s/p multiple stents, chronic lower ankle ulceration, CAD, recent admission for R groin surgical site cellulitis 2/2  recent R bypass procedure  s/p R groin wound washout/closure using gracilis muscle flap on 12/18/23(on longterm IV meropenem via PICC), hypothyroidism, CKD, h/o COVID p/w near syncope. Patient states that after getting sutures removed from his groin, he slumped a bit to the side, and wasn't responding appropriately. States that he didn't lose consciousness fully, and felt like he was fainting. States he has been having decreased PO intake because he does not like the food at the rehab center.     Patient admitted with JENIFER and near syncope- likely related to dehydration.     1/3- pt was seen and examined. No acute overnight events. VSS- denies dizziness or SOB,    Vital Signs Last 24 Hrs  T(C): 36.6 (03 Jan 2024 08:33), Max: 36.8 (02 Jan 2024 17:10)  T(F): 97.8 (03 Jan 2024 08:33), Max: 98.3 (02 Jan 2024 17:10)  HR: 86 (03 Jan 2024 08:33) (56 - 86)  BP: 146/75 (03 Jan 2024 08:33) (120/75 - 146/75)  BP(mean): 89 (02 Jan 2024 21:34) (69 - 123)  RR: 18 (03 Jan 2024 08:33) (14 - 18)  SpO2: 100% (03 Jan 2024 08:33) (99% - 100%)    Parameters below as of 03 Jan 2024 08:33  Patient On (Oxygen Delivery Method): room air    ROS:   All 10 systems reviewed and found to be negative with the exception of what has been described above.     PE:  Constitutional: NAD, laying in bed  HEENT: NC/AT  Back: no tenderness  Respiratory: respirations even and non labored, LCTA  Cardiovascular: S1S2 regular, no murmurs  Abdomen: soft, not tender, not distended, positive BS  Genitourinary: voiding  Musculoskeletal: no muscle tenderness, no joint swelling or tenderness  Extremities: no pedal edema   Neurological: no focal deficits    MEDICATIONS  (STANDING):  apixaban 5 milliGRAM(s) Oral two times a day  atorvastatin 40 milliGRAM(s) Oral at bedtime  cholecalciferol 1000 Unit(s) Oral daily  ertapenem  IVPB 500 milliGRAM(s) IV Intermittent every 24 hours  ferrous    sulfate 325 milliGRAM(s) Oral daily  influenza  Vaccine (HIGH DOSE) 0.7 milliLiter(s) IntraMuscular once  levothyroxine 100 MICROGram(s) Oral daily  multivitamin 1 Tablet(s) Oral daily  potassium chloride    Tablet ER 40 milliEquivalent(s) Oral every 4 hours  saccharomyces boulardii 250 milliGRAM(s) Oral two times a day  senna 2 Tablet(s) Oral at bedtime  sodium chloride 0.9%. 1000 milliLiter(s) (100 mL/Hr) IV Continuous <Continuous>  sodium chloride 0.9%. 1000 milliLiter(s) (125 mL/Hr) IV Continuous <Continuous>  tiotropium 2.5 MICROgram(s) Inhaler 2 Puff(s) Inhalation daily    MEDICATIONS  (PRN):  acetaminophen     Tablet .. 650 milliGRAM(s) Oral every 6 hours PRN Mild Pain (1 - 3)  albuterol    90 MICROgram(s) HFA Inhaler 2 Puff(s) Inhalation every 6 hours PRN Bronchospasm  melatonin 5 milliGRAM(s) Oral at bedtime PRN Insomnia  polyethylene glycol 3350 17 Gram(s) Oral daily PRN Constipation                            9.8    4.95  )-----------( 320      ( 03 Jan 2024 07:32 )             31.0     01-03    144  |  108  |  53<H>  ----------------------------<  115<H>  3.3<L>   |  31  |  1.85<H>    Ca    8.6      03 Jan 2024 07:32  Mg     2.5     01-02    TPro  6.9  /  Alb  2.8<L>  /  TBili  0.5  /  DBili  x   /  AST  47<H>  /  ALT  23  /  AlkPhos  198<H>  01-03      ACC: [id number] EXAM:  CT BRAIN   ORDERED BY: DOLORES WAGNER     PROCEDURE DATE:  01/02/2024          INTERPRETATION:  History: Near syncope episode    Head CT  1/2/2024    Thin axial sections through the head obtained from skull base to vertex.   Coronal and sagittal reconstructed images provided.    Comparison made to previous head CT 10/24/2023.    There is prominence of the cortical sulci, fissures and ventricles   related generalized volume loss. There is a cavum of septum pellucidum   and vergae. There is mild periventricular  white matter hypodensity   likely related to small vessel ischemic disease. There is no midline   shift. There is no acute intracranial hemorrhage. There are no abnormal   extra-axial collections.There is a  leftparasellar aneurysmal coil.  The calvarium is intact. The visualized orbits are unremarkable. There is   left mastoid air cell opacification with associated bony sclerosis   related to mastoiditis.  IMPRESSION:  No acute intracranial hemorrhage.  Moderate generalized volume loss.  Left parasellar aneurysmal coil.  Left-sided mastoiditis.

## 2024-01-03 NOTE — DIETITIAN INITIAL EVALUATION ADULT - PERTINENT MEDS FT
MEDICATIONS  (STANDING):  apixaban 5 milliGRAM(s) Oral two times a day  atorvastatin 40 milliGRAM(s) Oral at bedtime  cholecalciferol 1000 Unit(s) Oral daily  ertapenem  IVPB 500 milliGRAM(s) IV Intermittent every 24 hours  ferrous    sulfate 325 milliGRAM(s) Oral daily  influenza  Vaccine (HIGH DOSE) 0.7 milliLiter(s) IntraMuscular once  levothyroxine 100 MICROGram(s) Oral daily  multivitamin 1 Tablet(s) Oral daily  potassium chloride    Tablet ER 40 milliEquivalent(s) Oral every 4 hours  saccharomyces boulardii 250 milliGRAM(s) Oral two times a day  senna 2 Tablet(s) Oral at bedtime  sodium chloride 0.9%. 1000 milliLiter(s) (100 mL/Hr) IV Continuous <Continuous>  sodium chloride 0.9%. 1000 milliLiter(s) (125 mL/Hr) IV Continuous <Continuous>  tiotropium 2.5 MICROgram(s) Inhaler 2 Puff(s) Inhalation daily    MEDICATIONS  (PRN):  acetaminophen     Tablet .. 650 milliGRAM(s) Oral every 6 hours PRN Mild Pain (1 - 3)  albuterol    90 MICROgram(s) HFA Inhaler 2 Puff(s) Inhalation every 6 hours PRN Bronchospasm  melatonin 5 milliGRAM(s) Oral at bedtime PRN Insomnia  polyethylene glycol 3350 17 Gram(s) Oral daily PRN Constipation

## 2024-01-03 NOTE — CONSULT NOTE ADULT - SUBJECTIVE AND OBJECTIVE BOX
Date of Consult: 1/3/24  HPI: 72 y/o M w/ PMH of PVD s/p multiple stents, chronic lower ankle ulceration, CAD, recent admission for R groin surgical site cellulitis 2/2  recent R bypass procedure  s/p R groin wound washout/closure using gracilis muscle flap on 12/18/23(on longterm IV meropenem via PICC), hypothyroidism, CKD, h/o COVID p/w near syncope. Patient states that after getting sutures removed from his groin, he slumped a bit to the side, and wasn't responding appropriately. States that he didn't lose consciousness fully, and felt like he was fainting. States he has been having decreased PO intake because he does not like the food at the rehab center. Denies abdominal pain, nausea, vomiting, CP, SOB, dizziness, LH, cough, runny nose, sore throat.         PSH: PVD s/p multiple stents, L fem / pop bypass, R groin wound washout    Social Hx: Denies tobacco / etoh / drugs     Family Hx: Denies pertinent hx    (02 Jan 2024 21:12)            PMH: Essential hypertension    PVD (peripheral vascular disease)    Gastroesophageal reflux disease, esophagitis presence not specified    Hypothyroidism    Chronic obstructive pulmonary disease, unspecified COPD type    Eczema    Hyperlipidemia, unspecified hyperlipidemia type    Medial meniscus tear      PSH:Femoral popliteal artery thrombus    PVD (peripheral vascular disease)        Allergies:IV Contrast (Hives)  Betadine (Hives; Rash)      Labs:                          9.8    4.95  )-----------( 320      ( 03 Jan 2024 07:32 )             31.0     WBC Trend  4.95 Date (01-03 @ 07:32)  8.03 Date (01-02 @ 14:03)  6.20 Date (12-22 @ 07:09)  7.45 Date (12-21 @ 06:28)  8.74 Date (12-20 @ 16:28)  9.81 Date (12-19 @ 07:46)  6.30 Date (12-18 @ 06:11)  7.59 Date (12-17 @ 10:12)  9.92 Date (12-14 @ 08:45)  13.45<H> Date (12-13 @ 08:41)  6.63 Date (12-12 @ 07:36)  5.29 Date (12-11 @ 06:35)      Chem  01-03    144  |  108  |  53<H>  ----------------------------<  115<H>  3.3<L>   |  31  |  1.85<H>    Ca    8.6      03 Jan 2024 07:32  Mg     2.5     01-02    TPro  6.9  /  Alb  2.8<L>  /  TBili  0.5  /  DBili  x   /  AST  47<H>  /  ALT  23  /  AlkPhos  198<H>  01-03          T(F): 97.5 (01-03-24 @ 15:27), Max: 97.8 (01-03-24 @ 08:33)  HR: 60 (01-03-24 @ 15:27) (57 - 86)  BP: 135/64 (01-03-24 @ 15:27) (120/75 - 146/75)  RR: 18 (01-03-24 @ 15:27) (17 - 18)  SpO2: 95% (01-03-24 @ 15:27) (95% - 100%)  Wt(kg): --    REVIEW OF SYSTEMS:    CONSTITUTIONAL: No weakness, fevers or chills  EYES: No visual changes  RESPIRATORY: No cough, wheezing; No shortness of breath  CARDIOVASCULAR: No chest pain or palpitations  GASTROINTESTINAL: No abdominal or epigastric pain. No nausea, vomiting; No diarrhea or constipation.   GENITOURINARY: No dysuria, frequency or hematuria  NEUROLOGICAL: No numbness or weakness  SKIN: See physical examination.  All other review of systems is negative unless indicated above    Physical Exam:   Constitutional: NAD, alert;  Lower Extremity Focus  Derm:  Skin warm, dry and supple bilateral.    Ulceration to the left medial heel, wound base is mostly granular, wound size is 3cm x 3cm, no periwound edema or erythema noted. no purulence or pus noted, no tracking/tunneling noted, no PTB, no malodor  Vascular: Dorsalis Pedis and Posterior Tibial pulses non palpable.  Capillary re-fill time more then 3 seconds digits 1-5 bilateral.    Neuro: Protective sensation diminished to the level of the digits bilateral.  MSK: Muscle strength 5/5 all major muscle groups bilateral.

## 2024-01-03 NOTE — PHYSICAL THERAPY INITIAL EVALUATION ADULT - MODALITIES TREATMENT COMMENTS
pt left in bed supine post Eval @ pt request; bed alarm on; IV, HM intact; callbell in reach; pt instructed not to get up alone; call nursing for assist; tasha well; denied pain

## 2024-01-03 NOTE — PROGRESS NOTE ADULT - ASSESSMENT
72 y/o M w/ PMH of PVD s/p multiple stents, chronic lower ankle ulceration, CAD, recent admission for R groin surgical site cellulitis 2/2  recent R bypass procedure  s/p R groin wound washout/closure using gracilis muscle flap on 12/18/23 (on longterm IV via PICC), hypothyroidism, CKD, h/o COVID p/w near syncope    *Near-syncope - likely 2/2 dehydration   -IVF   -CTH: No bleeding/CVA reported   -Check orthostatics  -Remote telemetry  -Last Echo on 10/23/23  -Troponin negative x 1  -EKG: Junction rhythm, bradycardic at 57 bpm (previous EKG from 12/9/23 had sinus bradycardia of 46) -> cardio consult for further evaluation and will hold beta blocker overnight  -F/u outpatient neuro if above work up remains negative     *Acute on CKD   -On IV abx, will consult nephro for further evaluation  -I/Os  -UA  -Avoid nephrotoxic agents   -Will hold ARB / Diuretic overnight     *R groin surgical site cellulitis s/p recent vascular surgery  -Plastic surgery / Vascular consult  -ID consult    *L foot wound  -Podiatry consult   -On Abx     *Mastoiditis reported on CT  -ED contacted ENT, who did not believe this to be true mastoiditis  -F/u ID   -Outpatient ENT     *Elevated alk phos / transaminitis  -F/u outpatient GI for further evaluation if reamins stable during hospitalization     *H/o COVID   -Patient was COVID positive on 12/9/23  -Currently asymptomatic     *DVT ppx   -Marquis Rodriguez d/w team on IDR.    74 y/o M w/ PMH of PVD s/p multiple stents, chronic lower ankle ulceration, CAD, recent admission for R groin surgical site cellulitis 2/2  recent R bypass procedure  s/p R groin wound washout/closure using gracilis muscle flap on 12/18/23 (on longterm IV via PICC), hypothyroidism, CKD, h/o COVID p/w near syncope    *Near-syncope - likely 2/2 dehydration   -IVF   -CTH: No bleeding/CVA reported   -Check orthostatics  -Remote telemetry  -Last Echo on 10/23/23  -Troponin negative x 1  -EKG: Junction rhythm, bradycardic at 57 bpm (previous EKG from 12/9/23 had sinus bradycardia of 46) -> cardio consult for further evaluation and will hold beta blocker overnight  -F/u outpatient neuro if above work up remains negative     *Acute on CKD   -On IV abx, will consult nephro for further evaluation  -I/Os  -UA  -Avoid nephrotoxic agents   -Will hold ARB / Diuretic overnight     *R groin surgical site cellulitis s/p recent vascular surgery  -Plastic surgery / Vascular consult  -ID consult    *L foot wound  -Podiatry consult   -On Abx     *Mastoiditis reported on CT  -ED contacted ENT, who did not believe this to be true mastoiditis  -F/u ID   -Outpatient ENT     *Elevated alk phos / transaminitis  -F/u outpatient GI for further evaluation if reamins stable during hospitalization     *H/o COVID   -Patient was COVID positive on 12/9/23  -Currently asymptomatic     *DVT ppx   -Marquis Rodriguez d/w team on IDR.

## 2024-01-03 NOTE — DIETITIAN INITIAL EVALUATION ADULT - ORAL INTAKE PTA/DIET HISTORY
Pt from Rehab and reports not liking the food; endorses poor intake w/ associated wt loss. Reports wife will bring in outside food. Likely meeting <75% ENN chronically.

## 2024-01-04 ENCOUNTER — TRANSCRIPTION ENCOUNTER (OUTPATIENT)
Age: 74
End: 2024-01-04

## 2024-01-04 LAB
ANION GAP SERPL CALC-SCNC: 3 MMOL/L — LOW (ref 5–17)
ANION GAP SERPL CALC-SCNC: 3 MMOL/L — LOW (ref 5–17)
BUN SERPL-MCNC: 40 MG/DL — HIGH (ref 7–23)
BUN SERPL-MCNC: 40 MG/DL — HIGH (ref 7–23)
CALCIUM SERPL-MCNC: 8.8 MG/DL — SIGNIFICANT CHANGE UP (ref 8.5–10.1)
CALCIUM SERPL-MCNC: 8.8 MG/DL — SIGNIFICANT CHANGE UP (ref 8.5–10.1)
CHLORIDE SERPL-SCNC: 113 MMOL/L — HIGH (ref 96–108)
CHLORIDE SERPL-SCNC: 113 MMOL/L — HIGH (ref 96–108)
CO2 SERPL-SCNC: 28 MMOL/L — SIGNIFICANT CHANGE UP (ref 22–31)
CO2 SERPL-SCNC: 28 MMOL/L — SIGNIFICANT CHANGE UP (ref 22–31)
CREAT SERPL-MCNC: 1.28 MG/DL — SIGNIFICANT CHANGE UP (ref 0.5–1.3)
CREAT SERPL-MCNC: 1.28 MG/DL — SIGNIFICANT CHANGE UP (ref 0.5–1.3)
EGFR: 60 ML/MIN/1.73M2 — SIGNIFICANT CHANGE UP
EGFR: 60 ML/MIN/1.73M2 — SIGNIFICANT CHANGE UP
GLUCOSE SERPL-MCNC: 98 MG/DL — SIGNIFICANT CHANGE UP (ref 70–99)
GLUCOSE SERPL-MCNC: 98 MG/DL — SIGNIFICANT CHANGE UP (ref 70–99)
HCT VFR BLD CALC: 29.4 % — LOW (ref 39–50)
HCT VFR BLD CALC: 29.4 % — LOW (ref 39–50)
HGB BLD-MCNC: 9.3 G/DL — LOW (ref 13–17)
HGB BLD-MCNC: 9.3 G/DL — LOW (ref 13–17)
MCHC RBC-ENTMCNC: 29.1 PG — SIGNIFICANT CHANGE UP (ref 27–34)
MCHC RBC-ENTMCNC: 29.1 PG — SIGNIFICANT CHANGE UP (ref 27–34)
MCHC RBC-ENTMCNC: 31.6 GM/DL — LOW (ref 32–36)
MCHC RBC-ENTMCNC: 31.6 GM/DL — LOW (ref 32–36)
MCV RBC AUTO: 91.9 FL — SIGNIFICANT CHANGE UP (ref 80–100)
MCV RBC AUTO: 91.9 FL — SIGNIFICANT CHANGE UP (ref 80–100)
PLATELET # BLD AUTO: 293 K/UL — SIGNIFICANT CHANGE UP (ref 150–400)
PLATELET # BLD AUTO: 293 K/UL — SIGNIFICANT CHANGE UP (ref 150–400)
POTASSIUM SERPL-MCNC: 3.9 MMOL/L — SIGNIFICANT CHANGE UP (ref 3.5–5.3)
POTASSIUM SERPL-MCNC: 3.9 MMOL/L — SIGNIFICANT CHANGE UP (ref 3.5–5.3)
POTASSIUM SERPL-SCNC: 3.9 MMOL/L — SIGNIFICANT CHANGE UP (ref 3.5–5.3)
POTASSIUM SERPL-SCNC: 3.9 MMOL/L — SIGNIFICANT CHANGE UP (ref 3.5–5.3)
RBC # BLD: 3.2 M/UL — LOW (ref 4.2–5.8)
RBC # BLD: 3.2 M/UL — LOW (ref 4.2–5.8)
RBC # FLD: 14.8 % — HIGH (ref 10.3–14.5)
RBC # FLD: 14.8 % — HIGH (ref 10.3–14.5)
SODIUM SERPL-SCNC: 144 MMOL/L — SIGNIFICANT CHANGE UP (ref 135–145)
SODIUM SERPL-SCNC: 144 MMOL/L — SIGNIFICANT CHANGE UP (ref 135–145)
WBC # BLD: 4.37 K/UL — SIGNIFICANT CHANGE UP (ref 3.8–10.5)
WBC # BLD: 4.37 K/UL — SIGNIFICANT CHANGE UP (ref 3.8–10.5)
WBC # FLD AUTO: 4.37 K/UL — SIGNIFICANT CHANGE UP (ref 3.8–10.5)
WBC # FLD AUTO: 4.37 K/UL — SIGNIFICANT CHANGE UP (ref 3.8–10.5)

## 2024-01-04 PROCEDURE — 99232 SBSQ HOSP IP/OBS MODERATE 35: CPT

## 2024-01-04 PROCEDURE — 99222 1ST HOSP IP/OBS MODERATE 55: CPT | Mod: 24,GC

## 2024-01-04 RX ORDER — ERTAPENEM SODIUM 1 G/1
1000 INJECTION, POWDER, LYOPHILIZED, FOR SOLUTION INTRAMUSCULAR; INTRAVENOUS EVERY 24 HOURS
Refills: 0 | Status: DISCONTINUED | OUTPATIENT
Start: 2024-01-04 | End: 2024-01-05

## 2024-01-04 RX ORDER — TIOTROPIUM BROMIDE 18 UG/1
2 CAPSULE ORAL; RESPIRATORY (INHALATION)
Qty: 0 | Refills: 0 | DISCHARGE
Start: 2024-01-04

## 2024-01-04 RX ORDER — APIXABAN 2.5 MG/1
1 TABLET, FILM COATED ORAL
Qty: 0 | Refills: 0 | DISCHARGE
Start: 2024-01-04

## 2024-01-04 RX ORDER — COLLAGENASE CLOSTRIDIUM HIST. 250 UNIT/G
1 OINTMENT (GRAM) TOPICAL
Qty: 0 | Refills: 0 | DISCHARGE
Start: 2024-01-04

## 2024-01-04 RX ORDER — METOPROLOL TARTRATE 50 MG
25 TABLET ORAL DAILY
Refills: 0 | Status: DISCONTINUED | OUTPATIENT
Start: 2024-01-04 | End: 2024-01-05

## 2024-01-04 RX ORDER — LOSARTAN/HYDROCHLOROTHIAZIDE 100MG-25MG
1 TABLET ORAL
Refills: 0 | DISCHARGE

## 2024-01-04 RX ADMIN — ATORVASTATIN CALCIUM 40 MILLIGRAM(S): 80 TABLET, FILM COATED ORAL at 21:36

## 2024-01-04 RX ADMIN — APIXABAN 5 MILLIGRAM(S): 2.5 TABLET, FILM COATED ORAL at 21:37

## 2024-01-04 RX ADMIN — Medication 650 MILLIGRAM(S): at 21:44

## 2024-01-04 RX ADMIN — Medication 100 MICROGRAM(S): at 05:39

## 2024-01-04 RX ADMIN — Medication 250 MILLIGRAM(S): at 21:55

## 2024-01-04 RX ADMIN — Medication 1000 UNIT(S): at 09:44

## 2024-01-04 RX ADMIN — Medication 1 APPLICATION(S): at 10:01

## 2024-01-04 RX ADMIN — APIXABAN 5 MILLIGRAM(S): 2.5 TABLET, FILM COATED ORAL at 09:43

## 2024-01-04 RX ADMIN — Medication 1 TABLET(S): at 09:43

## 2024-01-04 RX ADMIN — Medication 325 MILLIGRAM(S): at 09:43

## 2024-01-04 RX ADMIN — Medication 5 MILLIGRAM(S): at 21:36

## 2024-01-04 RX ADMIN — ERTAPENEM SODIUM 120 MILLIGRAM(S): 1 INJECTION, POWDER, LYOPHILIZED, FOR SOLUTION INTRAMUSCULAR; INTRAVENOUS at 01:02

## 2024-01-04 RX ADMIN — Medication 250 MILLIGRAM(S): at 09:43

## 2024-01-04 RX ADMIN — TIOTROPIUM BROMIDE 2 PUFF(S): 18 CAPSULE ORAL; RESPIRATORY (INHALATION) at 09:41

## 2024-01-04 NOTE — CONSULT NOTE ADULT - ASSESSMENT
73 year old male with extensive vascular surgery hx, most recent being a right femoral cutdown with aortogram and B/L LE angiogram, SFA covered stent placed in SFA, left axillary to anterior tibial bypass with PTFE graft 11/1/23. His right femoral site had spontaneously draining seroma, which was explored then washed out in the OR and covered with a gracilis flap 12/18/23. Vascular is consulted for evaluation of these wounds, as he is admitted for evaluation of weakness/near syncope after removal of some sutures from his right femoral site. He has been on prolonged IV abx per ID as he has PTFE nearby the wound bed addressed on 12/18/23 with a gracilis flap. His wounds are healing appropriately and may be left open to air.    Recommendations  No acute vascular surgical intervention  Continue IV abx per ID  Keep incisions clean and dry    Will be discussed w/ Dr. Sosa and updated accordingly 73 year old male with extensive vascular surgery hx, most recent being a right femoral cutdown with aortogram and B/L LE angiogram, SFA covered stent placed in SFA, left axillary to anterior tibial bypass with PTFE graft 11/1/23. His right femoral site had spontaneously draining seroma, which was explored then washed out in the OR and covered with a gracilis flap 12/18/23. Vascular is consulted for evaluation of these wounds, as he is admitted for evaluation of weakness/near syncope after removal of some sutures from his right femoral site. He has been on prolonged IV abx per ID as he has PTFE nearby the wound bed addressed on 12/18/23 with a gracilis flap. His wounds are healing appropriately.    Recommendations  No acute vascular surgical intervention  Continue IV abx per ID  Dress groin wound with dry gauze, daily changes  Needs to follow up with Dr[facility] Perkins [facility]9  Please reconsult as needed    D/w Dr. Sosa

## 2024-01-04 NOTE — DISCHARGE NOTE PROVIDER - HOSPITAL COURSE
72 y/o ma w/ PMH of PVD s/p multiple stents, chronic lower ankle ulceration, CAD, recent admission for R groin surgical site cellulitis 2/2  recent R bypass procedure  s/p R groin wound washout/closure using gracilis muscle flap on 12/18/23(on longterm IV meropenem via PICC), hypothyroidism, CKD, h/o COVID p/w near syncope. Patient states that after getting sutures removed from his groin, he slumped a bit to the side, and wasn't responding appropriately. States that he didn't lose consciousness fully, and felt like he was fainting. States he has been having decreased PO intake because he does not like the food at the rehab center.     Patient admitted with EJNIFER and near syncope- likely related to dehydration.     1/4 patient was seen  and examined. VSS. no acute overnight events.     Vital Signs Last 24 Hrs  T(C): 36.2 (04 Jan 2024 08:50), Max: 36.4 (03 Jan 2024 15:27)  T(F): 97.2 (04 Jan 2024 08:50), Max: 97.5 (03 Jan 2024 15:27)  HR: 61 (04 Jan 2024 08:50) (60 - 80)  BP: 146/50 (04 Jan 2024 08:50) (135/64 - 147/55)  BP(mean): --  RR: 18 (04 Jan 2024 08:50) (18 - 18)  SpO2: 95% (04 Jan 2024 08:50) (95% - 100%)    Parameters below as of 04 Jan 2024 08:50  Patient On (Oxygen Delivery Method): room air    ROS:   All 10 systems reviewed and found to be negative with the exception of what has been described above.     PE:  Constitutional: NAD, laying in bed  HEENT: NC/AT  Back: no tenderness  Respiratory: respirations even and non labored, LCTA- diminished   Cardiovascular: S1S2 regular, no murmurs  Abdomen: soft, not tender, not distended, positive BS  Genitourinary: voiding  Musculoskeletal: no muscle tenderness, no joint swelling or tenderness  Extremities: no pedal edema   Neurological: no focal deficits    meds/Labs- reviewed     PLAN   *Near-syncope - likely 2/2 dehydration   -IVF - completed   -CTH: No bleeding/CVA reported   -Check orthostatics- negative   -Last Echo on 10/23/23  -Troponin negative x 1  -EKG: Junction rhythm, bradycardic at 57 bpm (previous EKG from 12/9/23 had sinus bradycardia of 46) -> cardio consult for further evaluation and will hold beta blocker overnight  -F/u outpatient neuro if above work up remains negative     *Acute on CKD - resolved   -On IV abx  -I/Os  -UA  -Avoid nephrotoxic agents     *R groin surgical site cellulitis s/p recent vascular surgery  -Plastic surgery / Vascular consult- outpaitent f/u   -ID consult    *L foot wound  -Podiatry consult   -On Abx     *Mastoiditis reported on CT- no s/s of mastoiditis   -ED contacted ENT, who did not believe this to be true mastoiditis  -F/u ID   -Outpatient ENT     *Elevated alk phos / transaminitis  -F/u outpatient GI     *H/o COVID   -Patient was COVID positive on 12/9/23  -Currently asymptomatic     *DVT ppx   -Eliquis    Plan for dc to rehab             74 y/o ma w/ PMH of PVD s/p multiple stents, chronic lower ankle ulceration, CAD, recent admission for R groin surgical site cellulitis 2/2  recent R bypass procedure  s/p R groin wound washout/closure using gracilis muscle flap on 12/18/23(on longterm IV meropenem via PICC), hypothyroidism, CKD, h/o COVID p/w near syncope. Patient states that after getting sutures removed from his groin, he slumped a bit to the side, and wasn't responding appropriately. States that he didn't lose consciousness fully, and felt like he was fainting. States he has been having decreased PO intake because he does not like the food at the rehab center.     Patient admitted with JENIFER and near syncope- likely related to dehydration.     1/4 patient was seen  and examined. VSS. no acute overnight events.     Vital Signs Last 24 Hrs  T(C): 36.2 (04 Jan 2024 08:50), Max: 36.4 (03 Jan 2024 15:27)  T(F): 97.2 (04 Jan 2024 08:50), Max: 97.5 (03 Jan 2024 15:27)  HR: 61 (04 Jan 2024 08:50) (60 - 80)  BP: 146/50 (04 Jan 2024 08:50) (135/64 - 147/55)  BP(mean): --  RR: 18 (04 Jan 2024 08:50) (18 - 18)  SpO2: 95% (04 Jan 2024 08:50) (95% - 100%)    Parameters below as of 04 Jan 2024 08:50  Patient On (Oxygen Delivery Method): room air    ROS:   All 10 systems reviewed and found to be negative with the exception of what has been described above.     PE:  Constitutional: NAD, laying in bed  HEENT: NC/AT  Back: no tenderness  Respiratory: respirations even and non labored, LCTA- diminished   Cardiovascular: S1S2 regular, no murmurs  Abdomen: soft, not tender, not distended, positive BS  Genitourinary: voiding  Musculoskeletal: no muscle tenderness, no joint swelling or tenderness  Extremities: no pedal edema   Neurological: no focal deficits    meds/Labs- reviewed     PLAN   *Near-syncope - likely 2/2 dehydration   -IVF - completed   -CTH: No bleeding/CVA reported   -Check orthostatics- negative   -Last Echo on 10/23/23  -Troponin negative x 1  -EKG: Junction rhythm, bradycardic at 57 bpm (previous EKG from 12/9/23 had sinus bradycardia of 46) -> cardio consult for further evaluation and will hold beta blocker overnight  -F/u outpatient neuro if above work up remains negative     *Acute on CKD - resolved   -On IV abx  -I/Os  -UA  -Avoid nephrotoxic agents     *R groin surgical site cellulitis s/p recent vascular surgery  -Plastic surgery / Vascular consult- outpaitent f/u   -ID consult    *L foot wound  -Podiatry consult   -On Abx     *Mastoiditis reported on CT- no s/s of mastoiditis   -ED contacted ENT, who did not believe this to be true mastoiditis  -F/u ID   -Outpatient ENT     *Elevated alk phos / transaminitis  -F/u outpatient GI     *H/o COVID   -Patient was COVID positive on 12/9/23  -Currently asymptomatic     *DVT ppx   -Eliquis    Plan for dc to rehab             72 y/o ma w/ PMH of PVD s/p multiple stents, chronic lower ankle ulceration, CAD, recent admission for R groin surgical site cellulitis 2/2  recent R bypass procedure  s/p R groin wound washout/closure using gracilis muscle flap on 12/18/23(on longterm IV meropenem via PICC), hypothyroidism, CKD, h/o COVID p/w near syncope. Patient states that after getting sutures removed from his groin, he slumped a bit to the side, and wasn't responding appropriately. States that he didn't lose consciousness fully, and felt like he was fainting. States he has been having decreased PO intake because he does not like the food at the rehab center.     Patient admitted with JENIFER and near syncope- likely related to dehydration.     1/4 patient was seen  and examined. VSS. no acute overnight events.     Vital Signs Last 24 Hrs  T(C): 36.2 (04 Jan 2024 08:50), Max: 36.4 (03 Jan 2024 15:27)  T(F): 97.2 (04 Jan 2024 08:50), Max: 97.5 (03 Jan 2024 15:27)  HR: 61 (04 Jan 2024 08:50) (60 - 80)  BP: 146/50 (04 Jan 2024 08:50) (135/64 - 147/55)  BP(mean): --  RR: 18 (04 Jan 2024 08:50) (18 - 18)  SpO2: 95% (04 Jan 2024 08:50) (95% - 100%)    Parameters below as of 04 Jan 2024 08:50  Patient On (Oxygen Delivery Method): room air    ROS:   All 10 systems reviewed and found to be negative with the exception of what has been described above.     PE:  Constitutional: NAD, laying in bed  HEENT: NC/AT  Back: no tenderness  Respiratory: respirations even and non labored, LCTA- diminished   Cardiovascular: S1S2 regular, no murmurs  Abdomen: soft, not tender, not distended, positive BS  Genitourinary: voiding  Musculoskeletal: no muscle tenderness, no joint swelling or tenderness  Extremities: no pedal edema   Neurological: no focal deficits    meds/Labs- reviewed     PLAN   *Near-syncope - likely 2/2 dehydration   -IVF - completed   -CTH: No bleeding/CVA reported   -Check orthostatics- negative   -Last Echo on 10/23/23  -Troponin negative x 1  -EKG: Junction rhythm, bradycardic at 57 bpm (previous EKG from 12/9/23 had sinus bradycardia of 46) -> cardio consult for further evaluation and will hold beta blocker overnight  - d/w Dr Chua- will dc on 40mg torsemide- f/u with cardiology as an outpt     *Acute on CKD - resolved  -I/Os  -UA  -Avoid nephrotoxic agents     *R groin surgical site cellulitis s/p recent vascular surgery  -Plastic surgery / Vascular consult- outpaitent f/u   -ID consult  -continue IV antibiotics     *HTN- c/w antihypertensive regimen     *L foot wound  -Podiatry consult appreciated   -On Abx     *Mastoiditis reported on CT- no s/s of mastoiditis   -ED contacted ENT, who did not believe this to be true mastoiditis  -F/u ID   -Outpatient ENT     *hypokalemia- replete and monitor     *Elevated alk phos / transaminitis  -F/u outpatient GI     *H/o COVID   -Patient was COVID positive on 12/9/23  -Currently asymptomatic     *DVT ppx   -Eliquis    Plan for dc to rehab

## 2024-01-04 NOTE — CONSULT NOTE ADULT - SUBJECTIVE AND OBJECTIVE BOX
Patient is a 73y old  Male who presents with a chief complaint of near syncope (04 Jan 2024 14:38)    ________________________________  MJanee SALDIVAR is a 73y year old Male with a past medical history of nonobstructive coronary disease on coronary angiogram 10/27/2023, prior tobacco abuse with abdominal aortic aneurysm status postrepair in 2011, peripheral arterial disease status post multiple interventions, including right to left femorofemoral bypass, multiple endovascular revascularization including stent to left subclavian, hypertension, hyperlipidemia, hypothyroidism, history of brain cancer status post coiling, COPD, anemia and moderate aortic stenosis (2023).  He was recently admitted for cellulitis at right groin site requiring washout and closure with muscle flap and required IV antibiotics.    He now presents from rehab with near syncope.  Stated his oral intake was low because he did not like the food.  This occurred after getting sutures removed.  He slumped over, felt dizzy and lightheaded but no chest discomfort or palpitations.  Was noted to have JENIFER with dehydration arrival.  Cardiac enzymes negative.         PREVIOUS CARDIAC WORKUP:      IMPRESSION:   No evidence of active chest disease.    --- End of Report -    ________________________________  Review of systems: A 10 point review of system has been performed, and is negative except for what has been mentioned in the above history of present illness.     PAST MEDICAL & SURGICAL HISTORY:  Essential hypertension      PVD (peripheral vascular disease)      Gastroesophageal reflux disease, esophagitis presence not specified      Hypothyroidism      Chronic obstructive pulmonary disease, unspecified COPD type      Eczema      Hyperlipidemia, unspecified hyperlipidemia type      Medial meniscus tear  left knee      Femoral popliteal artery thrombus  h/o Fem pop bypass 1997      PVD (peripheral vascular disease)  s/p peripheral stent insertion bilateral lower extemities x 10        FAMILY HISTORY:       SOCIAL HISTORY: The patient denies any tobacco abuse, alcohol abuse or illicit drug use.    ALLERGIES:  IV Contrast (Hives)  Betadine (Hives; Rash)    Home Medications:  apixaban 5 mg oral tablet: 1 tab(s) orally 2 times a day (04 Jan 2024 14:25)  collagenase 250 units/g topical ointment: 1 Apply topically to affected area once a day (04 Jan 2024 14:25)  docusate sodium 100 mg oral capsule: 2 cap(s) orally once a day (at bedtime) , HOLD for Loose stool (02 Jan 2024 19:22)  ferrous sulfate 325 mg (65 mg elemental iron) oral tablet: 1 tab(s) orally once a day (02 Jan 2024 19:25)  Florastor 250 mg oral capsule: 1 cap(s) orally 2 times a day for 56 days (02 Jan 2024 19:28)  Invanz 1 g injection: 1 gram(s) intravenously once a day for 6 weeks (02 Jan 2024 19:23)  ipratropium-albuterol 0.5 mg-2.5 mg/3 mL inhalation solution: 3 milliliter(s) by nebulizer every 6 hours as needed for  shortness of breath and/or wheezing (02 Jan 2024 19:29)  levothyroxine 100 mcg (0.1 mg) oral tablet: 1 tab(s) orally once a day (02 Jan 2024 19:25)  melatonin 3 mg oral tablet: 1 tab(s) orally once a day (at bedtime) (02 Jan 2024 19:23)  metoprolol succinate 25 mg oral tablet, extended release: 1 tab(s) orally once a day (02 Jan 2024 19:25)  Nephro-Bela oral tablet: 1 tab(s) orally once a day (02 Jan 2024 19:29)  polyethylene glycol 3350 oral powder for reconstitution: 17 gram(s) orally once a day (02 Jan 2024 19:25)  potassium chloride 10 mEq oral tablet, extended release: 1 tab(s) orally once a day (02 Jan 2024 19:29)  senna leaf extract oral tablet: 2 tab(s) orally once a day (at bedtime) (02 Jan 2024 19:25)  simvastatin 20 mg oral tablet: 1 tab(s) orally once a day (02 Jan 2024 19:29)  tiotropium 2.5 mcg/inh inhalation aerosol: 2 puff(s) inhaled once a day (04 Jan 2024 14:25)  torsemide 20 mg oral tablet: 3 tab(s) orally once a day (02 Jan 2024 19:25)  Tylenol 325 mg oral tablet: 2 tab(s) orally every 6 hours as needed for pain (02 Jan 2024 19:24)  Vitamin D3 1000 intl units oral capsule: 1 cap(s) orally once a day (02 Jan 2024 19:25)    MEDICATIONS  (STANDING):  apixaban 5 milliGRAM(s) Oral two times a day  atorvastatin 40 milliGRAM(s) Oral at bedtime  cholecalciferol 1000 Unit(s) Oral daily  collagenase Ointment 1 Application(s) Topical daily  ertapenem  IVPB 1000 milliGRAM(s) IV Intermittent every 24 hours  ferrous    sulfate 325 milliGRAM(s) Oral daily  influenza  Vaccine (HIGH DOSE) 0.7 milliLiter(s) IntraMuscular once  levothyroxine 100 MICROGram(s) Oral daily  multivitamin 1 Tablet(s) Oral daily  saccharomyces boulardii 250 milliGRAM(s) Oral two times a day  senna 2 Tablet(s) Oral at bedtime  sodium chloride 0.9%. 1000 milliLiter(s) (100 mL/Hr) IV Continuous <Continuous>  sodium chloride 0.9%. 1000 milliLiter(s) (125 mL/Hr) IV Continuous <Continuous>  tiotropium 2.5 MICROgram(s) Inhaler 2 Puff(s) Inhalation daily    MEDICATIONS  (PRN):  acetaminophen     Tablet .. 650 milliGRAM(s) Oral every 6 hours PRN Mild Pain (1 - 3)  albuterol    90 MICROgram(s) HFA Inhaler 2 Puff(s) Inhalation every 6 hours PRN Bronchospasm  melatonin 5 milliGRAM(s) Oral at bedtime PRN Insomnia  polyethylene glycol 3350 17 Gram(s) Oral daily PRN Constipation    Vital Signs Last 24 Hrs  T(C): 36.2 (04 Jan 2024 15:38), Max: 36.4 (03 Jan 2024 21:00)  T(F): 97.2 (04 Jan 2024 15:38), Max: 97.5 (03 Jan 2024 21:00)  HR: 69 (04 Jan 2024 15:38) (61 - 80)  BP: 151/54 (04 Jan 2024 15:38) (146/50 - 151/54)  BP(mean): --  RR: 19 (04 Jan 2024 15:38) (18 - 19)  SpO2: 96% (04 Jan 2024 15:38) (95% - 100%)    Parameters below as of 04 Jan 2024 15:38  Patient On (Oxygen Delivery Method): room air      I&O's Summary    03 Jan 2024 07:01  -  04 Jan 2024 07:00  --------------------------------------------------------  IN: 0 mL / OUT: 325 mL / NET: -325 mL      ________________________________  GENERAL APPEARANCE:  No acute distress  HEAD: normocephalic, atraumatic  NECK: supple, no jugular venous distention, no carotid bruit    HEART: Regular rate and rhythm, S1, S2 normal, 1/6 murmur    CHEST:  No anterior chest wall tenderness    LUNGS:  Clear to auscultation, without any wheezing, rhonchi or rales    ABDOMEN: soft, nontender, nondistended, with positive bowel sounds appreciated  EXTREMITIES: no edema.   NEURO: Alert and oriented x3  PSYC:  Normal affect  SKIN:  Dry  ________________________________      ECG: Per my interpretation, sinus rhythm at 57 bpm with nonspecific changes.  Infrequent PVCs.    LABS:                        9.3    4.37  )-----------( 293      ( 04 Jan 2024 07:34 )             29.4             01-04    144  |  113<H>  |  40<H>  ----------------------------<  98  3.9   |  28  |  1.28    Ca    8.8      04 Jan 2024 07:34    TPro  6.9  /  Alb  2.8<L>  /  TBili  0.5  /  DBili  x   /  AST  47<H>  /  ALT  23  /  AlkPhos  198<H>  01-03      LIVER FUNCTIONS - ( 03 Jan 2024 07:32 )  Alb: 2.8 g/dL / Pro: 6.9 gm/dL / ALK PHOS: 198 U/L / ALT: 23 U/L / AST: 47 U/L / GGT: x         PT/INR - ( 03 Jan 2024 07:32 )   PT: 20.0 sec;   INR: 1.80 ratio         PTT - ( 03 Jan 2024 07:32 )  PTT:34.2 sec  Urinalysis Basic - ( 04 Jan 2024 07:34 )    Color: x / Appearance: x / SG: x / pH: x  Gluc: 98 mg/dL / Ketone: x  / Bili: x / Urobili: x   Blood: x / Protein: x / Nitrite: x   Leuk Esterase: x / RBC: x / WBC x   Sq Epi: x / Non Sq Epi: x / Bacteria: x        PT/INR - ( 03 Jan 2024 07:32 )   PT: 20.0 sec;   INR: 1.80 ratio         PTT - ( 03 Jan 2024 07:32 )  PTT:34.2 sec  Urinalysis Basic - ( 04 Jan 2024 07:34 )    Color: x / Appearance: x / SG: x / pH: x  Gluc: 98 mg/dL / Ketone: x  / Bili: x / Urobili: x   Blood: x / Protein: x / Nitrite: x   Leuk Esterase: x / RBC: x / WBC x   Sq Epi: x / Non Sq Epi: x / Bacteria: x             ________________________________    RADIOLOGY & ADDITIONAL STUDIES: IMPRESSION:    No acute intracranial hemorrhage.    Moderate generalized volume loss.    Left parasellar aneurysmal coil.    Left-sided mastoiditis.    IMPRESSION:   No evidence of active chest disease.    --- End of Report -`  ________________________________    ASSESSMENT:  Near syncope  Nonobstructive coronary artery disease  Moderate restenosis  Mild systolic dysfunction with a EF of 45% on cath  Extensive peripheral arterial disease history status post multiple surgeries and endovascular intervention  Abdominal aortic aneurysm status postrepair  Hypertension  Hyperlipidemia  History of cerebral aneurysm status post intervention  COPD  JENIFER-improved with IV fluids      PLAN:  In summary, this is a 73y Male with a past medical history of extensive vascular history as noted above, nonobstructive CAD, mild systolic dysfunction and hypertension-admit admitted for near syncope likely due to decreased oral intake.  Was noted to have JENIFER which improved with IV fluids.  No history of chest discomfort or shortness of breath.  EKG showed sinus rhythm/sinus bradycardia with PVCs.  No AV block on telemetry.  No need for repeat echo.  Continue statin and anticoagulation.  Resume beta-blocker for history of cardiomyopathy.    ____________________________________________  (Dragon Dictation software used). Thank you for allowing me to participate in the care of your patient. Please contact me should any questions arise.    HARESH Chua DO, FACC  Office: [phone]      Patient is a 73y old  Male who presents with a chief complaint of near syncope (04 Jan 2024 14:38)    ________________________________  MJanee SALDIVAR is a 73y year old Male with a past medical history of nonobstructive coronary disease on coronary angiogram 10/27/2023, prior tobacco abuse with abdominal aortic aneurysm status postrepair in 2011, peripheral arterial disease status post multiple interventions, including right to left femorofemoral bypass, multiple endovascular revascularization including stent to left subclavian, hypertension, hyperlipidemia, hypothyroidism, history of brain cancer status post coiling, COPD, anemia and moderate aortic stenosis (2023).  He was recently admitted for cellulitis at right groin site requiring washout and closure with muscle flap and required IV antibiotics.    He now presents from rehab with near syncope.  Stated his oral intake was low because he did not like the food.  This occurred after getting sutures removed.  He slumped over, felt dizzy and lightheaded but no chest discomfort or palpitations.  Was noted to have JENIFER with dehydration arrival.  Cardiac enzymes negative.         PREVIOUS CARDIAC WORKUP:      IMPRESSION:   No evidence of active chest disease.    --- End of Report -    ________________________________  Review of systems: A 10 point review of system has been performed, and is negative except for what has been mentioned in the above history of present illness.     PAST MEDICAL & SURGICAL HISTORY:  Essential hypertension      PVD (peripheral vascular disease)      Gastroesophageal reflux disease, esophagitis presence not specified      Hypothyroidism      Chronic obstructive pulmonary disease, unspecified COPD type      Eczema      Hyperlipidemia, unspecified hyperlipidemia type      Medial meniscus tear  left knee      Femoral popliteal artery thrombus  h/o Fem pop bypass 1997      PVD (peripheral vascular disease)  s/p peripheral stent insertion bilateral lower extemities x 10        FAMILY HISTORY:       SOCIAL HISTORY: The patient denies any tobacco abuse, alcohol abuse or illicit drug use.    ALLERGIES:  IV Contrast (Hives)  Betadine (Hives; Rash)    Home Medications:  apixaban 5 mg oral tablet: 1 tab(s) orally 2 times a day (04 Jan 2024 14:25)  collagenase 250 units/g topical ointment: 1 Apply topically to affected area once a day (04 Jan 2024 14:25)  docusate sodium 100 mg oral capsule: 2 cap(s) orally once a day (at bedtime) , HOLD for Loose stool (02 Jan 2024 19:22)  ferrous sulfate 325 mg (65 mg elemental iron) oral tablet: 1 tab(s) orally once a day (02 Jan 2024 19:25)  Florastor 250 mg oral capsule: 1 cap(s) orally 2 times a day for 56 days (02 Jan 2024 19:28)  Invanz 1 g injection: 1 gram(s) intravenously once a day for 6 weeks (02 Jan 2024 19:23)  ipratropium-albuterol 0.5 mg-2.5 mg/3 mL inhalation solution: 3 milliliter(s) by nebulizer every 6 hours as needed for  shortness of breath and/or wheezing (02 Jan 2024 19:29)  levothyroxine 100 mcg (0.1 mg) oral tablet: 1 tab(s) orally once a day (02 Jan 2024 19:25)  melatonin 3 mg oral tablet: 1 tab(s) orally once a day (at bedtime) (02 Jan 2024 19:23)  metoprolol succinate 25 mg oral tablet, extended release: 1 tab(s) orally once a day (02 Jan 2024 19:25)  Nephro-Bela oral tablet: 1 tab(s) orally once a day (02 Jan 2024 19:29)  polyethylene glycol 3350 oral powder for reconstitution: 17 gram(s) orally once a day (02 Jan 2024 19:25)  potassium chloride 10 mEq oral tablet, extended release: 1 tab(s) orally once a day (02 Jan 2024 19:29)  senna leaf extract oral tablet: 2 tab(s) orally once a day (at bedtime) (02 Jan 2024 19:25)  simvastatin 20 mg oral tablet: 1 tab(s) orally once a day (02 Jan 2024 19:29)  tiotropium 2.5 mcg/inh inhalation aerosol: 2 puff(s) inhaled once a day (04 Jan 2024 14:25)  torsemide 20 mg oral tablet: 3 tab(s) orally once a day (02 Jan 2024 19:25)  Tylenol 325 mg oral tablet: 2 tab(s) orally every 6 hours as needed for pain (02 Jan 2024 19:24)  Vitamin D3 1000 intl units oral capsule: 1 cap(s) orally once a day (02 Jan 2024 19:25)    MEDICATIONS  (STANDING):  apixaban 5 milliGRAM(s) Oral two times a day  atorvastatin 40 milliGRAM(s) Oral at bedtime  cholecalciferol 1000 Unit(s) Oral daily  collagenase Ointment 1 Application(s) Topical daily  ertapenem  IVPB 1000 milliGRAM(s) IV Intermittent every 24 hours  ferrous    sulfate 325 milliGRAM(s) Oral daily  influenza  Vaccine (HIGH DOSE) 0.7 milliLiter(s) IntraMuscular once  levothyroxine 100 MICROGram(s) Oral daily  multivitamin 1 Tablet(s) Oral daily  saccharomyces boulardii 250 milliGRAM(s) Oral two times a day  senna 2 Tablet(s) Oral at bedtime  sodium chloride 0.9%. 1000 milliLiter(s) (100 mL/Hr) IV Continuous <Continuous>  sodium chloride 0.9%. 1000 milliLiter(s) (125 mL/Hr) IV Continuous <Continuous>  tiotropium 2.5 MICROgram(s) Inhaler 2 Puff(s) Inhalation daily    MEDICATIONS  (PRN):  acetaminophen     Tablet .. 650 milliGRAM(s) Oral every 6 hours PRN Mild Pain (1 - 3)  albuterol    90 MICROgram(s) HFA Inhaler 2 Puff(s) Inhalation every 6 hours PRN Bronchospasm  melatonin 5 milliGRAM(s) Oral at bedtime PRN Insomnia  polyethylene glycol 3350 17 Gram(s) Oral daily PRN Constipation    Vital Signs Last 24 Hrs  T(C): 36.2 (04 Jan 2024 15:38), Max: 36.4 (03 Jan 2024 21:00)  T(F): 97.2 (04 Jan 2024 15:38), Max: 97.5 (03 Jan 2024 21:00)  HR: 69 (04 Jan 2024 15:38) (61 - 80)  BP: 151/54 (04 Jan 2024 15:38) (146/50 - 151/54)  BP(mean): --  RR: 19 (04 Jan 2024 15:38) (18 - 19)  SpO2: 96% (04 Jan 2024 15:38) (95% - 100%)    Parameters below as of 04 Jan 2024 15:38  Patient On (Oxygen Delivery Method): room air      I&O's Summary    03 Jan 2024 07:01  -  04 Jan 2024 07:00  --------------------------------------------------------  IN: 0 mL / OUT: 325 mL / NET: -325 mL      ________________________________  GENERAL APPEARANCE:  No acute distress  HEAD: normocephalic, atraumatic  NECK: supple, no jugular venous distention, no carotid bruit    HEART: Regular rate and rhythm, S1, S2 normal, 1/6 murmur    CHEST:  No anterior chest wall tenderness    LUNGS:  Clear to auscultation, without any wheezing, rhonchi or rales    ABDOMEN: soft, nontender, nondistended, with positive bowel sounds appreciated  EXTREMITIES: no edema.   NEURO: Alert and oriented x3  PSYC:  Normal affect  SKIN:  Dry  ________________________________      ECG: Per my interpretation, sinus rhythm at 57 bpm with nonspecific changes.  Infrequent PVCs.    LABS:                        9.3    4.37  )-----------( 293      ( 04 Jan 2024 07:34 )             29.4             01-04    144  |  113<H>  |  40<H>  ----------------------------<  98  3.9   |  28  |  1.28    Ca    8.8      04 Jan 2024 07:34    TPro  6.9  /  Alb  2.8<L>  /  TBili  0.5  /  DBili  x   /  AST  47<H>  /  ALT  23  /  AlkPhos  198<H>  01-03      LIVER FUNCTIONS - ( 03 Jan 2024 07:32 )  Alb: 2.8 g/dL / Pro: 6.9 gm/dL / ALK PHOS: 198 U/L / ALT: 23 U/L / AST: 47 U/L / GGT: x         PT/INR - ( 03 Jan 2024 07:32 )   PT: 20.0 sec;   INR: 1.80 ratio         PTT - ( 03 Jan 2024 07:32 )  PTT:34.2 sec  Urinalysis Basic - ( 04 Jan 2024 07:34 )    Color: x / Appearance: x / SG: x / pH: x  Gluc: 98 mg/dL / Ketone: x  / Bili: x / Urobili: x   Blood: x / Protein: x / Nitrite: x   Leuk Esterase: x / RBC: x / WBC x   Sq Epi: x / Non Sq Epi: x / Bacteria: x        PT/INR - ( 03 Jan 2024 07:32 )   PT: 20.0 sec;   INR: 1.80 ratio         PTT - ( 03 Jan 2024 07:32 )  PTT:34.2 sec  Urinalysis Basic - ( 04 Jan 2024 07:34 )    Color: x / Appearance: x / SG: x / pH: x  Gluc: 98 mg/dL / Ketone: x  / Bili: x / Urobili: x   Blood: x / Protein: x / Nitrite: x   Leuk Esterase: x / RBC: x / WBC x   Sq Epi: x / Non Sq Epi: x / Bacteria: x             ________________________________    RADIOLOGY & ADDITIONAL STUDIES: IMPRESSION:    No acute intracranial hemorrhage.    Moderate generalized volume loss.    Left parasellar aneurysmal coil.    Left-sided mastoiditis.    IMPRESSION:   No evidence of active chest disease.    --- End of Report -`  ________________________________    ASSESSMENT:  Near syncope  Nonobstructive coronary artery disease  Moderate restenosis  Mild systolic dysfunction with a EF of 45% on cath  Extensive peripheral arterial disease history status post multiple surgeries and endovascular intervention  Abdominal aortic aneurysm status postrepair  Hypertension  Hyperlipidemia  History of cerebral aneurysm status post intervention  COPD  JENIFER-improved with IV fluids      PLAN:  In summary, this is a 73y Male with a past medical history of extensive vascular history as noted above, nonobstructive CAD, mild systolic dysfunction and hypertension-admit admitted for near syncope likely due to decreased oral intake.  Was noted to have JENIFER which improved with IV fluids.  No history of chest discomfort or shortness of breath.  EKG showed sinus rhythm/sinus bradycardia with PVCs.  No AV block on telemetry.  No need for repeat echo.  Continue statin and anticoagulation.  Resume beta-blocker for history of cardiomyopathy.    ____________________________________________  (Dragon Dictation software used). Thank you for allowing me to participate in the care of your patient. Please contact me should any questions arise.    HARESH Chua DO, FACC  Office: [phone]

## 2024-01-04 NOTE — CONSULT NOTE ADULT - ATTENDING COMMENTS
I agree with the assessment and plan
Sent to ED from office for hypotension   Incisions c/d/i, palpable pulse in L DP  Will need to follow up in wound care center on Tuesday 1/9 with Dr. Perkins

## 2024-01-04 NOTE — DISCHARGE NOTE PROVIDER - NSDCMRMEDTOKEN_GEN_ALL_CORE_FT
apixaban 5 mg oral tablet: 1 tab(s) orally 2 times a day  collagenase 250 units/g topical ointment: 1 Apply topically to affected area once a day  docusate sodium 100 mg oral capsule: 2 cap(s) orally once a day (at bedtime) , HOLD for Loose stool  ferrous sulfate 325 mg (65 mg elemental iron) oral tablet: 1 tab(s) orally once a day  Florastor 250 mg oral capsule: 1 cap(s) orally 2 times a day for 56 days  Invanz 1 g injection: 1 gram(s) intravenously once a day for 6 weeks  ipratropium-albuterol 0.5 mg-2.5 mg/3 mL inhalation solution: 3 milliliter(s) by nebulizer every 6 hours as needed for  shortness of breath and/or wheezing  levothyroxine 100 mcg (0.1 mg) oral tablet: 1 tab(s) orally once a day  melatonin 3 mg oral tablet: 1 tab(s) orally once a day (at bedtime)  metoprolol succinate 25 mg oral tablet, extended release: 1 tab(s) orally once a day  Nephro-Bela oral tablet: 1 tab(s) orally once a day  polyethylene glycol 3350 oral powder for reconstitution: 17 gram(s) orally once a day  potassium chloride 10 mEq oral tablet, extended release: 1 tab(s) orally once a day  senna leaf extract oral tablet: 2 tab(s) orally once a day (at bedtime)  simvastatin 20 mg oral tablet: 1 tab(s) orally once a day  tiotropium 2.5 mcg/inh inhalation aerosol: 2 puff(s) inhaled once a day  torsemide 20 mg oral tablet: 3 tab(s) orally once a day  Tylenol 325 mg oral tablet: 2 tab(s) orally every 6 hours as needed for pain  Vitamin D3 1000 intl units oral capsule: 1 cap(s) orally once a day   apixaban 5 mg oral tablet: 1 tab(s) orally 2 times a day  collagenase 250 units/g topical ointment: 1 Apply topically to affected area once a day  docusate sodium 100 mg oral capsule: 2 cap(s) orally once a day (at bedtime) , HOLD for Loose stool  ferrous sulfate 325 mg (65 mg elemental iron) oral tablet: 1 tab(s) orally once a day  Florastor 250 mg oral capsule: 1 cap(s) orally 2 times a day for 56 days  Invanz 1 g injection: 1 gram(s) intravenously once a day for 6 weeks  ipratropium-albuterol 0.5 mg-2.5 mg/3 mL inhalation solution: 3 milliliter(s) by nebulizer every 6 hours as needed for  shortness of breath and/or wheezing  levothyroxine 100 mcg (0.1 mg) oral tablet: 1 tab(s) orally once a day  losartan 50 mg oral tablet: 1 tab(s) orally once a day  melatonin 3 mg oral tablet: 1 tab(s) orally once a day (at bedtime)  metoprolol succinate 25 mg oral tablet, extended release: 1 tab(s) orally once a day  Nephro-Bela oral tablet: 1 tab(s) orally once a day  polyethylene glycol 3350 oral powder for reconstitution: 17 gram(s) orally once a day  potassium chloride 10 mEq oral tablet, extended release: 1 tab(s) orally once a day  senna leaf extract oral tablet: 2 tab(s) orally once a day (at bedtime)  simvastatin 20 mg oral tablet: 1 tab(s) orally once a day  thiamine 100 mg oral tablet: 1 tab(s) orally once a day  tiotropium 2.5 mcg/inh inhalation aerosol: 2 puff(s) inhaled once a day  torsemide 20 mg oral tablet: 2 tab(s) orally once a day  Tylenol 325 mg oral tablet: 2 tab(s) orally every 6 hours as needed for pain  Vitamin D3 1000 intl units oral capsule: 1 cap(s) orally once a day

## 2024-01-04 NOTE — DISCHARGE NOTE PROVIDER - NPI NUMBER (FOR [employer] USE ONLY) :
[[id number]] [[id number]]

## 2024-01-04 NOTE — PROGRESS NOTE ADULT - ASSESSMENT
72 y/o male with h/o HTN, GERD,  severe PVD s/p multiple stents b/l legs, s/p L  fem/popliteal bypass, chronic left lower ankle ulceration, COVID-19 viral syndrome, recent hospitalization on 12/9 for right inguinal cellulitis with likely postsurgical infection Peptoniphilus and MSSA s/p wound debridement, undergoing abx therapy via PICC line with ertapenem 1 gm IV qd was admitted on 1/2 for a near syncope episode. Patient states that after getting sutures removed from his groin, he slumped a bit to the side, and wasn't responding appropriately. States that he didn't lose consciousness fully, and felt like he was fainting. States he has been having decreased PO intake because he does not like the food at the rehab center. No fever or chills reported as outpatient. In ER he received ertapenem.    1. Near syncopal episode. Right inguinal cellulitis s/p recent vascular surgery. Postsurgical infection Peptoniphilus and MSSA on IV abx therapy. CRF stage 3. PVD s/p vascular bypass.   -no fever noted  -postoperative wound is healing  -on ertapenem 1 gm IV qd since 12/18  -tolerating abx well so far; no side effects noted  -local wound care  -vascular surgery evaluation  -cardiology evaluation  -continue ertapenem 500 gm IV qd  -will need 6 weeks of abx therapy for the date of surgery  -plan t complete abx therapy as outpatient   -wound care  -PICC line care  -monitor temps  -f/u CBC  -supportive care  2. Other issues:   -care per medicine       74 y/o male with h/o HTN, GERD,  severe PVD s/p multiple stents b/l legs, s/p L  fem/popliteal bypass, chronic left lower ankle ulceration, COVID-19 viral syndrome, recent hospitalization on 12/9 for right inguinal cellulitis with likely postsurgical infection Peptoniphilus and MSSA s/p wound debridement, undergoing abx therapy via PICC line with ertapenem 1 gm IV qd was admitted on 1/2 for a near syncope episode. Patient states that after getting sutures removed from his groin, he slumped a bit to the side, and wasn't responding appropriately. States that he didn't lose consciousness fully, and felt like he was fainting. States he has been having decreased PO intake because he does not like the food at the rehab center. No fever or chills reported as outpatient. In ER he received ertapenem.    1. Near syncopal episode. Right inguinal cellulitis s/p recent vascular surgery. Postsurgical infection Peptoniphilus and MSSA on IV abx therapy. CRF stage 3. PVD s/p vascular bypass.   -no fever noted  -postoperative wound is healing  -on ertapenem 1 gm IV qd since 12/18  -tolerating abx well so far; no side effects noted  -local wound care  -vascular surgery evaluation  -cardiology evaluation  -continue ertapenem 500 gm IV qd  -will need 6 weeks of abx therapy for the date of surgery  -plan t complete abx therapy as outpatient   -wound care  -PICC line care  -monitor temps  -f/u CBC  -supportive care  2. Other issues:   -care per medicine

## 2024-01-04 NOTE — PROGRESS NOTE ADULT - SUBJECTIVE AND OBJECTIVE BOX
72 y/o ma w/ PMH of PVD s/p multiple stents, chronic lower ankle ulceration, CAD, recent admission for R groin surgical site cellulitis 2/2  recent R bypass procedure  s/p R groin wound washout/closure using gracilis muscle flap on 12/18/23(on longterm IV meropenem via PICC), hypothyroidism, CKD, h/o COVID p/w near syncope. Patient states that after getting sutures removed from his groin, he slumped a bit to the side, and wasn't responding appropriately. States that he didn't lose consciousness fully, and felt like he was fainting. States he has been having decreased PO intake because he does not like the food at the rehab center.     Patient admitted with JENIFER and near syncope- likely related to dehydration.     1/4 patient was seen  and examined. VSS. no acute overnight events.     Vital Signs Last 24 Hrs  T(C): 36.2 (04 Jan 2024 08:50), Max: 36.4 (03 Jan 2024 15:27)  T(F): 97.2 (04 Jan 2024 08:50), Max: 97.5 (03 Jan 2024 15:27)  HR: 61 (04 Jan 2024 08:50) (60 - 80)  BP: 146/50 (04 Jan 2024 08:50) (135/64 - 147/55)  BP(mean): --  RR: 18 (04 Jan 2024 08:50) (18 - 18)  SpO2: 95% (04 Jan 2024 08:50) (95% - 100%)    Parameters below as of 04 Jan 2024 08:50  Patient On (Oxygen Delivery Method): room air    ROS:   All 10 systems reviewed and found to be negative with the exception of what has been described above.     PE:  Constitutional: NAD, laying in bed  HEENT: NC/AT  Back: no tenderness  Respiratory: respirations even and non labored, LCTA- diminished   Cardiovascular: S1S2 regular, no murmurs  Abdomen: soft, not tender, not distended, positive BS  Genitourinary: voiding  Musculoskeletal: no muscle tenderness, no joint swelling or tenderness  Extremities: no pedal edema   Neurological: no focal deficits

## 2024-01-04 NOTE — PROGRESS NOTE ADULT - ASSESSMENT
PLAN   *Near-syncope - likely 2/2 dehydration   -IVF - completed   -CTH: No bleeding/CVA reported   -Check orthostatics- negative   -Last Echo on 10/23/23  -Troponin negative x 1  -EKG: Junction rhythm, bradycardic at 57 bpm (previous EKG from 12/9/23 had sinus bradycardia of 46) -> cardio consult for further evaluation and will hold beta blocker overnight  -F/u outpatient neuro if above work up remains negative     *Acute on CKD - resolved   -On IV abx  -I/Os  -UA  -Avoid nephrotoxic agents     *R groin surgical site cellulitis s/p recent vascular surgery  -Plastic surgery / Vascular consult- outpaitent f/u   -ID consult    *L foot wound  -Podiatry consult   -On Abx     *Mastoiditis reported on CT- no s/s of mastoiditis   -ED contacted ENT, who did not believe this to be true mastoiditis  -F/u ID   -Outpatient ENT     *Elevated alk phos / transaminitis  -F/u outpatient GI     *H/o COVID   -Patient was COVID positive on 12/9/23  -Currently asymptomatic     *DVT ppx   -Eliquis    Plan for dc to rehab

## 2024-01-04 NOTE — PROGRESS NOTE ADULT - SUBJECTIVE AND OBJECTIVE BOX
Date of service: 01-04-24 @ 14:39    Lying in bed in NAD  Denies pain  NO fever    ROS: no fever or chills; denies dizziness, no HA, no SOB or cough, no abdominal pain, no diarrhea or constipation; no dysuria, no legs pain, no rashes    MEDICATIONS  (STANDING):  apixaban 5 milliGRAM(s) Oral two times a day  atorvastatin 40 milliGRAM(s) Oral at bedtime  cholecalciferol 1000 Unit(s) Oral daily  collagenase Ointment 1 Application(s) Topical daily  ertapenem  IVPB 1000 milliGRAM(s) IV Intermittent every 24 hours  ferrous    sulfate 325 milliGRAM(s) Oral daily  influenza  Vaccine (HIGH DOSE) 0.7 milliLiter(s) IntraMuscular once  levothyroxine 100 MICROGram(s) Oral daily  multivitamin 1 Tablet(s) Oral daily  saccharomyces boulardii 250 milliGRAM(s) Oral two times a day  senna 2 Tablet(s) Oral at bedtime  sodium chloride 0.9%. 1000 milliLiter(s) (100 mL/Hr) IV Continuous <Continuous>  sodium chloride 0.9%. 1000 milliLiter(s) (125 mL/Hr) IV Continuous <Continuous>  tiotropium 2.5 MICROgram(s) Inhaler 2 Puff(s) Inhalation daily    Vital Signs Last 24 Hrs  T(C): 36.2 (04 Jan 2024 08:50), Max: 36.4 (03 Jan 2024 15:27)  T(F): 97.2 (04 Jan 2024 08:50), Max: 97.5 (03 Jan 2024 15:27)  HR: 61 (04 Jan 2024 08:50) (60 - 80)  BP: 146/50 (04 Jan 2024 08:50) (135/64 - 147/55)  BP(mean): --  RR: 18 (04 Jan 2024 08:50) (18 - 18)  SpO2: 95% (04 Jan 2024 08:50) (95% - 100%)    Parameters below as of 04 Jan 2024 08:50  Patient On (Oxygen Delivery Method): room air     Physical exam:    Constitutional:  No acute distress  HEENT: NC/AT, EOMI, PERRLA, conjunctivae clear; ears and nose atraumatic  Neck: supple; thyroid not palpable  Back: no tenderness  Respiratory: respiratory effort normal; clear to auscultation  Cardiovascular: S1S2 regular, no murmurs  Abdomen: soft, not tender, not distended, positive BS  Genitourinary: no suprapubic tenderness  Lymphatic: no LN palpable  Musculoskeletal: no muscle tenderness, no joint swelling or tenderness  Right inguinal postoperative wound dry, no drainage    Extremities: no pedal edema  Right medial ankle superficial ulcer, no surrounding erythema  Neurological/ Psychiatric: AxOx3, moving all extremities  Skin: no rashes; no palpable lesions    Labs: reviewed                        9.3    4.37  )-----------( 293      ( 04 Jan 2024 07:34 )             29.4     01-04    144  |  113<H>  |  40<H>  ----------------------------<  98  3.9   |  28  |  1.28    Ca    8.8      04 Jan 2024 07:34    TPro  6.9  /  Alb  2.8<L>  /  TBili  0.5  /  DBili  x   /  AST  47<H>  /  ALT  23  /  AlkPhos  198<H>  01-03                        9.8    4.95  )-----------( 320      ( 03 Jan 2024 07:32 )             31.0     01-03    144  |  108  |  53<H>  ----------------------------<  115<H>  3.3<L>   |  31  |  1.85<H>    Ca    8.6      03 Jan 2024 07:32  Mg     2.5     01-02    TPro  6.9  /  Alb  2.8<L>  /  TBili  0.5  /  DBili  x   /  AST  47<H>  /  ALT  23  /  AlkPhos  198<H>  01-03     LIVER FUNCTIONS - ( 03 Jan 2024 07:32 )  Alb: 2.8 g/dL / Pro: 6.9 gm/dL / ALK PHOS: 198 U/L / ALT: 23 U/L / AST: 47 U/L / GGT: x           Urinalysis (01-02 @ 16:28)  Urine Appearance: Clear  Protein, Urine: Negative mg/dL    Radiology: all available radiological tests reviewed    Advanced directives addressed: full resuscitation

## 2024-01-04 NOTE — DISCHARGE NOTE PROVIDER - CARE PROVIDER_API CALL
Alexy Perkins  Plastic Surgery  [address] [place] [address]  Phone: [phone]  Fax: [phone]  Follow Up Time:     Kelsey Sosa  Vascular Surgery  [address] [facility], [address]  [place] [address]  Phone: [phone]  Fax: [phone]  Follow Up Time:     Letty Chua  Cardiology  [address] [place] [address]  Phone: [phone]  Fax: [phone]  Follow Up Time:     Alvin Griffith  Internal Medicine  [address] [place] [address], [address]  [place] [address]  Phone: [phone]  Fax: [phone]  Follow Up Time:    Alexy Perkins  Plastic Surgery  [address] [place] [address]  Phone: [phone]  Fax: [phone]  Follow Up Time:     Kelsey Sosa  Vascular Surgery  [address] [facility]  [place] [address]  Phone: [phone]  Fax: [phone]  Follow Up Time:     Letty Chua  Cardiology  [address] [place] [address]  Phone: [phone]  Fax: [phone]  Follow Up Time:     Alvin Griffith  Internal Medicine  [address] [place] [address], [address]  [place] [address]  Phone: [phone]  Fax: [phone]  Follow Up Time:

## 2024-01-04 NOTE — DISCHARGE NOTE PROVIDER - NSDCCAREPROVSEEN_GEN_ALL_CORE_FT
Juwan, Geovanna Zaidi, Meenakshi White, Cesar Knight, Ashanti Ortega, Juhi Nath, Aureliano Winn, Lashanda Boone, Kimi

## 2024-01-04 NOTE — DISCHARGE NOTE PROVIDER - NSDCCPCAREPLAN_GEN_ALL_CORE_FT
PRINCIPAL DISCHARGE DIAGNOSIS  Diagnosis: Near syncope  Assessment and Plan of Treatment: lik[facility] from [facility]  encourage po fluid intake  *acute kidney injury- related to dehydration   *right groin surgical site cellulitis  - contienu antibiotics as per ID recommendations  via PICC line  you will need weekly blood work while you are on antibiotics  - follow up with vascualr surgeon and plastic surgeon  follow up with cardiologist as an outpatient.        PRINCIPAL DISCHARGE DIAGNOSIS  Diagnosis: Near syncope  Assessment and Plan of Treatment: likley from [facility]  encourage po fluid intake  *acute kidney injury- related to dehydration   *right groin surgical site cellulitis  - contienu antibiotics as per ID recommendations  via PICC line  you will need weekly blood work while you are on antibiotics  - follow up with vascualr surgeon and plastic surgeon  follow up with cardiologist as an outpatient.

## 2024-01-04 NOTE — DISCHARGE NOTE PROVIDER - PROVIDER TOKENS
PROVIDER:[TOKEN:[[id number]]],PROVIDER:[TOKEN:[[id number]]],PROVIDER:[TOKEN:[[id number]]],PROVIDER:[TOKEN:[[id number]]] PROVIDER:[TOKEN:[[id number]]],PROVIDER:[TOKEN:[[id number]]],PROVIDER:[TOKEN:[[id number]]],PROVIDER:[TOKEN:[[id number]]]

## 2024-01-05 ENCOUNTER — TRANSCRIPTION ENCOUNTER (OUTPATIENT)
Age: 74
End: 2024-01-05

## 2024-01-05 VITALS
OXYGEN SATURATION: 100 % | DIASTOLIC BLOOD PRESSURE: 59 MMHG | TEMPERATURE: 98 F | SYSTOLIC BLOOD PRESSURE: 148 MMHG | HEART RATE: 58 BPM | RESPIRATION RATE: 18 BRPM

## 2024-01-05 PROCEDURE — 99238 HOSP IP/OBS DSCHRG MGMT 30/<: CPT

## 2024-01-05 RX ORDER — THIAMINE MONONITRATE (VIT B1) 100 MG
100 TABLET ORAL DAILY
Refills: 0 | Status: DISCONTINUED | OUTPATIENT
Start: 2024-01-05 | End: 2024-01-05

## 2024-01-05 RX ORDER — LOSARTAN POTASSIUM 100 MG/1
50 TABLET, FILM COATED ORAL DAILY
Refills: 0 | Status: DISCONTINUED | OUTPATIENT
Start: 2024-01-05 | End: 2024-01-05

## 2024-01-05 RX ORDER — LOSARTAN POTASSIUM 100 MG/1
1 TABLET, FILM COATED ORAL
Qty: 0 | Refills: 0 | DISCHARGE
Start: 2024-01-05

## 2024-01-05 RX ORDER — THIAMINE MONONITRATE (VIT B1) 100 MG
1 TABLET ORAL
Qty: 0 | Refills: 0 | DISCHARGE
Start: 2024-01-05

## 2024-01-05 RX ADMIN — Medication 325 MILLIGRAM(S): at 11:23

## 2024-01-05 RX ADMIN — TIOTROPIUM BROMIDE 2 PUFF(S): 18 CAPSULE ORAL; RESPIRATORY (INHALATION) at 09:06

## 2024-01-05 RX ADMIN — Medication 25 MILLIGRAM(S): at 11:23

## 2024-01-05 RX ADMIN — Medication 100 MICROGRAM(S): at 05:30

## 2024-01-05 RX ADMIN — Medication 1 APPLICATION(S): at 13:02

## 2024-01-05 RX ADMIN — APIXABAN 5 MILLIGRAM(S): 2.5 TABLET, FILM COATED ORAL at 11:21

## 2024-01-05 RX ADMIN — Medication 250 MILLIGRAM(S): at 11:22

## 2024-01-05 RX ADMIN — Medication 1 TABLET(S): at 11:22

## 2024-01-05 RX ADMIN — Medication 1000 UNIT(S): at 11:22

## 2024-01-05 RX ADMIN — ERTAPENEM SODIUM 120 MILLIGRAM(S): 1 INJECTION, POWDER, LYOPHILIZED, FOR SOLUTION INTRAMUSCULAR; INTRAVENOUS at 00:03

## 2024-01-05 NOTE — PROGRESS NOTE ADULT - SUBJECTIVE AND OBJECTIVE BOX
74 y/o man w/ PMH of PVD s/p multiple stents, chronic lower ankle ulceration, CAD, recent admission for R groin surgical site cellulitis 2/2  recent R bypass procedure  s/p R groin wound washout/closure using gracilis muscle flap on 12/18/23(on longterm IV meropenem via PICC), hypothyroidism, CKD, h/o COVID p/w near syncope. Patient states that after getting sutures removed from his groin, he slumped a bit to the side, and wasn't responding appropriately. States that he didn't lose consciousness fully, and felt like he was fainting. States he has been having decreased PO intake because he does not like the food at the rehab center.     Patient admitted with JENIFER and near syncope- likely related to dehydration.     1/5- no acute overnight events. VSS.       Vital Signs Last 24 Hrs  T(C): 36.5 (05 Jan 2024 07:33), Max: 36.5 (05 Jan 2024 07:33)  T(F): 97.7 (05 Jan 2024 07:33), Max: 97.7 (05 Jan 2024 07:33)  HR: 71 (05 Jan 2024 11:21) (61 - 71)  BP: 140/59 (05 Jan 2024 11:21) (140/59 - 178/65)  BP(mean): --  RR: 19 (05 Jan 2024 07:33) (19 - 19)  SpO2: 95% (05 Jan 2024 09:08) (95% - 96%)    Parameters below as of 05 Jan 2024 09:08  Patient On (Oxygen Delivery Method): room air          ROS:   All 10 systems reviewed and found to be negative with the exception of what has been described above.     PE:  Constitutional: NAD, laying in bed  HEENT: NC/AT  Back: no tenderness  Respiratory: respirations even and non labored, LCTA- diminished   Cardiovascular: S1S2 regular, no murmurs  Abdomen: soft, not tender, not distended, positive BS  Genitourinary: voiding  Musculoskeletal: no muscle tenderness, no joint swelling or tenderness  Extremities: no pedal edema   Neurological: no focal deficits    meds/labs- reviewed

## 2024-01-05 NOTE — BH DISCHARGE NOTE NURSING/SOCIAL WORK/PSYCH REHAB - PATIENT PORTAL LINK FT
You can access the FollowMyHealth Patient Portal offered by [facility] by registering at the following website: [email or web address]. By joining [employer]’s FollowMyHealth portal, you will also be able to view your health information using other applications (apps) compatible with our system. You can access the FollowMyHealth Patient Portal offered by [facility] by registering at the following website: [email or web address]. By joining [facility]’s FollowMyHealth portal, you will also be able to view your health information using other applications (apps) compatible with our system.

## 2024-01-05 NOTE — PROGRESS NOTE ADULT - ASSESSMENT
PLAN   *Near-syncope - likely 2/2 dehydration   -IVF - completed   -CTH: No bleeding/CVA reported   -Check orthostatics- negative   -Last Echo on 10/23/23  -Troponin negative x 1  -EKG: Junction rhythm, bradycardic at 57 bpm (previous EKG from 12/9/23 had sinus bradycardia of 46)  -cardiology consult appreciated      *Acute on CKD - resolved   -On IV abx- will continue   -I/Os  -UA  -Avoid nephrotoxic agents     *R groin surgical site cellulitis s/p recent vascular surgery  -Plastic surgery / Vascular consult- outpatient f/u   -ID consult    *L foot wound  -Podiatry consult   -On Abx     *Mastoiditis reported on CT- no s/s of mastoiditis   -ED contacted ENT, who did not believe this to be true mastoiditis  -F/u ID   -Outpatient ENT     *Elevated alk phos / transaminitis  -F/u outpatient GI     *H/o COVID   -Patient was COVID positive on 12/9/23  -Currently asymptomatic     *DVT ppx   -Eliquis    Plan for dc to rehab awaiting insurance auth.    PLAN   *Near-syncope - likely 2/2 dehydration   -IVF - completed   -CTH: No bleeding/CVA reported   -Check orthostatics- negative   -Last Echo on 10/23/23  -Troponin negative x 1  -EKG: Junction rhythm, bradycardic at 57 bpm (previous EKG from 12/9/23 had sinus bradycardia of 46)  -cardiology consult appreciated      *Acute on CKD - resolved   -On IV abx- will continue   -I/Os  -UA  -Avoid nephrotoxic agents     *R groin surgical site cellulitis s/p recent vascular surgery  -Plastic surgery / Vascular consult- outpatient f/u   -ID consult    *L foot wound  -Podiatry consult   -On Abx     *Mastoiditis reported on CT- no s/s of mastoiditis   -ED contacted ENT, who did not believe this to be true mastoiditis  -F/u ID   -Outpatient ENT     *Elevated alk phos / transaminitis  -F/u outpatient GI     *H/o COVID   -Patient was COVID positive on 12/9/23  -Currently asymptomatic       Severe protein-calorie malnutrition.  liberalize diet to regular to maximize caloric and nutrient intake.    ensure plus high protein BID to optimize PO     *DVT ppx   -Eliquis        Plan for dc to rehab awaiting insurance auth.

## 2024-01-05 NOTE — DISCHARGE NOTE NURSING/CASE MANAGEMENT/SOCIAL WORK - PATIENT PORTAL LINK FT
You can access the FollowMyHealth Patient Portal offered by [facility] by registering at the following website: [email or web address]. By joining [facility]’s FollowMyHealth portal, you will also be able to view your health information using other applications (apps) compatible with our system. You can access the FollowMyHealth Patient Portal offered by [facility] by registering at the following website: [email or web address]. By joining [facility]’s FollowMyHealth portal, you will also be able to view your health information using other applications (apps) compatible with our system.

## 2024-01-05 NOTE — CDI QUERY NOTE - NSCDIOTHERTXTBX_GEN_ALL_CORE_HH
The patient received a nutrition assessment by a Registered Dietician on (1-3-24).  The documentation of this assessment states: (Severe protein-calorie malnutrition)       Please specify the clinical significance of these findings based on your clinical judgement such as:    -	(Severe protein-calorie malnutrition)  -	Other (please specify)  -	Clinically unable to be determined      Please document your response in your progress notes and/or discharge summary as appropriate.      Chart Documentation:    Dietitian Nutrition Risk Notification 1-3-24 @ 13:59  Upon Nutritional Assessment by the Registered Dietitian Your Patient was Determined to Meet Criteria/Has Evidence of the Following Diagnosis:  Severe protein-calorie malnutrition

## 2024-01-05 NOTE — PROGRESS NOTE ADULT - SUBJECTIVE AND OBJECTIVE BOX
Date of service: 1/5/24  HPI: 74 y/o M w/ PMH of PVD s/p multiple stents, chronic lower ankle ulceration, CAD, recent admission for R groin surgical site cellulitis 2/2  recent R bypass procedure  s/p R groin wound washout/closure using gracilis muscle flap on 12/18/23(on longterm IV meropenem via PICC), hypothyroidism, CKD, h/o COVID p/w near syncope. Patient states that after getting sutures removed from his groin, he slumped a bit to the side, and wasn't responding appropriately. States that he didn't lose consciousness fully, and felt like he was fainting. States he has been having decreased PO intake because he does not like the food at the rehab center. Denies abdominal pain, nausea, vomiting, CP, SOB, dizziness, LH, cough, runny nose, sore throat.     1/5: Pt seen by podiatry today with attending present for chronic foot wound. Pt resting bedside having dinner. NAD.    PSH: PVD s/p multiple stents, L fem / pop bypass, R groin wound washout    Social Hx: Denies tobacco / etoh / drugs     Family Hx: Denies pertinent hx    (02 Jan 2024 21:12)            PMH: Essential hypertension    PVD (peripheral vascular disease)    Gastroesophageal reflux disease, esophagitis presence not specified    Hypothyroidism    Chronic obstructive pulmonary disease, unspecified COPD type    Eczema    Hyperlipidemia, unspecified hyperlipidemia type    Medial meniscus tear      PSH:Femoral popliteal artery thrombus    PVD (peripheral vascular disease)        Allergies:IV Contrast (Hives)  Betadine (Hives; Rash)      Labs:                                     9.3    4.37  )-----------( 293      ( 04 Jan 2024 07:34 )             29.4   01-04    144  |  113<H>  |  40<H>  ----------------------------<  98  3.9   |  28  |  1.28    Ca    8.8      04 Jan 2024 07:34        Vital Signs Last 24 Hrs  T(C): 36.8 (05 Jan 2024 15:32), Max: 36.8 (05 Jan 2024 15:32)  T(F): 98.2 (05 Jan 2024 15:32), Max: 98.2 (05 Jan 2024 15:32)  HR: 58 (05 Jan 2024 15:32) (58 - 71)  BP: 148/59 (05 Jan 2024 15:32) (140/59 - 178/65)  BP(mean): --  RR: 18 (05 Jan 2024 15:32) (18 - 19)  SpO2: 100% (05 Jan 2024 15:32) (95% - 100%)    Parameters below as of 05 Jan 2024 15:32  Patient On (Oxygen Delivery Method): room air        REVIEW OF SYSTEMS:    CONSTITUTIONAL: No weakness, fevers or chills  EYES: No visual changes  RESPIRATORY: No cough, wheezing; No shortness of breath  CARDIOVASCULAR: No chest pain or palpitations  GASTROINTESTINAL: No abdominal or epigastric pain. No nausea, vomiting; No diarrhea or constipation.   GENITOURINARY: No dysuria, frequency or hematuria  NEUROLOGICAL: No numbness or weakness  SKIN: See physical examination.  All other review of systems is negative unless indicated above    Physical Exam:   Constitutional: NAD, alert;  Lower Extremity Focus  Derm:  Skin warm, dry and supple bilateral.    Ulceration to the left medial heel, wound base is mostly granular, wound size is 3cm x 3cm, no periwound edema or erythema noted. no purulence or pus noted, no tracking/tunneling noted, no PTB, no malodor  Vascular: Dorsalis Pedis and Posterior Tibial pulses non palpable.  Capillary re-fill time more then 3 seconds digits 1-5 bilateral.    Neuro: Protective sensation diminished to the level of the digits bilateral.  MSK: Muscle strength 5/5 all major muscle groups bilateral.

## 2024-01-05 NOTE — DISCHARGE NOTE NURSING/CASE MANAGEMENT/SOCIAL WORK - NSDCPEFALRISK_GEN_ALL_CORE
For information on Fall & Injury Prevention, visit: [email or web address] OR  [email or web address] OR  https://www.cdc.gov/steadi/patient.html For information on Fall & Injury Prevention, visit: [email or web address] OR  [email or web address] OR  https://www.cdc.gov/steadi/patient.html

## 2024-01-05 NOTE — PROGRESS NOTE ADULT - ASSESSMENT
Assessment: 73y old male seen for the following:   - Partial thickness wound to Left foot, chronic   - PVD  - Difficulty ambulation    Plan:   Chart reviewed and Patient evaluated;  Discussed diagnosis and treatment with patient. Discussed importance of daily foot examinations, proper shoe gear, importance of tight glycemic control.   Partial thickness wound to approximate left medial mal, mostly granular, wound is chronic and stable without acute SOI  WC: Santyl and DSD  WBAT to LLE  Offload to bilateral heels while bedbound.   All additional care per Med appreciated  Patient demonstrated verbal understanding of all interventions and tolerated interventions well without any complications.     Case D/W attending Dr. Joseph    Wound care instructions for Left Lower Extremity to be changed every other day:  - Gently remove dressings.  - Clean the wound with saline and dry it thoroughly with dry gauze  - Apply santyl, gauze, kerlix and secure it with tape

## 2024-01-16 DIAGNOSIS — R74.01 ELEVATION OF LEVELS OF LIVER TRANSAMINASE LEVELS: ICD-10-CM

## 2024-01-16 DIAGNOSIS — Z91.041 RADIOGRAPHIC DYE ALLERGY STATUS: ICD-10-CM
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
## 2024-01-16 DIAGNOSIS — E43 UNSPECIFIED SEVERE PROTEIN-CALORIE MALNUTRITION: ICD-10-CM

## 2024-01-16 DIAGNOSIS — E03.9 HYPOTHYROIDISM, UNSPECIFIED: ICD-10-CM

## 2024-01-16 DIAGNOSIS — Z79.01 LONG TERM (CURRENT) USE OF ANTICOAGULANTS: ICD-10-CM

## 2024-01-16 DIAGNOSIS — Z79.890 HORMONE REPLACEMENT THERAPY: ICD-10-CM

## 2024-01-16 DIAGNOSIS — I12.9 HYPERTENSIVE CHRONIC KIDNEY DISEASE WITH STAGE 1 THROUGH STAGE 4 CHRONIC KIDNEY DISEASE, OR UNSPECIFIED CHRONIC KIDNEY DISEASE: ICD-10-CM

## 2024-01-16 DIAGNOSIS — E86.0 DEHYDRATION: ICD-10-CM

## 2024-01-16 DIAGNOSIS — T81.43XA INFECTION FOLLOWING A PROCEDURE, ORGAN AND SPACE SURGICAL SITE, INITIAL ENCOUNTER: ICD-10-CM

## 2024-01-16 DIAGNOSIS — I73.9 PERIPHERAL VASCULAR DISEASE, UNSPECIFIED: ICD-10-CM

## 2024-01-16 DIAGNOSIS — E78.5 HYPERLIPIDEMIA, UNSPECIFIED: ICD-10-CM

## 2024-01-16 DIAGNOSIS — N17.9 ACUTE KIDNEY FAILURE, UNSPECIFIED: ICD-10-CM

## 2024-01-16 DIAGNOSIS — J44.9 CHRONIC OBSTRUCTIVE PULMONARY DISEASE, UNSPECIFIED: ICD-10-CM

## 2024-01-16 DIAGNOSIS — N18.30 CHRONIC KIDNEY DISEASE, STAGE 3 UNSPECIFIED: ICD-10-CM

## 2024-01-16 DIAGNOSIS — I42.9 CARDIOMYOPATHY, UNSPECIFIED: ICD-10-CM

## 2024-01-16 DIAGNOSIS — L03.314 CELLULITIS OF GROIN: ICD-10-CM

## 2024-01-16 DIAGNOSIS — L97.429 NON-PRESSURE CHRONIC ULCER OF LEFT HEEL AND MIDFOOT WITH UNSPECIFIED SEVERITY: ICD-10-CM

## 2024-01-16 DIAGNOSIS — E87.6 HYPOKALEMIA: ICD-10-CM

## 2024-01-16 DIAGNOSIS — K21.9 GASTRO-ESOPHAGEAL REFLUX DISEASE WITHOUT ESOPHAGITIS: ICD-10-CM

## 2024-01-16 DIAGNOSIS — D64.9 ANEMIA, UNSPECIFIED: ICD-10-CM

## 2024-01-16 DIAGNOSIS — I25.10 ATHEROSCLEROTIC HEART DISEASE OF NATIVE CORONARY ARTERY WITHOUT ANGINA PECTORIS: ICD-10-CM
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
## 2024-01-16 DIAGNOSIS — R55 SYNCOPE AND COLLAPSE: ICD-10-CM

## 2024-01-18 PROBLEM — I73.9 PAD (PERIPHERAL ARTERY DISEASE): Status: ACTIVE | Noted: 2024-01-18

## 2024-01-18 NOTE — PHYSICAL EXAM
[Normal Rate and Rhythm] : normal rate and rhythm [2+] : left 2+ [Ankle Swelling (On Exam)] : not present [Varicose Veins Of Lower Extremities] : not present [] : not present [Skin Ulcer] : ulcer [Alert] : alert [Oriented to Person] : oriented to person [Oriented to Place] : oriented to place [Oriented to Time] : oriented to time [Calm] : calm [de-identified] : NAD [de-identified] : WNL [FreeTextEntry1] : L foot wound with healthy granulation tissue R sided incisions c/d/i without erythema or drainage

## 2024-01-18 NOTE — ASSESSMENT
[FreeTextEntry1] : 73M presents for follow up after undergoing R groin washout and flap - Will follow up with plastic surgery Dr. Perkins in the wound care center - During evaluation patient became lightheaded and was found to be hypotensive. Wife notes that he has not been drinking very much at nursing home. Ambulance called and patient transferred to [facility] for evaluation

## 2024-01-18 NOTE — HISTORY OF PRESENT ILLNESS
[FreeTextEntry1] : 73M presents for follow up after undergoing R groin washout and flap placement with plastics. No complaints. Pain controlled. Has been going to wound care for L foot. No complaints today

## 2024-01-23 ENCOUNTER — APPOINTMENT (OUTPATIENT)
Dept: VASCULAR SURGERY | Facility: CLINIC | Age: 74
End: 2024-01-23

## 2024-01-25 NOTE — DIETITIAN INITIAL EVALUATION ADULT - MALNUTRITION
I attest my time as attending is greater than 50% of the total combined time spent on qualifying patient care activities by the PA/NP and attending.
Pt meets criteria for severe malnutrition in the context of acute on chronic illness

## 2024-02-12 ENCOUNTER — OUTPATIENT (OUTPATIENT)
Dept: OUTPATIENT SERVICES | Facility: HOSPITAL | Age: 74
LOS: 1 days | End: 2024-02-12
Payer: MEDICARE

## 2024-02-12 DIAGNOSIS — I11.0 HYPERTENSIVE HEART DISEASE WITH HEART FAILURE: ICD-10-CM

## 2024-02-12 DIAGNOSIS — L97.322 NON-PRESSURE CHRONIC ULCER OF LEFT ANKLE WITH FAT LAYER EXPOSED: ICD-10-CM

## 2024-02-12 DIAGNOSIS — S91.309A UNSPECIFIED OPEN WOUND, UNSPECIFIED FOOT, INITIAL ENCOUNTER: ICD-10-CM

## 2024-02-12 DIAGNOSIS — J44.9 CHRONIC OBSTRUCTIVE PULMONARY DISEASE, UNSPECIFIED: ICD-10-CM

## 2024-02-12 DIAGNOSIS — I50.9 HEART FAILURE, UNSPECIFIED: ICD-10-CM

## 2024-02-12 DIAGNOSIS — I73.9 PERIPHERAL VASCULAR DISEASE, UNSPECIFIED: Chronic | ICD-10-CM

## 2024-02-12 DIAGNOSIS — I74.3 EMBOLISM AND THROMBOSIS OF ARTERIES OF THE LOWER EXTREMITIES: Chronic | ICD-10-CM

## 2024-02-12 PROCEDURE — 11042 DBRDMT SUBQ TIS 1ST 20SQCM/<: CPT

## 2024-02-26 ENCOUNTER — OUTPATIENT (OUTPATIENT)
Dept: OUTPATIENT SERVICES | Facility: HOSPITAL | Age: 74
LOS: 1 days | End: 2024-02-26
Payer: MEDICARE

## 2024-02-26 DIAGNOSIS — S91.309A UNSPECIFIED OPEN WOUND, UNSPECIFIED FOOT, INITIAL ENCOUNTER: ICD-10-CM

## 2024-02-26 DIAGNOSIS — I73.9 PERIPHERAL VASCULAR DISEASE, UNSPECIFIED: Chronic | ICD-10-CM

## 2024-02-26 DIAGNOSIS — I74.3 EMBOLISM AND THROMBOSIS OF ARTERIES OF THE LOWER EXTREMITIES: Chronic | ICD-10-CM

## 2024-02-26 PROCEDURE — 11042 DBRDMT SUBQ TIS 1ST 20SQCM/<: CPT

## 2024-02-28 DIAGNOSIS — I50.9 HEART FAILURE, UNSPECIFIED: ICD-10-CM

## 2024-02-28 DIAGNOSIS — L97.322 NON-PRESSURE CHRONIC ULCER OF LEFT ANKLE WITH FAT LAYER EXPOSED: ICD-10-CM

## 2024-02-28 DIAGNOSIS — I11.0 HYPERTENSIVE HEART DISEASE WITH HEART FAILURE: ICD-10-CM

## 2024-02-28 DIAGNOSIS — J44.9 CHRONIC OBSTRUCTIVE PULMONARY DISEASE, UNSPECIFIED: ICD-10-CM

## 2024-03-11 ENCOUNTER — OUTPATIENT (OUTPATIENT)
Dept: OUTPATIENT SERVICES | Facility: HOSPITAL | Age: 74
LOS: 1 days | End: 2024-03-11
Payer: MEDICARE

## 2024-03-11 DIAGNOSIS — L97.322 NON-PRESSURE CHRONIC ULCER OF LEFT ANKLE WITH FAT LAYER EXPOSED: ICD-10-CM

## 2024-03-11 DIAGNOSIS — I50.9 HEART FAILURE, UNSPECIFIED: ICD-10-CM

## 2024-03-11 DIAGNOSIS — I73.9 PERIPHERAL VASCULAR DISEASE, UNSPECIFIED: Chronic | ICD-10-CM

## 2024-03-11 DIAGNOSIS — I11.0 HYPERTENSIVE HEART DISEASE WITH HEART FAILURE: ICD-10-CM

## 2024-03-11 DIAGNOSIS — I74.3 EMBOLISM AND THROMBOSIS OF ARTERIES OF THE LOWER EXTREMITIES: Chronic | ICD-10-CM

## 2024-03-11 DIAGNOSIS — J44.9 CHRONIC OBSTRUCTIVE PULMONARY DISEASE, UNSPECIFIED: ICD-10-CM

## 2024-03-11 PROCEDURE — 11042 DBRDMT SUBQ TIS 1ST 20SQCM/<: CPT

## 2024-03-13 NOTE — PHYSICAL THERAPY INITIAL EVALUATION ADULT - PLANNED THERAPY INTERVENTIONS, PT EVAL
GCE/balance training/bed mobility training/gait training/transfer training
bed mobility training/gait training/transfer training
Transient cerebral ischemia

## 2024-03-25 ENCOUNTER — OUTPATIENT (OUTPATIENT)
Dept: OUTPATIENT SERVICES | Facility: HOSPITAL | Age: 74
LOS: 1 days | End: 2024-03-25
Payer: MEDICARE

## 2024-03-25 DIAGNOSIS — I50.9 HEART FAILURE, UNSPECIFIED: ICD-10-CM

## 2024-03-25 DIAGNOSIS — L97.322 NON-PRESSURE CHRONIC ULCER OF LEFT ANKLE WITH FAT LAYER EXPOSED: ICD-10-CM

## 2024-03-25 DIAGNOSIS — J44.9 CHRONIC OBSTRUCTIVE PULMONARY DISEASE, UNSPECIFIED: ICD-10-CM

## 2024-03-25 DIAGNOSIS — I74.3 EMBOLISM AND THROMBOSIS OF ARTERIES OF THE LOWER EXTREMITIES: Chronic | ICD-10-CM

## 2024-03-25 DIAGNOSIS — I73.9 PERIPHERAL VASCULAR DISEASE, UNSPECIFIED: Chronic | ICD-10-CM

## 2024-03-25 DIAGNOSIS — I11.0 HYPERTENSIVE HEART DISEASE WITH HEART FAILURE: ICD-10-CM

## 2024-03-25 PROCEDURE — 11042 DBRDMT SUBQ TIS 1ST 20SQCM/<: CPT

## 2024-04-01 ENCOUNTER — OUTPATIENT (OUTPATIENT)
Dept: OUTPATIENT SERVICES | Facility: HOSPITAL | Age: 74
LOS: 1 days | End: 2024-04-01
Payer: MEDICARE

## 2024-04-01 DIAGNOSIS — L97.322 NON-PRESSURE CHRONIC ULCER OF LEFT ANKLE WITH FAT LAYER EXPOSED: ICD-10-CM

## 2024-04-01 DIAGNOSIS — I73.9 PERIPHERAL VASCULAR DISEASE, UNSPECIFIED: Chronic | ICD-10-CM

## 2024-04-01 DIAGNOSIS — I11.0 HYPERTENSIVE HEART DISEASE WITH HEART FAILURE: ICD-10-CM

## 2024-04-01 DIAGNOSIS — I74.3 EMBOLISM AND THROMBOSIS OF ARTERIES OF THE LOWER EXTREMITIES: Chronic | ICD-10-CM

## 2024-04-01 DIAGNOSIS — J44.9 CHRONIC OBSTRUCTIVE PULMONARY DISEASE, UNSPECIFIED: ICD-10-CM

## 2024-04-01 DIAGNOSIS — I50.9 HEART FAILURE, UNSPECIFIED: ICD-10-CM

## 2024-04-01 PROCEDURE — 11042 DBRDMT SUBQ TIS 1ST 20SQCM/<: CPT

## 2024-04-15 ENCOUNTER — OUTPATIENT (OUTPATIENT)
Dept: OUTPATIENT SERVICES | Facility: HOSPITAL | Age: 74
LOS: 1 days | End: 2024-04-15
Payer: MEDICARE

## 2024-04-15 DIAGNOSIS — I50.9 HEART FAILURE, UNSPECIFIED: ICD-10-CM

## 2024-04-15 DIAGNOSIS — L97.322 NON-PRESSURE CHRONIC ULCER OF LEFT ANKLE WITH FAT LAYER EXPOSED: ICD-10-CM

## 2024-04-15 DIAGNOSIS — I74.3 EMBOLISM AND THROMBOSIS OF ARTERIES OF THE LOWER EXTREMITIES: Chronic | ICD-10-CM

## 2024-04-15 DIAGNOSIS — J44.9 CHRONIC OBSTRUCTIVE PULMONARY DISEASE, UNSPECIFIED: ICD-10-CM

## 2024-04-15 DIAGNOSIS — I11.0 HYPERTENSIVE HEART DISEASE WITH HEART FAILURE: ICD-10-CM

## 2024-04-15 DIAGNOSIS — I73.9 PERIPHERAL VASCULAR DISEASE, UNSPECIFIED: Chronic | ICD-10-CM

## 2024-04-15 PROCEDURE — 11042 DBRDMT SUBQ TIS 1ST 20SQCM/<: CPT

## 2024-04-29 ENCOUNTER — OUTPATIENT (OUTPATIENT)
Dept: OUTPATIENT SERVICES | Facility: HOSPITAL | Age: 74
LOS: 1 days | End: 2024-04-29
Payer: MEDICARE

## 2024-04-29 DIAGNOSIS — I73.9 PERIPHERAL VASCULAR DISEASE, UNSPECIFIED: Chronic | ICD-10-CM

## 2024-04-29 DIAGNOSIS — L97.322 NON-PRESSURE CHRONIC ULCER OF LEFT ANKLE WITH FAT LAYER EXPOSED: ICD-10-CM

## 2024-04-29 DIAGNOSIS — I74.3 EMBOLISM AND THROMBOSIS OF ARTERIES OF THE LOWER EXTREMITIES: Chronic | ICD-10-CM

## 2024-04-29 PROCEDURE — 11042 DBRDMT SUBQ TIS 1ST 20SQCM/<: CPT

## 2024-05-10 DIAGNOSIS — I11.0 HYPERTENSIVE HEART DISEASE WITH HEART FAILURE: ICD-10-CM

## 2024-05-10 DIAGNOSIS — L97.322 NON-PRESSURE CHRONIC ULCER OF LEFT ANKLE WITH FAT LAYER EXPOSED: ICD-10-CM

## 2024-05-10 DIAGNOSIS — I50.9 HEART FAILURE, UNSPECIFIED: ICD-10-CM

## 2024-05-10 DIAGNOSIS — J44.9 CHRONIC OBSTRUCTIVE PULMONARY DISEASE, UNSPECIFIED: ICD-10-CM

## 2024-05-13 ENCOUNTER — OUTPATIENT (OUTPATIENT)
Dept: OUTPATIENT SERVICES | Facility: HOSPITAL | Age: 74
LOS: 1 days | End: 2024-05-13
Payer: MEDICARE

## 2024-05-13 DIAGNOSIS — L97.322 NON-PRESSURE CHRONIC ULCER OF LEFT ANKLE WITH FAT LAYER EXPOSED: ICD-10-CM

## 2024-05-13 DIAGNOSIS — I74.3 EMBOLISM AND THROMBOSIS OF ARTERIES OF THE LOWER EXTREMITIES: Chronic | ICD-10-CM

## 2024-05-13 DIAGNOSIS — I73.9 PERIPHERAL VASCULAR DISEASE, UNSPECIFIED: Chronic | ICD-10-CM

## 2024-05-13 PROCEDURE — 11042 DBRDMT SUBQ TIS 1ST 20SQCM/<: CPT

## 2024-05-21 DIAGNOSIS — L97.322 NON-PRESSURE CHRONIC ULCER OF LEFT ANKLE WITH FAT LAYER EXPOSED: ICD-10-CM

## 2024-05-21 DIAGNOSIS — I50.9 HEART FAILURE, UNSPECIFIED: ICD-10-CM

## 2024-05-21 DIAGNOSIS — J44.9 CHRONIC OBSTRUCTIVE PULMONARY DISEASE, UNSPECIFIED: ICD-10-CM

## 2024-05-21 DIAGNOSIS — I11.0 HYPERTENSIVE HEART DISEASE WITH HEART FAILURE: ICD-10-CM

## 2024-10-01 ENCOUNTER — APPOINTMENT (OUTPATIENT)
Dept: PODIATRY | Facility: CLINIC | Age: 74
End: 2024-10-01
Payer: MEDICARE

## 2024-10-01 DIAGNOSIS — L60.3 NAIL DYSTROPHY: ICD-10-CM

## 2024-10-01 DIAGNOSIS — B35.1 TINEA UNGUIUM: ICD-10-CM

## 2024-10-01 PROCEDURE — 99203 OFFICE O/P NEW LOW 30 MIN: CPT

## 2024-10-07 PROBLEM — L60.3 NAIL DYSTROPHY: Status: ACTIVE | Noted: 2024-10-07

## 2024-10-07 PROBLEM — B35.1 ONYCHOMYCOSIS: Status: ACTIVE | Noted: 2024-10-07

## 2024-10-08 NOTE — ASSESSMENT
[FreeTextEntry1] : Impression: Onychomycosis. Nail dystrophy.  Treatment: Discussed findings and conditions with the patient. Discussed pedal care. He is to examine his feet daily and avoid walking barefoot and shoe gear modifications were discussed to avoid pressure to the toes. Discussed with the patient right hallux nail deformity is most likely the result of trauma sustained to the nails several years ago. There may continue to be outgrowth of the nail. The residual nail was prepped and trimmed and manually and mechanically debrided to patient's tolerance without incidence. It was smoothed with a rotary fe to avoid any traumatic nail avulsion itself. Antibiotic ointment was applied to the area. Neosporin was applied. He is to continue with application of Neosporin for the next 2 to 3 days to keep the area soft and clear. Bilateral 3rd toenails were prepped and also manually debrided to patient's tolerance with sterile nippers and decreased in height with a rotary fe and smoothed. Subungual debris was gently curettaged without incidence. Remainder of nails were trimmed to appropriate hygienic length. Patient is to return in approximately 2 to 3 months. Any increase in pain, redness, problems, concerns, or issue with the nail itself or discomfort, he is to contact the office immediately.

## 2024-10-08 NOTE — PHYSICAL EXAM
[Varicose Veins Of Lower Extremities] : bilaterally [Ankle Swelling On The Right] : mild [1+] : left foot dorsalis pedis 1+ [FreeTextEntry3] : Temperature gradient within normal limits. Feet are warm to touch. Negative signs of acute ischemia or necrosis. CFT: 4 seconds x10. Negative pitting edema bilaterally. [de-identified] : Muscle strength 5/5. Normal arch morphology bilaterally. [FreeTextEntry4] :   decreased vibratory at the 1st MPJ [FreeTextEntry8] : decreased vibratory at the 1st MPJ [FreeTextEntry1] : Epicritic sensation and light touch are intact.

## 2024-10-08 NOTE — HISTORY OF PRESENT ILLNESS
[FreeTextEntry1] : Patient presents today for evaluation and pedal exam. He states there is a discoloration on the right hallux nail. It had gotten thickened, and the nail cracked. He states he tried to cut the nail because he thought it was digging into the skin as well. It became brittle, cracked and fell off.  He denies any bleeding at the time. He denies any redness, pain or pain with shoe gear at this time. He states that approximately 2 years ago he dropped something on the big toe which left the nail discolored and thickened but has not grown out completely normal as per the patient. The patient has had multiple surgical procedures recently for arterial issues. He was admitted into the hospital at [facility] and then had spent time in [facility] and has been home for approximately 6 weeks. He ambulates with a cane and is working on his strength for the lower extremities.

## 2024-10-08 NOTE — PHYSICAL EXAM
[Varicose Veins Of Lower Extremities] : bilaterally [Ankle Swelling On The Right] : mild [1+] : left foot dorsalis pedis 1+ [FreeTextEntry3] : Temperature gradient within normal limits. Feet are warm to touch. Negative signs of acute ischemia or necrosis. CFT: 4 seconds x10. Negative pitting edema bilaterally. [de-identified] : Muscle strength 5/5. Normal arch morphology bilaterally. [FreeTextEntry4] :   decreased vibratory at the 1st MPJ [FreeTextEntry8] : decreased vibratory at the 1st MPJ [FreeTextEntry1] : Epicritic sensation and light touch are intact.

## 2024-10-08 NOTE — PHYSICAL EXAM
[Varicose Veins Of Lower Extremities] : bilaterally [Ankle Swelling On The Right] : mild [1+] : left foot dorsalis pedis 1+ [FreeTextEntry3] : Temperature gradient within normal limits. Feet are warm to touch. Negative signs of acute ischemia or necrosis. CFT: 4 seconds x10. Negative pitting edema bilaterally. [de-identified] : Muscle strength 5/5. Normal arch morphology bilaterally. [FreeTextEntry4] :   decreased vibratory at the 1st MPJ [FreeTextEntry8] : decreased vibratory at the 1st MPJ [FreeTextEntry1] : Epicritic sensation and light touch are intact.

## 2024-10-08 NOTE — HISTORY OF PRESENT ILLNESS
[FreeTextEntry1] : Patient presents today for evaluation and pedal exam. He states there is a discoloration on the right hallux nail. It had gotten thickened, and the nail cracked. He states he tried to cut the nail because he thought it was digging into the skin as well. It became brittle, cracked and fell off.  He denies any bleeding at the time. He denies any redness, pain or pain with shoe gear at this time. He states that approximately 2 years ago he dropped something on the big toe which left the nail discolored and thickened but has not grown out completely normal as per the patient. The patient has had multiple surgical procedures recently for arterial issues. He was admitted into the hospital at [facility] and then had spent time in [facility] and has been home for approximately 6 weeks. He ambulates with a cane and is working on his strength for the lower extremities.

## 2024-10-08 NOTE — HISTORY OF PRESENT ILLNESS
[FreeTextEntry1] : Patient presents today for evaluation and pedal exam. He states there is a discoloration on the right hallux nail. It had gotten thickened, and the nail cracked. He states he tried to cut the nail because he thought it was digging into the skin as well. It became brittle, cracked and fell off.  He denies any bleeding at the time. He denies any redness, pain or pain with shoe gear at this time. He states that approximately 2 years ago he dropped something on the big toe which left the nail discolored and thickened but has not grown out completely normal as per the patient. The patient has had multiple surgical procedures recently for arterial issues. He was admitted into the hospital at [facility] and then had spent time in [facility] and has been home for approximately 6 weeks. He ambulates with a cane and is working on his strength for the lower extremities.

## 2024-11-26 ENCOUNTER — APPOINTMENT (OUTPATIENT)
Dept: PODIATRY | Facility: CLINIC | Age: 74
End: 2024-11-26
Payer: MEDICARE

## 2024-11-26 DIAGNOSIS — M20.42 OTHER HAMMER TOE(S) (ACQUIRED), LEFT FOOT: ICD-10-CM

## 2024-11-26 PROCEDURE — 99212 OFFICE O/P EST SF 10 MIN: CPT

## 2024-12-03 PROBLEM — M20.42 ACQUIRED HAMMERTOE OF LEFT FOOT: Status: ACTIVE | Noted: 2024-12-03

## 2025-05-19 NOTE — H&P PST ADULT - IS PATIENT PREGNANT?
ineffective breastfeeding/sore nipples/engorgement/knowledge deficit/feeding confusion/latch on difficulty/low supply not applicable (Male)